# Patient Record
Sex: FEMALE | Race: WHITE | NOT HISPANIC OR LATINO | Employment: OTHER | URBAN - METROPOLITAN AREA
[De-identification: names, ages, dates, MRNs, and addresses within clinical notes are randomized per-mention and may not be internally consistent; named-entity substitution may affect disease eponyms.]

---

## 2017-11-03 ENCOUNTER — LAB (OUTPATIENT)
Dept: LAB | Facility: HOSPITAL | Age: 67
End: 2017-11-03

## 2017-11-03 ENCOUNTER — TRANSCRIBE ORDERS (OUTPATIENT)
Dept: ADMINISTRATIVE | Facility: HOSPITAL | Age: 67
End: 2017-11-03

## 2017-11-03 DIAGNOSIS — I10 ESSENTIAL HYPERTENSION, MALIGNANT: Primary | ICD-10-CM

## 2017-11-03 DIAGNOSIS — I51.9 MYXEDEMA HEART DISEASE: ICD-10-CM

## 2017-11-03 DIAGNOSIS — R79.9 ABNORMAL BLOOD CHEMISTRY: ICD-10-CM

## 2017-11-03 DIAGNOSIS — E03.9 MYXEDEMA HEART DISEASE: ICD-10-CM

## 2017-11-03 DIAGNOSIS — I10 ESSENTIAL HYPERTENSION, MALIGNANT: ICD-10-CM

## 2017-11-03 LAB
ALBUMIN SERPL-MCNC: 4.1 G/DL (ref 3.5–5.2)
ALBUMIN/GLOB SERPL: 1.1 G/DL
ALP SERPL-CCNC: 73 U/L (ref 40–129)
ALT SERPL W P-5'-P-CCNC: 18 U/L (ref 5–33)
ANION GAP SERPL CALCULATED.3IONS-SCNC: 10.2 MMOL/L
AST SERPL-CCNC: 18 U/L (ref 5–32)
BILIRUB SERPL-MCNC: 0.3 MG/DL (ref 0.2–1.2)
BUN BLD-MCNC: 19 MG/DL (ref 8–23)
BUN/CREAT SERPL: 26.4 (ref 7–25)
CALCIUM SPEC-SCNC: 9.6 MG/DL (ref 8.8–10.5)
CHLORIDE SERPL-SCNC: 103 MMOL/L (ref 98–107)
CO2 SERPL-SCNC: 27.8 MMOL/L (ref 22–29)
CREAT BLD-MCNC: 0.72 MG/DL (ref 0.57–1)
GFR SERPL CREATININE-BSD FRML MDRD: 81 ML/MIN/1.73
GLOBULIN UR ELPH-MCNC: 3.6 GM/DL
GLUCOSE BLD-MCNC: 126 MG/DL (ref 65–99)
HBA1C MFR BLD: 6.3 % (ref 4.8–5.6)
POTASSIUM BLD-SCNC: 4.6 MMOL/L (ref 3.5–5.2)
PROT SERPL-MCNC: 7.7 G/DL (ref 6–8.5)
SODIUM BLD-SCNC: 141 MMOL/L (ref 136–145)
T4 FREE SERPL-MCNC: 1.69 NG/DL (ref 0.93–1.7)
TSH SERPL DL<=0.05 MIU/L-ACNC: 0.07 MIU/ML (ref 0.27–4.2)

## 2017-11-03 PROCEDURE — 36415 COLL VENOUS BLD VENIPUNCTURE: CPT

## 2017-11-03 PROCEDURE — 84443 ASSAY THYROID STIM HORMONE: CPT

## 2017-11-03 PROCEDURE — 84439 ASSAY OF FREE THYROXINE: CPT

## 2017-11-03 PROCEDURE — 80053 COMPREHEN METABOLIC PANEL: CPT

## 2017-11-03 PROCEDURE — 83036 HEMOGLOBIN GLYCOSYLATED A1C: CPT

## 2020-03-18 ENCOUNTER — TRANSCRIBE ORDERS (OUTPATIENT)
Dept: ADMINISTRATIVE | Facility: HOSPITAL | Age: 70
End: 2020-03-18

## 2020-03-18 DIAGNOSIS — C50.912 MALIGNANT NEOPLASM OF LEFT FEMALE BREAST, UNSPECIFIED ESTROGEN RECEPTOR STATUS, UNSPECIFIED SITE OF BREAST (HCC): Primary | ICD-10-CM

## 2020-03-31 ENCOUNTER — OFFICE VISIT (OUTPATIENT)
Dept: MAMMOGRAPHY | Facility: CLINIC | Age: 70
End: 2020-03-31

## 2020-03-31 VITALS
BODY MASS INDEX: 39.65 KG/M2 | DIASTOLIC BLOOD PRESSURE: 80 MMHG | SYSTOLIC BLOOD PRESSURE: 130 MMHG | WEIGHT: 210 LBS | HEIGHT: 61 IN

## 2020-03-31 DIAGNOSIS — C50.919 MALIGNANT NEOPLASM OF FEMALE BREAST, UNSPECIFIED ESTROGEN RECEPTOR STATUS, UNSPECIFIED LATERALITY, UNSPECIFIED SITE OF BREAST (HCC): Primary | ICD-10-CM

## 2020-03-31 DIAGNOSIS — Z17.0 MALIGNANT NEOPLASM OF OVERLAPPING SITES OF RIGHT BREAST IN FEMALE, ESTROGEN RECEPTOR POSITIVE (HCC): Primary | ICD-10-CM

## 2020-03-31 DIAGNOSIS — C50.811 MALIGNANT NEOPLASM OF OVERLAPPING SITES OF RIGHT BREAST IN FEMALE, ESTROGEN RECEPTOR POSITIVE (HCC): Primary | ICD-10-CM

## 2020-03-31 PROCEDURE — 99205 OFFICE O/P NEW HI 60 MIN: CPT | Performed by: SURGERY

## 2020-03-31 RX ORDER — LOSARTAN POTASSIUM 100 MG/1
100 TABLET ORAL DAILY
COMMUNITY
Start: 2017-01-09 | End: 2020-05-08

## 2020-03-31 RX ORDER — CYCLOBENZAPRINE HCL 10 MG
10 TABLET ORAL 3 TIMES DAILY PRN
COMMUNITY
End: 2020-06-18

## 2020-03-31 RX ORDER — ZOLPIDEM TARTRATE 10 MG/1
10 TABLET ORAL
COMMUNITY
Start: 2020-01-06 | End: 2020-06-18

## 2020-03-31 RX ORDER — IRBESARTAN 150 MG/1
150 TABLET ORAL EVERY MORNING
COMMUNITY
Start: 2019-06-26

## 2020-03-31 RX ORDER — FLUTICASONE PROPIONATE 50 MCG
2 SPRAY, SUSPENSION (ML) NASAL AS NEEDED
COMMUNITY
Start: 2012-03-22

## 2020-03-31 RX ORDER — EZETIMIBE 10 MG/1
10 TABLET ORAL DAILY
COMMUNITY
Start: 2019-06-26 | End: 2022-05-23 | Stop reason: SDDI

## 2020-03-31 RX ORDER — LORATADINE 10 MG/1
1 CAPSULE, LIQUID FILLED ORAL DAILY
COMMUNITY
Start: 2020-03-16

## 2020-03-31 RX ORDER — ALBUTEROL SULFATE 90 UG/1
2 AEROSOL, METERED RESPIRATORY (INHALATION) EVERY 4 HOURS PRN
COMMUNITY
Start: 2012-03-22 | End: 2020-06-18

## 2020-03-31 NOTE — PROGRESS NOTES
Chief Complaint: Mary Jo Berg is a 70 y.o.. female here today for Breast Cancer (Right IDC)        History of Present Illness:  Patient presents with newly diagnosed breast cancer. Right IDC  She is a nice 70-year-old white female who had mammograms in June 2019.  Those mammograms described some indeterminate microcalcifications in the inferior aspect of the right breast.  A six-month follow-up was obtained and the breast tissue was described as dense.  There were 4 areas of suspicious microcalcifications scattered throughout the breast.  These were felt to be suspicious and biopsy was recommended.  The calcifications in the lower outer quadrant of the right breast have revealed grade 1 invasive ductal cancer that is ER positive at 95%, CT negative, HER-2 negative, and the Ki-67 is 5%.  She also had biopsy of some calcifications in the upper inner quadrant of the right breast at the 1 o'clock position and this has returned intermediate grade DCIS.  The other 2 areas were not biopsied.    The patient has a long history of multiple callbacks for problems in the right breast but surprisingly has not had any prior biopsies.  Her family history is significant for a paternal grandmother with breast cancer and a paternal first cousin with breast cancer both in their 70s.  Her mother had cervical cancer and father had bladder cancer.    The patient's comorbidities include hypertension and type 2 diabetes mellitus.    Review of Systems:  Review of Systems   Endocrine: Positive for hot flashes.   Skin:        The patient denies any noticeable changes to the skin of the breast.    Neurological: Positive for headaches.   Hematological: Bruises/bleeds easily.   All other systems reviewed and are negative.     I have reviewed the ROS as documented by the MA/LPN/RN Bj Stephen MD      Past Medical and Surgical History:  Breast Biopsy History:  Patient has had the following breast biopsies:3/2020  Right-malignant  Breast Cancer HIstory:  Patient does not have a past medical history of breast cancer.  Breast Operations, and year:  None  Social History     Tobacco Use   Smoking Status Never Smoker   Smokeless Tobacco Never Used     Obstetric History:  Patient is postmenopausal, entered menopause naturally at age: 54   Number of pregnancies:3  Number of live births: 3  Number of abortions or miscarriages: 0  Age of delivery of first child: 22  Patient breast fed, for the following lenth of time:8 months  Length of time taking birth control pills:1 year  Patient has never taken hormone replacement    Past Surgical History:   Procedure Laterality Date   • BLADDER REPAIR  2012   • BREAST BIOPSY Right 2020    malignant   • CARDIAC PACEMAKER PLACEMENT     • CARPAL TUNNEL RELEASE Right 2015   • CATARACT EXTRACTION Bilateral    •  SECTION      x2   • COLONOSCOPY     • ENDOMETRIAL ABLATION     • HAND SURGERY     • HERNIA REPAIR      Bilateral femoral and inguinal   • KNEE ARTHROSCOPY Bilateral    • SHOULDER ARTHROSCOPY Right 2016    rotator cuff repair, biceps tenotomy   • THYROIDECTOMY, PARTIAL     • TONSILLECTOMY AND ADENOIDECTOMY     • TOTAL KNEE ARTHROPLASTY Left    • TUBAL ABDOMINAL LIGATION         Past Medical History:   Diagnosis Date   • Breast cancer (CMS/HCC) 2020    right IDC   • CTS (carpal tunnel syndrome)    • Diabetes mellitus (CMS/HCC)     Type 2   • H/O Cellulitis of left ear    • History of asthma    • History of cataract    • History of complete heart block     paced in    • History of fall    • Hyperlipidemia    • Hypertension    • Hypothyroidism    • Osteoarthritis    • Osteopenia    • PONV (postoperative nausea and vomiting)        Prior Hospitalizations, other than for surgery or childbirth, and year:  None    Social History:  Patient is .  Patient has three daughters.    Family History:  Family History   Problem Relation Age of Onset   • Cervical cancer  Mother    • Alzheimer's disease Mother    • Arthritis Mother    • Cancer Father         Bladder   • Heart disease Father    • Hypertension Father    • Arthritis Father    • Alcohol abuse Father    • Hyperlipidemia Father    • Arthritis Maternal Grandmother    • Heart disease Other         FH of heart disease in males before age 55   • Hyperlipidemia Brother    • Hypertension Brother    • Breast cancer Paternal Grandmother 75   • Heart attack Paternal Grandfather    • Breast cancer Paternal Cousin 70       Vital Signs:  Vitals:    03/31/20 1144   BP: 130/80       Medications:    Current Outpatient Prescriptions:     Current Outpatient Medications:   •  albuterol sulfate HFA (Ventolin HFA) 108 (90 Base) MCG/ACT inhaler, , Disp: , Rfl:   •  Ascorbic Acid Buffered (BUFFERED VITAMIN C) 1000 MG capsule, , Disp: , Rfl:   •  ezetimibe (ZETIA) 10 MG tablet, TAKE 1 TABLET BY MOUTH IN THE MORNING, Disp: , Rfl:   •  fluticasone (FLONASE) 50 MCG/ACT nasal spray, , Disp: , Rfl:   •  irbesartan (AVAPRO) 150 MG tablet, TAKE 1 TABLET BY MOUTH ONCE DAILY, Disp: , Rfl:   •  Loratadine 10 MG capsule, , Disp: , Rfl:   •  losartan (COZAAR) 100 MG tablet, , Disp: , Rfl:   •  cyclobenzaprine (FLEXERIL) 10 MG tablet, Take 10 mg by mouth., Disp: , Rfl:   •  Doxylamine Succinate, Sleep, (UNISOM PO), , Disp: , Rfl:   •  fluticasone-salmeterol (ADVAIR) 100-50 MCG/DOSE DISKUS, Inhale., Disp: , Rfl:   •  metFORMIN (GLUCOPHAGE) 500 MG tablet, , Disp: , Rfl:   •  Thyroid (LEVOTHYROXINE-LIOTHYRONINE PO), , Disp: , Rfl:   •  zolpidem (AMBIEN) 10 MG tablet, Take 10 mg by mouth every night at bedtime., Disp: , Rfl:     Physical Examination:  General Appearance:   Patient is in no distress.  She is well kept and has an obese build.   Psychiatric:  Patient with appropriate mood and affect. Alert and oriented to self, time, and place.    Breast, RIGHT:  medium sized, symmetric with the contralateral side.  Breast skin is without erythema, edema, rashes.   There are no visible abnormalities upon inspection during the arm-raising maneuver or with hands on hips in the sitting position. There is no nipple retraction, discharge or nipple/areolar skin changes.There are no masses palpable in the sitting or supine positions.  She does have several small scars related to her biopsies but there is really no bruising or masses that I can feel.    Breast, LEFT:  medium sized, symmetric with the contralateral side.  Breast skin is without erythema, edema, rashes.  There are no visible abnormalities upon inspection during the arm-raising maneuver or with hands on hips in the sitting position. There is no nipple retraction, discharge or nipple/areolar skin changes.There are no masses palpable in the sitting or supine positions.    Lymphatic:  There is no axillary, cervical, infraclavicular, or supraclavicular adenopathy bilaterally.  Eyes:  Pupils are round and reactive to light.  Cardiovascular:  Heart rate and rhythm are regular.  Respiratory:  Lungs are clear bilaterally with no crackles or wheezes in any lung field.  Gastrointestinal:  Abdomen is soft, nondistended, and nontender.  There was no evidence of hepatosplenomegaly or abdominal mass.  There are  scars from previous surgeries.    Musculoskeletal:  Good strength in all 4 extremities.   There is good range of motion in both shoulders.    Skin:  No new skin lesions or rashes on the skin excluding the breast (see breast exam above).    Cancer Staging  Primary Tumor: T1a  Regional Lymph Nodes:  N0  Distant Mets: M0  Clinical Stage Group: IA    Assessment:  1. Malignant neoplasm of overlapping sites of right breast in female, estrogen receptor positive (CMS/HCC)          Plan:  The patient came by herself today because of the coronavirus restrictions.  Her  did listen and on the phone during our conversation.  The office visit lasted 1 hour with 50 minutes spent in face-to-face consultation.    We began the  conversation discussing her pathology report.  We talked about the origin of most of breast cancers from either the ducts or the lobules.  The difference between invasive and in situ disease was explained and the visual was drawn for her.  When invasion has occurred, the lymph nodes need to be evaluated.  When there is in situ disease the lymph nodes should be considered if the area of concern is widespread or it is high-grade.  We also discussed the significance of hormone receptors.  They often can give us some idea how the breast cancer will behave and potentially lead us to offer neoadjuvant chemotherapy.    Next we discussed the surgical options which include breast conserving therapy versus a mastectomy.  With breast conserving therapy we are talking about a lumpectomy with margins, lymph node evaluation, and radiation treatment.  The radiation treatment is generally given to the entire breast for 6 weeks.  The side effects consist of local skin change and potential injury to nearby structures such as the lung or the heart.  A mastectomy would involve removing the breast tissues with preservation of the pectoral muscles and evaluation of the lymph nodes if indicated.  The potential for reconstruction by either an implant or autologous tissue was discussed.  This could be performed on a delayed basis or immediately depending on a multitude of factors.  The survival rates for these 2 procedures are equivalent but there are incidences where one may be favored over the other.  There are times when a lumpectomy is not possible due to the large tumor to breast ratio or the location of the tumor.  Previous radiation to the chest wall or collagen disorders such as scleroderma would make radiation treatment and possible and therefore exclude breast conserving therapy as an option.    The patient understands that her breast cancer is multicentric.  The invasive cancer is in the lower outer quadrant and DCIS in the upper  inner quadrant.  She also has a long history of mammogram troubles and this right breast and therefore I think a mastectomy would be her best option.  She agrees with that and I have discussed the recovery and what is involved with the surgery.  She would be interested in reconstruction and we will get her to see the plastic surgeons.  We also discussed what was involved with a sentinel lymph node biopsy and the possible need for an axillary dissection if she has positive sentinel lymph nodes.  Although an MRI would not really change her decision regarding the right breast the patient would like to obtain it to get a better look at the left breast.  I will be speaking with her after the MRI returns.      CPT coding:    Next Appointment:  No follow-ups on file.            EMR Dragon/transcription disclaimer:    Much of this encounter note is an electronic transcription/translocation of spoken language to printed text.  The electronic translation of spoken language may permit erroneous, or at times, nonsensical words or phrases to be inadvertently transcribed.  Although I have reviewed the note from such areas, some may still exist.

## 2020-04-01 ENCOUNTER — TELEPHONE (OUTPATIENT)
Dept: OTHER | Facility: HOSPITAL | Age: 70
End: 2020-04-01

## 2020-04-01 NOTE — TELEPHONE ENCOUNTER
Referral received from Dr. Setphen's office. I called Ms. Berg and introduced myself and navigational services. She states the plan is to have a consult with Dr. Jasso on April 7th and a MRI on April 8th. She verbalizes her primary concern is not being able to bring her  with her the day of surgery due to the Corona virus but she understands why that is necessary. She has a good understanding of her pathology and treatment options and  is comfortable with her plan of care.     She stated she has a wonderful support system from her [facility] family and friends. Her  has also been very helpful. She has been social distancing for the past 2 weeks and only leaves the house to attend her appointments. She stated she is anxious but still feels blessed to have such good care.     We discussed integrative therapies and other [facility]. She verbalized interest in receiving a navigation folder outlining services. I verified her mailing address and will send out a navigation folder with the following information:     [facility],  [facility] Breast Cancer Support Group, Cancer and Restorative Exercise (CARE), [facility] Exercise program, Together for Breast Cancer Survival,  For Women Facing Breast Cancer, Road to Recovery, [facility], [facility], Massage Therapy, Reiki Therapy, [facility] [employer] [place], Cancer Nutrition, and Survivorship [facility].     She verbalized appreciation for navigational services and she has my contact information and will call with any questions that arise.

## 2020-04-02 ENCOUNTER — TELEPHONE (OUTPATIENT)
Dept: MAMMOGRAPHY | Facility: CLINIC | Age: 70
End: 2020-04-02

## 2020-04-02 ENCOUNTER — TELEPHONE (OUTPATIENT)
Dept: OTHER | Facility: HOSPITAL | Age: 70
End: 2020-04-02

## 2020-04-02 ENCOUNTER — HOSPITAL ENCOUNTER (OUTPATIENT)
Dept: MRI IMAGING | Facility: HOSPITAL | Age: 70
Discharge: HOME | End: 2020-04-02
Admitting: SURGERY

## 2020-04-02 VITALS
HEART RATE: 97 BPM | HEIGHT: 61 IN | WEIGHT: 210 LBS | DIASTOLIC BLOOD PRESSURE: 74 MMHG | BODY MASS INDEX: 39.65 KG/M2 | OXYGEN SATURATION: 95 % | SYSTOLIC BLOOD PRESSURE: 113 MMHG | RESPIRATION RATE: 16 BRPM

## 2020-04-02 DIAGNOSIS — C50.811 MALIGNANT NEOPLASM OF OVERLAPPING SITES OF RIGHT BREAST IN FEMALE, ESTROGEN RECEPTOR POSITIVE (HCC): ICD-10-CM

## 2020-04-02 DIAGNOSIS — Z17.0 MALIGNANT NEOPLASM OF OVERLAPPING SITES OF RIGHT BREAST IN FEMALE, ESTROGEN RECEPTOR POSITIVE (HCC): ICD-10-CM

## 2020-04-02 LAB — CREAT BLDA-MCNC: 0.9 MG/DL (ref 0.6–1.3)

## 2020-04-02 PROCEDURE — 82565 ASSAY OF CREATININE: CPT

## 2020-04-02 PROCEDURE — A9577 INJ MULTIHANCE: HCPCS | Performed by: SURGERY

## 2020-04-02 PROCEDURE — C8937 CAD BREAST MRI: HCPCS

## 2020-04-02 PROCEDURE — C8908 MRI W/O FOL W/CONT, BREAST,: HCPCS

## 2020-04-02 PROCEDURE — 0 GADOBENATE DIMEGLUMINE 529 MG/ML SOLUTION: Performed by: SURGERY

## 2020-04-02 RX ORDER — LANCETS
EACH MISCELLANEOUS
COMMUNITY
Start: 2015-09-30

## 2020-04-02 RX ADMIN — GADOBENATE DIMEGLUMINE 20 ML: 529 INJECTION, SOLUTION INTRAVENOUS at 10:13

## 2020-04-02 NOTE — NURSING NOTE
Peanut butter and crackers and ginger ale given to the patient.  She will let us know when she is ready to leave.

## 2020-04-02 NOTE — NURSING NOTE
Patient reports she is ready to leave.  Patient taken to front of entrance B where she was parked.  Patient ambulated to her car without problems.  No problems or concerns noted.

## 2020-04-02 NOTE — NURSING NOTE
Patient up and dressed, she states the nausea is gone and she is drinking Ginger ale.  She reports her sternum is sore from laying on the table.  Otherwise no C/os.

## 2020-04-02 NOTE — TELEPHONE ENCOUNTER
I told her that the MRI showed a 2 cm mass at the site of invasive cancer.  There was no residual suspicious enhancement at the site of the DCIS.  The rest of that breast looked okay and the opposite breast did not show any suspicious enhancement.  She has an appointment with plastic surgeons tomorrow and we will await that discussion.

## 2020-04-02 NOTE — TELEPHONE ENCOUNTER
Message received from Dr. Jasso to see if this patient's appointment can be moved out till after surgery. Called and spoke with patient about the reasoning for postponing med oncology consult until after surgery. She had verbal understanding and was willing to wait. Relayed message to Dr. Jasso's office to cancel the appointment.

## 2020-04-03 ENCOUNTER — LAB REQUISITION (OUTPATIENT)
Dept: LAB | Facility: HOSPITAL | Age: 70
End: 2020-04-03

## 2020-04-03 DIAGNOSIS — Z00.00 ENCOUNTER FOR GENERAL ADULT MEDICAL EXAMINATION WITHOUT ABNORMAL FINDINGS: ICD-10-CM

## 2020-04-03 LAB
LAB AP CASE REPORT: NORMAL
LAB AP DIAGNOSIS COMMENT: NORMAL
LAB AP SPECIAL STAINS: NORMAL
PATH REPORT.FINAL DX SPEC: NORMAL
PATH REPORT.GROSS SPEC: NORMAL

## 2020-04-06 ENCOUNTER — TELEPHONE (OUTPATIENT)
Dept: MAMMOGRAPHY | Facility: CLINIC | Age: 70
End: 2020-04-06

## 2020-04-06 NOTE — TELEPHONE ENCOUNTER
Patient called to let us know Dr. López will not do immediate reconstruction.    Please call her to discuss next step in plan of care/ surgery    [phone]

## 2020-04-07 ENCOUNTER — APPOINTMENT (OUTPATIENT)
Dept: LAB | Facility: HOSPITAL | Age: 70
End: 2020-04-07

## 2020-04-07 NOTE — TELEPHONE ENCOUNTER
Thank you Dr. Stephen, please let me know when you have placed her surgery orders. I will get her on the schedule.    Marilia

## 2020-04-08 ENCOUNTER — APPOINTMENT (OUTPATIENT)
Dept: MRI IMAGING | Facility: HOSPITAL | Age: 70
End: 2020-04-08

## 2020-04-10 ENCOUNTER — TELEPHONE (OUTPATIENT)
Dept: MAMMOGRAPHY | Facility: CLINIC | Age: 70
End: 2020-04-10

## 2020-04-10 DIAGNOSIS — Z17.0 MALIGNANT NEOPLASM OF OVERLAPPING SITES OF RIGHT BREAST IN FEMALE, ESTROGEN RECEPTOR POSITIVE (HCC): Primary | ICD-10-CM

## 2020-04-10 DIAGNOSIS — C50.811 MALIGNANT NEOPLASM OF OVERLAPPING SITES OF RIGHT BREAST IN FEMALE, ESTROGEN RECEPTOR POSITIVE (HCC): Primary | ICD-10-CM

## 2020-04-10 NOTE — TELEPHONE ENCOUNTER
I spoke with her on the phone today.  She is a priority C1 patient and will need to receive hormone blocking therapy with delaying her surgery until the pandemic subsides.  The patient understands this.  We have placed a referral to the medical oncologists.

## 2020-04-15 ENCOUNTER — TELEMEDICINE (OUTPATIENT)
Dept: ONCOLOGY | Facility: CLINIC | Age: 70
End: 2020-04-15

## 2020-04-15 DIAGNOSIS — Z17.0 MALIGNANT NEOPLASM OF UPPER-OUTER QUADRANT OF LEFT BREAST IN FEMALE, ESTROGEN RECEPTOR POSITIVE (HCC): Primary | ICD-10-CM

## 2020-04-15 DIAGNOSIS — C50.412 MALIGNANT NEOPLASM OF UPPER-OUTER QUADRANT OF LEFT BREAST IN FEMALE, ESTROGEN RECEPTOR POSITIVE (HCC): Primary | ICD-10-CM

## 2020-04-15 PROCEDURE — 99205 OFFICE O/P NEW HI 60 MIN: CPT | Performed by: INTERNAL MEDICINE

## 2020-04-15 RX ORDER — ANASTROZOLE 1 MG/1
1 TABLET ORAL DAILY
Qty: 30 TABLET | Refills: 6 | Status: SHIPPED | OUTPATIENT
Start: 2020-04-15 | End: 2020-05-14

## 2020-04-15 NOTE — PROGRESS NOTES
Subjective     REASON FOR CONSULTATION: Right breast cancer invasive low-grade ER positive [place] negative HER-2 negative breast cancer at 6:00 with DCIS at 1:00 post biopsy  Provide an opinion on any further workup or treatment                             REQUESTING PHYSICIAN: MD Keshia Durán MD    RECORDS OBTAINED:  Records of the patients history including those obtained from the referring provider were reviewed and summarized in detail.    HISTORY OF PRESENT ILLNESS:  The patient is a 70 y.o. year old female who is here for an opinion about the above issue.    History of Present Illness patient is a 70-year-old white female with hypercholesterolemia diabetes type 2 hypertension hypothyroidism and pacemaker placement 2015 was noted on routine mammogram in June of this last year to have some abnormalities in the right breast that warranted close follow-up.  This led to a repeat mammogram in January which was delayed due to her trip she took Shira with her  when she came back in February she did the mammogram and this showed persistence and worsening of the calcifications at the 6:00 and 1 o'clock position of the right breast.    This led to a biopsy of both these regions on 3/5/2020 which showed DCIS at the 1 o'clock position intermediate grade with calcifications and no necrosis and well differentiated grade 1 infiltrating ductal carcinoma at the 6 o'clock position measuring 2 mm with no lymph vascular invasion and associated DCIS intermediate grade the tumor was positive for estrogen greater than 95% negative for progesterone 0% HER-2 negative at 1+ Ki-67 of 5%.    Patient was referred to Dr. Stephen who ordered bilateral breast MRIs which showed residual 2 cm area of enhancement at the 6 o'clock position but no further abnormalities at the 1 o'clock position of the right breast.  There is no axillary or internal mammary adenopathy  in the left breast was benign.  The mass is not palpable by the patient or Dr. Stephen    Based on the recent coronavirus outbreak which is ongoing elective surgeries for grade 1 breast cancers are being delayed based on guidelines from the hospital and from the oncological community and she is here to discuss neoadjuvant hormonal blockade.    Patient is  3 para 3 first childbirth was age 22 she breast-fed 2 of her 3 children for at least 4 to 6 months menarche was at age 11 menopause at 49 she did not take hormones after menopause    Family history is positive for paternal grandmother with metastatic breast cancer at age 75 a paternal first cousin with breast cancer at age 70 her father  with bladder cancer mother had endometrial carcinoma at age 80.  She is concerned about genetic testing because she has 3 daughters and 3 grand daughters.    She is not a smoker or drinker and denies DVTs MI or stroke    Past Medical History:   Diagnosis Date   • Breast cancer (CMS/HCC) 2020    right IDC   • CTS (carpal tunnel syndrome)    • Diabetes mellitus (CMS/HCC)     Type 2   • H/O Cellulitis of left ear    • History of asthma    • History of cataract    • History of complete heart block     paced in 2015   • History of fall    • Hyperlipidemia    • Hypertension    • Hypothyroidism    • Osteoarthritis    • Osteopenia    • PONV (postoperative nausea and vomiting)         Past Surgical History:   Procedure Laterality Date   • BLADDER REPAIR  2012   • BREAST BIOPSY Right 2020    malignant   • CARDIAC PACEMAKER PLACEMENT     • CARPAL TUNNEL RELEASE Right 2015   • CATARACT EXTRACTION Bilateral    •  SECTION      x2   • COLONOSCOPY     • ENDOMETRIAL ABLATION     • HAND SURGERY     • HERNIA REPAIR      Bilateral femoral and inguinal   • KNEE ARTHROSCOPY Bilateral    • SHOULDER ARTHROSCOPY Right 2016    rotator cuff repair, biceps tenotomy   • THYROIDECTOMY, PARTIAL  2005   • TONSILLECTOMY AND  ADENOIDECTOMY     • TOTAL KNEE ARTHROPLASTY Left 2007   • TUBAL ABDOMINAL LIGATION          Current Outpatient Medications on File Prior to Visit   Medication Sig Dispense Refill   • albuterol sulfate HFA (Ventolin HFA) 108 (90 Base) MCG/ACT inhaler      • Ascorbic Acid Buffered (BUFFERED VITAMIN C) 1000 MG capsule      • cyclobenzaprine (FLEXERIL) 10 MG tablet Take 10 mg by mouth.     • Doxylamine Succinate, Sleep, (UNISOM PO)      • ezetimibe (ZETIA) 10 MG tablet TAKE 1 TABLET BY MOUTH IN THE MORNING     • fluticasone (FLONASE) 50 MCG/ACT nasal spray      • fluticasone-salmeterol (ADVAIR) 100-50 MCG/DOSE DISKUS Inhale.     • glucose blood test strip ACCU-CHECK SKYLA. Use to check blood sugar once daily, as directed. DX: E11.9     • irbesartan (AVAPRO) 150 MG tablet TAKE 1 TABLET BY MOUTH ONCE DAILY     • Lancets (ACCU-CHEK MULTICLIX) lancets Use to check blood sugar once daily, as directed. DX: 250.00     • Loratadine 10 MG capsule      • losartan (COZAAR) 100 MG tablet      • metFORMIN (GLUCOPHAGE) 500 MG tablet      • Thyroid (LEVOTHYROXINE-LIOTHYRONINE PO)      • zolpidem (AMBIEN) 10 MG tablet Take 10 mg by mouth every night at bedtime.       No current facility-administered medications on file prior to visit.         ALLERGIES:    Allergies   Allergen Reactions   • Indomethacin Dizziness     DIZZINESS, EAR RINGING, AND NAUSEA  DIZZINESS, EAR RINGING, AND NAUSEA     • Nutritional Supplements Swelling     [place] nuts - no other nuts that she is aware of   • Penicillins Anaphylaxis, Hives, Shortness Of Breath and Itching     Sob, hives  SOB, tachycardia  Sob, hives  SOB, tachycardia  Sob, hives     • Hydrochlorothiazide Unknown - High Severity     Caused gout symptoms.  Other reaction(s): Other (See Comments)  Caused gout symptoms.  Caused gout symptoms.     • Lisinopril Cough   • Morphine Nausea Only     N/V  N/V     • Pentazocine Nausea And Vomiting     N/V          Social History     Socioeconomic History   •  Marital status:      Spouse name: Sean   • Number of children: Not on file   • Years of education: Not on file   • Highest education level: Not on file   Occupational History     Employer: RETIRED   Tobacco Use   • Smoking status: Never Smoker   • Smokeless tobacco: Never Used   Substance and Sexual Activity   • Alcohol use: Yes     Comment: Occasionally   • Drug use: Never        Family History   Problem Relation Age of Onset   • Cervical cancer Mother    • Alzheimer's disease Mother    • Arthritis Mother    • Cancer Father         Bladder   • Heart disease Father    • Hypertension Father    • Arthritis Father    • Alcohol abuse Father    • Hyperlipidemia Father    • Arthritis Maternal Grandmother    • Heart disease Other         FH of heart disease in males before age 55   • Hyperlipidemia Brother    • Hypertension Brother    • Breast cancer Paternal Grandmother 75   • Heart attack Paternal Grandfather    • Breast cancer Paternal Cousin 70        Review of Systems   Constitutional: Positive for diaphoresis.   Respiratory: Negative for shortness of breath.    Cardiovascular: Negative for chest pain, palpitations and leg swelling.   Musculoskeletal: Positive for arthralgias, gait problem (Arthritis in the knees one is replaced) and joint swelling.   Neurological: Negative for dizziness and weakness.   All other systems reviewed and are negative.       Objective Video visit    There were no vitals filed for this visit.  No flowsheet data found.    Physical Exam physical exam not done as this was a video visit    RECENT LABS:  Hematology WBC   Date Value Ref Range Status   02/05/2019 10.83 4.5 - 11.0 10*3/uL Final     RBC   Date Value Ref Range Status   02/05/2019 4.36 4.0 - 5.2 10*6/uL Final     Hemoglobin   Date Value Ref Range Status   02/05/2019 13.7 12.0 - 16.0 g/dL Final     Hematocrit   Date Value Ref Range Status   02/05/2019 42.1 36.0 - 46.0 % Final     Platelets   Date Value Ref Range Status    02/05/2019 337 518 - 440 10*3/uL Final          Assessment/Plan   1.cT1cN0 grade 1 infiltrating ductal carcinoma right breast at 6:00 ER positive [place] negative HER-2 negative with associated intermediate grade DCIS the 1 o'clock position of the breast both lesions nonpalpable  2.  Type 2 diabetes/hypertension hypercholesterolemia/  3.  Hypothyroidism  4.  Significant degenerative arthritis on nonsteroidals  5.  Family history of breast cancer  6.  Normal bone density in 2018    Plan  1.  Oncotype DX score on her core biopsy at 6:00  2.  Arimidex 1 mg daily till surgery unless her Oncotype DX score is high in which case we will discuss other options  The side effects and toxicities of the Aromatase inhibitors was discussed with the patient including, hot flashes, mood swings and hair thinning.Significant arthralgias and worsening bone density were also discussed. Baseline bone density evaluation was ordered.  I discussed with the patient and her  on the video conference that this was not the standard of care usually but because of the pandemic and making treatment decisions for the safety of the patient and based on delaying elective surgery for certain subsets of breast cancer which she presented.    I explained the rationale of doing an Oncotype DX score and told her that if she was not willing to take chemotherapy there would be no reason to do this but she is willing to take chemotherapy if she has a high Oncotype score and therefore we will send this results and she will call me in 2 weeks to discuss them    There are no clear guidelines on how to manage patients with clinically node-negative disease with a high Oncotype and we may decide to move up her surgery so that we will have more information with which to make a decision about chemotherapy in order to decide how much chemotherapy to give her    She and her  were willing to go this route and she will call me in 2 weeks to discuss her Oncotype  score    I am concerned about her general joint discomfort which might be aggravated by an aromatase inhibitor but we will deal with that if it occurs    I will also schedule her to see the genetic counselors and they could possibly do a video visit with her also and decide about genetic testing

## 2020-04-20 ENCOUNTER — TELEPHONE (OUTPATIENT)
Dept: ONCOLOGY | Facility: HOSPITAL | Age: 70
End: 2020-04-20

## 2020-04-20 NOTE — TELEPHONE ENCOUNTER
Note from [place] noted and pt called.  Pt states her feet are swollen, red, painful with fine raised red rash.  Pt reports she did not take Arimidex today and her feet already feel some better.  Message forwarded to Dr. Jasso along with above description.  Will wait for return call or message.

## 2020-04-20 NOTE — PROGRESS NOTES
Talk to patient about rash and pain in her big toe-she has had gout twice before and it felt just like the gout so I do not think this is caused by the Arimidex    I recommended pushing fluids and taking some Aleve twice a day she is already better today since starting Aleve yesterday  I told her to call by Friday and tell us if he was better and if she is better to try the Arimidex again next Monday and see how she does    Overall I do not think this is related to the Arimidex

## 2020-04-22 ENCOUNTER — TELEPHONE (OUTPATIENT)
Dept: ONCOLOGY | Facility: HOSPITAL | Age: 70
End: 2020-04-22

## 2020-04-22 NOTE — TELEPHONE ENCOUNTER
Pt called again today about her rash and the swelling in her foot.  Dr. Jasso talked to patient 2 days ago and had decided she probably had gout.  Decided to hold her arimidex temporarily and have her call on Friday for update.    Pt calling today instead and said the swelling and foot pain is better but the rash is spreading up her leg now.  Still looks the same as it did on Monday when she apparently sent pics to Dr. Jasso.  Message sent to Dr. Haynes to see if any further changes should be made.  Will wait for response.

## 2020-04-22 NOTE — TELEPHONE ENCOUNTER
----- Message from Leanna Hemphill MA sent at 4/22/2020  3:22 PM EDT -----  Regarding: RE: Dr. Romero Jasso said to have patient take benadryl if rash is not any better have her let someone look at it  ----- Message -----  From: Dionna Guillermo RN  Sent: 4/22/2020   3:01 PM EDT  To: [facility] [place]  Subject: Dr. Romero EASLEY, please review with Dr. Jasso.      Dr. Jasso,   You spoke with the patient Monday about a rash and swelling in her foot. The gout and swelling seem to be doing better but the rash is spreading up her leg.  She said you were aware of the rash details.     She has not taken anything but she did soak it in epsom salt.  It helped the gout but not the rash.  Let me know what you recommend. Thanks.

## 2020-04-24 ENCOUNTER — TELEPHONE (OUTPATIENT)
Dept: ONCOLOGY | Facility: CLINIC | Age: 70
End: 2020-04-24

## 2020-04-24 NOTE — TELEPHONE ENCOUNTER
PT CALLING TO GIVE UPDATE ON RASH, SWELLING. PT STATES THAT RASH AND SWELLING ARE BETTER. PT HAD USED BENADRYL. DR. GARCIA BELIEVED THIS TO BE GOUT AND NOT R/T ARIMIDEX IN ANY WAY. ADVISED PT IF THIS REMAINS AN ISSUE TO SEEK THE CARE OF HER PCP OR GO TO URGENT CARE. PT V/U.

## 2020-04-30 ENCOUNTER — OFFICE VISIT (OUTPATIENT)
Dept: ONCOLOGY | Facility: CLINIC | Age: 70
End: 2020-04-30

## 2020-04-30 DIAGNOSIS — Z17.0 MALIGNANT NEOPLASM OF UPPER-OUTER QUADRANT OF LEFT BREAST IN FEMALE, ESTROGEN RECEPTOR POSITIVE (HCC): Primary | ICD-10-CM

## 2020-04-30 DIAGNOSIS — C50.412 MALIGNANT NEOPLASM OF UPPER-OUTER QUADRANT OF LEFT BREAST IN FEMALE, ESTROGEN RECEPTOR POSITIVE (HCC): Primary | ICD-10-CM

## 2020-04-30 PROCEDURE — 99443 PR PHYS/QHP TELEPHONE EVALUATION 21-30 MIN: CPT | Performed by: INTERNAL MEDICINE

## 2020-04-30 RX ORDER — PREDNISONE 10 MG/1
10 TABLET ORAL DAILY
Qty: 10 TABLET | Refills: 2 | Status: SHIPPED | OUTPATIENT
Start: 2020-04-30 | End: 2020-05-08

## 2020-04-30 NOTE — PROGRESS NOTES
Subjective     REASON FOR CONSULTATION: Right breast cancer invasive low-grade ER positive ND negative HER-2 negative breast cancer at 6:00 with DCIS at 1:00 post biopsy                             REQUESTING PHYSICIAN: MD Keshia Durán MD  History of Present Illness   Patient is a 70-year-old female with small mammographically detected right breast cancer ER positive ND negative and we opted to start Arimidex preoperatively because surgeries are being delayed for the coronavirus pandemic.  We will also ask for an Oncotype DX to be done on her biopsy specimen to help us make decisions about further treatment.  Unfortunately the specimen was too small and the Oncotype could not be done.  In addition she developed gout in her foot after 2 or 3 doses of Arimidex which I do not think is related to the Arimidex and she was evaluated by her daughter who is a physician who did not think there was any infection she tried nonsteroidals with some improvement but she still not better and has not yet restarted her Arimidex    In light of all these findings I have given her a short prednisone bolus to see if this will help the gout and at this point I think we should move ahead with surgery in the next 3 to 4 weeks and hold off on the Arimidex until after surgery so we can get all the information we need at the time of surgery    She is okay with this approach and I have called her surgeon and her cardiologist to clear her for surgery    She will need to stop her nonsteroidals at least a week before surgery and hopefully the steroids will help the foot enough that she does not have to take the nonsteroidals    She has had no fever or other signs of infection      Past Medical History:   Diagnosis Date   • Breast cancer (CMS/HCC) 03/2020    right IDC   • CTS (carpal tunnel syndrome)    • Diabetes mellitus (CMS/HCC)     Type 2   • H/O Cellulitis of left ear     • History of asthma    • History of cataract    • History of complete heart block     paced in 2015   • History of fall    • Hyperlipidemia    • Hypertension    • Hypothyroidism    • Osteoarthritis    • Osteopenia    • PONV (postoperative nausea and vomiting)         Past Surgical History:   Procedure Laterality Date   • BLADDER REPAIR  2012   • BREAST BIOPSY Right 2020    malignant   • CARDIAC PACEMAKER PLACEMENT     • CARPAL TUNNEL RELEASE Right 2015   • CATARACT EXTRACTION Bilateral    •  SECTION      x2   • COLONOSCOPY  2015   • ENDOMETRIAL ABLATION     • HAND SURGERY     • HERNIA REPAIR      Bilateral femoral and inguinal   • KNEE ARTHROSCOPY Bilateral    • SHOULDER ARTHROSCOPY Right 2016    rotator cuff repair, biceps tenotomy   • THYROIDECTOMY, PARTIAL     • TONSILLECTOMY AND ADENOIDECTOMY     • TOTAL KNEE ARTHROPLASTY Left    • TUBAL ABDOMINAL LIGATION      patient is a 70-year-old white female with hypercholesterolemia diabetes type 2 hypertension hypothyroidism and pacemaker placement 2015 was noted on routine mammogram in  of this last year to have some abnormalities in the right breast that warranted close follow-up.  This led to a repeat mammogram in January which was delayed due to her trip she took Shira with her  when she came back in February she did the mammogram and this showed persistence and worsening of the calcifications at the 6:00 and 1 o'clock position of the right breast.    This led to a biopsy of both these regions on 3/5/2020 which showed DCIS at the 1 o'clock position intermediate grade with calcifications and no necrosis and well differentiated grade 1 infiltrating ductal carcinoma at the 6 o'clock position measuring 2 mm with no lymph vascular invasion and associated DCIS intermediate grade the tumor was positive for estrogen greater than 95% negative for progesterone 0% HER-2 negative at 1+ Ki-67 of 5%.    Patient was referred to Dr. Stephen who  ordered bilateral breast MRIs which showed residual 2 cm area of enhancement at the 6 o'clock position but no further abnormalities at the 1 o'clock position of the right breast.  There is no axillary or internal mammary adenopathy in the left breast was benign.  The mass is not palpable by the patient or Dr. Stephen    Based on the recent coronavirus outbreak which is ongoing elective surgeries for grade 1 breast cancers are being delayed based on guidelines from the hospital and from the oncological community and she is here to discuss neoadjuvant hormonal blockade.    Patient is  3 para 3 first childbirth was age 22 she breast-fed 2 of her 3 children for at least 4 to 6 months menarche was at age 11 menopause at 49 she did not take hormones after menopause    Family history is positive for paternal grandmother with metastatic breast cancer at age 75 a paternal first cousin with breast cancer at age 70 her father  with bladder cancer mother had endometrial carcinoma at age 80.  She is concerned about genetic testing because she has 3 daughters and 3 grand daughters.    She is not a smoker or drinker and denies DVTs MI or stroke    Because of the delay of surgery due to the coronavirus we decided to do Oncotype on her biopsy and start aromatase inhibitor in the neoadjuvant setting    The side effects and toxicities of the Aromatase inhibitors was discussed with the patient including, hot flashes, mood swings and hair thinning.Significant arthralgias and worsening bone density were also discussed. Baseline bone density evaluation was ordered.  I discussed with the patient and her  on the video conference that this was not the standard of care usually but because of the pandemic and making treatment decisions for the safety of the patient and based on delaying elective surgery for certain subsets of breast cancer which she presented.    I explained the rationale of doing an Oncotype DX score and  told her that if she was not willing to take chemotherapy there would be no reason to do this but she is willing to take chemotherapy if she has a high Oncotype score and therefore we will send this results and she will call me in 2 weeks to discuss them    There are no clear guidelines on how to manage patients with clinically node-negative disease with a high Oncotype and we may decide to move up her surgery so that we will have more information with which to make a decision about chemotherapy in order to decide how much chemotherapy to give her    She and her  were willing to go this route and she will call me in 2 weeks to discuss her Oncotype score    I am concerned about her general joint discomfort which might be aggravated by an aromatase inhibitor but we will deal with that if it occurs    I will also schedule her to see the genetic counselors and they could possibly do a video visit with her also and decide about genetic testing        Current Outpatient Medications on File Prior to Visit   Medication Sig Dispense Refill   • albuterol sulfate HFA (Ventolin HFA) 108 (90 Base) MCG/ACT inhaler      • anastrozole (Arimidex) 1 MG tablet Take 1 tablet by mouth Daily for 30 days. 30 tablet 6   • Ascorbic Acid Buffered (BUFFERED VITAMIN C) 1000 MG capsule      • cyclobenzaprine (FLEXERIL) 10 MG tablet Take 10 mg by mouth.     • Doxylamine Succinate, Sleep, (UNISOM PO)      • ezetimibe (ZETIA) 10 MG tablet TAKE 1 TABLET BY MOUTH IN THE MORNING     • fluticasone (FLONASE) 50 MCG/ACT nasal spray      • fluticasone-salmeterol (ADVAIR) 100-50 MCG/DOSE DISKUS Inhale.     • glucose blood test strip ACCU-CHECK SKYLA. Use to check blood sugar once daily, as directed. DX: E11.9     • irbesartan (AVAPRO) 150 MG tablet TAKE 1 TABLET BY MOUTH ONCE DAILY     • Lancets (ACCU-CHEK MULTICLIX) lancets Use to check blood sugar once daily, as directed. DX: 250.00     • Loratadine 10 MG capsule      • losartan (COZAAR) 100 MG  tablet      • metFORMIN (GLUCOPHAGE) 500 MG tablet      • Thyroid (LEVOTHYROXINE-LIOTHYRONINE PO)      • zolpidem (AMBIEN) 10 MG tablet Take 10 mg by mouth every night at bedtime.       No current facility-administered medications on file prior to visit.         ALLERGIES:    Allergies   Allergen Reactions   • Indomethacin Dizziness     DIZZINESS, EAR RINGING, AND NAUSEA  DIZZINESS, EAR RINGING, AND NAUSEA     • Nutritional Supplements Swelling     [place] nuts - no other nuts that she is aware of   • Penicillins Anaphylaxis, Hives, Shortness Of Breath and Itching     Sob, hives  SOB, tachycardia  Sob, hives  SOB, tachycardia  Sob, hives     • Hydrochlorothiazide Unknown - High Severity     Caused gout symptoms.  Other reaction(s): Other (See Comments)  Caused gout symptoms.  Caused gout symptoms.     • Lisinopril Cough   • Morphine Nausea Only     N/V  N/V     • Pentazocine Nausea And Vomiting     N/V          Social History     Socioeconomic History   • Marital status:      Spouse name: Sean   • Number of children: Not on file   • Years of education: Not on file   • Highest education level: Not on file   Occupational History     Employer: RETIRED   Tobacco Use   • Smoking status: Never Smoker   • Smokeless tobacco: Never Used   Substance and Sexual Activity   • Alcohol use: Yes     Comment: Occasionally   • Drug use: Never        Family History   Problem Relation Age of Onset   • Cervical cancer Mother    • Alzheimer's disease Mother    • Arthritis Mother    • Cancer Father         Bladder   • Heart disease Father    • Hypertension Father    • Arthritis Father    • Alcohol abuse Father    • Hyperlipidemia Father    • Arthritis Maternal Grandmother    • Heart disease Other         FH of heart disease in males before age 55   • Hyperlipidemia Brother    • Hypertension Brother    • Breast cancer Paternal Grandmother 75   • Heart attack Paternal Grandfather    • Breast cancer Paternal Cousin 70        Review  of Systems   Constitutional: Positive for diaphoresis.   Respiratory: Negative for shortness of breath.    Cardiovascular: Negative for chest pain, palpitations and leg swelling.   Musculoskeletal: Positive for arthralgias, gait problem (Arthritis in the knees one is replaced) and joint swelling.   Neurological: Negative for dizziness and weakness.   All other systems reviewed and are negative.       Objective No vitals telephone visit    There were no vitals filed for this visit.  No flowsheet data found.    Physical Exam physical exam not done as this was a telephone visit    RECENT LABS:  Hematology WBC   Date Value Ref Range Status   02/05/2019 10.83 4.5 - 11.0 10*3/uL Final     RBC   Date Value Ref Range Status   02/05/2019 4.36 4.0 - 5.2 10*6/uL Final     Hemoglobin   Date Value Ref Range Status   02/05/2019 13.7 12.0 - 16.0 g/dL Final     Hematocrit   Date Value Ref Range Status   02/05/2019 42.1 36.0 - 46.0 % Final     Platelets   Date Value Ref Range Status   02/05/2019 337 140 - 440 10*3/uL Final          Assessment/Plan   1.cT1cN0 grade 1 infiltrating ductal carcinoma right breast at 6:00 ER positive WA negative HER-2 negative with associated intermediate grade DCIS the 1 o'clock position of the breast both lesions nonpalpable  2.  Type 2 diabetes/hypertension hypercholesterolemia/  3.  Hypothyroidism  4.  Significant degenerative arthritis on nonsteroidals  5.  Family history of breast cancer  6.  Normal bone density in 2018  7.  Gout right foot minimally responsive to nonsteroidals    .Plan  1.  Prednisone 40 mg taper quickly over 4 days  2.  Hold Arimidex 1 mg daily till surgery as Oncotype could not be done on her biopsy which was too small specimen-we will try femara operatively just to avoid any further issues with Arimidex  3. Discuss  Moving up surgery with dr ballesteros  4.  I have notified her cardiologist Dr. Lemons to clear her for surgery because of her pacemaker  5.  We will have to stop her  nonsteroidals of the steroids help in order to facilitate surgery  6.  Genetic testing to be done after surgery  We will see her back in 2 months and hopefully she will have had surgery and we can do the Oncotype on the pathology specimen and have all the information we need    If her foot does not improve with the prednisone she needs to come in and be evaluated    You have chosen to receive care through a telephone visit. Do you consent to use a telephone visit for your medical care today? Yes    Telephone visit greater than 20 minutes

## 2020-05-01 ENCOUNTER — PREP FOR SURGERY (OUTPATIENT)
Dept: OTHER | Facility: HOSPITAL | Age: 70
End: 2020-05-01

## 2020-05-01 DIAGNOSIS — C50.411 MALIGNANT NEOPLASM OF UPPER-OUTER QUADRANT OF RIGHT BREAST IN FEMALE, ESTROGEN RECEPTOR POSITIVE (HCC): ICD-10-CM

## 2020-05-01 DIAGNOSIS — Z17.0 MALIGNANT NEOPLASM OF UPPER-OUTER QUADRANT OF RIGHT BREAST IN FEMALE, ESTROGEN RECEPTOR POSITIVE (HCC): ICD-10-CM

## 2020-05-01 DIAGNOSIS — Z17.0 MALIGNANT NEOPLASM OF UPPER-OUTER QUADRANT OF LEFT BREAST IN FEMALE, ESTROGEN RECEPTOR POSITIVE (HCC): Primary | ICD-10-CM

## 2020-05-01 DIAGNOSIS — C50.412 MALIGNANT NEOPLASM OF UPPER-OUTER QUADRANT OF LEFT BREAST IN FEMALE, ESTROGEN RECEPTOR POSITIVE (HCC): Primary | ICD-10-CM

## 2020-05-01 RX ORDER — LIDOCAINE AND PRILOCAINE 25; 25 MG/G; MG/G
CREAM TOPICAL ONCE
Status: CANCELLED | OUTPATIENT
Start: 2020-05-11 | End: 2020-05-01

## 2020-05-01 RX ORDER — ACETAMINOPHEN 500 MG
1000 TABLET ORAL ONCE
Status: CANCELLED | OUTPATIENT
Start: 2020-05-11 | End: 2020-05-01

## 2020-05-01 RX ORDER — CLINDAMYCIN PHOSPHATE 900 MG/50ML
900 INJECTION INTRAVENOUS ONCE
Status: CANCELLED | OUTPATIENT
Start: 2020-05-11 | End: 2020-05-01

## 2020-05-01 RX ORDER — DIAZEPAM 5 MG/1
5 TABLET ORAL ONCE
Status: CANCELLED | OUTPATIENT
Start: 2020-05-11 | End: 2020-05-01

## 2020-05-05 ENCOUNTER — APPOINTMENT (OUTPATIENT)
Dept: PREADMISSION TESTING | Facility: HOSPITAL | Age: 70
End: 2020-05-05

## 2020-05-06 ENCOUNTER — OFFICE VISIT (OUTPATIENT)
Dept: MAMMOGRAPHY | Facility: CLINIC | Age: 70
End: 2020-05-06

## 2020-05-06 DIAGNOSIS — M10.072 ACUTE IDIOPATHIC GOUT INVOLVING TOE OF LEFT FOOT: Primary | ICD-10-CM

## 2020-05-06 PROCEDURE — 99213 OFFICE O/P EST LOW 20 MIN: CPT | Performed by: SURGERY

## 2020-05-06 RX ORDER — TRAMADOL HYDROCHLORIDE 50 MG/1
50 TABLET ORAL EVERY 6 HOURS PRN
Qty: 20 TABLET | Refills: 0 | Status: SHIPPED | OUTPATIENT
Start: 2020-05-06 | End: 2020-07-08 | Stop reason: SDUPTHER

## 2020-05-06 NOTE — H&P (VIEW-ONLY)
History of Present Illness:  Patient presents with newly diagnosed breast cancer. Right IDC  She is a nice 70-year-old white female who had mammograms in June 2019.  Those mammograms described some indeterminate microcalcifications in the inferior aspect of the right breast.  A six-month follow-up was obtained and the breast tissue was described as dense.  There were 4 areas of suspicious microcalcifications scattered throughout the breast.  These were felt to be suspicious and biopsy was recommended.  The calcifications in the lower outer quadrant of the right breast have revealed grade 1 invasive ductal cancer that is ER positive at 95%, SC negative, HER-2 negative, and the Ki-67 is 5%.  She also had biopsy of some calcifications in the upper inner quadrant of the right breast at the 1 o'clock position and this has returned intermediate grade DCIS.  The other 2 areas were not biopsied.     The patient is undergone an MRI which reveals a 2 cm masslike area at the site of invasive cancer.  There is no concerning enhancement at the other area that revealed DCIS.  She also has 2 other areas of suspicious microcalcifications but these areas did not enhance abnormally on her MRI.  No lymph nodes look enlarged and the other breast looks fine.    The patient has a long history of multiple callbacks for problems in the right breast but surprisingly has not had any prior biopsies.  Her family history is significant for a paternal grandmother with breast cancer and a paternal first cousin with breast cancer both in their 70s.  Her mother had cervical cancer and father had bladder cancer.     The patient's comorbidities include hypertension and type 2 diabetes mellitus.     Review of Systems:  The patient is currently suffering from gout involving the left great toe.     Past Medical and Surgical History:         Surgical History         Past Surgical History:   Procedure Laterality Date   • BLADDER REPAIR   2012   •  BREAST BIOPSY Right 2020     malignant   • CARDIAC PACEMAKER PLACEMENT       • CARPAL TUNNEL RELEASE Right 2015   • CATARACT EXTRACTION Bilateral     •  SECTION         x2   • COLONOSCOPY   2015   • ENDOMETRIAL ABLATION       • HAND SURGERY       • HERNIA REPAIR         Bilateral femoral and inguinal   • KNEE ARTHROSCOPY Bilateral     • SHOULDER ARTHROSCOPY Right 2016     rotator cuff repair, biceps tenotomy   • THYROIDECTOMY, PARTIAL      • TONSILLECTOMY AND ADENOIDECTOMY       • TOTAL KNEE ARTHROPLASTY Left    • TUBAL ABDOMINAL LIGATION                Medical History        Past Medical History:   Diagnosis Date   • Breast cancer (CMS/HCC) 2020     right IDC   • CTS (carpal tunnel syndrome)     • Diabetes mellitus (CMS/HCC)       Type 2   • H/O Cellulitis of left ear     • History of asthma     • History of cataract     • History of complete heart block       paced in 2015   • History of fall     • Hyperlipidemia     • Hypertension     • Hypothyroidism     • Osteoarthritis     • Osteopenia     • PONV (postoperative nausea and vomiting)                   Social History:  Patient is .  Patient has three daughters.          Vital Signs: Afebrile       Medications:     Current Outpatient Prescriptions:      Current Outpatient Medications:   •  albuterol sulfate HFA (Ventolin HFA) 108 (90 Base) MCG/ACT inhaler, , Disp: , Rfl:   •  Ascorbic Acid Buffered (BUFFERED VITAMIN C) 1000 MG capsule, , Disp: , Rfl:   •  ezetimibe (ZETIA) 10 MG tablet, TAKE 1 TABLET BY MOUTH IN THE MORNING, Disp: , Rfl:   •  fluticasone (FLONASE) 50 MCG/ACT nasal spray, , Disp: , Rfl:   •  irbesartan (AVAPRO) 150 MG tablet, TAKE 1 TABLET BY MOUTH ONCE DAILY, Disp: , Rfl:   •  Loratadine 10 MG capsule, , Disp: , Rfl:   •  losartan (COZAAR) 100 MG tablet, , Disp: , Rfl:   •  cyclobenzaprine (FLEXERIL) 10 MG tablet, Take 10 mg by mouth., Disp: , Rfl:   •  Doxylamine Succinate, Sleep, (UNISOM PO), , Disp: , Rfl:   •   fluticasone-salmeterol (ADVAIR) 100-50 MCG/DOSE DISKUS, Inhale., Disp: , Rfl:   •  metFORMIN (GLUCOPHAGE) 500 MG tablet, , Disp: , Rfl:   •  Thyroid (LEVOTHYROXINE-LIOTHYRONINE PO), , Disp: , Rfl:   •  zolpidem (AMBIEN) 10 MG tablet, Take 10 mg by mouth every night at bedtime., Disp: , Rfl:      Physical Examination:  General Appearance:   Patient is in no distress.  She is well kept and has an obese build.   Psychiatric:  Patient with appropriate mood and affect. Alert and oriented to self, time, and place.          Lymphatic:  There is no axillary, cervical, infraclavicular, or supraclavicular adenopathy bilaterally.  Eyes:  Pupils are round and reactive to light.  Cardiovascular:  Heart rate and rhythm are regular.  She does have a 3/6 systolic ejection murmur heard best in the right second intercostal space  Respiratory:  Lungs are clear bilaterally with no crackles or wheezes in any lung field.  Gastrointestinal:  Abdomen is soft, nondistended, and nontender.  There was no evidence of hepatosplenomegaly or abdominal mass.  There are  scars from previous surgeries.     Musculoskeletal:  The first metacarpophalangeal joint of the left foot has edema and redness and is very tender.  These changes are consistent with gout            Assessment:  1. Malignant neoplasm of overlapping sites of right breast in female, estrogen receptor positive (CMS/HCC)      Plan- the patient has multicentric breast cancer and is really not a good candidate for breast conserving surgery.  She saw the plastic surgeons but they did not feel that she was a candidate for immediate reconstruction given her other comorbidities.  We will therefore perform a total mastectomy with sentinel lymph node biopsy.  The patient is aware of the possibility of an axillary dissection should her sentinel lymph nodes be positive.  We have discussed what is involved with the recovery and all of her questions have been answered.

## 2020-05-08 ENCOUNTER — APPOINTMENT (OUTPATIENT)
Dept: PREADMISSION TESTING | Facility: HOSPITAL | Age: 70
End: 2020-05-08

## 2020-05-08 ENCOUNTER — PREP FOR SURGERY (OUTPATIENT)
Dept: OTHER | Facility: HOSPITAL | Age: 70
End: 2020-05-08

## 2020-05-08 ENCOUNTER — HOSPITAL ENCOUNTER (OUTPATIENT)
Dept: GENERAL RADIOLOGY | Facility: HOSPITAL | Age: 70
Discharge: HOME | End: 2020-05-08
Admitting: SURGERY

## 2020-05-08 VITALS
TEMPERATURE: 97.6 F | BODY MASS INDEX: 39.46 KG/M2 | HEART RATE: 90 BPM | WEIGHT: 209 LBS | DIASTOLIC BLOOD PRESSURE: 79 MMHG | SYSTOLIC BLOOD PRESSURE: 136 MMHG | HEIGHT: 61 IN | OXYGEN SATURATION: 96 % | RESPIRATION RATE: 20 BRPM

## 2020-05-08 DIAGNOSIS — C50.412 MALIGNANT NEOPLASM OF UPPER-OUTER QUADRANT OF LEFT BREAST IN FEMALE, ESTROGEN RECEPTOR POSITIVE (HCC): ICD-10-CM

## 2020-05-08 DIAGNOSIS — Z17.0 MALIGNANT NEOPLASM OF UPPER-OUTER QUADRANT OF LEFT BREAST IN FEMALE, ESTROGEN RECEPTOR POSITIVE (HCC): ICD-10-CM

## 2020-05-08 LAB
ALBUMIN SERPL-MCNC: 3.9 G/DL (ref 3.5–5.2)
ALBUMIN/GLOB SERPL: 1.2 G/DL
ALP SERPL-CCNC: 75 U/L (ref 39–117)
ALT SERPL W P-5'-P-CCNC: 16 U/L (ref 1–33)
ANION GAP SERPL CALCULATED.3IONS-SCNC: 13.5 MMOL/L (ref 5–15)
AST SERPL-CCNC: 16 U/L (ref 1–32)
BILIRUB SERPL-MCNC: 0.4 MG/DL (ref 0.2–1.2)
BUN BLD-MCNC: 27 MG/DL (ref 8–23)
BUN/CREAT SERPL: 30 (ref 7–25)
CALCIUM SPEC-SCNC: 9.6 MG/DL (ref 8.6–10.5)
CHLORIDE SERPL-SCNC: 102 MMOL/L (ref 98–107)
CO2 SERPL-SCNC: 22.5 MMOL/L (ref 22–29)
CREAT BLD-MCNC: 0.9 MG/DL (ref 0.57–1)
DEPRECATED RDW RBC AUTO: 42.2 FL (ref 37–54)
ERYTHROCYTE [DISTWIDTH] IN BLOOD BY AUTOMATED COUNT: 12.4 % (ref 12.3–15.4)
GFR SERPL CREATININE-BSD FRML MDRD: 62 ML/MIN/1.73
GLOBULIN UR ELPH-MCNC: 3.3 GM/DL
GLUCOSE BLD-MCNC: 149 MG/DL (ref 65–99)
HCT VFR BLD AUTO: 39.7 % (ref 34–46.6)
HGB BLD-MCNC: 13.2 G/DL (ref 12–15.9)
MCH RBC QN AUTO: 31.1 PG (ref 26.6–33)
MCHC RBC AUTO-ENTMCNC: 33.2 G/DL (ref 31.5–35.7)
MCV RBC AUTO: 93.6 FL (ref 79–97)
PLATELET # BLD AUTO: 323 10*3/MM3 (ref 140–450)
PMV BLD AUTO: 9.4 FL (ref 6–12)
POTASSIUM BLD-SCNC: 4.4 MMOL/L (ref 3.5–5.2)
PROT SERPL-MCNC: 7.2 G/DL (ref 6–8.5)
RBC # BLD AUTO: 4.24 10*6/MM3 (ref 3.77–5.28)
SODIUM BLD-SCNC: 138 MMOL/L (ref 136–145)
WBC NRBC COR # BLD: 8.46 10*3/MM3 (ref 3.4–10.8)

## 2020-05-08 PROCEDURE — 93010 ELECTROCARDIOGRAM REPORT: CPT | Performed by: INTERNAL MEDICINE

## 2020-05-08 PROCEDURE — 80053 COMPREHEN METABOLIC PANEL: CPT | Performed by: SURGERY

## 2020-05-08 PROCEDURE — 71046 X-RAY EXAM CHEST 2 VIEWS: CPT

## 2020-05-08 PROCEDURE — 36415 COLL VENOUS BLD VENIPUNCTURE: CPT

## 2020-05-08 PROCEDURE — 85027 COMPLETE CBC AUTOMATED: CPT | Performed by: NURSE PRACTITIONER

## 2020-05-08 PROCEDURE — 93005 ELECTROCARDIOGRAM TRACING: CPT

## 2020-05-08 PROCEDURE — U0004 COV-19 TEST NON-CDC HGH THRU: HCPCS | Performed by: NURSE PRACTITIONER

## 2020-05-08 RX ORDER — LANOLIN ALCOHOL/MO/W.PET/CERES
1000 CREAM (GRAM) TOPICAL DAILY
COMMUNITY
End: 2022-01-17

## 2020-05-08 NOTE — DISCHARGE INSTRUCTIONS
Take the following medications the morning of surgery:  FLONASE, IRBESARTAN, THYROID    ARRIVE TO MAIN SURGERY AT 10 AM ON 5/11/2020      General Instructions:  • Do not eat solid food after midnight the night before surgery.  • You may drink clear liquids day of surgery but must stop at least one hour before your hospital arrival time.  • It is beneficial for you to have a clear drink that contains carbohydrates the day of surgery.  We suggest a 12 to 20 ounce bottle of Gatorade or Powerade for non-diabetic patients or a 12 to 20 ounce bottle of G2 or Powerade Zero for diabetic patients. (Pediatric patients, are not advised to drink a 12 to 20 ounce carbohydrate drink)    Clear liquids are liquids you can see through.  Nothing red in color.     Plain water                               Sports drinks  Sodas                                   Gelatin (Jell-O)  Fruit juices without pulp such as white grape juice and apple juice  Popsicles that contain no fruit or yogurt  Tea or coffee (no cream or milk added)  Gatorade / Powerade  G2 / Powerade Zero    • Infants may have breast milk up to four hours before surgery.  • Infants drinking formula may drink formula up to six hours before surgery.   • Patients who avoid smoking, chewing tobacco and alcohol for 4 weeks prior to surgery have a reduced risk of post-operative complications.  Quit smoking as many days before surgery as you can.  • Do not smoke, use chewing tobacco or drink alcohol the day of surgery.   • If applicable bring your C-PAP/ BI-PAP machine.  • Bring any papers given to you in the doctor’s office.  • Wear clean comfortable clothes.  • Do not wear contact lenses, false eyelashes or make-up.  Bring a case for your glasses.   • Bring crutches or walker if applicable.  • Remove all piercings.  Leave jewelry and any other valuables at home.  • Hair extensions with metal clips must be removed prior to surgery.  • The Pre-Admission Testing nurse will instruct  you to bring medications if unable to obtain an accurate list in Pre-Admission Testing.          Preventing a Surgical Site Infection:  • For 2 to 3 days before surgery, avoid shaving with a razor because the razor can irritate skin and make it easier to develop an infection.    • Any areas of open skin can increase the risk of a post-operative wound infection by allowing bacteria to enter and travel throughout the body.  Notify your surgeon if you have any skin wounds / rashes even if it is not near the expected surgical site.  The area will need assessed to determine if surgery should be delayed until it is healed.  • The night prior to surgery shower using a fresh bar of anti-bacterial soap (such as Dial) and clean washcloth.  Sleep in a clean bed with clean clothing.  Do not allow pets to sleep with you.  • Shower on the morning of surgery using a fresh bar of anti-bacterial soap (such as Dial) and clean washcloth.  Dry with a clean towel and dress in clean clothing.  • Ask your surgeon if you will be receiving antibiotics prior to surgery.  • Make sure you, your family, and all healthcare providers clean their hands with soap and water or an alcohol based hand  before caring for you or your wound.    Day of surgery:  Your arrival time is approximately two hours before your scheduled surgery time.  Upon arrival, a Pre-op nurse and Anesthesiologist will review your health history, obtain vital signs, and answer questions you may have.  The only belongings needed at this time will be a list of your home medications and if applicable your C-PAP/BI-PAP machine.  If you are staying overnight your family can leave the rest of your belongings in the car and bring them to your room later.  A Pre-op nurse will start an IV and you may receive medication in preparation for surgery, including something to help you relax.  Your family will be able to see you in the Pre-op area.  Two visitors at a time will be  allowed in the Pre-op room.  While you are in surgery your family should notify the waiting room  if they leave the waiting room area and provide a contact phone number.    Please be aware that surgery does come with discomfort.  We want to make every effort to control your discomfort so please discuss any uncontrolled symptoms with your nurse.   Your doctor will most likely have prescribed pain medications.      If you are going home after surgery you will receive individualized written care instructions before being discharged.  A responsible adult must drive you to and from the hospital on the day of your surgery and stay with you for 24 hours.    If you are staying overnight following surgery, you will be transported to your hospital room following the recovery period.  [facility] has all private rooms.    If you have any questions please call Pre-Admission Testing at [phone].  Deductibles and co-payments are collected on the day of service. Please be prepared to pay the required co-pay, deductible or deposit on the day of service as defined by your plan.    Self-Quarantine Prior to Surgery    • Stay at home. Do not leave your home after you have been given the Covid test for your upcoming surgery.   • Wash your hands often with soap and water for at least 20 seconds especially after you have been in a public place, or after blowing your nose, coughing, or sneezing.  • If soap and water are not readily available, use a hand  that contains at least 60% alcohol. Cover all surfaces of your hands and rub them together until they feel dry.  • Avoid touching your eyes, nose, and mouth with unwashed hands.  • Take everyday precautions to keep space between yourself and others (stay 6 feet away, which is about two arm lengths).  Remember that some people without symptoms may be able to spread virus.  • You should stay in a specific room away from others if possible.  Stay at  least 6 feet away from others in the home if you cannot have a dedicated room to yourself. Do not have visitors.   • Wear a mask around others in your home if you are unable to stay in a separate room or 6 feet apart. If you are unable to wear a mask, have your family member wear a mask if they must be within 6 feet of you.   • Do not snuggle with your pet. While the CDC says there is no evidence that pets can spread COVID-19 or be infected from humans, it is probably best to avoid “petting, snuggling, being kissed or licked and sharing food (during self-quarantine)”, according to the CDC.   • Do not share anything - Have your own utensils, drinking glass, dishes, towels and bedding.   • Do not share a bathroom with others, if possible.   • Clean and disinfect frequently touched services daily. This includes tables, doorknobs, light switches, countertops, handles, desks, phones, keyboards, toilets, faucets, and sinks.  • Do not travel prior to surgery.    Call your surgeon immediately if you experience any of the following symptoms:  • Sore Throat      • Shortness of Breath or difficulty breathing  • Cough  • Chills  • Body soreness or muscle pain  • Headache  • Fever  • New loss of taste or smell  • Do not arrive for your surgery ill.  Your procedure will need to be rescheduled to another time.  You will need to call your physician before the day of surgery to avoid any unnecessary exposure to hospital staff as well as other patients.    CHLORHEXIDINE CLOTH INSTRUCTIONS  The morning of surgery follow these instructions using the Chlorhexidine cloths you've been given.  These steps reduce bacteria on the body.  Do not use the cloths near your eyes, ears mouth, genitalia or on open wounds.  Throw the cloths away after use but do not try to flush them down a toilet.      • Open and remove one cloth at a time from the package.    • Leave the cloth unfolded and begin the bathing.  • Massage the skin with the cloths  using gentle pressure to remove bacteria.  Do not scrub harshly.   • Follow the steps below with one 2% CHG cloth per area (6 total cloths).  • One cloth for neck, shoulders and chest.  • One cloth for both arms, hands, fingers and underarms (do underarms last).  • One cloth for the abdomen followed by groin.  • One cloth for right leg and foot including between the toes.  • One cloth for left leg and foot including between the toes.  • The last cloth is to be used for the back of the neck, back and buttocks.    Allow the CHG to air dry 3 minutes on the skin which will give it time to work and decrease the chance of irritation.  The skin may feel sticky until it is dry.  Do not rinse with water or any other liquid or you will lose the beneficial effects of the CHG.  If mild skin irritation occurs, do rinse the skin to remove the CHG.  Report this to the nurse at time of admission.  Do not apply lotions, creams, ointments, deodorants or perfumes after using the clothes. Dress in clean clothes before coming to the hospital.

## 2020-05-09 LAB
REF LAB TEST METHOD: NORMAL
SARS-COV-2 RNA RESP QL NAA+PROBE: NOT DETECTED

## 2020-05-11 ENCOUNTER — ANESTHESIA EVENT (OUTPATIENT)
Dept: PERIOP | Facility: HOSPITAL | Age: 70
End: 2020-05-11

## 2020-05-11 ENCOUNTER — HOSPITAL ENCOUNTER (OUTPATIENT)
Dept: NUCLEAR MEDICINE | Facility: HOSPITAL | Age: 70
Discharge: HOME | End: 2020-05-11

## 2020-05-11 ENCOUNTER — APPOINTMENT (OUTPATIENT)
Dept: NUCLEAR MEDICINE | Facility: HOSPITAL | Age: 70
End: 2020-05-11

## 2020-05-11 ENCOUNTER — ANESTHESIA (OUTPATIENT)
Dept: PERIOP | Facility: HOSPITAL | Age: 70
End: 2020-05-11

## 2020-05-11 ENCOUNTER — HOSPITAL ENCOUNTER (OUTPATIENT)
Facility: HOSPITAL | Age: 70
Discharge: HOME | End: 2020-05-12
Attending: SURGERY | Admitting: SURGERY

## 2020-05-11 DIAGNOSIS — C50.411 MALIGNANT NEOPLASM OF UPPER-OUTER QUADRANT OF RIGHT BREAST IN FEMALE, ESTROGEN RECEPTOR POSITIVE (HCC): ICD-10-CM

## 2020-05-11 DIAGNOSIS — Z17.0 MALIGNANT NEOPLASM OF UPPER-OUTER QUADRANT OF LEFT BREAST IN FEMALE, ESTROGEN RECEPTOR POSITIVE (HCC): ICD-10-CM

## 2020-05-11 DIAGNOSIS — C50.412 MALIGNANT NEOPLASM OF UPPER-OUTER QUADRANT OF LEFT BREAST IN FEMALE, ESTROGEN RECEPTOR POSITIVE (HCC): ICD-10-CM

## 2020-05-11 DIAGNOSIS — Z17.0 MALIGNANT NEOPLASM OF UPPER-OUTER QUADRANT OF RIGHT BREAST IN FEMALE, ESTROGEN RECEPTOR POSITIVE (HCC): ICD-10-CM

## 2020-05-11 LAB — GLUCOSE BLDC GLUCOMTR-MCNC: 97 MG/DL (ref 70–130)

## 2020-05-11 PROCEDURE — 88331 PATH CONSLTJ SURG 1 BLK 1SPC: CPT | Performed by: SURGERY

## 2020-05-11 PROCEDURE — 25010000002 DEXAMETHASONE PER 1 MG: Performed by: ANESTHESIOLOGY

## 2020-05-11 PROCEDURE — 38900 IO MAP OF SENT LYMPH NODE: CPT | Performed by: SURGERY

## 2020-05-11 PROCEDURE — 25010000002 PROPOFOL 10 MG/ML EMULSION: Performed by: NURSE ANESTHETIST, CERTIFIED REGISTERED

## 2020-05-11 PROCEDURE — 25010000002 ONDANSETRON PER 1 MG: Performed by: ANESTHESIOLOGY

## 2020-05-11 PROCEDURE — 88360 TUMOR IMMUNOHISTOCHEM/MANUAL: CPT | Performed by: SURGERY

## 2020-05-11 PROCEDURE — 82962 GLUCOSE BLOOD TEST: CPT

## 2020-05-11 PROCEDURE — 88307 TISSUE EXAM BY PATHOLOGIST: CPT | Performed by: SURGERY

## 2020-05-11 PROCEDURE — G0378 HOSPITAL OBSERVATION PER HR: HCPCS

## 2020-05-11 PROCEDURE — 25010000002 FENTANYL CITRATE (PF) 100 MCG/2ML SOLUTION: Performed by: NURSE ANESTHETIST, CERTIFIED REGISTERED

## 2020-05-11 PROCEDURE — 0 TECHNETIUM FILTERED SULFUR COLLOID: Performed by: SURGERY

## 2020-05-11 PROCEDURE — A9541 TC99M SULFUR COLLOID: HCPCS | Performed by: SURGERY

## 2020-05-11 PROCEDURE — 19303 MAST SIMPLE COMPLETE: CPT | Performed by: SURGERY

## 2020-05-11 PROCEDURE — 88341 IMHCHEM/IMCYTCHM EA ADD ANTB: CPT | Performed by: SURGERY

## 2020-05-11 PROCEDURE — 25010000002 NEOSTIGMINE PER 0.5 MG: Performed by: ANESTHESIOLOGY

## 2020-05-11 PROCEDURE — 88342 IMHCHEM/IMCYTCHM 1ST ANTB: CPT | Performed by: SURGERY

## 2020-05-11 PROCEDURE — 25010000002 HYDROMORPHONE PER 4 MG: Performed by: SURGERY

## 2020-05-11 PROCEDURE — 88377 M/PHMTRC ALYS ISHQUANT/SEMIQ: CPT

## 2020-05-11 PROCEDURE — 38525 BIOPSY/REMOVAL LYMPH NODES: CPT | Performed by: SURGERY

## 2020-05-11 PROCEDURE — 38792 RA TRACER ID OF SENTINL NODE: CPT

## 2020-05-11 PROCEDURE — 25010000002 PHENYLEPHRINE PER 1 ML: Performed by: NURSE ANESTHETIST, CERTIFIED REGISTERED

## 2020-05-11 PROCEDURE — 25010000002 MIDAZOLAM PER 1 MG: Performed by: ANESTHESIOLOGY

## 2020-05-11 RX ORDER — FENTANYL CITRATE 50 UG/ML
50 INJECTION, SOLUTION INTRAMUSCULAR; INTRAVENOUS
Status: DISCONTINUED | OUTPATIENT
Start: 2020-05-11 | End: 2020-05-11 | Stop reason: HOSPADM

## 2020-05-11 RX ORDER — ACETAMINOPHEN 325 MG/1
650 TABLET ORAL ONCE AS NEEDED
Status: DISCONTINUED | OUTPATIENT
Start: 2020-05-11 | End: 2020-05-11 | Stop reason: HOSPADM

## 2020-05-11 RX ORDER — LABETALOL HYDROCHLORIDE 5 MG/ML
5 INJECTION, SOLUTION INTRAVENOUS
Status: DISCONTINUED | OUTPATIENT
Start: 2020-05-11 | End: 2020-05-11 | Stop reason: HOSPADM

## 2020-05-11 RX ORDER — ERYTHROMYCIN 5 MG/G
OINTMENT OPHTHALMIC EVERY 12 HOURS
Status: DISCONTINUED | OUTPATIENT
Start: 2020-05-11 | End: 2020-05-12 | Stop reason: HOSPADM

## 2020-05-11 RX ORDER — MAGNESIUM HYDROXIDE 1200 MG/15ML
LIQUID ORAL AS NEEDED
Status: DISCONTINUED | OUTPATIENT
Start: 2020-05-11 | End: 2020-05-11 | Stop reason: HOSPADM

## 2020-05-11 RX ORDER — HYDROCODONE BITARTRATE AND ACETAMINOPHEN 10; 325 MG/1; MG/1
1 TABLET ORAL EVERY 4 HOURS PRN
Status: DISCONTINUED | OUTPATIENT
Start: 2020-05-11 | End: 2020-05-12 | Stop reason: HOSPADM

## 2020-05-11 RX ORDER — PROMETHAZINE HYDROCHLORIDE 25 MG/ML
12.5 INJECTION, SOLUTION INTRAMUSCULAR; INTRAVENOUS EVERY 6 HOURS PRN
Status: DISCONTINUED | OUTPATIENT
Start: 2020-05-11 | End: 2020-05-12 | Stop reason: HOSPADM

## 2020-05-11 RX ORDER — FENTANYL CITRATE 50 UG/ML
INJECTION, SOLUTION INTRAMUSCULAR; INTRAVENOUS AS NEEDED
Status: DISCONTINUED | OUTPATIENT
Start: 2020-05-11 | End: 2020-05-11 | Stop reason: SURG

## 2020-05-11 RX ORDER — PROMETHAZINE HYDROCHLORIDE 25 MG/ML
6.25 INJECTION, SOLUTION INTRAMUSCULAR; INTRAVENOUS
Status: DISCONTINUED | OUTPATIENT
Start: 2020-05-11 | End: 2020-05-11 | Stop reason: HOSPADM

## 2020-05-11 RX ORDER — SODIUM CHLORIDE 0.9 % (FLUSH) 0.9 %
3-10 SYRINGE (ML) INJECTION AS NEEDED
Status: DISCONTINUED | OUTPATIENT
Start: 2020-05-11 | End: 2020-05-11 | Stop reason: HOSPADM

## 2020-05-11 RX ORDER — CLINDAMYCIN PHOSPHATE 900 MG/50ML
900 INJECTION INTRAVENOUS ONCE
Status: COMPLETED | OUTPATIENT
Start: 2020-05-11 | End: 2020-05-11

## 2020-05-11 RX ORDER — KETOROLAC TROMETHAMINE 5 MG/ML
1 SOLUTION OPHTHALMIC 4 TIMES DAILY
Status: DISCONTINUED | OUTPATIENT
Start: 2020-05-11 | End: 2020-05-12 | Stop reason: HOSPADM

## 2020-05-11 RX ORDER — LIDOCAINE HYDROCHLORIDE 10 MG/ML
0.5 INJECTION, SOLUTION EPIDURAL; INFILTRATION; INTRACAUDAL; PERINEURAL ONCE AS NEEDED
Status: DISCONTINUED | OUTPATIENT
Start: 2020-05-11 | End: 2020-05-11 | Stop reason: HOSPADM

## 2020-05-11 RX ORDER — GLYCOPYRROLATE 0.2 MG/ML
INJECTION INTRAMUSCULAR; INTRAVENOUS AS NEEDED
Status: DISCONTINUED | OUTPATIENT
Start: 2020-05-11 | End: 2020-05-11 | Stop reason: SURG

## 2020-05-11 RX ORDER — ROCURONIUM BROMIDE 10 MG/ML
INJECTION, SOLUTION INTRAVENOUS AS NEEDED
Status: DISCONTINUED | OUTPATIENT
Start: 2020-05-11 | End: 2020-05-11 | Stop reason: SURG

## 2020-05-11 RX ORDER — HYDRALAZINE HYDROCHLORIDE 20 MG/ML
5 INJECTION INTRAMUSCULAR; INTRAVENOUS
Status: DISCONTINUED | OUTPATIENT
Start: 2020-05-11 | End: 2020-05-11 | Stop reason: HOSPADM

## 2020-05-11 RX ORDER — CYCLOBENZAPRINE HCL 10 MG
10 TABLET ORAL 3 TIMES DAILY PRN
Status: DISCONTINUED | OUTPATIENT
Start: 2020-05-11 | End: 2020-05-12 | Stop reason: HOSPADM

## 2020-05-11 RX ORDER — MIDAZOLAM HYDROCHLORIDE 1 MG/ML
2 INJECTION INTRAMUSCULAR; INTRAVENOUS
Status: DISCONTINUED | OUTPATIENT
Start: 2020-05-11 | End: 2020-05-11 | Stop reason: HOSPADM

## 2020-05-11 RX ORDER — NALOXONE HCL 0.4 MG/ML
0.1 VIAL (ML) INJECTION
Status: DISCONTINUED | OUTPATIENT
Start: 2020-05-11 | End: 2020-05-12 | Stop reason: HOSPADM

## 2020-05-11 RX ORDER — HYDROMORPHONE HYDROCHLORIDE 1 MG/ML
0.5 INJECTION, SOLUTION INTRAMUSCULAR; INTRAVENOUS; SUBCUTANEOUS
Status: DISCONTINUED | OUTPATIENT
Start: 2020-05-11 | End: 2020-05-11 | Stop reason: HOSPADM

## 2020-05-11 RX ORDER — PROMETHAZINE HYDROCHLORIDE 25 MG/1
25 SUPPOSITORY RECTAL ONCE AS NEEDED
Status: DISCONTINUED | OUTPATIENT
Start: 2020-05-11 | End: 2020-05-11 | Stop reason: HOSPADM

## 2020-05-11 RX ORDER — ONDANSETRON 2 MG/ML
4 INJECTION INTRAMUSCULAR; INTRAVENOUS ONCE AS NEEDED
Status: DISCONTINUED | OUTPATIENT
Start: 2020-05-11 | End: 2020-05-11 | Stop reason: HOSPADM

## 2020-05-11 RX ORDER — ALBUTEROL SULFATE 2.5 MG/3ML
2.5 SOLUTION RESPIRATORY (INHALATION) ONCE AS NEEDED
Status: DISCONTINUED | OUTPATIENT
Start: 2020-05-11 | End: 2020-05-11 | Stop reason: HOSPADM

## 2020-05-11 RX ORDER — NALOXONE HCL 0.4 MG/ML
0.4 VIAL (ML) INJECTION
Status: DISCONTINUED | OUTPATIENT
Start: 2020-05-11 | End: 2020-05-12 | Stop reason: HOSPADM

## 2020-05-11 RX ORDER — HYDROCODONE BITARTRATE AND ACETAMINOPHEN 7.5; 325 MG/1; MG/1
1 TABLET ORAL ONCE AS NEEDED
Status: DISCONTINUED | OUTPATIENT
Start: 2020-05-11 | End: 2020-05-11 | Stop reason: HOSPADM

## 2020-05-11 RX ORDER — HYDROCODONE BITARTRATE AND ACETAMINOPHEN 5; 325 MG/1; MG/1
1 TABLET ORAL EVERY 4 HOURS PRN
Status: DISCONTINUED | OUTPATIENT
Start: 2020-05-11 | End: 2020-05-12 | Stop reason: HOSPADM

## 2020-05-11 RX ORDER — LIDOCAINE HYDROCHLORIDE 20 MG/ML
INJECTION, SOLUTION INFILTRATION; PERINEURAL AS NEEDED
Status: DISCONTINUED | OUTPATIENT
Start: 2020-05-11 | End: 2020-05-11 | Stop reason: SURG

## 2020-05-11 RX ORDER — HYDROMORPHONE HYDROCHLORIDE 1 MG/ML
0.5 INJECTION, SOLUTION INTRAMUSCULAR; INTRAVENOUS; SUBCUTANEOUS
Status: DISCONTINUED | OUTPATIENT
Start: 2020-05-11 | End: 2020-05-12 | Stop reason: HOSPADM

## 2020-05-11 RX ORDER — ACETAMINOPHEN 650 MG/1
650 SUPPOSITORY RECTAL EVERY 4 HOURS PRN
Status: DISCONTINUED | OUTPATIENT
Start: 2020-05-11 | End: 2020-05-12 | Stop reason: HOSPADM

## 2020-05-11 RX ORDER — ACETAMINOPHEN 325 MG/1
650 TABLET ORAL EVERY 4 HOURS PRN
Status: DISCONTINUED | OUTPATIENT
Start: 2020-05-11 | End: 2020-05-12 | Stop reason: HOSPADM

## 2020-05-11 RX ORDER — PROPOFOL 10 MG/ML
VIAL (ML) INTRAVENOUS AS NEEDED
Status: DISCONTINUED | OUTPATIENT
Start: 2020-05-11 | End: 2020-05-11 | Stop reason: SURG

## 2020-05-11 RX ORDER — ONDANSETRON 2 MG/ML
INJECTION INTRAMUSCULAR; INTRAVENOUS AS NEEDED
Status: DISCONTINUED | OUTPATIENT
Start: 2020-05-11 | End: 2020-05-11 | Stop reason: SURG

## 2020-05-11 RX ORDER — FAMOTIDINE 10 MG/ML
20 INJECTION, SOLUTION INTRAVENOUS ONCE
Status: COMPLETED | OUTPATIENT
Start: 2020-05-11 | End: 2020-05-11

## 2020-05-11 RX ORDER — EPHEDRINE SULFATE 50 MG/ML
5 INJECTION, SOLUTION INTRAVENOUS ONCE AS NEEDED
Status: DISCONTINUED | OUTPATIENT
Start: 2020-05-11 | End: 2020-05-11 | Stop reason: HOSPADM

## 2020-05-11 RX ORDER — PROMETHAZINE HYDROCHLORIDE 25 MG/ML
12.5 INJECTION, SOLUTION INTRAMUSCULAR; INTRAVENOUS ONCE AS NEEDED
Status: DISCONTINUED | OUTPATIENT
Start: 2020-05-11 | End: 2020-05-11 | Stop reason: HOSPADM

## 2020-05-11 RX ORDER — ONDANSETRON 2 MG/ML
4 INJECTION INTRAMUSCULAR; INTRAVENOUS EVERY 6 HOURS PRN
Status: DISCONTINUED | OUTPATIENT
Start: 2020-05-11 | End: 2020-05-12 | Stop reason: HOSPADM

## 2020-05-11 RX ORDER — DIPHENHYDRAMINE HYDROCHLORIDE 50 MG/ML
12.5 INJECTION INTRAMUSCULAR; INTRAVENOUS
Status: DISCONTINUED | OUTPATIENT
Start: 2020-05-11 | End: 2020-05-11 | Stop reason: HOSPADM

## 2020-05-11 RX ORDER — DIPHENHYDRAMINE HCL 25 MG
25 CAPSULE ORAL
Status: DISCONTINUED | OUTPATIENT
Start: 2020-05-11 | End: 2020-05-11 | Stop reason: HOSPADM

## 2020-05-11 RX ORDER — ACETAMINOPHEN 650 MG/1
650 SUPPOSITORY RECTAL ONCE AS NEEDED
Status: DISCONTINUED | OUTPATIENT
Start: 2020-05-11 | End: 2020-05-11 | Stop reason: HOSPADM

## 2020-05-11 RX ORDER — CLINDAMYCIN PHOSPHATE 600 MG/50ML
600 INJECTION INTRAVENOUS EVERY 8 HOURS
Status: COMPLETED | OUTPATIENT
Start: 2020-05-11 | End: 2020-05-12

## 2020-05-11 RX ORDER — MIDAZOLAM HYDROCHLORIDE 1 MG/ML
1 INJECTION INTRAMUSCULAR; INTRAVENOUS
Status: DISCONTINUED | OUTPATIENT
Start: 2020-05-11 | End: 2020-05-11 | Stop reason: HOSPADM

## 2020-05-11 RX ORDER — NALOXONE HCL 0.4 MG/ML
0.2 VIAL (ML) INJECTION AS NEEDED
Status: DISCONTINUED | OUTPATIENT
Start: 2020-05-11 | End: 2020-05-11 | Stop reason: HOSPADM

## 2020-05-11 RX ORDER — OXYCODONE AND ACETAMINOPHEN 7.5; 325 MG/1; MG/1
1 TABLET ORAL ONCE AS NEEDED
Status: DISCONTINUED | OUTPATIENT
Start: 2020-05-11 | End: 2020-05-11 | Stop reason: HOSPADM

## 2020-05-11 RX ORDER — MORPHINE SULFATE 10 MG/ML
4 INJECTION INTRAMUSCULAR; INTRAVENOUS; SUBCUTANEOUS
Status: DISCONTINUED | OUTPATIENT
Start: 2020-05-11 | End: 2020-05-12 | Stop reason: HOSPADM

## 2020-05-11 RX ORDER — SCOLOPAMINE TRANSDERMAL SYSTEM 1 MG/1
1 PATCH, EXTENDED RELEASE TRANSDERMAL ONCE
Status: DISCONTINUED | OUTPATIENT
Start: 2020-05-11 | End: 2020-05-11

## 2020-05-11 RX ORDER — FLUMAZENIL 0.1 MG/ML
0.2 INJECTION INTRAVENOUS AS NEEDED
Status: DISCONTINUED | OUTPATIENT
Start: 2020-05-11 | End: 2020-05-11 | Stop reason: HOSPADM

## 2020-05-11 RX ORDER — PROMETHAZINE HYDROCHLORIDE 25 MG/1
25 TABLET ORAL ONCE AS NEEDED
Status: DISCONTINUED | OUTPATIENT
Start: 2020-05-11 | End: 2020-05-11 | Stop reason: HOSPADM

## 2020-05-11 RX ORDER — ONDANSETRON 4 MG/1
4 TABLET, FILM COATED ORAL EVERY 6 HOURS PRN
Status: DISCONTINUED | OUTPATIENT
Start: 2020-05-11 | End: 2020-05-12 | Stop reason: HOSPADM

## 2020-05-11 RX ORDER — LOSARTAN POTASSIUM 50 MG/1
50 TABLET ORAL
Status: DISCONTINUED | OUTPATIENT
Start: 2020-05-11 | End: 2020-05-12 | Stop reason: HOSPADM

## 2020-05-11 RX ORDER — SODIUM CHLORIDE 0.9 % (FLUSH) 0.9 %
3 SYRINGE (ML) INJECTION EVERY 12 HOURS SCHEDULED
Status: DISCONTINUED | OUTPATIENT
Start: 2020-05-11 | End: 2020-05-11 | Stop reason: HOSPADM

## 2020-05-11 RX ORDER — SODIUM CHLORIDE, SODIUM LACTATE, POTASSIUM CHLORIDE, CALCIUM CHLORIDE 600; 310; 30; 20 MG/100ML; MG/100ML; MG/100ML; MG/100ML
9 INJECTION, SOLUTION INTRAVENOUS CONTINUOUS
Status: DISCONTINUED | OUTPATIENT
Start: 2020-05-11 | End: 2020-05-11

## 2020-05-11 RX ORDER — DEXAMETHASONE SODIUM PHOSPHATE 10 MG/ML
INJECTION INTRAMUSCULAR; INTRAVENOUS AS NEEDED
Status: DISCONTINUED | OUTPATIENT
Start: 2020-05-11 | End: 2020-05-11 | Stop reason: SURG

## 2020-05-11 RX ORDER — DIAZEPAM 5 MG/1
5 TABLET ORAL ONCE
Status: COMPLETED | OUTPATIENT
Start: 2020-05-11 | End: 2020-05-11

## 2020-05-11 RX ORDER — SODIUM CHLORIDE, SODIUM LACTATE, POTASSIUM CHLORIDE, CALCIUM CHLORIDE 600; 310; 30; 20 MG/100ML; MG/100ML; MG/100ML; MG/100ML
100 INJECTION, SOLUTION INTRAVENOUS CONTINUOUS
Status: DISCONTINUED | OUTPATIENT
Start: 2020-05-11 | End: 2020-05-12 | Stop reason: HOSPADM

## 2020-05-11 RX ORDER — ACETAMINOPHEN 500 MG
1000 TABLET ORAL ONCE
Status: COMPLETED | OUTPATIENT
Start: 2020-05-11 | End: 2020-05-11

## 2020-05-11 RX ORDER — DEXTROSE, SODIUM CHLORIDE, AND POTASSIUM CHLORIDE 5; .45; .15 G/100ML; G/100ML; G/100ML
50 INJECTION INTRAVENOUS CONTINUOUS
Status: DISCONTINUED | OUTPATIENT
Start: 2020-05-11 | End: 2020-05-12 | Stop reason: HOSPADM

## 2020-05-11 RX ORDER — LIDOCAINE AND PRILOCAINE 25; 25 MG/G; MG/G
CREAM TOPICAL ONCE
Status: COMPLETED | OUTPATIENT
Start: 2020-05-11 | End: 2020-05-11

## 2020-05-11 RX ORDER — ISOSULFAN BLUE 50 MG/5ML
INJECTION, SOLUTION SUBCUTANEOUS AS NEEDED
Status: DISCONTINUED | OUTPATIENT
Start: 2020-05-11 | End: 2020-05-11 | Stop reason: HOSPADM

## 2020-05-11 RX ADMIN — ONDANSETRON HYDROCHLORIDE 4 MG: 2 SOLUTION INTRAMUSCULAR; INTRAVENOUS at 14:06

## 2020-05-11 RX ADMIN — DEXAMETHASONE SODIUM PHOSPHATE 6 MG: 10 INJECTION INTRAMUSCULAR; INTRAVENOUS at 14:37

## 2020-05-11 RX ADMIN — LIDOCAINE HYDROCHLORIDE 60 MG: 20 INJECTION, SOLUTION INFILTRATION; PERINEURAL at 12:25

## 2020-05-11 RX ADMIN — HYDROMORPHONE HYDROCHLORIDE 0.5 MG: 1 INJECTION, SOLUTION INTRAMUSCULAR; INTRAVENOUS; SUBCUTANEOUS at 16:33

## 2020-05-11 RX ADMIN — SCOPALAMINE 1 PATCH: 1 PATCH, EXTENDED RELEASE TRANSDERMAL at 12:06

## 2020-05-11 RX ADMIN — DIAZEPAM 5 MG: 5 TABLET ORAL at 09:59

## 2020-05-11 RX ADMIN — GLYCOPYRROLATE 0.2 MG: 0.2 INJECTION INTRAMUSCULAR; INTRAVENOUS at 14:47

## 2020-05-11 RX ADMIN — FAMOTIDINE 20 MG: 10 INJECTION, SOLUTION INTRAVENOUS at 12:06

## 2020-05-11 RX ADMIN — FENTANYL CITRATE 25 MCG: 50 INJECTION INTRAMUSCULAR; INTRAVENOUS at 15:01

## 2020-05-11 RX ADMIN — POTASSIUM CHLORIDE, DEXTROSE MONOHYDRATE AND SODIUM CHLORIDE 50 ML/HR: 150; 5; 450 INJECTION, SOLUTION INTRAVENOUS at 17:13

## 2020-05-11 RX ADMIN — Medication 2.8 MG: at 14:47

## 2020-05-11 RX ADMIN — ERYTHROMYCIN: 5 OINTMENT OPHTHALMIC at 20:28

## 2020-05-11 RX ADMIN — CLINDAMYCIN PHOSPHATE 600 MG: 600 INJECTION, SOLUTION INTRAVENOUS at 20:29

## 2020-05-11 RX ADMIN — ACETAMINOPHEN 1000 MG: 500 TABLET, FILM COATED ORAL at 09:59

## 2020-05-11 RX ADMIN — TECHNETIUM TC 99M SULFUR COLLOID 1 DOSE: KIT at 11:45

## 2020-05-11 RX ADMIN — MIDAZOLAM 2 MG: 1 INJECTION INTRAMUSCULAR; INTRAVENOUS at 12:06

## 2020-05-11 RX ADMIN — PHENYLEPHRINE HYDROCHLORIDE 100 MCG: 10 INJECTION INTRAVENOUS at 12:27

## 2020-05-11 RX ADMIN — FENTANYL CITRATE 50 MCG: 50 INJECTION INTRAMUSCULAR; INTRAVENOUS at 15:40

## 2020-05-11 RX ADMIN — FENTANYL CITRATE 50 MCG: 50 INJECTION INTRAMUSCULAR; INTRAVENOUS at 12:37

## 2020-05-11 RX ADMIN — HYDROCODONE BITARTRATE AND ACETAMINOPHEN 1 TABLET: 5; 325 TABLET ORAL at 17:13

## 2020-05-11 RX ADMIN — FENTANYL CITRATE 50 MCG: 50 INJECTION INTRAMUSCULAR; INTRAVENOUS at 12:25

## 2020-05-11 RX ADMIN — LIDOCAINE AND PRILOCAINE 1 APPLICATION: 25; 25 CREAM TOPICAL at 09:48

## 2020-05-11 RX ADMIN — FENTANYL CITRATE 50 MCG: 50 INJECTION INTRAMUSCULAR; INTRAVENOUS at 15:35

## 2020-05-11 RX ADMIN — CLINDAMYCIN PHOSPHATE 900 MG: 18 INJECTION, SOLUTION INTRAMUSCULAR; INTRAVENOUS at 12:13

## 2020-05-11 RX ADMIN — PROPOFOL 150 MG: 10 INJECTION, EMULSION INTRAVENOUS at 12:25

## 2020-05-11 RX ADMIN — PHENYLEPHRINE HYDROCHLORIDE 100 MCG: 10 INJECTION INTRAVENOUS at 13:16

## 2020-05-11 RX ADMIN — SODIUM CHLORIDE, POTASSIUM CHLORIDE, SODIUM LACTATE AND CALCIUM CHLORIDE 9 ML/HR: 600; 310; 30; 20 INJECTION, SOLUTION INTRAVENOUS at 10:35

## 2020-05-11 RX ADMIN — KETOROLAC TROMETHAMINE 1 DROP: 5 SOLUTION/ DROPS OPHTHALMIC at 20:28

## 2020-05-11 RX ADMIN — METFORMIN HYDROCHLORIDE 500 MG: 500 TABLET ORAL at 18:27

## 2020-05-11 RX ADMIN — ROCURONIUM BROMIDE 50 MG: 10 INJECTION, SOLUTION INTRAVENOUS at 12:25

## 2020-05-11 NOTE — ANESTHESIA PROCEDURE NOTES
Airway  Urgency: elective    Date/Time: 5/11/2020 12:26 PM  Airway not difficult    General Information and Staff    Patient location during procedure: OR  Anesthesiologist: Ryne Paez MD  CRNA: Steff Betancourt CRNA    Indications and Patient Condition  Indications for airway management: airway protection    Preoxygenated: yes  MILS not maintained throughout  Mask difficulty assessment: 1 - vent by mask    Final Airway Details  Final airway type: endotracheal airway      Successful airway: ETT  Cuffed: yes   Successful intubation technique: direct laryngoscopy  Facilitating devices/methods: intubating stylet  Endotracheal tube insertion site: oral  Blade: Dorothy  Blade size: 3  ETT size (mm): 7.0  Cormack-Lehane Classification: grade I - full view of glottis  Placement verified by: chest auscultation   Cuff volume (mL): 8  Measured from: lips  Number of attempts at approach: 1  Assessment: lips, teeth, and gum same as pre-op and atraumatic intubation    Additional Comments  PreO2 100% face mask, IV induction, easy mask, DVL x1, cords noted, tube through, cuff up, EBBSH, +etCO2, = chest movement, tube secured in place, atraumatic, teeth and lips intact as preop.

## 2020-05-11 NOTE — ANESTHESIA POSTPROCEDURE EVALUATION
Patient: Mary Jo Berg    Procedure Summary     Date:  05/11/20 Room / Location:   [facility]  [facility]    Anesthesia Start:  1213 Anesthesia Stop:  1527    Procedure:  RIGHT BREAST MASTECTOMY WITH SENTINEL NODE BIOPSY (Right Breast) Diagnosis:       Malignant neoplasm of upper-outer quadrant of left breast in female, estrogen receptor positive (CMS/HCC)      (Malignant neoplasm of upper-outer quadrant of left breast in female, estrogen receptor positive (CMS/HCC) [C50.412, Z17.0])    Surgeon:  Bj Stephen MD Provider:  Ryne Paez MD    Anesthesia Type:  general ASA Status:  3          Anesthesia Type: general    Vitals  Vitals Value Taken Time   /64 5/11/2020  4:00 PM   Temp 36.6 °C (97.9 °F) 5/11/2020  4:00 PM   Pulse 65 5/11/2020  4:06 PM   Resp 12 5/11/2020  4:00 PM   SpO2 100 % 5/11/2020  4:06 PM   Vitals shown include unvalidated device data.        Post Anesthesia Care and Evaluation    Patient location during evaluation: PACU  Anesthetic complications: No anesthetic complications

## 2020-05-11 NOTE — PLAN OF CARE
Problem: Patient Care Overview  Goal: Plan of Care Review  Outcome: Ongoing (interventions implemented as appropriate)  Flowsheets (Taken 5/11/2020 5536)  Progress: improving  Plan of Care Reviewed With: patient  Outcome Summary: Left chest dressing CD&I, JPs with moderate amount of bloody drainage. Voiding without difficulty. C/o moderate amount of pain, better after PO pain medication. Tolerating clear liquids. VSS on room air.

## 2020-05-11 NOTE — OP NOTE
Name: Mary Jo Berg  Age: 70 y.o.  Sex: female  :  1950  MRN: [id number]    Mastectomy with SN Procedure Note    Indications: This patient presents for surgical treatment of Breast Cancer involving the right breast.  She actually has cancer in 2 spots and there are 2 other suspicious areas.  Mastectomy is her best option and she is not a good candidate for reconstruction.    Pre-operative Diagnosis: breast cancer, right    Post-operative Diagnosis: same    Procedure:  right total mastectomy, [place] Node biopsy    Surgeon: Bj Stephen MD, FACS    Assistants: Fely Carrera certified first assistant    Anesthesia: General anesthesia      Procedure Details   The patient was seen again in the Holding Room. The risks, benefits, indications, potential complications, treatment options, and expected outcomes were discussed with the patient. The possibilities of reaction to medication, pulmonary aspiration, bleeding, recurrent infection, the need for additional procedures, failure to diagnose a condition, and creating a complication requiring transfusion or further operation were discussed with the patient. The patient and/or family concurred with the proposed plan, giving informed consent. The site of surgery was properly noted/marked.    The patient was also taken to the nuclear med department where a radioisotope injection was performed in the periareolar area of the affected breast  in the usual fashion.       The patient was taken to the Operating Room, identified as Mary Jo Berg  and the procedure verified as right total mastectomy with sentinel lymph node biopsy and possible axillary dissection.   A Time Out was held and the above information confirmed.          The patient was placed supine and general anesthetic was administered.     5 cc of blue dye were injected into the breast near the edge of the areola.  The  arm, breast, and chest were prepped and draped in standard fashion.   An  oblique elliptical incision was made encompassing the nipple of the .right breast.  . Skin flaps were created meticulously to preserve the subdermal blood supply.  Flaps were developed to the clavicle, rectus sheath, sternum, and anterior edge of the latissimus dorsi m.    [place] node evaluation was performed. 5 sentinel node(s) was/were found and removed.  The lymph node with the highest counts was 1081 and it was blue.  All of the other sentinel nodes were also blue and had appropriate counts.  2 of these lymph nodes were deep.. No other nodes were found with counts over 110 and there were no other blue nodes.  Frozen section was performed and all 5 sentinel lymph nodes were benign.     Dissection was carried down to the pectoralis fascia, which was included with the specimen, and an axillary dissection  was not performed.,       The specimen was submitted to pathology. The wound was irrigated. Two BRANDEE drains were placed.  The skin incision was closed in layers with a running 3-0 Vicryl suture for the subcutaneous layer followed by a 4-0 Vicryl subcuticular suture.. The drains were secured with 2-0 silk sutures. Steri-Strips were applied along with a dry  gauze dressing and an Ace wrap.      Instrument, sponge, and needle counts were correct at closure and at the conclusion of the case.     Findings: There were no unexpected findings    Estimated Blood Loss: less than 50 mL           Drains: 19 [place] Jose Carlos-Chavis x2      Specimens:   ID Type Source Tests Collected by Time   A : RIGHT BREAST TISSUE, FRESH FOR PERMANENT, SUTURE MARKS 12:00 Tissue Breast, Right TISSUE PATHOLOGY EXAM Bj Stephen MD 5/11/2020 1348   B : SENTINEL NODE #1, CALL EXT 4273 Tissue [place] Lymph Node TISSUE PATHOLOGY EXAM Bj Stephen MD 5/11/2020 1359   C (Not marked as sent) : SENTINEL NODE #2, CALL EXT 4273 Tissue [place] Lymph Node TISSUE PATHOLOGY EXAM Bj Stephen MD 5/11/2020 1400   D : SENTINEL NODE  #3, CALL EXT 4273 Tissue [place] Lymph Node TISSUE PATHOLOGY EXAM Bj Stephen MD 5/11/2020 1400   E (Not marked as sent) : SENTINEL NODE #4, CALL EXT 4273 Tissue [place] Lymph Node TISSUE PATHOLOGY EXAM Bj Stephen MD 5/11/2020 1402   F : RIGHT SENTINEL NODE #5, CALL EXT 4273 Tissue [place] Lymph Node TISSUE PATHOLOGY EXAM Bj Stephen MD 5/11/2020 1405       Complications: None; patient tolerated the procedure well.           Disposition: PACU - hemodynamically stable.           Condition: stable

## 2020-05-11 NOTE — ANESTHESIA PREPROCEDURE EVALUATION
Anesthesia Evaluation     Patient summary reviewed and Nursing notes reviewed   history of anesthetic complications (previous response with TD Scop will repeat): PONV  NPO Solid Status: > 6 hours             Airway   Mallampati: III  TM distance: >3 FB  Neck ROM: full  Small opening and Large neck circumference  Dental - normal exam     Pulmonary - normal exam   (-) not a smoker  Cardiovascular - normal exam    ECG reviewed    (+) pacemaker pacemaker, hypertension, hyperlipidemia,   (-) angina      Neuro/Psych  GI/Hepatic/Renal/Endo    (+)   diabetes mellitus type 2,     Musculoskeletal     (+) neck stiffness,   Abdominal    Substance History      OB/GYN          Other      history of cancer (RIGHT Breast) active                    Anesthesia Plan    ASA 3     general       Anesthetic plan, all risks, benefits, and alternatives have been provided, discussed and informed consent has been obtained with: patient.

## 2020-05-12 ENCOUNTER — READMISSION MANAGEMENT (OUTPATIENT)
Dept: CALL CENTER | Facility: HOSPITAL | Age: 70
End: 2020-05-12

## 2020-05-12 VITALS
SYSTOLIC BLOOD PRESSURE: 111 MMHG | OXYGEN SATURATION: 99 % | TEMPERATURE: 97 F | HEART RATE: 62 BPM | DIASTOLIC BLOOD PRESSURE: 64 MMHG | RESPIRATION RATE: 16 BRPM

## 2020-05-12 LAB
ANION GAP SERPL CALCULATED.3IONS-SCNC: 9.6 MMOL/L (ref 5–15)
BUN BLD-MCNC: 21 MG/DL (ref 8–23)
BUN/CREAT SERPL: 22.8 (ref 7–25)
CALCIUM SPEC-SCNC: 9.2 MG/DL (ref 8.6–10.5)
CHLORIDE SERPL-SCNC: 101 MMOL/L (ref 98–107)
CO2 SERPL-SCNC: 24.4 MMOL/L (ref 22–29)
CREAT BLD-MCNC: 0.92 MG/DL (ref 0.57–1)
GFR SERPL CREATININE-BSD FRML MDRD: 60 ML/MIN/1.73
GLUCOSE BLD-MCNC: 211 MG/DL (ref 65–99)
POTASSIUM BLD-SCNC: 5.2 MMOL/L (ref 3.5–5.2)
SODIUM BLD-SCNC: 135 MMOL/L (ref 136–145)

## 2020-05-12 PROCEDURE — G0378 HOSPITAL OBSERVATION PER HR: HCPCS

## 2020-05-12 PROCEDURE — 80048 BASIC METABOLIC PNL TOTAL CA: CPT | Performed by: SURGERY

## 2020-05-12 RX ORDER — HYDROCODONE BITARTRATE AND ACETAMINOPHEN 5; 325 MG/1; MG/1
1-2 TABLET ORAL EVERY 4 HOURS PRN
Qty: 20 TABLET | Refills: 0 | Status: SHIPPED | OUTPATIENT
Start: 2020-05-12 | End: 2020-06-18

## 2020-05-12 RX ADMIN — ERYTHROMYCIN: 5 OINTMENT OPHTHALMIC at 06:33

## 2020-05-12 RX ADMIN — CLINDAMYCIN PHOSPHATE 600 MG: 600 INJECTION, SOLUTION INTRAVENOUS at 04:27

## 2020-05-12 RX ADMIN — METFORMIN HYDROCHLORIDE 500 MG: 500 TABLET ORAL at 08:24

## 2020-05-12 RX ADMIN — KETOROLAC TROMETHAMINE 1 DROP: 5 SOLUTION/ DROPS OPHTHALMIC at 08:25

## 2020-05-12 RX ADMIN — HYDROCODONE BITARTRATE AND ACETAMINOPHEN 1 TABLET: 5; 325 TABLET ORAL at 06:41

## 2020-05-12 RX ADMIN — LOSARTAN POTASSIUM 50 MG: 50 TABLET, FILM COATED ORAL at 08:24

## 2020-05-12 NOTE — OUTREACH NOTE
Prep Survey      Responses   [facility] patient discharged from?  [place]   Is LACE score < 7 ?  Yes   Eligibility  Not Eligible   What are the reasons patient is not eligible?  Other   COVID-19 Test Status  Negative   Does the patient have one of the following disease processes/diagnoses(primary or secondary)?  General Surgery   Prep survey completed?  Yes          Manjula Reese RN

## 2020-05-12 NOTE — PLAN OF CARE
Problem: Patient Care Overview  Goal: Plan of Care Review  Outcome: Ongoing (interventions implemented as appropriate)  Flowsheets (Taken 5/12/2020 9275)  Progress: improving  Plan of Care Reviewed With: patient  Outcome Summary: Dressing CDI. ACE wrap intact. BRANDEE x2 with bloody output. No c/o pain, medication offered multiple times but refused. Up with assist to bathroom, voiding well. Walked in halls. IVFs infusing, IV abx given. Tolerating regular diet. Slept most of night. Afebrile and VSS. Will continue to monitor.

## 2020-05-12 NOTE — PROGRESS NOTES
Case Management Discharge Note      Final Note: Discharged home. Emmy Marsh, ZAHIDA              Transportation Services  Private: Car    Final Discharge Disposition Code: 01 - home or self-care

## 2020-05-12 NOTE — PLAN OF CARE
Problem: Patient Care Overview  Goal: Plan of Care Review  Outcome: Outcome(s) achieved  Flowsheets  Taken 5/12/2020 1037  Progress: improving  Outcome Summary: VSS. Inc CDI with island dsg and ace wrap in place. BRANDEE x2 with serosang output. Teaching down with pt and  at bedside. Softee, 4x4s, tape, and drain supplies given.  Taken 5/12/2020 0837  Plan of Care Reviewed With: patient  Goal: Individualization and Mutuality  Outcome: Outcome(s) achieved  Goal: Discharge Needs Assessment  Outcome: Outcome(s) achieved  Goal: Interprofessional Rounds/Family Conf  Outcome: Outcome(s) achieved     Problem: Breast Surgery/Reconstruction (Adult)  Goal: Signs and Symptoms of Listed Potential Problems Will be Absent, Minimized or Managed (Breast Surgery/Reconstruction)  Outcome: Outcome(s) achieved  Goal: Anesthesia/Sedation Recovery  Outcome: Outcome(s) achieved     Problem: Pain, Acute (Adult)  Goal: Identify Related Risk Factors and Signs and Symptoms  Outcome: Outcome(s) achieved  Goal: Acceptable Pain Control/Comfort Level  Outcome: Outcome(s) achieved

## 2020-05-12 NOTE — DISCHARGE INSTRUCTIONS
Discharge Activity    1) No driving    2) please remove the dressings in 48 hours but keep the Steri-Strips in place and let them fall off naturally.  Please empty the drains twice a day and record the output.  I would apply a 4 x 4 gauze dressing over the drain sites and change this daily.  3) May shower 48 hours from surgery after the dressings have been removed.  4) Do not lift / push / pull more then 15 lbs.  Or raise the elbow above the shoulder level

## 2020-05-12 NOTE — DISCHARGE SUMMARY
Date of admission- 5/11/2020  Date of discharge- 5/12/2020    Admission diagnosis- right breast cancer    Discharge diagnosis- same    Procedure performed- right total mastectomy with sentinel lymph node biopsy    History and physical examination-please see admitting H&P    Hospital course- the patient was admitted to the hospital following her right mastectomy.  Her postoperative course has been uneventful.  She is ambulating without difficulty and tolerating her diet.  The patient's pain level is well controlled with oral pain medication.  She has 2 drains in place and they are putting out serosanguineous fluid in appropriate amounts.  The dressing is dry and intact and I do not see any undrained fluid collections beneath the skin flaps.  Her extremities do not show any calf tenderness or edema.  The patient will be given instructions and emptying the drains and recording the outputs.  She is to call me in 1 week with drainage amounts.  She will continue all of her home medications with the addition hydrocodone 5/325 mg 1-2 p.o. every 4-6 hours as needed pain.  I will call her when the pathology report becomes available.

## 2020-05-14 ENCOUNTER — TELEPHONE (OUTPATIENT)
Dept: MAMMOGRAPHY | Facility: CLINIC | Age: 70
End: 2020-05-14

## 2020-05-14 ENCOUNTER — OFFICE VISIT (OUTPATIENT)
Dept: ONCOLOGY | Facility: CLINIC | Age: 70
End: 2020-05-14

## 2020-05-14 DIAGNOSIS — Z17.0 MALIGNANT NEOPLASM OF UPPER-OUTER QUADRANT OF LEFT BREAST IN FEMALE, ESTROGEN RECEPTOR POSITIVE (HCC): Primary | ICD-10-CM

## 2020-05-14 DIAGNOSIS — C50.412 MALIGNANT NEOPLASM OF UPPER-OUTER QUADRANT OF LEFT BREAST IN FEMALE, ESTROGEN RECEPTOR POSITIVE (HCC): Primary | ICD-10-CM

## 2020-05-14 PROCEDURE — 99442 PR PHYS/QHP TELEPHONE EVALUATION 11-20 MIN: CPT | Performed by: INTERNAL MEDICINE

## 2020-05-14 RX ORDER — LETROZOLE 2.5 MG/1
2.5 TABLET, FILM COATED ORAL DAILY
Qty: 90 TABLET | Refills: 3 | Status: SHIPPED | OUTPATIENT
Start: 2020-05-14 | End: 2020-08-12

## 2020-05-14 NOTE — PROGRESS NOTES
Subjective     REASON FOR CONSULTATION: Right breast cancer invasive low-grade ER positive GA negative HER-2 negative breast cancer at 6:00 with DCIS at 1:00 post biopsy                             REQUESTING PHYSICIAN: MD Keshia Durán MD  History of Present Illness patient is a 70-year-old female with a right breast cancer low-grade ER positive GA negative who was taken to surgery because of inability to do the Oncotype DX score on her core biopsy.    She is finally gotten rid of the gout in her foot and is doing better.    We reviewed the results of her simple mastectomy on 5/11/2020 and this shows an invasive cancer measuring 19 x 13 mmI- DCIS present measuring 15 mm margins were widely clear 5 sentinel nodes were negative for metastatic disease still grade 1   There is no lymphovascular invasion    We have sent an Oncotype DX score and the results are not back and she will call for the results in 2 weeks    She was very worried that the Arimidex because the gout although I told her I did not think this was the case I am certainly happy to switch to Femara just make sure we do not have any other problems when we resume her hormonal therapy after her Oncotype results are back.    Also discussed genetic testing because of her family history and we will schedule this    No radiation is needed because she had a mastectomy    Past Medical History:   Diagnosis Date   • Breast cancer (CMS/HCC) 03/2020    right IDC   • CTS (carpal tunnel syndrome)    • Diabetes mellitus (CMS/HCC)     Type 2   • H/O Cellulitis of left ear    • History of asthma    • History of cataract    • History of complete heart block     paced in 2015   • History of fall    • Hyperlipidemia    • Hypertension    • Hypothyroidism    • Osteoarthritis    • Osteopenia    • PONV (postoperative nausea and vomiting)         Past Surgical History:   Procedure Laterality Date   •  BLADDER REPAIR  2012   • BREAST BIOPSY Right 2020    malignant   • CARDIAC PACEMAKER PLACEMENT     • CARPAL TUNNEL RELEASE Right 2015   • CATARACT EXTRACTION Bilateral    •  SECTION      x2   • COLONOSCOPY  2015   • ENDOMETRIAL ABLATION     • HAND SURGERY     • HERNIA REPAIR      Bilateral femoral and inguinal   • KNEE ARTHROSCOPY Bilateral    • MASTECTOMY WITH SENTINEL NODE BIOPSY AND AXILLARY NODE DISSECTION Right 2020    Procedure: RIGHT BREAST MASTECTOMY WITH SENTINEL NODE BIOPSY;  Surgeon: Bj Stephen MD;  Location: [facility] [place];  Service: General;  Laterality: Right;   • SHOULDER ARTHROSCOPY Right 2016    rotator cuff repair, biceps tenotomy   • THYROIDECTOMY, PARTIAL     • TONSILLECTOMY AND ADENOIDECTOMY     • TOTAL KNEE ARTHROPLASTY Left    • TUBAL ABDOMINAL LIGATION      patient is a 70-year-old white female with hypercholesterolemia diabetes type 2 hypertension hypothyroidism and pacemaker placement 2015 was noted on routine mammogram in  of this last year to have some abnormalities in the right breast that warranted close follow-up.  This led to a repeat mammogram in January which was delayed due to her trip she took Shira with her  when she came back in February she did the mammogram and this showed persistence and worsening of the calcifications at the 6:00 and 1 o'clock position of the right breast.    This led to a biopsy of both these regions on 3/5/2020 which showed DCIS at the 1 o'clock position intermediate grade with calcifications and no necrosis and well differentiated grade 1 infiltrating ductal carcinoma at the 6 o'clock position measuring 2 mm with no lymph vascular invasion and associated DCIS intermediate grade the tumor was positive for estrogen greater than 95% negative for progesterone 0% HER-2 negative at 1+ Ki-67 of 5%.    Patient was referred to Dr. Stephen who ordered bilateral breast MRIs which showed residual 2 cm area of enhancement  at the 6 o'clock position but no further abnormalities at the 1 o'clock position of the right breast.  There is no axillary or internal mammary adenopathy in the left breast was benign.  The mass is not palpable by the patient or Dr. Stephen    Based on the recent coronavirus outbreak which is ongoing elective surgeries for grade 1 breast cancers are being delayed based on guidelines from the hospital and from the oncological community and she is here to discuss neoadjuvant hormonal blockade.    Patient is  3 para 3 first childbirth was age 22 she breast-fed 2 of her 3 children for at least 4 to 6 months menarche was at age 11 menopause at 49 she did not take hormones after menopause    Family history is positive for paternal grandmother with metastatic breast cancer at age 75 a paternal first cousin with breast cancer at age 70 her father  with bladder cancer mother had endometrial carcinoma at age 80.  She is concerned about genetic testing because she has 3 daughters and 3 grand daughters.    She is not a smoker or drinker and denies DVTs MI or stroke    Because of the delay of surgery due to the coronavirus we decided to do Oncotype on her biopsy and start aromatase inhibitor in the neoadjuvant setting    The side effects and toxicities of the Aromatase inhibitors was discussed with the patient including, hot flashes, mood swings and hair thinning.Significant arthralgias and worsening bone density were also discussed. Baseline bone density evaluation was ordered.  I discussed with the patient and her  on the video conference that this was not the standard of care usually but because of the pandemic and making treatment decisions for the safety of the patient and based on delaying elective surgery for certain subsets of breast cancer which she presented.    I explained the rationale of doing an Oncotype DX score and told her that if she was not willing to take chemotherapy there would be no  reason to do this but she is willing to take chemotherapy if she has a high Oncotype score and therefore we will send this results and she will call me in 2 weeks to discuss them    There are no clear guidelines on how to manage patients with clinically node-negative disease with a high Oncotype and we may decide to move up her surgery so that we will have more information with which to make a decision about chemotherapy in order to decide how much chemotherapy to give her    She and her  were willing to go this route and she will call me in 2 weeks to discuss her Oncotype score    I am concerned about her general joint discomfort which might be aggravated by an aromatase inhibitor but we will deal with that if it occurs    I will also schedule her to see the genetic counselors and they could possibly do a video visit with her also and decide about genetic testing    4/20  Patient is a 70-year-old female with small mammographically detected right breast cancer ER positive NM negative and we opted to start Arimidex preoperatively because surgeries are being delayed for the coronavirus pandemic.  We will also ask for an Oncotype DX to be done on her biopsy specimen to help us make decisions about further treatment.  Unfortunately the specimen was too small and the Oncotype could not be done.  In addition she developed gout in her foot after 2 or 3 doses of Arimidex which I do not think is related to the Arimidex and she was evaluated by her daughter who is a physician who did not think there was any infection she tried nonsteroidals with some improvement but she still not better and has not yet restarted her Arimidex    In light of all these findings I have given her a short prednisone bolus to see if this will help the gout and at this point I think we should move ahead with surgery in the next 3 to 4 weeks and hold off on the Arimidex until after surgery so we can get all the information we need at the time  of surgery    She is okay with this approach and I have called her surgeon and her cardiologist to clear her for surgery    She will need to stop her nonsteroidals at least a week before surgery and hopefully the steroids will help the foot enough that she does not have to take the nonsteroidals    She has had no fever or other signs of infection      Current Outpatient Medications on File Prior to Visit   Medication Sig Dispense Refill   • albuterol sulfate HFA (Ventolin HFA) 108 (90 Base) MCG/ACT inhaler Inhale 2 puffs Every 4 (Four) Hours As Needed.     • anastrozole (Arimidex) 1 MG tablet Take 1 tablet by mouth Daily for 30 days. 30 tablet 6   • cyclobenzaprine (FLEXERIL) 10 MG tablet Take 10 mg by mouth 3 (Three) Times a Day As Needed.     • Doxylamine Succinate, Sleep, (UNISOM PO) Take 1 tablet by mouth At Night As Needed.     • ezetimibe (ZETIA) 10 MG tablet Take 10 mg by mouth Daily.     • fluticasone (FLONASE) 50 MCG/ACT nasal spray 2 sprays into the nostril(s) as directed by provider As Needed.     • fluticasone-salmeterol (ADVAIR) 100-50 MCG/DOSE DISKUS Inhale 1 puff As Needed.     • glucose blood test strip ACCU-CHECK SKYLA. Use to check blood sugar once daily, as directed. DX: E11.9     • HYDROcodone-acetaminophen (NORCO) 5-325 MG per tablet Take 1-2 tablets by mouth Every 4 (Four) Hours As Needed (Pain). 20 tablet 0   • irbesartan (AVAPRO) 150 MG tablet Take 150 mg by mouth Daily.     • Lancets (ACCU-CHEK MULTICLIX) lancets Use to check blood sugar once daily, as directed. DX: 250.00     • Loratadine 10 MG capsule Take 1 tablet by mouth Daily.     • metFORMIN (GLUCOPHAGE) 500 MG tablet Take 500 mg by mouth 2 (Two) Times a Day With Meals.     • Thyroid (LEVOTHYROXINE-LIOTHYRONINE PO) Take 125 mg by mouth Daily. 1/2 ON SUNDAYS     • traMADol (Ultram) 50 MG tablet Take 1 tablet by mouth Every 6 (Six) Hours As Needed for Moderate Pain . 20 tablet 0   • vitamin B-12 (CYANOCOBALAMIN) 1000 MCG tablet Take  1,000 mcg by mouth Daily.     • zolpidem (AMBIEN) 10 MG tablet Take 10 mg by mouth every night at bedtime.       No current facility-administered medications on file prior to visit.         ALLERGIES:    Allergies   Allergen Reactions   • Indomethacin Dizziness     DIZZINESS, EAR RINGING, AND NAUSEA  DIZZINESS, EAR RINGING, AND NAUSEA     • Nutritional Supplements Swelling     [place] nuts - no other nuts that she is aware of   • Penicillins Anaphylaxis, Hives, Shortness Of Breath and Itching     Sob, hives  SOB, tachycardia  Sob, hives  SOB, tachycardia  Sob, hives     • Hydrochlorothiazide Unknown - High Severity     Caused gout symptoms.  Other reaction(s): Other (See Comments)  Caused gout symptoms.  Caused gout symptoms.     • Lisinopril Cough   • Morphine Nausea Only     N/V  N/V     • Pentazocine Nausea And Vomiting     N/V          Social History     Socioeconomic History   • Marital status:      Spouse name: Sean   • Number of children: Not on file   • Years of education: Not on file   • Highest education level: Not on file   Occupational History     Employer: RETIRED   Tobacco Use   • Smoking status: Never Smoker   • Smokeless tobacco: Never Used   Substance and Sexual Activity   • Alcohol use: Yes     Comment: Occasionally   • Drug use: Never   • Sexual activity: Defer        Family History   Problem Relation Age of Onset   • Cervical cancer Mother    • Alzheimer's disease Mother    • Arthritis Mother    • Cancer Father         Bladder   • Heart disease Father    • Hypertension Father    • Arthritis Father    • Alcohol abuse Father    • Hyperlipidemia Father    • Arthritis Maternal Grandmother    • Heart disease Other         FH of heart disease in males before age 55   • Hyperlipidemia Brother    • Hypertension Brother    • Breast cancer Paternal Grandmother 75   • Heart attack Paternal Grandfather    • Breast cancer Paternal Cousin 70   • Malig Hyperthermia Neg Hx         Review of Systems    Constitutional: Positive for diaphoresis.   Respiratory: Negative for shortness of breath.    Cardiovascular: Negative for chest pain, palpitations and leg swelling.   Musculoskeletal: Positive for arthralgias, gait problem (Arthritis in the knees one is replaced) and joint swelling.   Neurological: Negative for dizziness and weakness.   All other systems reviewed and are negative.       Objective No vitals telephone visit    There were no vitals filed for this visit.  No flowsheet data found.    Physical Exam physical exam not done as this was a telephone visit    RECENT LABS:  Hematology WBC   Date Value Ref Range Status   05/08/2020 8.46 3.40 - 10.80 10*3/mm3 Final   02/05/2019 10.83 4.5 - 11.0 10*3/uL Final     RBC   Date Value Ref Range Status   05/08/2020 4.24 3.77 - 5.28 10*6/mm3 Final   02/05/2019 4.36 4.0 - 5.2 10*6/uL Final     Hemoglobin   Date Value Ref Range Status   05/08/2020 13.2 12.0 - 15.9 g/dL Final   02/05/2019 13.7 12.0 - 16.0 g/dL Final     Hematocrit   Date Value Ref Range Status   05/08/2020 39.7 34.0 - 46.6 % Final   02/05/2019 42.1 36.0 - 46.0 % Final     Platelets   Date Value Ref Range Status   05/08/2020 323 140 - 450 10*3/mm3 Final   02/05/2019 337 140 - 440 10*3/uL Final        SPECIMEN   Procedure  Total mastectomy    Specimen Laterality  Right    TUMOR   Tumor Site  Lower inner quadrant    Clock Position of Tumor Site  6 o'clock    Histologic Type  Invasive carcinoma of no special type (invasive ductal carcinoma, not otherwise specified)    Glandular (Acinar) / Tubular Differentiation  Score 2    Nuclear Pleomorphism  Score 2    Mitotic Rate  Score 1    Overall Grade  Grade 1 (scores of 3, 4 or 5)    Tumor Size  Greatest dimension of largest invasive focus (Millimeters): 19 mm   Additional Dimension (Millimeters)  13 mm     10 mm   Tumor Focality  Single focus of invasive carcinoma    Ductal Carcinoma In Situ (DCIS)  Present      Negative for extensive intraductal component (EIC)     Size (Extent) of DCIS  Estimated size (extent) of DCIS is at least (Millimeters): 15 mm   Architectural Patterns  Cribriform      Papillary      Solid    Nuclear Grade  Grade II (intermediate)    Necrosis  Present, focal (small foci or single cell necrosis)    Lobular Carcinoma In Situ (LCIS)  No LCIS in specimen    Tumor Extent     Lymphovascular Invasion  Not identified    Dermal Lymphovascular Invasion  Not identified    Microcalcifications  Present in DCIS      Present in invasive carcinoma      Present in non-neoplastic tissue    Treatment Effect  No known presurgical therapy    MARGINS   Invasive Carcinoma Margins  Uninvolved by invasive carcinoma    Distance from Closest Margin (Millimeters)  10 mm   Closest Margin(s)  Lateral    Distance from Other Margins     Anterior Margin (Millimeters)  40 mm   Posterior Margin (Millimeters)  45 mm   Superior Margin (Millimeters)  18 mm   Inferior Margin (Millimeters)  13 mm   Medial Margin (Millimeters)  22 mm   Lateral Margin (Millimeters)  10 mm   DCIS Margins  Uninvolved by DCIS    Distance from Closest Margin (Millimeters)  14 mm   Closest Margin(s)  Superior      Inferior    Distance from Other Margins     Anterior Margin (Millimeters)  20 mm   Posterior Margin (Millimeters)  40 mm   Superior Margin (Millimeters)  14 mm   Inferior Margin (Millimeters)  14 mm   Medial Margin (Millimeters)  30 mm   Lateral Margin (Millimeters)  21 mm   LYMPH NODES   Regional Lymph Nodes  Uninvolved by tumor cells    Total Number of Lymph Nodes Examined  5    Number of [place] Nodes Examined  5    PATHOLOGIC STAGE CLASSIFICATION (pTNM, AJCC 8th Edition)   Primary Tumor (pT)  pT1c    Regional Lymph Nodes Modifier  (sn): Only sentinel node(s) evaluated.    Regional Lymph Nodes (pN)  pN0    .   Breast Biomarker Reporting Template   Breast.Bmk - 1   Protocol posted: 8/28/2019   Test(s) Performed     Estrogen Receptor (ER) Status  Positive    Percentage of Cells with Nuclear Positivity   %    Average Intensity of Staining  Strong    Test Type  Food and Drug Administration (FDA) cleared (test / vendor): [employer]    Primary Antibody  SP1    Scoring System  Ella    Proportion Score  5    Intensity Score  3    Total Ella Score  8    Test(s) Performed     Progesterone Receptor (PgR) Status  Positive    Percentage of Cells with Nuclear Positivity  5 %   Average Intensity of Staining  Moderate    Test Type  Food and Drug Administration (FDA) cleared (test / vendor): [employer]    Primary Antibody  1E2    Scoring System  Ella    Proportion Score  2    Intensity Score  2    Total Ella Score  4    Test(s) Performed     HER2 by Immunohistochemistry  Equivocal (Score 2+)    Percentage of Cells with Uniform Intense Complete Membrane Staining  0 %   Test Type  Food and Drug Administration (FDA) cleared (test / vendor): [employer]    Primary Antibody  4B5    Test(s) Performed  Ki-67    Percentage of Positive Nuclei  10 %   Primary Antibody           Assessment/Plan   1.cT1cN0 grade 1 infiltrating ductal carcinoma right breast at 6:00 ER positive MS negative HER-2 negative with associated intermediate grade DCIS the 1 o'clock position of the breast both lesions nonpalpable  · pT1cN0 grade 1 ER positive MS negative HER-2 negative post mastectomy on 5/11/2020  · Oncotype DX score pending  2.  Type 2 diabetes/hypertension hypercholesterolemia/  3.  Hypothyroidism  4.  Significant degenerative arthritis on nonsteroidals  5.  Family history of breast cancer  6.  Normal bone density in 2018  7.  Gout right foot minimally responsive to nonsteroidals    .Plan  1.  DC Arimidex Femara 2.5mg to start once Oncotype results are back  2.  Oncotype DX score  3.  Refer to genetics  4.  See me in August for follow-up    You have chosen to receive care through a telephone visit. Do you consent to use a telephone visit for your medical care today? Yes    Telephone visit 15 minutes

## 2020-05-14 NOTE — TELEPHONE ENCOUNTER
I told her the path report showed the invasive cancer measured 1.9 cm.  We had nice clear margins around everything and the sentinel nodes are all clear.

## 2020-05-18 ENCOUNTER — TELEPHONE (OUTPATIENT)
Dept: MAMMOGRAPHY | Facility: CLINIC | Age: 70
End: 2020-05-18

## 2020-05-18 NOTE — TELEPHONE ENCOUNTER
Pt called with her drain totals:  Drain #1:  05/13/2020-  71  05/14/2020-  65  05/15/2020-  65  05/16/2020-  53  05/17/2020-  55  05/18/2020-  50    Drain #2:  05/13/2020-  50  05/14/2020-  80  05/15/2020-  40  05/16/2020-  45  05/17/2020-  35  05/18/2020-  40    CMA

## 2020-05-19 ENCOUNTER — TELEPHONE (OUTPATIENT)
Dept: OTHER | Facility: HOSPITAL | Age: 70
End: 2020-05-19

## 2020-05-19 NOTE — TELEPHONE ENCOUNTER
Called Ms. Berg to see how she was doing. She stated she is doing well, but is struggling with the drains and the uncomfortable feeling they bring. She is also struggling with gout after starting her Arimidex. She will speak with Dr. Jasso's office to report these symptoms.     We also set up her genetics appointment for June 30th at 9 am. She was thankful for the call and will reach out if any questions or needs arise.

## 2020-05-22 ENCOUNTER — OFFICE VISIT (OUTPATIENT)
Dept: MAMMOGRAPHY | Facility: CLINIC | Age: 70
End: 2020-05-22

## 2020-05-22 DIAGNOSIS — C50.811 MALIGNANT NEOPLASM OF OVERLAPPING SITES OF RIGHT FEMALE BREAST, UNSPECIFIED ESTROGEN RECEPTOR STATUS (HCC): Primary | ICD-10-CM

## 2020-05-22 PROCEDURE — 99024 POSTOP FOLLOW-UP VISIT: CPT | Performed by: SURGERY

## 2020-05-22 NOTE — PROGRESS NOTES
Chief Complaint: Mary Jo Berg is a  70 y.o. female, initially referred by No ref. provider found , who is here today for a postoperative visit.    History of Present Illness:  In the interim,Mary Jo Berg has had the following procedure and resultant pathology report: She is undergone a right total mastectomy with sentinel lymph node biopsy.  All of her lymph nodes were benign.  The invasive cancer measured 1.9 cm with associated DCIS measuring 15 mm.  All the margins are clear.  She is noted some irritation around 1 of her drains.    She has noted no redness, warmth,drainage, swelling at the incision site. Denies fever or chills.      Current Outpatient Medications:   •  albuterol sulfate HFA (Ventolin HFA) 108 (90 Base) MCG/ACT inhaler, Inhale 2 puffs Every 4 (Four) Hours As Needed., Disp: , Rfl:   •  cyclobenzaprine (FLEXERIL) 10 MG tablet, Take 10 mg by mouth 3 (Three) Times a Day As Needed., Disp: , Rfl:   •  Doxylamine Succinate, Sleep, (UNISOM PO), Take 1 tablet by mouth At Night As Needed., Disp: , Rfl:   •  ezetimibe (ZETIA) 10 MG tablet, Take 10 mg by mouth Daily., Disp: , Rfl:   •  fluticasone (FLONASE) 50 MCG/ACT nasal spray, 2 sprays into the nostril(s) as directed by provider As Needed., Disp: , Rfl:   •  fluticasone-salmeterol (ADVAIR) 100-50 MCG/DOSE DISKUS, Inhale 1 puff As Needed., Disp: , Rfl:   •  glucose blood test strip, ACCU-CHECK SKYLA. Use to check blood sugar once daily, as directed. DX: E11.9, Disp: , Rfl:   •  HYDROcodone-acetaminophen (NORCO) 5-325 MG per tablet, Take 1-2 tablets by mouth Every 4 (Four) Hours As Needed (Pain)., Disp: 20 tablet, Rfl: 0  •  irbesartan (AVAPRO) 150 MG tablet, Take 150 mg by mouth Daily., Disp: , Rfl:   •  Lancets (ACCU-CHEK MULTICLIX) lancets, Use to check blood sugar once daily, as directed. DX: 250.00, Disp: , Rfl:   •  letrozole (Femara) 2.5 MG tablet, Take 1 tablet by mouth Daily for 90 days., Disp: 90 tablet, Rfl: 3  •  Loratadine 10 MG  capsule, Take 1 tablet by mouth Daily., Disp: , Rfl:   •  metFORMIN (GLUCOPHAGE) 500 MG tablet, Take 500 mg by mouth 2 (Two) Times a Day With Meals., Disp: , Rfl:   •  Thyroid (LEVOTHYROXINE-LIOTHYRONINE PO), Take 125 mg by mouth Daily. 1/2 ON SUNDAYS, Disp: , Rfl:   •  traMADol (Ultram) 50 MG tablet, Take 1 tablet by mouth Every 6 (Six) Hours As Needed for Moderate Pain ., Disp: 20 tablet, Rfl: 0  •  vitamin B-12 (CYANOCOBALAMIN) 1000 MCG tablet, Take 1,000 mcg by mouth Daily., Disp: , Rfl:   •  zolpidem (AMBIEN) 10 MG tablet, Take 10 mg by mouth every night at bedtime., Disp: , Rfl:   Physical examination  Right chest wall-status post mastectomy without reconstruction.  The incision looks pretty good and is intact.  I removed some of the Steri-Strips in the region of the axilla because there appeared to be some moisture beneath the strips.    Assessment:  Right breast cancer status post mastectomy without reconstruction.  1 of the drains was putting out below 30 cc for the last 3 days.  I removed it today without difficulty.  The other drain remains in place.  She is otherwise recovering well.    Plan:  She will call our office in about 4 days with drain amounts but she is getting close to having that one removed as well.          EMR Dragon/transcription disclaimer:    Much of this encounter note is an electronic transcription/translocation of spoken language to printed text.  The electronic translation of spoken language may permit erroneous, or at times, nonsensical words or phrases to be inadvertently transcribed.  Although I have reviewed the note from such areas, some may still exist.  Answers for HPI/ROS submitted by the patient on 5/21/2020   What is the primary reason for your visit?: Other  Please describe your symptoms.: Post -op Visit  Have you had these symptoms before?: No  How long have you been having these symptoms?: 1-2 weeks  Please list any medications you are currently taking for this  condition.: Hydrocodone with acetaminophen , Or, Tramadol  Please describe any probable cause for these symptoms. : Post - op

## 2020-05-26 ENCOUNTER — TELEPHONE (OUTPATIENT)
Dept: MAMMOGRAPHY | Facility: CLINIC | Age: 70
End: 2020-05-26

## 2020-05-26 NOTE — TELEPHONE ENCOUNTER
I am not really concerned about that will drain site.  I should be able to see her back when we pulled the drains out.

## 2020-05-26 NOTE — TELEPHONE ENCOUNTER
Calling in drain amounts:    05/24  35  05/25  27  05/26  30    I told her to call back on Thursday with the amounts. You mentioned to her over the weekend about debriding the other site again so she was wondering if she needed to come in for that.

## 2020-05-28 ENCOUNTER — TELEPHONE (OUTPATIENT)
Dept: MAMMOGRAPHY | Facility: CLINIC | Age: 70
End: 2020-05-28

## 2020-05-28 ENCOUNTER — OFFICE VISIT (OUTPATIENT)
Dept: MAMMOGRAPHY | Facility: CLINIC | Age: 70
End: 2020-05-28

## 2020-05-28 VITALS — SYSTOLIC BLOOD PRESSURE: 130 MMHG | DIASTOLIC BLOOD PRESSURE: 80 MMHG

## 2020-05-28 DIAGNOSIS — C50.811 MALIGNANT NEOPLASM OF OVERLAPPING SITES OF RIGHT BREAST IN FEMALE, ESTROGEN RECEPTOR POSITIVE (HCC): Primary | ICD-10-CM

## 2020-05-28 DIAGNOSIS — Z17.0 MALIGNANT NEOPLASM OF OVERLAPPING SITES OF RIGHT BREAST IN FEMALE, ESTROGEN RECEPTOR POSITIVE (HCC): Primary | ICD-10-CM

## 2020-05-28 PROCEDURE — 99024 POSTOP FOLLOW-UP VISIT: CPT | Performed by: SURGERY

## 2020-05-28 NOTE — PROGRESS NOTES
Chief Complaint: Mary Jo Berg is a  70 y.o. female, initially referred by No ref. provider found , who is here today for a postoperative visit.    History of Present Illness:  In the interim,Mary oJ Berg has been emptying the remaining drain and it is now below 30 cc for 24 hours for at least 4 days in a row.    She has noted no redness, warmth,drainage, swelling at the incision site. Denies fever or chills.  She has noticed some yellow material around the old drain site.    Current Outpatient Medications:   •  albuterol sulfate HFA (Ventolin HFA) 108 (90 Base) MCG/ACT inhaler, Inhale 2 puffs Every 4 (Four) Hours As Needed., Disp: , Rfl:   •  cyclobenzaprine (FLEXERIL) 10 MG tablet, Take 10 mg by mouth 3 (Three) Times a Day As Needed., Disp: , Rfl:   •  Doxylamine Succinate, Sleep, (UNISOM PO), Take 1 tablet by mouth At Night As Needed., Disp: , Rfl:   •  ezetimibe (ZETIA) 10 MG tablet, Take 10 mg by mouth Daily., Disp: , Rfl:   •  fluticasone (FLONASE) 50 MCG/ACT nasal spray, 2 sprays into the nostril(s) as directed by provider As Needed., Disp: , Rfl:   •  fluticasone-salmeterol (ADVAIR) 100-50 MCG/DOSE DISKUS, Inhale 1 puff As Needed., Disp: , Rfl:   •  glucose blood test strip, ACCU-CHECK SKYLA. Use to check blood sugar once daily, as directed. DX: E11.9, Disp: , Rfl:   •  HYDROcodone-acetaminophen (NORCO) 5-325 MG per tablet, Take 1-2 tablets by mouth Every 4 (Four) Hours As Needed (Pain)., Disp: 20 tablet, Rfl: 0  •  irbesartan (AVAPRO) 150 MG tablet, Take 150 mg by mouth Daily., Disp: , Rfl:   •  Lancets (ACCU-CHEK MULTICLIX) lancets, Use to check blood sugar once daily, as directed. DX: 250.00, Disp: , Rfl:   •  letrozole (Femara) 2.5 MG tablet, Take 1 tablet by mouth Daily for 90 days., Disp: 90 tablet, Rfl: 3  •  Loratadine 10 MG capsule, Take 1 tablet by mouth Daily., Disp: , Rfl:   •  metFORMIN (GLUCOPHAGE) 500 MG tablet, Take 500 mg by mouth 2 (Two) Times a Day With Meals., Disp: , Rfl:   •   Thyroid (LEVOTHYROXINE-LIOTHYRONINE PO), Take 125 mg by mouth Daily. 1/2 ON SUNDAYS, Disp: , Rfl:   •  traMADol (Ultram) 50 MG tablet, Take 1 tablet by mouth Every 6 (Six) Hours As Needed for Moderate Pain ., Disp: 20 tablet, Rfl: 0  •  vitamin B-12 (CYANOCOBALAMIN) 1000 MCG tablet, Take 1,000 mcg by mouth Daily., Disp: , Rfl:   •  zolpidem (AMBIEN) 10 MG tablet, Take 10 mg by mouth every night at bedtime., Disp: , Rfl:   Physical examination  Right chest wall- the incision has Steri-Strips in place which were removed.  A couple of areas of the incision have some scab on them but I see no signs of infection or obvious fluid collection.  Assessment:  Right breast cancer status post mastectomy without reconstruction.  The drain site just has some adipose tissue that has turned yellow because of exposure to air.  I do not see anything concerning there.  The remaining drain was removed today without difficulty.    Plan:  I have instructed her in how to do the wall walking exercises.  I would like to see her back in about 2 weeks.          EMR Dragon/transcription disclaimer:    Much of this encounter note is an electronic transcription/translocation of spoken language to printed text.  The electronic translation of spoken language may permit erroneous, or at times, nonsensical words or phrases to be inadvertently transcribed.  Although I have reviewed the note from such areas, some may still exist.

## 2020-06-11 ENCOUNTER — OFFICE VISIT (OUTPATIENT)
Dept: MAMMOGRAPHY | Facility: CLINIC | Age: 70
End: 2020-06-11

## 2020-06-11 DIAGNOSIS — C50.811 MALIGNANT NEOPLASM OF OVERLAPPING SITES OF RIGHT BREAST IN FEMALE, ESTROGEN RECEPTOR POSITIVE (HCC): Primary | ICD-10-CM

## 2020-06-11 DIAGNOSIS — Z17.0 MALIGNANT NEOPLASM OF OVERLAPPING SITES OF RIGHT BREAST IN FEMALE, ESTROGEN RECEPTOR POSITIVE (HCC): Primary | ICD-10-CM

## 2020-06-11 PROCEDURE — 99024 POSTOP FOLLOW-UP VISIT: CPT | Performed by: SURGERY

## 2020-06-12 ENCOUNTER — TELEPHONE (OUTPATIENT)
Dept: ONCOLOGY | Facility: HOSPITAL | Age: 70
End: 2020-06-12

## 2020-06-12 ENCOUNTER — TELEPHONE (OUTPATIENT)
Dept: ONCOLOGY | Facility: CLINIC | Age: 70
End: 2020-06-12

## 2020-06-12 NOTE — TELEPHONE ENCOUNTER
Patient request that a nurse give her call today regarding her test results.  Please call [phone]

## 2020-06-12 NOTE — PROGRESS NOTES
Chief Complaint: Mary Jo Berg is a  70 y.o. female, initially referred by No ref. provider found , who is here today for a postoperative visit.    History of Present Illness:  In the interim,Mary Jo Berg has been doing the wall walking exercises and making good progress.  Her final drain was removed at her last visit and she has not really noted any puffiness to the chest wall.    She has noted no redness, warmth,drainage, swelling at the incision site. Denies fever or chills.      Current Outpatient Medications:   •  albuterol sulfate HFA (Ventolin HFA) 108 (90 Base) MCG/ACT inhaler, Inhale 2 puffs Every 4 (Four) Hours As Needed., Disp: , Rfl:   •  cyclobenzaprine (FLEXERIL) 10 MG tablet, Take 10 mg by mouth 3 (Three) Times a Day As Needed., Disp: , Rfl:   •  Doxylamine Succinate, Sleep, (UNISOM PO), Take 1 tablet by mouth At Night As Needed., Disp: , Rfl:   •  ezetimibe (ZETIA) 10 MG tablet, Take 10 mg by mouth Daily., Disp: , Rfl:   •  fluticasone (FLONASE) 50 MCG/ACT nasal spray, 2 sprays into the nostril(s) as directed by provider As Needed., Disp: , Rfl:   •  fluticasone-salmeterol (ADVAIR) 100-50 MCG/DOSE DISKUS, Inhale 1 puff As Needed., Disp: , Rfl:   •  glucose blood test strip, ACCU-CHECK SKYLA. Use to check blood sugar once daily, as directed. DX: E11.9, Disp: , Rfl:   •  HYDROcodone-acetaminophen (NORCO) 5-325 MG per tablet, Take 1-2 tablets by mouth Every 4 (Four) Hours As Needed (Pain)., Disp: 20 tablet, Rfl: 0  •  irbesartan (AVAPRO) 150 MG tablet, Take 150 mg by mouth Daily., Disp: , Rfl:   •  Lancets (ACCU-CHEK MULTICLIX) lancets, Use to check blood sugar once daily, as directed. DX: 250.00, Disp: , Rfl:   •  letrozole (Femara) 2.5 MG tablet, Take 1 tablet by mouth Daily for 90 days., Disp: 90 tablet, Rfl: 3  •  Loratadine 10 MG capsule, Take 1 tablet by mouth Daily., Disp: , Rfl:   •  metFORMIN (GLUCOPHAGE) 500 MG tablet, Take 500 mg by mouth 2 (Two) Times a Day With Meals., Disp: ,  Rfl:   •  Thyroid (LEVOTHYROXINE-LIOTHYRONINE PO), Take 125 mg by mouth Daily. 1/2 ON SUNDAYS, Disp: , Rfl:   •  traMADol (Ultram) 50 MG tablet, Take 1 tablet by mouth Every 6 (Six) Hours As Needed for Moderate Pain ., Disp: 20 tablet, Rfl: 0  •  vitamin B-12 (CYANOCOBALAMIN) 1000 MCG tablet, Take 1,000 mcg by mouth Daily., Disp: , Rfl:   •  zolpidem (AMBIEN) 10 MG tablet, Take 10 mg by mouth every night at bedtime., Disp: , Rfl:   Physical examination  Right chest wall- the incision has 2 areas with scab remaining.  I do not see any fluid beneath the skin flaps and there is no evidence of infection.  The drain sites are doing well.  One has totally scabbed over and the other looks pretty clean.  Extremities-the patient has good range of motion of that right upper extremity.  Assessment:  Right breast cancer status post mastectomy without reconstruction.  She is making good progress with her exercising and the wounds are doing well.    Plan:  The patient will continue to follow with the medical oncologist.  I would like to see her back in about 3 weeks just to be certain the wound has healed well.  At that point we will give her a prescription for a prosthetic breast.          EMR Dragon/transcription disclaimer:    Much of this encounter note is an electronic transcription/translocation of spoken language to printed text.  The electronic translation of spoken language may permit erroneous, or at times, nonsensical words or phrases to be inadvertently transcribed.  Although I have reviewed the note from such areas, some may still exist.  Answers for HPI/ROS submitted by the patient on 6/9/2020   What is the primary reason for your visit?: Other  Please describe your symptoms.: Post operation check  Have you had these symptoms before?: Yes  How long have you been having these symptoms?: Greater than 2 weeks  Please list any medications you are currently taking for this condition.: Same as on file  Please describe any  probable cause for these symptoms. : Mastectomy

## 2020-06-12 NOTE — TELEPHONE ENCOUNTER
Called pt and informed her of this. She v/u.     ----- Message from Karyn Jasso MD sent at 6/12/2020  1:42 PM EDT -----  Not she had back we had to wait 15 days after her discharge from the hospital because of Medicare rules Macie will call her on Monday  ----- Message -----  From: Verna Chase RN  Sent: 6/12/2020  11:02 AM EDT  To: MD Yuri Alas,   Pt is calling for the results of her Onocotype test. Can you please review and let me know what I can tell pt? It looks like your last note mentions her starting Femara? Please advise. Thank you!

## 2020-06-15 LAB
CYTO UR: NORMAL
LAB AP CASE REPORT: NORMAL
LAB AP DIAGNOSIS COMMENT: NORMAL
LAB AP SPECIAL STAINS: NORMAL
LAB AP SYNOPTIC CHECKLIST: NORMAL
Lab: NORMAL
PATH REPORT.ADDENDUM SPEC: NORMAL
PATH REPORT.FINAL DX SPEC: NORMAL
PATH REPORT.GROSS SPEC: NORMAL

## 2020-06-18 ENCOUNTER — OFFICE VISIT (OUTPATIENT)
Dept: FAMILY MEDICINE CLINIC | Facility: CLINIC | Age: 70
End: 2020-06-18

## 2020-06-18 VITALS
HEIGHT: 61 IN | HEART RATE: 86 BPM | DIASTOLIC BLOOD PRESSURE: 74 MMHG | WEIGHT: 209 LBS | OXYGEN SATURATION: 98 % | TEMPERATURE: 98.3 F | SYSTOLIC BLOOD PRESSURE: 126 MMHG | BODY MASS INDEX: 39.46 KG/M2

## 2020-06-18 DIAGNOSIS — I10 ESSENTIAL HYPERTENSION: ICD-10-CM

## 2020-06-18 DIAGNOSIS — R73.03 PREDIABETES: ICD-10-CM

## 2020-06-18 DIAGNOSIS — I44.2 COMPLETE HEART BLOCK (HCC): ICD-10-CM

## 2020-06-18 DIAGNOSIS — Z13.0 SCREENING FOR DEFICIENCY ANEMIA: ICD-10-CM

## 2020-06-18 DIAGNOSIS — E89.0 POSTOPERATIVE HYPOTHYROIDISM: ICD-10-CM

## 2020-06-18 DIAGNOSIS — C50.919 MALIGNANT NEOPLASM OF FEMALE BREAST, UNSPECIFIED ESTROGEN RECEPTOR STATUS, UNSPECIFIED LATERALITY, UNSPECIFIED SITE OF BREAST (HCC): ICD-10-CM

## 2020-06-18 DIAGNOSIS — G47.00 INSOMNIA, UNSPECIFIED TYPE: ICD-10-CM

## 2020-06-18 DIAGNOSIS — M10.9 GOUT OF LEFT FOOT, UNSPECIFIED CAUSE, UNSPECIFIED CHRONICITY: Primary | ICD-10-CM

## 2020-06-18 DIAGNOSIS — E78.00 HIGH CHOLESTEROL: ICD-10-CM

## 2020-06-18 DIAGNOSIS — E53.8 VITAMIN B12 DEFICIENCY: ICD-10-CM

## 2020-06-18 PROBLEM — E13.9 DIABETES 1.5, MANAGED AS TYPE 2: Status: ACTIVE | Noted: 2020-06-18

## 2020-06-18 PROBLEM — M48.061 LUMBAR STENOSIS WITHOUT NEUROGENIC CLAUDICATION: Status: ACTIVE | Noted: 2019-01-16

## 2020-06-18 PROBLEM — M43.17 SPONDYLOLISTHESIS AT L5-S1 LEVEL: Status: ACTIVE | Noted: 2019-01-16

## 2020-06-18 PROBLEM — E78.5 HYPERLIPIDEMIA: Status: ACTIVE | Noted: 2020-06-18

## 2020-06-18 PROBLEM — M54.16 LUMBAR RADICULOPATHY: Status: ACTIVE | Noted: 2019-01-16

## 2020-06-18 PROBLEM — E13.9 DIABETES 1.5, MANAGED AS TYPE 2 (HCC): Status: ACTIVE | Noted: 2020-06-18

## 2020-06-18 PROCEDURE — 99204 OFFICE O/P NEW MOD 45 MIN: CPT | Performed by: FAMILY MEDICINE

## 2020-06-18 RX ORDER — ALLOPURINOL 100 MG/1
100 TABLET ORAL DAILY
COMMUNITY
Start: 2020-06-15 | End: 2020-07-02 | Stop reason: SDUPTHER

## 2020-06-18 NOTE — PROGRESS NOTES
Mary Jo Berg is a 70 y.o. female.     Chief Complaint   Patient presents with   • Establish Care     new pt establishing today with dr cole    • Gout     follow up pt taking allopiurinol        HPI     Pt is a pleasant 70 y.o. YO female here for gout. She is a new patient to me and to [facility] [place], she is being seen by [facility] for breast cancer.     Gout - chronic, has been very rare in occurrence but has had two episodes over the last couple of months since starting on letrozole for breast cancer. Most recent episodes have involved the left foot, she was prescribed allopurinol by her orthopedist for the latest episode and is taking it daily, her orthopedist recommended she have uric acid levels checked when she saw her PCP to determine if it needs to be continued.    Breast Cancer - multicentric, right sided - Diagnosed through imaging in early March 2020 - she had mastectomy in May and is now on oral therapy with letrozole. Her surgeon is Dr. Stephen and her Oncologist is Dr. Jasso     Heart Block s/p pacemaker: Follows with cardiology in [facility] where she used to live ([facility]), recently noted to have a worsening systolic murmur which I also found on my exam, had a echocardiogram done a couple weeks ago but is still pending results.    HTN: Chronic, well-controlled on irbesartan daily.    HLD: Chronic, per patient well-controlled on acetamide daily, patient has been trialed on statin medications in the past but is intolerant.    Prediabetes: Chronic, unclear if patient's A1c was never high enough to be categorized as diabetes, follows with endocrinology-Dr. Wynn- takes metformin daily.    Hypothyroidism: Chronic, secondary to thyroidectomy for a very large goiter, follows with Dr. Wynn, endocrinology, for management.    Insomnia - chronic, well controlled on doxylamine for the most part, still has occasional nights that she is unable to sleep , insomnia has been  worse since starting the Femara    The following portions of the patient's history were reviewed by me and updated as appropriate: allergies, current medications, past family history, past medical history, past social history, past surgical history, and problem list.     Review of Systems   Constitutional: Negative.  Negative for activity change, appetite change and chills.   HENT: Negative.    Eyes: Negative.  Negative for pain, discharge and itching.   Respiratory: Negative.  Negative for apnea, choking and chest tightness.    Cardiovascular: Negative for chest pain, palpitations and leg swelling.   Gastrointestinal: Negative.  Negative for abdominal distention, abdominal pain and anal bleeding.   Endocrine: Negative.  Negative for cold intolerance, heat intolerance and polydipsia.   Genitourinary: Negative.  Negative for difficulty urinating, dyspareunia and dysuria.   Musculoskeletal: Positive for myalgias.        Toe pain    Allergic/Immunologic: Negative.  Negative for environmental allergies, food allergies and immunocompromised state.   Neurological: Negative.  Negative for dizziness and facial asymmetry.   Hematological: Negative.  Negative for adenopathy. Does not bruise/bleed easily.   Psychiatric/Behavioral: Negative.    I have reviewed and confirmed the accuracy of the ROS as documented by the MA/LPN/RN Kathrin Mitchell MD    Objective  Vitals:    06/18/20 1307   BP: 126/74   Pulse: 86   Temp: 98.3 °F (36.8 °C)   SpO2: 98%     Body mass index is 39.49 kg/m².       Physical Exam   Constitutional: She is oriented to person, place, and time. She appears well-developed and well-nourished. No distress.   HENT:   Head: Normocephalic and atraumatic.   Right Ear: External ear normal.   Left Ear: External ear normal.   Nose: Nose normal.   Eyes: Conjunctivae are normal. Right eye exhibits no discharge. Left eye exhibits no discharge. No scleral icterus.   Neck: Neck supple.   Cardiovascular: Normal rate and  regular rhythm. Exam reveals no gallop and no friction rub.   Murmur heard.  holosytolic murmur   Pulmonary/Chest: Effort normal and breath sounds normal. No respiratory distress. She has no wheezes. She has no rales.   Musculoskeletal:   Left foot with no significant tenderness to palpation, mild swelling, no erythema.    Lymphadenopathy:     She has no cervical adenopathy.   Neurological: She is alert and oriented to person, place, and time.   Skin: Skin is warm and dry.   Psychiatric: She has a normal mood and affect. Her behavior is normal. Judgment and thought content normal.   Vitals reviewed.        Current Outpatient Medications:   •  allopurinol (ZYLOPRIM) 100 MG tablet, Take 100 mg by mouth Daily., Disp: , Rfl:   •  Doxylamine Succinate, Sleep, (UNISOM PO), Take 1 tablet by mouth At Night As Needed., Disp: , Rfl:   •  ezetimibe (ZETIA) 10 MG tablet, Take 10 mg by mouth Daily., Disp: , Rfl:   •  fluticasone (FLONASE) 50 MCG/ACT nasal spray, 2 sprays into the nostril(s) as directed by provider As Needed., Disp: , Rfl:   •  glucose blood test strip, ACCU-CHECK SKYLA. Use to check blood sugar once daily, as directed. DX: E11.9, Disp: , Rfl:   •  irbesartan (AVAPRO) 150 MG tablet, Take 150 mg by mouth Daily., Disp: , Rfl:   •  Lancets (ACCU-CHEK MULTICLIX) lancets, Use to check blood sugar once daily, as directed. DX: 250.00, Disp: , Rfl:   •  letrozole (Femara) 2.5 MG tablet, Take 1 tablet by mouth Daily for 90 days., Disp: 90 tablet, Rfl: 3  •  Loratadine 10 MG capsule, Take 1 tablet by mouth Daily., Disp: , Rfl:   •  metFORMIN (GLUCOPHAGE) 500 MG tablet, Take 500 mg by mouth 2 (Two) Times a Day With Meals., Disp: , Rfl:   •  Thyroid (LEVOTHYROXINE-LIOTHYRONINE PO), Take 125 mg by mouth Daily. 1/2 ON SUNDAYS, Disp: , Rfl:   •  traMADol (Ultram) 50 MG tablet, Take 1 tablet by mouth Every 6 (Six) Hours As Needed for Moderate Pain ., Disp: 20 tablet, Rfl: 0  •  vitamin B-12 (CYANOCOBALAMIN) 1000 MCG tablet, Take  1,000 mcg by mouth Daily., Disp: , Rfl:     Procedures    Lab Results (most recent)     None              Mary Jo was seen today for establish care and gout.    45 minutes was spent of the 50 minute visit in direct face to face counseling and coordination of care regarding:   Encounter Diagnoses   Name Primary?   • Gout of left foot, unspecified cause, unspecified chronicity Yes   • Malignant neoplasm of female breast, unspecified estrogen receptor status, unspecified laterality, unspecified site of breast (CMS/HCC)    • Complete heart block (CMS/HCC)    • Essential hypertension    • High cholesterol    • Prediabetes    • Postoperative hypothyroidism    • Insomnia, unspecified type    • Vitamin B12 deficiency    • Screening for deficiency anemia        Diagnoses and all orders for this visit:    Gout of left foot, unspecified cause, unspecified chronicity  Continue allopurinol 100 mg for the time being, will check uric acid levels today.  She has had 2 episodes since being started on the letrozole and so she probably will need to be on prophylactic therapy with allopurinol for the time being.  Will call once we get results back and adjust dose if needed.  -     Uric acid    Malignant neoplasm of female breast, unspecified estrogen receptor status, unspecified laterality, unspecified site of breast (CMS/HCC)  Followed by Dr. Stephen and Dr. Jasso, will continue to keep up-to-date with treatments.  Currently having side effects on the letrozole including mood lability, hot flashes, and weight gain however medication is medically necessary.    Complete heart block (CMS/HCC)  Chronic, follows with cardiology in [facility], patient requested records be sent to us, we will follow-up on echo report for heart murmur.    Essential hypertension  Chronic, well-controlled on irbesartan, continue the same.  Will obtain a CMP today to evaluate for renal function.  Also to evaluate for sodium levels as she was mildly  hyponatremic after surgery.  -     Comprehensive Metabolic Panel    High cholesterol  Chronic, patient unable to tolerate statin medication, continue acetamide for the time being, will obtain lipid panel today (patient not fasting)  -     Lipid Panel    Prediabetes  Chronic, managed by endocrinology, will obtain A1c today to evaluate control.  Patient has had a recent flareup of gout that did require steroid treatment and may lead to elevated sugar levels.  -     Hemoglobin A1c    Postoperative hypothyroidism  Chronic, managed by endocrinology, will obtain TSH and T4 levels today as patient is past due for them.    -     TSH  -     T4, Free    Insomnia, unspecified type  Chronic, well controlled on doxylamine nightly, continue the same.    Vitamin B12 deficiency  Continue B12 supplement for the time being, will check B12 levels today and evaluate if dosing needs adjusted.  -     Vitamin B12    Screening for deficiency anemia  -     CBC & Differential        Return in about 6 months (around 12/18/2020) for Medicare Wellness.      Kathrin Mitchell MD

## 2020-06-19 LAB
ALBUMIN SERPL-MCNC: 4.3 G/DL (ref 3.5–5.2)
ALBUMIN/GLOB SERPL: 1.5 G/DL
ALP SERPL-CCNC: 75 U/L (ref 39–117)
ALT SERPL-CCNC: 13 U/L (ref 1–33)
AST SERPL-CCNC: 13 U/L (ref 1–32)
BASOPHILS # BLD AUTO: 0.04 10*3/MM3 (ref 0–0.2)
BASOPHILS NFR BLD AUTO: 0.4 % (ref 0–1.5)
BILIRUB SERPL-MCNC: 0.2 MG/DL (ref 0.2–1.2)
BUN SERPL-MCNC: 27 MG/DL (ref 8–23)
BUN/CREAT SERPL: 23.5 (ref 7–25)
CALCIUM SERPL-MCNC: 10.2 MG/DL (ref 8.6–10.5)
CHLORIDE SERPL-SCNC: 106 MMOL/L (ref 98–107)
CHOLEST SERPL-MCNC: 210 MG/DL (ref 0–200)
CO2 SERPL-SCNC: 26.3 MMOL/L (ref 22–29)
CREAT SERPL-MCNC: 1.15 MG/DL (ref 0.57–1)
EOSINOPHIL # BLD AUTO: 0.22 10*3/MM3 (ref 0–0.4)
EOSINOPHIL NFR BLD AUTO: 2 % (ref 0.3–6.2)
ERYTHROCYTE [DISTWIDTH] IN BLOOD BY AUTOMATED COUNT: 12.7 % (ref 12.3–15.4)
GLOBULIN SER CALC-MCNC: 2.9 GM/DL
GLUCOSE SERPL-MCNC: 105 MG/DL (ref 65–99)
HBA1C MFR BLD: 6.8 % (ref 4.8–5.6)
HCT VFR BLD AUTO: 40.4 % (ref 34–46.6)
HDLC SERPL-MCNC: 69 MG/DL (ref 40–60)
HGB BLD-MCNC: 13.3 G/DL (ref 12–15.9)
IMM GRANULOCYTES # BLD AUTO: 0.05 10*3/MM3 (ref 0–0.05)
IMM GRANULOCYTES NFR BLD AUTO: 0.5 % (ref 0–0.5)
LDLC SERPL CALC-MCNC: 92 MG/DL (ref 0–100)
LYMPHOCYTES # BLD AUTO: 2.97 10*3/MM3 (ref 0.7–3.1)
LYMPHOCYTES NFR BLD AUTO: 27.7 % (ref 19.6–45.3)
MCH RBC QN AUTO: 31.4 PG (ref 26.6–33)
MCHC RBC AUTO-ENTMCNC: 32.9 G/DL (ref 31.5–35.7)
MCV RBC AUTO: 95.3 FL (ref 79–97)
MONOCYTES # BLD AUTO: 0.88 10*3/MM3 (ref 0.1–0.9)
MONOCYTES NFR BLD AUTO: 8.2 % (ref 5–12)
NEUTROPHILS # BLD AUTO: 6.58 10*3/MM3 (ref 1.7–7)
NEUTROPHILS NFR BLD AUTO: 61.2 % (ref 42.7–76)
NRBC BLD AUTO-RTO: 0 /100 WBC (ref 0–0.2)
PLATELET # BLD AUTO: 293 10*3/MM3 (ref 140–450)
POTASSIUM SERPL-SCNC: 5.1 MMOL/L (ref 3.5–5.2)
PROT SERPL-MCNC: 7.2 G/DL (ref 6–8.5)
RBC # BLD AUTO: 4.24 10*6/MM3 (ref 3.77–5.28)
SODIUM SERPL-SCNC: 142 MMOL/L (ref 136–145)
T4 FREE SERPL-MCNC: 1.63 NG/DL (ref 0.93–1.7)
TRIGL SERPL-MCNC: 247 MG/DL (ref 0–150)
TSH SERPL DL<=0.005 MIU/L-ACNC: 1.03 UIU/ML (ref 0.27–4.2)
URATE SERPL-MCNC: 5.9 MG/DL (ref 2.4–5.7)
VIT B12 SERPL-MCNC: >2000 PG/ML (ref 211–946)
VLDLC SERPL CALC-MCNC: 49.4 MG/DL
WBC # BLD AUTO: 10.74 10*3/MM3 (ref 3.4–10.8)

## 2020-06-23 ENCOUNTER — CLINICAL SUPPORT (OUTPATIENT)
Dept: OTHER | Facility: HOSPITAL | Age: 70
End: 2020-06-23

## 2020-06-23 DIAGNOSIS — C50.911 MALIGNANT NEOPLASM OF RIGHT BREAST IN FEMALE, ESTROGEN RECEPTOR POSITIVE, UNSPECIFIED SITE OF BREAST (HCC): Primary | ICD-10-CM

## 2020-06-23 DIAGNOSIS — Z80.3 FAMILY HISTORY OF BREAST CANCER: ICD-10-CM

## 2020-06-23 DIAGNOSIS — Z80.8 FAMILY HISTORY OF BRAIN CANCER: ICD-10-CM

## 2020-06-23 DIAGNOSIS — Z17.0 MALIGNANT NEOPLASM OF RIGHT BREAST IN FEMALE, ESTROGEN RECEPTOR POSITIVE, UNSPECIFIED SITE OF BREAST (HCC): Primary | ICD-10-CM

## 2020-06-23 PROCEDURE — G0463 HOSPITAL OUTPT CLINIC VISIT: HCPCS

## 2020-06-23 PROCEDURE — 99203 OFFICE O/P NEW LOW 30 MIN: CPT | Performed by: MEDICAL GENETICS

## 2020-06-26 NOTE — PROGRESS NOTES
NAME:Mary Jo Berg            YOB: 1950    MRN:[id number]    DATE SEEN:06/23/2020    COUNSELOR:    : Romero Mckoy M.D.      Mary Jo Berg was seen for genetic counseling at the [facility].  She referral was initiated by Karyn Jasso MD because of a recent diagnosisiof breast cancer and family history of breast cancer, raising the possibility of a hereditary basis for the malignancy.    Review of the patient's health history indicated that Ms. Berg has type 2 diabetes. She had surgery for a goiter and a uterine ablation. She has had symptoms related to gout. She underwent a simple mastectomy on May 11, 2020 due to an invasive low grade  breast cancer of the right breast that was estrogen receptor positive and progesterone receptor and HER-2 negative. She is currently taking letrazole.     Review of the family history and pedigree revealed that Ms. Berg is 70 years of age. She has three daughters 48, 44, and 42. Her mother had cervical cancer and father bladder cancer. Her paternal grandmother had breast cancer at age 75 and paternal first cousin breast cancer in her 70s and this woman's grandson had leukemia in his 20s. Another paternal first cousin had brain cancer and his grandson had brain cancer age age 6 years.  The rest of the family history was non-contributory and there was no [employer]azi [employer] descent.    The rest of the genetic counseling session focused on the possibility of a hereditary basis for her breast cancer diagnosis.  She was told that approximately 70% of breast cancer is sporadic in nature, usually occurring later in life and without a significant family history of breast cancer.  In about 20% to 25% of cases, familial clustering occurs suggesting an increased genetic predisposition, although a specific abnormality within a gene is not detected.  In about 5% to 10% of cases, a hereditary basis  represents the cause for the cancer and a single gene is found to have a pathogenic variant that significantly increases the risk for breast cancer, may increase the risk for a second breast cancer and other malignancies.  In addition, the pathogenic variant places first degree relatives at 50% risk for inheriting the hereditary condition and, if not inherited, the risk for cancer would be the same as the risk for that cancer in the general population.    The most common cause of hereditary breast cancer is hereditary breast and ovarian cancer syndrome.  About two thirds of hereditary breast cancer either results from mutation in the BRCA1 gene (a tumor suppressor gene on chromosome 17) or the BRCA2 gene(a tumor suppressor gene on chromosome 13).  The presence of a mutation either of these genes increases the lifetime risk for breast cancer from 12% to 13% in the general population potentially to greater than 80%.  It also increases the risk for a second primary breast cancer to greater than 50%  There is also an increased risk for ovarian cancer. In the case of BRCA1, the ovarian cancer risk can be as high as 44% and in the case of BRCA2 as high as 27%, compared to the general population risk of 1.3% to 1.6%.  An increased risk for prostate cancer, male breast cancer, melanoma and pancreatic cancer also exists,either with a BRCA1 or  BRCA2 pathogenic variant.  Characteristics of hereditary breast and ovarian cancer syndrome frequently include but are not limited to an early age of diagnosis of breast cancer, multiple breast cancer primaries, the presence of both breast and ovarian cancer in the same woman or ovarian cancer alone, male breast cancer, family history of breast and ovarian cancer, ovarian cancer or multiple cases of breast cancer,triple negative breast cancer and Ashkenazi [employer] descent.    Since the original discovery of the BRCA1 and BRCA2 genes in the mid 1990s several moderate risk genes have been  identified that may increase the risk fro breast cancer in the range of 20% to as high as 60%.  These moderate breast risk genes in general are not associated with an increased risk for ovarian cancer but may be associated with an increased risk for other malignancies.    Based on Ms. Berg's diagnosis and family history, genetic testing will be performed.  A blood sample will be sent today to [facility] to perform there 84 gene multi cancer panel.  In addition, additional genes not on the multi cancer panel that are on the breast and gynecologic, urinary tract and kidney and nervous system and brain cancer panels, and the myelodysplastic syndrome and brain cancer panel will be pursued.  Should the out-of-pocket cost to her not exceed $100 [facility] will proceed with genetic testing and results will be communicated to her in 2 to 3 weeks.  Should insurance coverage be denied the maximum out-of-pocket cost to proceed with genetic testing would be $250.  Results are reported either as negative in which no pathogenic variant has been found, positive him which a pathogenic variant has been detected, and/or identification of a variant of uncertain significance, in which a change in the gene has been identified but currently data is insufficient to classified either is benign or pathogenic.  However in most instances eventually when a variant of uncertain significance is reclassified it is considered to be benign although this is not always the case and pathogenicity cannot be ruled out at this time.  In the interim period of time if Mary Jo Berg has any additional questions I can be reached either at [phone] or [phone].      Total time of the encounter was 30 minutes and 30 minutes was spent counseling.      Romero Mckoy MD  06/23/2020  Clinical

## 2020-06-30 NOTE — PATIENT INSTRUCTIONS
Pt seen by Dr. Mckoy for genetic counseling and testing. Lab drawn with 21g butterfly needle in Left AC x 1 attempt. Sent to [employer]. Pt informed she would receive a phone call when test results.

## 2020-07-02 DIAGNOSIS — M10.9 GOUT OF LEFT FOOT, UNSPECIFIED CAUSE, UNSPECIFIED CHRONICITY: ICD-10-CM

## 2020-07-02 DIAGNOSIS — M54.16 LUMBAR RADICULOPATHY: Primary | ICD-10-CM

## 2020-07-02 RX ORDER — ALLOPURINOL 100 MG/1
100 TABLET ORAL DAILY
Qty: 30 TABLET | Refills: 5 | Status: SHIPPED | OUTPATIENT
Start: 2020-07-02 | End: 2020-08-25 | Stop reason: SDUPTHER

## 2020-07-08 DIAGNOSIS — M10.072 ACUTE IDIOPATHIC GOUT INVOLVING TOE OF LEFT FOOT: ICD-10-CM

## 2020-07-08 RX ORDER — TRAMADOL HYDROCHLORIDE 50 MG/1
50 TABLET ORAL EVERY 6 HOURS PRN
Qty: 30 TABLET | Refills: 0 | Status: SHIPPED | OUTPATIENT
Start: 2020-07-08 | End: 2021-04-26

## 2020-07-08 RX ORDER — TRAMADOL HYDROCHLORIDE 50 MG/1
50 TABLET ORAL EVERY 6 HOURS PRN
Qty: 20 TABLET | Refills: 0 | Status: CANCELLED | OUTPATIENT
Start: 2020-07-08

## 2020-07-08 NOTE — TELEPHONE ENCOUNTER
Patient is having joint pain and had some tramadol left over from taking before and that is helping her sleep. Was wanting to know if tramadol can be called into pharmacy      [facility] pharmacy  [phone]    Patient phone # [phone]

## 2020-07-11 ENCOUNTER — DOCUMENTATION (OUTPATIENT)
Dept: OTHER | Facility: CLINIC | Age: 70
End: 2020-07-11

## 2020-07-11 NOTE — PROGRESS NOTES
Genetic testing was performed on Mary Jo Berg at the request of Dr. Karyn Jasso.  Ms. Berg recently was diagnosed with breast cancer and she had a paternal family history of breast cancer raising the possibility of a hereditary basis for her diagnosis.  [facility] performed their 84 gene multi cancer panel.  Additional genes not on the panel on the breast and gynecologic cancers panel, urinary tract/kidney cancer panel, nervous system/brain cancer panel myelodysplastic syndrome and leukemia panel were also performed.  A total of 118 genes were analyzed that are listed in the results were reviewed.  No pathogenic sequence variant or deletion/duplication was detected.  However for variants of uncertain significance were found and the following genes: A single APC gene (c.764A>G); a single FANCD2 gene(c.1250T>C); a single MSH6 gene(c.2780T>C); and a single RECQL4 gene(c.608G>A).  A pathogenic variant in 1 APC gene is associated with familial adenomatous polyposis, attenuated familial adenomatous polyposis and gastric adenocarcinoma and proximal polyposis of the stomach. The FANCD2 gene is associated with Fanconi anemia in both the maternally derived and paternally  Genes have a pathogenic variant.  MSH6 results and Scott syndrome, formerly known as hereditary non-polyposis colorectal cancer when there is a pathogenic variant in either the maternally or Scott MSH6 gene.  Finally RECQL4 gene results in Rothmund-Morley syndrome,RAPADALINO syndrome and Baller-Jayy syndrome in the presence of a pathogenic variant in both the maternally and paternally derived genes.  Although variants of uncertain significance cannot be classified at this time in most instances when reclassification is possible, the variants of uncertain significance are considered to be benign although this is not always the case.  However the greatest likelihood is that all 4 variants are of no clinical significance although pathogenicity cannot  be ruled out at this time.  This information was explained to Ms. Berg.  When the additional feedback on the 4 variants of uncertain significance becomes available she and Dr. Jasso will be notified.  I am available in the interim period of time if there are any questions and I can be reached at [phone] or [phone].

## 2020-07-23 ENCOUNTER — OFFICE VISIT (OUTPATIENT)
Dept: MAMMOGRAPHY | Facility: CLINIC | Age: 70
End: 2020-07-23

## 2020-07-23 DIAGNOSIS — C50.811 MALIGNANT NEOPLASM OF OVERLAPPING SITES OF RIGHT BREAST IN FEMALE, ESTROGEN RECEPTOR POSITIVE (HCC): Primary | ICD-10-CM

## 2020-07-23 DIAGNOSIS — Z17.0 MALIGNANT NEOPLASM OF OVERLAPPING SITES OF RIGHT BREAST IN FEMALE, ESTROGEN RECEPTOR POSITIVE (HCC): Primary | ICD-10-CM

## 2020-07-23 PROCEDURE — 99024 POSTOP FOLLOW-UP VISIT: CPT | Performed by: SURGERY

## 2020-07-23 NOTE — PROGRESS NOTES
Chief Complaint: Mary Jo Berg is a  70 y.o. female, initially referred by No ref. provider found , who is here today for a postoperative visit.    History of Present Illness:  In the interim,Mary Jo Berg states that the scab fell off and things have healed pretty well.  She is not having any problems raising her arms.    She has noted no redness, warmth,drainage, swelling at the incision site. Denies fever or chills.      Current Outpatient Medications:   •  allopurinol (ZYLOPRIM) 100 MG tablet, Take 1 tablet by mouth Daily., Disp: 30 tablet, Rfl: 5  •  Doxylamine Succinate, Sleep, (UNISOM PO), Take 1 tablet by mouth At Night As Needed., Disp: , Rfl:   •  ezetimibe (ZETIA) 10 MG tablet, Take 10 mg by mouth Daily., Disp: , Rfl:   •  fluticasone (FLONASE) 50 MCG/ACT nasal spray, 2 sprays into the nostril(s) as directed by provider As Needed., Disp: , Rfl:   •  glucose blood test strip, ACCU-CHECK SKYLA. Use to check blood sugar once daily, as directed. DX: E11.9, Disp: , Rfl:   •  irbesartan (AVAPRO) 150 MG tablet, Take 150 mg by mouth Daily., Disp: , Rfl:   •  Lancets (ACCU-CHEK MULTICLIX) lancets, Use to check blood sugar once daily, as directed. DX: 250.00, Disp: , Rfl:   •  letrozole (Femara) 2.5 MG tablet, Take 1 tablet by mouth Daily for 90 days., Disp: 90 tablet, Rfl: 3  •  Loratadine 10 MG capsule, Take 1 tablet by mouth Daily., Disp: , Rfl:   •  metFORMIN (GLUCOPHAGE) 500 MG tablet, Take 500 mg by mouth 2 (Two) Times a Day With Meals., Disp: , Rfl:   •  Thyroid (LEVOTHYROXINE-LIOTHYRONINE PO), Take 125 mg by mouth Daily. 1/2 ON SUNDAYS, Disp: , Rfl:   •  traMADol (Ultram) 50 MG tablet, Take 1 tablet by mouth Every 6 (Six) Hours As Needed for Moderate Pain ., Disp: 30 tablet, Rfl: 0  •  vitamin B-12 (CYANOCOBALAMIN) 1000 MCG tablet, Take 1,000 mcg by mouth Daily., Disp: , Rfl:   Physical examination  Right chest wall- the incision is healed nicely and I do not see any open areas or signs of  fluid.  Extremities-the patient has full range of motion of both upper extremities  Assessment:  Right breast cancer status post mastectomy without reconstruction.  She appears to be healing well and I gave her a prescription for breast prosthesis today.  She continues to follow with the medical oncologist.    Plan:  I will see her back in about 3 months.          EMR Dragon/transcription disclaimer:    Much of this encounter note is an electronic transcription/translocation of spoken language to printed text.  The electronic translation of spoken language may permit erroneous, or at times, nonsensical words or phrases to be inadvertently transcribed.  Although I have reviewed the note from such areas, some may still exist.  Answers for HPI/ROS submitted by the patient on 7/22/2020   What is the primary reason for your visit?: Other  Please describe your symptoms.: Post Surgery follow up  Have you had these symptoms before?: Yes  How long have you been having these symptoms?: Greater than 2 weeks  Please list any medications you are currently taking for this condition.: In my record  Please describe any probable cause for these symptoms. : Surgery

## 2020-08-10 ENCOUNTER — OFFICE VISIT (OUTPATIENT)
Dept: ONCOLOGY | Facility: CLINIC | Age: 70
End: 2020-08-10

## 2020-08-10 ENCOUNTER — APPOINTMENT (OUTPATIENT)
Dept: OTHER | Facility: HOSPITAL | Age: 70
End: 2020-08-10

## 2020-08-10 ENCOUNTER — LAB (OUTPATIENT)
Dept: OTHER | Facility: HOSPITAL | Age: 70
End: 2020-08-10

## 2020-08-10 VITALS
BODY MASS INDEX: 39.78 KG/M2 | WEIGHT: 210.7 LBS | TEMPERATURE: 97.3 F | HEIGHT: 61 IN | RESPIRATION RATE: 12 BRPM | SYSTOLIC BLOOD PRESSURE: 133 MMHG | HEART RATE: 93 BPM | DIASTOLIC BLOOD PRESSURE: 73 MMHG | OXYGEN SATURATION: 99 %

## 2020-08-10 DIAGNOSIS — C50.412 MALIGNANT NEOPLASM OF UPPER-OUTER QUADRANT OF LEFT BREAST IN FEMALE, ESTROGEN RECEPTOR POSITIVE (HCC): ICD-10-CM

## 2020-08-10 DIAGNOSIS — C50.811 MALIGNANT NEOPLASM OF OVERLAPPING SITES OF RIGHT BREAST IN FEMALE, ESTROGEN RECEPTOR POSITIVE (HCC): ICD-10-CM

## 2020-08-10 DIAGNOSIS — C50.412 MALIGNANT NEOPLASM OF UPPER-OUTER QUADRANT OF LEFT BREAST IN FEMALE, ESTROGEN RECEPTOR POSITIVE (HCC): Primary | ICD-10-CM

## 2020-08-10 DIAGNOSIS — N17.9 AKI (ACUTE KIDNEY INJURY) (HCC): ICD-10-CM

## 2020-08-10 DIAGNOSIS — Z17.0 MALIGNANT NEOPLASM OF OVERLAPPING SITES OF RIGHT BREAST IN FEMALE, ESTROGEN RECEPTOR POSITIVE (HCC): ICD-10-CM

## 2020-08-10 DIAGNOSIS — Z79.811 AROMATASE INHIBITOR USE: ICD-10-CM

## 2020-08-10 DIAGNOSIS — Z17.0 MALIGNANT NEOPLASM OF UPPER-OUTER QUADRANT OF LEFT BREAST IN FEMALE, ESTROGEN RECEPTOR POSITIVE (HCC): ICD-10-CM

## 2020-08-10 DIAGNOSIS — Z17.0 MALIGNANT NEOPLASM OF UPPER-OUTER QUADRANT OF LEFT BREAST IN FEMALE, ESTROGEN RECEPTOR POSITIVE (HCC): Primary | ICD-10-CM

## 2020-08-10 LAB
ALBUMIN SERPL-MCNC: 4.1 G/DL (ref 3.5–5.2)
ALBUMIN/GLOB SERPL: 1.4 G/DL
ALP SERPL-CCNC: 77 U/L (ref 39–117)
ALT SERPL W P-5'-P-CCNC: 14 U/L (ref 1–33)
ANION GAP SERPL CALCULATED.3IONS-SCNC: 10.8 MMOL/L (ref 5–15)
AST SERPL-CCNC: 15 U/L (ref 1–32)
BASOPHILS # BLD AUTO: 0.04 10*3/MM3 (ref 0–0.2)
BASOPHILS NFR BLD AUTO: 0.4 % (ref 0–1.5)
BILIRUB SERPL-MCNC: 0.2 MG/DL (ref 0–1.2)
BUN SERPL-MCNC: 34 MG/DL (ref 8–23)
BUN/CREAT SERPL: 31.2 (ref 7–25)
CALCIUM SPEC-SCNC: 9.6 MG/DL (ref 8.6–10.5)
CHLORIDE SERPL-SCNC: 107 MMOL/L (ref 98–107)
CO2 SERPL-SCNC: 25.2 MMOL/L (ref 22–29)
CREAT SERPL-MCNC: 1.09 MG/DL (ref 0.57–1)
DEPRECATED RDW RBC AUTO: 48.2 FL (ref 37–54)
EOSINOPHIL # BLD AUTO: 0.15 10*3/MM3 (ref 0–0.4)
EOSINOPHIL NFR BLD AUTO: 1.5 % (ref 0.3–6.2)
ERYTHROCYTE [DISTWIDTH] IN BLOOD BY AUTOMATED COUNT: 13.5 % (ref 12.3–15.4)
GFR SERPL CREATININE-BSD FRML MDRD: 49 ML/MIN/1.73
GLOBULIN UR ELPH-MCNC: 2.9 GM/DL
GLUCOSE SERPL-MCNC: 164 MG/DL (ref 65–99)
HCT VFR BLD AUTO: 38.5 % (ref 34–46.6)
HGB BLD-MCNC: 12.2 G/DL (ref 12–15.9)
IMM GRANULOCYTES # BLD AUTO: 0.03 10*3/MM3 (ref 0–0.05)
IMM GRANULOCYTES NFR BLD AUTO: 0.3 % (ref 0–0.5)
LYMPHOCYTES # BLD AUTO: 3.45 10*3/MM3 (ref 0.7–3.1)
LYMPHOCYTES NFR BLD AUTO: 35.2 % (ref 19.6–45.3)
MCH RBC QN AUTO: 30.3 PG (ref 26.6–33)
MCHC RBC AUTO-ENTMCNC: 31.7 G/DL (ref 31.5–35.7)
MCV RBC AUTO: 95.8 FL (ref 79–97)
MONOCYTES # BLD AUTO: 0.68 10*3/MM3 (ref 0.1–0.9)
MONOCYTES NFR BLD AUTO: 6.9 % (ref 5–12)
NEUTROPHILS NFR BLD AUTO: 5.46 10*3/MM3 (ref 1.7–7)
NEUTROPHILS NFR BLD AUTO: 55.7 % (ref 42.7–76)
NRBC BLD AUTO-RTO: 0 /100 WBC (ref 0–0.2)
PLATELET # BLD AUTO: 300 10*3/MM3 (ref 140–450)
PMV BLD AUTO: 9.8 FL (ref 6–12)
POTASSIUM SERPL-SCNC: 4.4 MMOL/L (ref 3.5–5.2)
PROT SERPL-MCNC: 7 G/DL (ref 6–8.5)
RBC # BLD AUTO: 4.02 10*6/MM3 (ref 3.77–5.28)
SODIUM SERPL-SCNC: 143 MMOL/L (ref 136–145)
WBC # BLD AUTO: 9.81 10*3/MM3 (ref 3.4–10.8)

## 2020-08-10 PROCEDURE — 36415 COLL VENOUS BLD VENIPUNCTURE: CPT

## 2020-08-10 PROCEDURE — 80053 COMPREHEN METABOLIC PANEL: CPT | Performed by: INTERNAL MEDICINE

## 2020-08-10 PROCEDURE — 85025 COMPLETE CBC W/AUTO DIFF WBC: CPT | Performed by: INTERNAL MEDICINE

## 2020-08-10 PROCEDURE — 99215 OFFICE O/P EST HI 40 MIN: CPT | Performed by: NURSE PRACTITIONER

## 2020-08-10 NOTE — PROGRESS NOTES
Subjective     REASON FOR CONSULTATION: Right breast cancer invasive low-grade ER positive MD negative HER-2 negative breast cancer at 6:00 with DCIS at 1:00 post biopsy; Simple mastectomy on 5/11/2020 and this shows an invasive cancer measuring 19 x 13 mmI- DCIS present measuring 15 mm margins were widely clear 5 sentinel nodes were negative for metastatic disease still grade 1. No lymphovascular invasion.                             REQUESTING PHYSICIAN: MD Keshia Durán MD    History of Present Illness patient is a 70 y.o. female with a right breast cancer low-grade ER positive MD negative who was taken to surgery because of inability to do the Oncotype DX score on her core biopsy.  When she was seen back in May by Dr. Jasso the patient was having continued issues with gout which she attributed to Arimidex.  We had recommended discontinuing Arimidex and awaiting Oncotype DX scoring before proceeding with further AI therapy, specifically Femara.    As she is now reviewed back 3 months later it is realized that the patient soon after being seen began Femara 2.5 mg daily though we had not review the Oncotype DX score with her.  That being said her Oncotype is 22.    She is reporting today repeated issues with gout in her right foot developing.  She saw her primary care physician, Dr. Mitchell, in June.  Uric acid level at that time was slightly elevated at 5.9.  Renal function was also notably worse with BUN of 27, creatinine of 1.1, estimated GFR 47.  The patient was started on allopurinol 100 mg daily.  Now in just the last week she believes she is having yet another flare in her right foot and began taking part of a leftover Medrol Dosepak though she has been inconsistent in taking these pills over the last few days.  Her right foot is slightly swollen on the top though not hot.  She does have bilateral bunions on her greater toes that are  slightly red but this appears to be from rubbing along her shoes.  She does note in general her feet being a little more swollen and difficult to get into her normal shoes.  Notably her BUN is worse today at 34, creatinine 1.09.  When asked about hydration she feels that she drinks water throughout the day though when further questioned she admits that she probably could stand to drink more.  At this point remains unclear if the Femara is in any way contributing to her worsening renal function.    She otherwise denies further concerns today.     Past Medical History:   Diagnosis Date   • Breast cancer (CMS/HCC) 2020    right IDC   • CTS (carpal tunnel syndrome)    • Diabetes mellitus (CMS/HCC)     Type 2   • H/O Cellulitis of left ear    • History of asthma    • History of cataract    • History of complete heart block     paced in    • History of fall    • Hyperlipidemia    • Hypertension    • Hypothyroidism    • Osteoarthritis    • Osteopenia    • PONV (postoperative nausea and vomiting)         Past Surgical History:   Procedure Laterality Date   • BLADDER REPAIR  2012   • BREAST BIOPSY Right 2020    malignant   • CARDIAC PACEMAKER PLACEMENT     • CARPAL TUNNEL RELEASE Right    • CATARACT EXTRACTION Bilateral    •  SECTION      x2   • COLONOSCOPY     • ENDOMETRIAL ABLATION     • HAND SURGERY     • HERNIA REPAIR      Bilateral femoral and inguinal   • KNEE ARTHROSCOPY Bilateral    • MASTECTOMY WITH SENTINEL NODE BIOPSY AND AXILLARY NODE DISSECTION Right 2020    Procedure: RIGHT BREAST MASTECTOMY WITH SENTINEL NODE BIOPSY;  Surgeon: Bj Stephen MD;  Location: [facility] OR [facility] [place];  Service: General;  Laterality: Right;   • SHOULDER ARTHROSCOPY Right     rotator cuff repair, biceps tenotomy   • THYROIDECTOMY, PARTIAL     • TONSILLECTOMY AND ADENOIDECTOMY     • TOTAL KNEE ARTHROPLASTY Left    • TUBAL ABDOMINAL LIGATION      patient is a 70-year-old white female with  hypercholesterolemia diabetes type 2 hypertension hypothyroidism and pacemaker placement 2015 was noted on routine mammogram in  of this last year to have some abnormalities in the right breast that warranted close follow-up.  This led to a repeat mammogram in January which was delayed due to her trip she took Shira with her  when she came back in February she did the mammogram and this showed persistence and worsening of the calcifications at the 6:00 and 1 o'clock position of the right breast.    This led to a biopsy of both these regions on 3/5/2020 which showed DCIS at the 1 o'clock position intermediate grade with calcifications and no necrosis and well differentiated grade 1 infiltrating ductal carcinoma at the 6 o'clock position measuring 2 mm with no lymph vascular invasion and associated DCIS intermediate grade the tumor was positive for estrogen greater than 95% negative for progesterone 0% HER-2 negative at 1+ Ki-67 of 5%.    Patient was referred to Dr. Stephen who ordered bilateral breast MRIs which showed residual 2 cm area of enhancement at the 6 o'clock position but no further abnormalities at the 1 o'clock position of the right breast.  There is no axillary or internal mammary adenopathy in the left breast was benign.  The mass is not palpable by the patient or Dr. Stephen    Based on the recent coronavirus outbreak which is ongoing elective surgeries for grade 1 breast cancers are being delayed based on guidelines from the hospital and from the oncological community and she is here to discuss neoadjuvant hormonal blockade.    Patient is  3 para 3 first childbirth was age 22 she breast-fed 2 of her 3 children for at least 4 to 6 months menarche was at age 11 menopause at 49 she did not take hormones after menopause    Family history is positive for paternal grandmother with metastatic breast cancer at age 75 a paternal first cousin with breast cancer at age 70 her father   with bladder cancer mother had endometrial carcinoma at age 80.  She is concerned about genetic testing because she has 3 daughters and 3 grand daughters.    She is not a smoker or drinker and denies DVTs MI or stroke    Because of the delay of surgery due to the coronavirus we decided to do Oncotype on her biopsy and start aromatase inhibitor in the neoadjuvant setting    The side effects and toxicities of the Aromatase inhibitors was discussed with the patient including, hot flashes, mood swings and hair thinning.Significant arthralgias and worsening bone density were also discussed. Baseline bone density evaluation was ordered.  I discussed with the patient and her  on the video conference that this was not the standard of care usually but because of the pandemic and making treatment decisions for the safety of the patient and based on delaying elective surgery for certain subsets of breast cancer which she presented.    I explained the rationale of doing an Oncotype DX score and told her that if she was not willing to take chemotherapy there would be no reason to do this but she is willing to take chemotherapy if she has a high Oncotype score and therefore we will send this results and she will call me in 2 weeks to discuss them    There are no clear guidelines on how to manage patients with clinically node-negative disease with a high Oncotype and we may decide to move up her surgery so that we will have more information with which to make a decision about chemotherapy in order to decide how much chemotherapy to give her    She and her  were willing to go this route and she will call me in 2 weeks to discuss her Oncotype score    I am concerned about her general joint discomfort which might be aggravated by an aromatase inhibitor but we will deal with that if it occurs    I will also schedule her to see the genetic counselors and they could possibly do a video visit with her also and decide about  genetic testing    4/20  Patient is a 70-year-old female with small mammographically detected right breast cancer ER positive AL negative and we opted to start Arimidex preoperatively because surgeries are being delayed for the coronavirus pandemic.  We will also ask for an Oncotype DX to be done on her biopsy specimen to help us make decisions about further treatment.  Unfortunately the specimen was too small and the Oncotype could not be done.  In addition she developed gout in her foot after 2 or 3 doses of Arimidex which I do not think is related to the Arimidex and she was evaluated by her daughter who is a physician who did not think there was any infection she tried nonsteroidals with some improvement but she still not better and has not yet restarted her Arimidex    In light of all these findings I have given her a short prednisone bolus to see if this will help the gout and at this point I think we should move ahead with surgery in the next 3 to 4 weeks and hold off on the Arimidex until after surgery so we can get all the information we need at the time of surgery    She is okay with this approach and I have called her surgeon and her cardiologist to clear her for surgery    She will need to stop her nonsteroidals at least a week before surgery and hopefully the steroids will help the foot enough that she does not have to take the nonsteroidals    She has had no fever or other signs of infection.    Simple mastectomy on 5/11/2020 and this shows an invasive cancer measuring 19 x 13 mmI- DCIS present measuring 15 mm margins were widely clear 5 sentinel nodes were negative for metastatic disease still grade 1. No lymphovascular invasion.    Patient discontinuing Arimidex in May 2020 due to concerns for contributing to gout.  Oncotype DX score returning 22.  Patient beginning Femara late May 2020.    Current Outpatient Medications on File Prior to Visit   Medication Sig Dispense Refill   • allopurinol  (ZYLOPRIM) 100 MG tablet Take 1 tablet by mouth Daily. 30 tablet 5   • Doxylamine Succinate, Sleep, (UNISOM PO) Take 1 tablet by mouth At Night As Needed.     • ezetimibe (ZETIA) 10 MG tablet Take 10 mg by mouth Daily.     • fluticasone (FLONASE) 50 MCG/ACT nasal spray 2 sprays into the nostril(s) as directed by provider As Needed.     • glucose blood test strip ACCU-CHECK SKYLA. Use to check blood sugar once daily, as directed. DX: E11.9     • irbesartan (AVAPRO) 150 MG tablet Take 150 mg by mouth As Needed.     • Lancets (ACCU-CHEK MULTICLIX) lancets Use to check blood sugar once daily, as directed. DX: 250.00     • letrozole (Femara) 2.5 MG tablet Take 1 tablet by mouth Daily for 90 days. 90 tablet 3   • Loratadine 10 MG capsule Take 1 tablet by mouth Daily.     • metFORMIN (GLUCOPHAGE) 500 MG tablet Take 500 mg by mouth 2 (Two) Times a Day With Meals.     • Thyroid (LEVOTHYROXINE-LIOTHYRONINE PO) Take 125 mg by mouth Daily. 1/2 ON SUNDAYS     • traMADol (Ultram) 50 MG tablet Take 1 tablet by mouth Every 6 (Six) Hours As Needed for Moderate Pain . 30 tablet 0   • vitamin B-12 (CYANOCOBALAMIN) 1000 MCG tablet Take 1,000 mcg by mouth Daily. Every other day       No current facility-administered medications on file prior to visit.         ALLERGIES:    Allergies   Allergen Reactions   • Indomethacin Dizziness     DIZZINESS, EAR RINGING, AND NAUSEA  DIZZINESS, EAR RINGING, AND NAUSEA     • Nutritional Supplements Swelling     [place] nuts - no other nuts that she is aware of   • Penicillins Anaphylaxis, Hives, Shortness Of Breath and Itching     Sob, hives  SOB, tachycardia  Sob, hives  SOB, tachycardia  Sob, hives     • Hydrochlorothiazide Unknown - High Severity     Caused gout symptoms.  Other reaction(s): Other (See Comments)  Caused gout symptoms.  Caused gout symptoms.     • Lisinopril Cough   • Morphine Nausea Only     N/V  N/V     • Pentazocine Nausea And Vomiting     N/V          Social History  "    Socioeconomic History   • Marital status:      Spouse name: Sean   • Number of children: Not on file   • Years of education: Not on file   • Highest education level: Not on file   Occupational History     Employer: RETIRED   Tobacco Use   • Smoking status: Never Smoker   • Smokeless tobacco: Never Used   Substance and Sexual Activity   • Alcohol use: Yes     Comment: Occasionally   • Drug use: Never   • Sexual activity: Defer        Family History   Problem Relation Age of Onset   • Cervical cancer Mother    • Alzheimer's disease Mother    • Arthritis Mother    • Cancer Father         Bladder   • Heart disease Father    • Hypertension Father    • Arthritis Father    • Alcohol abuse Father    • Hyperlipidemia Father    • Arthritis Maternal Grandmother    • Heart disease Other         FH of heart disease in males before age 55   • Hyperlipidemia Brother    • Hypertension Brother    • Breast cancer Paternal Grandmother 75   • Heart attack Paternal Grandfather    • Breast cancer Paternal Cousin 70   • Malig Hyperthermia Neg Hx         Review of Systems   Constitutional: Positive for diaphoresis.   Respiratory: Negative for shortness of breath.    Cardiovascular: Negative for chest pain, palpitations and leg swelling.   Musculoskeletal: Positive for arthralgias (right foot, 8/10/2020), gait problem (Arthritis in the knees one is replaced) and joint swelling.   Neurological: Negative for dizziness and weakness.   All other systems reviewed and are negative.       Objective   Vitals:    08/10/20 1315   BP: 133/73   Pulse: 93   Resp: 12   Temp: 97.3 °F (36.3 °C)   TempSrc: Tympanic   SpO2: 99%   Weight: 95.6 kg (210 lb 11.2 oz)   Height: 154.9 cm (60.98\")   PainSc:   6   PainLoc: Foot  Comment: rt foot     Current Status 8/10/2020   ECOG score 0       Physical Exam   Constitutional: She appears well-developed and well-nourished. No distress.   HENT:   Head: Normocephalic and atraumatic.   Mouth/Throat: No " oropharyngeal exudate.   Eyes: Pupils are equal, round, and reactive to light. Conjunctivae and EOM are normal.   Neck: Normal range of motion. Neck supple. No thyromegaly present.   Cardiovascular: Normal rate and regular rhythm.   Murmur heard.  Pulmonary/Chest: Effort normal and breath sounds normal. No respiratory distress.   Abdominal: Soft. Bowel sounds are normal. She exhibits no distension.   Musculoskeletal: Normal range of motion. She exhibits edema (mild top of right foot). She exhibits no tenderness.   Skin: Skin is warm. There is erythema.   Mild redness to lateral portion of both great toes   Psychiatric: She has a normal mood and affect. Her behavior is normal.          RECENT LABS:  Results from last 7 days   Lab Units 08/10/20  1251   WBC 10*3/mm3 9.81   NEUTROS ABS 10*3/mm3 5.46   HEMOGLOBIN g/dL 12.2   HEMATOCRIT % 38.5   PLATELETS 10*3/mm3 300     Results from last 7 days   Lab Units 08/10/20  1251   SODIUM mmol/L 143   POTASSIUM mmol/L 4.4   CHLORIDE mmol/L 107   CO2 mmol/L 25.2   BUN mg/dL 34*   CREATININE mg/dL 1.09*   CALCIUM mg/dL 9.6   ALBUMIN g/dL 4.10   BILIRUBIN mg/dL 0.2   ALK PHOS U/L 77   ALT (SGPT) U/L 14   AST (SGOT) U/L 15   GLUCOSE mg/dL 164*             SPECIMEN   Procedure  Total mastectomy    Specimen Laterality  Right    TUMOR   Tumor Site  Lower inner quadrant    Clock Position of Tumor Site  6 o'clock    Histologic Type  Invasive carcinoma of no special type (invasive ductal carcinoma, not otherwise specified)    Glandular (Acinar) / Tubular Differentiation  Score 2    Nuclear Pleomorphism  Score 2    Mitotic Rate  Score 1    Overall Grade  Grade 1 (scores of 3, 4 or 5)    Tumor Size  Greatest dimension of largest invasive focus (Millimeters): 19 mm   Additional Dimension (Millimeters)  13 mm     10 mm   Tumor Focality  Single focus of invasive carcinoma    Ductal Carcinoma In Situ (DCIS)  Present      Negative for extensive intraductal component (EIC)    Size (Extent) of  DCIS  Estimated size (extent) of DCIS is at least (Millimeters): 15 mm   Architectural Patterns  Cribriform      Papillary      Solid    Nuclear Grade  Grade II (intermediate)    Necrosis  Present, focal (small foci or single cell necrosis)    Lobular Carcinoma In Situ (LCIS)  No LCIS in specimen    Tumor Extent     Lymphovascular Invasion  Not identified    Dermal Lymphovascular Invasion  Not identified    Microcalcifications  Present in DCIS      Present in invasive carcinoma      Present in non-neoplastic tissue    Treatment Effect  No known presurgical therapy    MARGINS   Invasive Carcinoma Margins  Uninvolved by invasive carcinoma    Distance from Closest Margin (Millimeters)  10 mm   Closest Margin(s)  Lateral    Distance from Other Margins     Anterior Margin (Millimeters)  40 mm   Posterior Margin (Millimeters)  45 mm   Superior Margin (Millimeters)  18 mm   Inferior Margin (Millimeters)  13 mm   Medial Margin (Millimeters)  22 mm   Lateral Margin (Millimeters)  10 mm   DCIS Margins  Uninvolved by DCIS    Distance from Closest Margin (Millimeters)  14 mm   Closest Margin(s)  Superior      Inferior    Distance from Other Margins     Anterior Margin (Millimeters)  20 mm   Posterior Margin (Millimeters)  40 mm   Superior Margin (Millimeters)  14 mm   Inferior Margin (Millimeters)  14 mm   Medial Margin (Millimeters)  30 mm   Lateral Margin (Millimeters)  21 mm   LYMPH NODES   Regional Lymph Nodes  Uninvolved by tumor cells    Total Number of Lymph Nodes Examined  5    Number of [place]ter Nodes Examined  5    PATHOLOGIC STAGE CLASSIFICATION (pTNM, AJCC 8th Edition)   Primary Tumor (pT)  pT1c    Regional Lymph Nodes Modifier  (sn): Only sentinel node(s) evaluated.    Regional Lymph Nodes (pN)  pN0    .   Breast Biomarker Reporting Template   Breast.Bmk - 1   Protocol posted: 8/28/2019   Test(s) Performed     Estrogen Receptor (ER) Status  Positive    Percentage of Cells with Nuclear Positivity  %    Average  Intensity of Staining  Strong    Test Type  Food and Drug Administration (FDA) cleared (test / vendor): [place]    Primary Antibody  SP1    Scoring System  Ella    Proportion Score  5    Intensity Score  3    Total Ella Score  8    Test(s) Performed     Progesterone Receptor (PgR) Status  Positive    Percentage of Cells with Nuclear Positivity  5 %   Average Intensity of Staining  Moderate    Test Type  Food and Drug Administration (FDA) cleared (test / vendor): [place]    Primary Antibody  1E2    Scoring System  Ella    Proportion Score  2    Intensity Score  2    Total Ella Score  4    Test(s) Performed     HER2 by Immunohistochemistry  Equivocal (Score 2+)    Percentage of Cells with Uniform Intense Complete Membrane Staining  0 %   Test Type  Food and Drug Administration (FDA) cleared (test / vendor): [place]    Primary Antibody  4B5    Test(s) Performed  Ki-67    Percentage of Positive Nuclei  10 %   Primary Antibody           Assessment/Plan   1.cT1cN0 grade 1 infiltrating ductal carcinoma right breast at 6:00 ER positive TX negative HER-2 negative with associated intermediate grade DCIS the 1 o'clock position of the breast both lesions nonpalpable  · pT1cN0 grade 1 ER positive TX negative HER-2 negative post mastectomy on 5/11/2020  · Oncotype DX score 22  · Patient discontinued Arimidex in early May 2020 and soon thereafter began Femara 2.5 mg daily.  · Patient reviewed today, 8/10/2020, having recurrent issues with gout and renal dysfunction, ? r/t Femara.  I reviewed her case with Dr. Shah in Dr. Jasso's absence.  We will asked the patient to hold Femara for 10 days.  I will plan to have a tele-visit follow-up with her to assess how she is doing in terms of gout and hydration status before considering restarting.    2.  Type 2 diabetes/hypertension hypercholesterolemia/  3.  Hypothyroidism  4.  Significant degenerative arthritis on nonsteroidals  5.  Family history of breast cancer.  Genetics  testing negative.  6.  Normal bone density in 2018    7.  Gout right foot, recurrent.  Patient initiated on allopurinol 100 mg daily per Dr. Mitchell in June 2020.    · As patient is reviewed today, 8/10/2020, she is reporting another flare in her right foot.  She does have minimal swelling in the right foot though no significant warmth or redness.  She began part of a leftover Medrol pack a few days ago though she has been taking this inconsistently.  Furthermore her kidney function is noted to be worse with BUN up to 34, creatinine relatively stable at 1.09.  Upon questioning patient could be hydrating better than she is though I am not sure that that is the whole story.  Again have asked her to hold Femara and follow-up with Dr. Mitchell.  I will message Dr. Mitchell regarding labs today and patient's ongoing issues with questionable gout.      PLAN:  1.  Patient to hold Femara for the next 10 days.  2.  I will have my note and today's labs faxed to Dr. Mitchell and I will also message her regarding concern for worsening kidney function and recurrent questionable gouty arthritis.  I would recommend she have repeat chemistries done in a week and have recommended the patient significantly increase her water intake in the meantime.  3.  Plan tele-visit follow-up with the patient in roughly 10 days to assess her hopeful improvement of renal function and gouty flare and consider at that time restarting Femara.  Based on that discussion we will decide on follow-up with Dr. Jasso.    This patient was new to me today and extensive review of the EMR performed for clarity and to create plan of care.  The above assessment and plan was discussed with Dr. Shah in Dr. Jasso's absence. I did spend 40 minutes with the patient today, 40 minutes in face-to-face consultation and coordination of care reviewing issues outlined in the assessment and plan above.

## 2020-08-11 ENCOUNTER — TELEPHONE (OUTPATIENT)
Dept: FAMILY MEDICINE CLINIC | Facility: CLINIC | Age: 70
End: 2020-08-11

## 2020-08-11 NOTE — TELEPHONE ENCOUNTER
She can continue it, I'd like to check levels while she's on it to confirm whether or not her cancer medicine was the problem.

## 2020-08-11 NOTE — TELEPHONE ENCOUNTER
Pt is currently scheduled for next Thursday, pt is curious if she should go ahead a fill her allopurinol prescription or should she discontinue until evaluation?

## 2020-08-11 NOTE — TELEPHONE ENCOUNTER
Please have patient make an appointment to see me, sometime next week would be fine but can do sooner if she wants, please schedule for a 30 min block, thank you!

## 2020-08-11 NOTE — TELEPHONE ENCOUNTER
Patient was had an evaluation with Tesha WORTHINGTON (oncology) yesterday. Patient renal function is worse, increased from 27 to 34. Oncology is concerned about patient's gouty flare ups and arthritis. Labs are in patient's chart to review, along with notes and advisements from oncology physician. Please advise.

## 2020-08-12 NOTE — TELEPHONE ENCOUNTER
Informed patient. She wanted me to let you know they did take her off the cancer mediation for the time being.

## 2020-08-13 ENCOUNTER — TELEPHONE (OUTPATIENT)
Dept: ONCOLOGY | Facility: CLINIC | Age: 70
End: 2020-08-13

## 2020-08-13 ENCOUNTER — TELEPHONE (OUTPATIENT)
Dept: FAMILY MEDICINE CLINIC | Facility: CLINIC | Age: 70
End: 2020-08-13

## 2020-08-13 DIAGNOSIS — M10.9 GOUT OF LEFT FOOT, UNSPECIFIED CAUSE, UNSPECIFIED CHRONICITY: Primary | ICD-10-CM

## 2020-08-13 RX ORDER — PREDNISONE 20 MG/1
20 TABLET ORAL 2 TIMES DAILY
Qty: 10 TABLET | Refills: 0 | Status: SHIPPED | OUTPATIENT
Start: 2020-08-13 | End: 2020-08-20

## 2020-08-13 NOTE — TELEPHONE ENCOUNTER
Pt calling for refill on prednisone for her gouty arthritis. Per Mirtha VELASQUEZ, she has been in contact w/ pt's PCP who will be managing this issue. Told pt to contact her PCP, pt v/u.

## 2020-08-13 NOTE — TELEPHONE ENCOUNTER
Could you call patient and see if there is a certain amount of prednisone that has worked well for her in the past? I typically write for prednisone 40 mg daily x 5 days for gout flairs

## 2020-08-20 ENCOUNTER — OFFICE VISIT (OUTPATIENT)
Dept: FAMILY MEDICINE CLINIC | Facility: CLINIC | Age: 70
End: 2020-08-20

## 2020-08-20 VITALS
DIASTOLIC BLOOD PRESSURE: 78 MMHG | WEIGHT: 210 LBS | HEART RATE: 89 BPM | BODY MASS INDEX: 39.65 KG/M2 | TEMPERATURE: 97.1 F | OXYGEN SATURATION: 98 % | SYSTOLIC BLOOD PRESSURE: 128 MMHG | HEIGHT: 61 IN

## 2020-08-20 DIAGNOSIS — R00.2 PALPITATIONS: ICD-10-CM

## 2020-08-20 DIAGNOSIS — Z79.899 HIGH RISK MEDICATION USE: ICD-10-CM

## 2020-08-20 DIAGNOSIS — R79.89 ELEVATED SERUM CREATININE: ICD-10-CM

## 2020-08-20 DIAGNOSIS — M10.9 ACUTE GOUT INVOLVING TOE OF RIGHT FOOT, UNSPECIFIED CAUSE: Primary | ICD-10-CM

## 2020-08-20 LAB
ALBUMIN SERPL-MCNC: 4.5 G/DL (ref 3.5–5.2)
ALBUMIN/GLOB SERPL: 2 G/DL
ALP SERPL-CCNC: 82 U/L (ref 39–117)
ALT SERPL-CCNC: 21 U/L (ref 1–33)
AST SERPL-CCNC: 14 U/L (ref 1–32)
BASOPHILS # BLD AUTO: 0.04 10*3/MM3 (ref 0–0.2)
BASOPHILS NFR BLD AUTO: 0.3 % (ref 0–1.5)
BILIRUB SERPL-MCNC: 0.4 MG/DL (ref 0–1.2)
BUN SERPL-MCNC: 23 MG/DL (ref 8–23)
BUN/CREAT SERPL: 24.5 (ref 7–25)
CALCIUM SERPL-MCNC: 10.3 MG/DL (ref 8.6–10.5)
CHLORIDE SERPL-SCNC: 100 MMOL/L (ref 98–107)
CO2 SERPL-SCNC: 27.8 MMOL/L (ref 22–29)
CREAT SERPL-MCNC: 0.94 MG/DL (ref 0.57–1)
EOSINOPHIL # BLD AUTO: 0.22 10*3/MM3 (ref 0–0.4)
EOSINOPHIL NFR BLD AUTO: 1.8 % (ref 0.3–6.2)
ERYTHROCYTE [DISTWIDTH] IN BLOOD BY AUTOMATED COUNT: 13.3 % (ref 12.3–15.4)
GLOBULIN SER CALC-MCNC: 2.3 GM/DL
GLUCOSE SERPL-MCNC: 132 MG/DL (ref 65–99)
HCT VFR BLD AUTO: 43.9 % (ref 34–46.6)
HGB BLD-MCNC: 14.1 G/DL (ref 12–15.9)
IMM GRANULOCYTES # BLD AUTO: 0.08 10*3/MM3 (ref 0–0.05)
IMM GRANULOCYTES NFR BLD AUTO: 0.6 % (ref 0–0.5)
LYMPHOCYTES # BLD AUTO: 3.02 10*3/MM3 (ref 0.7–3.1)
LYMPHOCYTES NFR BLD AUTO: 24.2 % (ref 19.6–45.3)
MCH RBC QN AUTO: 30.2 PG (ref 26.6–33)
MCHC RBC AUTO-ENTMCNC: 32.1 G/DL (ref 31.5–35.7)
MCV RBC AUTO: 94 FL (ref 79–97)
MONOCYTES # BLD AUTO: 1.03 10*3/MM3 (ref 0.1–0.9)
MONOCYTES NFR BLD AUTO: 8.3 % (ref 5–12)
NEUTROPHILS # BLD AUTO: 8.08 10*3/MM3 (ref 1.7–7)
NEUTROPHILS NFR BLD AUTO: 64.8 % (ref 42.7–76)
NRBC BLD AUTO-RTO: 0 /100 WBC (ref 0–0.2)
PLATELET # BLD AUTO: 348 10*3/MM3 (ref 140–450)
POTASSIUM SERPL-SCNC: 5.5 MMOL/L (ref 3.5–5.2)
PROT SERPL-MCNC: 6.8 G/DL (ref 6–8.5)
RBC # BLD AUTO: 4.67 10*6/MM3 (ref 3.77–5.28)
SODIUM SERPL-SCNC: 141 MMOL/L (ref 136–145)
URATE SERPL-MCNC: 6.5 MG/DL (ref 2.4–5.7)
WBC # BLD AUTO: 12.47 10*3/MM3 (ref 3.4–10.8)

## 2020-08-20 PROCEDURE — 93000 ELECTROCARDIOGRAM COMPLETE: CPT | Performed by: FAMILY MEDICINE

## 2020-08-20 PROCEDURE — 99214 OFFICE O/P EST MOD 30 MIN: CPT | Performed by: FAMILY MEDICINE

## 2020-08-20 RX ORDER — METHYLPREDNISOLONE SODIUM SUCCINATE 125 MG/2ML
125 INJECTION, POWDER, LYOPHILIZED, FOR SOLUTION INTRAMUSCULAR; INTRAVENOUS ONCE
Status: COMPLETED | OUTPATIENT
Start: 2020-08-20 | End: 2020-08-20

## 2020-08-20 RX ADMIN — METHYLPREDNISOLONE SODIUM SUCCINATE 125 MG: 125 INJECTION, POWDER, LYOPHILIZED, FOR SOLUTION INTRAMUSCULAR; INTRAVENOUS at 11:22

## 2020-08-20 NOTE — PROGRESS NOTES
Mary Jo Berg is a 70 y.o. female.     Chief Complaint   Patient presents with   • Gout     pt is here for a gout flareup in R foot, rx from last week was helpful    • Abnormal Lab     pt is here to discuss kidney function. increased fatigue        HPI     Pt is a pleasant 70 y.o. YO female here for assessment of gout as well as kidney function and symptoms of fatigue.         Review of Systems   Constitutional: Positive for fatigue. Negative for fever and unexpected weight change.   Musculoskeletal:        Right ankle pain         Objective  Vitals:    08/20/20 0953   BP: 128/78   Pulse: 89   Temp: 97.1 °F (36.2 °C)   SpO2: 98%     Body mass index is 39.68 kg/m².       Physical Exam      Current Outpatient Medications:   •  allopurinol (ZYLOPRIM) 100 MG tablet, Take 1 tablet by mouth Daily., Disp: 30 tablet, Rfl: 5  •  Doxylamine Succinate, Sleep, (UNISOM PO), Take 1 tablet by mouth At Night As Needed., Disp: , Rfl:   •  ezetimibe (ZETIA) 10 MG tablet, Take 10 mg by mouth Daily., Disp: , Rfl:   •  fluticasone (FLONASE) 50 MCG/ACT nasal spray, 2 sprays into the nostril(s) as directed by provider As Needed., Disp: , Rfl:   •  glucose blood test strip, ACCU-CHECK SKYLA. Use to check blood sugar once daily, as directed. DX: E11.9, Disp: , Rfl:   •  irbesartan (AVAPRO) 150 MG tablet, Take 150 mg by mouth As Needed., Disp: , Rfl:   •  Lancets (ACCU-CHEK MULTICLIX) lancets, Use to check blood sugar once daily, as directed. DX: 250.00, Disp: , Rfl:   •  Loratadine 10 MG capsule, Take 1 tablet by mouth Daily., Disp: , Rfl:   •  metFORMIN (GLUCOPHAGE) 500 MG tablet, Take 500 mg by mouth 2 (Two) Times a Day With Meals., Disp: , Rfl:   •  Thyroid (LEVOTHYROXINE-LIOTHYRONINE PO), Take 125 mg by mouth Daily. 1/2 ON SUNDAYS, Disp: , Rfl:   •  traMADol (Ultram) 50 MG tablet, Take 1 tablet by mouth Every 6 (Six) Hours As Needed for Moderate Pain ., Disp: 30 tablet, Rfl: 0  •  vitamin B-12 (CYANOCOBALAMIN) 1000 MCG tablet,  Take 1,000 mcg by mouth Daily. Every other day, Disp: , Rfl:     Procedures    Lab Results (most recent)     None              There are no diagnoses linked to this encounter.      No follow-ups on file.      Kathrin Mitchell MD

## 2020-08-20 NOTE — PROGRESS NOTES
Mary Jo Berg is a 70 y.o. female.     Chief Complaint   Patient presents with   • Gout     pt is here for a gout flareup in R foot, rx from last week was helpful    • Abnormal Lab     pt is here to discuss kidney function. increased fatigue        HPI     Pt is a pleasant 70 y.o. YO female here for right foot pain as well as to follow-up mildly elevated kidney function values.  Patient also describes new symptoms of palpitations patient's medical problems include gout, hypothyroidism, insulin resistance, and she is currently undergoing treatment for breast cancer.      Right foot pain -patient started having symptoms of pain in her right foot, specifically the right great toe about 2 weeks ago.  At that time she was seen by a nurse practitioner in the heme-onc office who is concerned that gout flareup could have been secondary to dehydration.  Around this time she had also been found to have mildly elevated serum creatinine to 1.15.  She was encouraged to start drinking lots of fluids and her Femara was held    Since that time patient has had treatment with a 5-day course of prednisone with some resolution in symptoms but after completing the prednisone symptoms quickly came back in her foot.  She is having significant pain in the right great toe with associated redness and swelling.  She has been diagnosed with gout and has responded to treatment for gout in the past.  Her diagnosis has not been confirmed with crystal analysis.  She is currently on daily allopurinol as a preventative agent.  She had a uric acid level taken about 2 months ago which was 5.9.    Kidney Function -creatinine over the last 2 months elevated from her baseline of 0.9-1.15.  Due to this she was instructed to increase water intake and her Femara was held x10 days.Trying to drink about 60 oz of water in addition to water she drinks with her medication.  She describes some fatigue over the last week or so, attributes it to  "increasing her water intake and now waking up multiple times in the night to urinate.    Palpitations -new, started in the last couple of weeks.  Patient has had intermittent symptoms when laying and down in bed of feeling like her heart is \"fluttering\".  It has been at least a week since the last episode.  She had one episode that was longer in time, she describes trying to do deep breathing and calm herself down which led to resolution.  Other episodes have been very brief.     The following portions of the patient's history were reviewed by me and updated as appropriate: allergies, current medications, past family history, past medical history, past social history, past surgical history, and problem list.     Review of Systems   Constitutional: Positive for fatigue.   HENT: Negative.    Eyes: Negative.    Respiratory: Negative.    Cardiovascular: Negative.  Negative for chest pain, palpitations and leg swelling.   Gastrointestinal: Negative.    Endocrine: Negative.    Genitourinary: Negative.    Musculoskeletal: Positive for arthralgias and gait problem.   Skin: Positive for color change.   Allergic/Immunologic: Negative.    Hematological: Negative.    Psychiatric/Behavioral: Negative.    I have reviewed and confirmed the accuracy of the ROS as documented by the MA/LPN/RN Kathrin Mitchell MD    Objective  Vitals:    08/20/20 0953   BP: 128/78   Pulse: 89   Temp: 97.1 °F (36.2 °C)   SpO2: 98%     Body mass index is 39.68 kg/m².       Physical Exam   Constitutional: She is oriented to person, place, and time. She appears well-developed and well-nourished. No distress.   HENT:   Head: Normocephalic and atraumatic.   Nose: Nose normal.   Eyes: Conjunctivae are normal. Right eye exhibits no discharge. Left eye exhibits no discharge. No scleral icterus.   Cardiovascular: Normal rate.   Murmur heard.  Holosystolic murmur   Pulmonary/Chest: Effort normal. No respiratory distress.   Musculoskeletal:   Redness, swelling, " and significant tenderness of the right great toe.   Neurological: She is alert and oriented to person, place, and time.   Skin: No erythema. No pallor.   Psychiatric: She has a normal mood and affect. Her behavior is normal. Judgment and thought content normal.   Vitals reviewed.        Current Outpatient Medications:   •  allopurinol (ZYLOPRIM) 100 MG tablet, Take 1 tablet by mouth Daily., Disp: 30 tablet, Rfl: 5  •  Doxylamine Succinate, Sleep, (UNISOM PO), Take 1 tablet by mouth At Night As Needed., Disp: , Rfl:   •  ezetimibe (ZETIA) 10 MG tablet, Take 10 mg by mouth Daily., Disp: , Rfl:   •  fluticasone (FLONASE) 50 MCG/ACT nasal spray, 2 sprays into the nostril(s) as directed by provider As Needed., Disp: , Rfl:   •  glucose blood test strip, ACCU-CHECK SKYLA. Use to check blood sugar once daily, as directed. DX: E11.9, Disp: , Rfl:   •  irbesartan (AVAPRO) 150 MG tablet, Take 150 mg by mouth As Needed., Disp: , Rfl:   •  Lancets (ACCU-CHEK MULTICLIX) lancets, Use to check blood sugar once daily, as directed. DX: 250.00, Disp: , Rfl:   •  Loratadine 10 MG capsule, Take 1 tablet by mouth Daily., Disp: , Rfl:   •  metFORMIN (GLUCOPHAGE) 500 MG tablet, Take 500 mg by mouth 2 (Two) Times a Day With Meals., Disp: , Rfl:   •  Thyroid (LEVOTHYROXINE-LIOTHYRONINE PO), Take 125 mg by mouth Daily. 1/2 ON SUNDAYS, Disp: , Rfl:   •  traMADol (Ultram) 50 MG tablet, Take 1 tablet by mouth Every 6 (Six) Hours As Needed for Moderate Pain ., Disp: 30 tablet, Rfl: 0  •  vitamin B-12 (CYANOCOBALAMIN) 1000 MCG tablet, Take 1,000 mcg by mouth Daily. Every other day, Disp: , Rfl:   No current facility-administered medications for this visit.       ECG 12 Lead  Date/Time: 8/20/2020 12:34 PM  Performed by: Kathrin Rawls MD  Authorized by: Kathrin Rawls MD   Comparison: compared with previous ECG from 5/8/2020  Similar to previous ECG  Rhythm: sinus rhythm  Rate: normal  BPM: 83  QRS axis: left    Clinical impression:  non-specific ECG  Comments: Atrial ventricular dual paced rhythm            Lab Results (most recent)     None              Mary Jo was seen today for gout and abnormal lab.    Diagnoses and all orders for this visit:    Acute gout involving toe of right foot, unspecified cause  Patient with evidence of acute gouty flare in the right toe, has not had complete resolution with 5-day course of oral steroid.  Patient is not able to take NSAIDs secondary to renal function.  Will administer IM injection of Solu-Medrol in office today, if no significant improvement on this may do a prolonged oral steroid course.  If able to catch episode early in its course may consider colchicine in the future.  Will check uric acid level today.  Continue allopurinol 100 mg daily.  -     Uric acid  -     methylPREDNISolone sodium succinate (SOLU-Medrol) injection 125 mg    Elevated serum creatinine  Patient with elevated serum creatinine over the last 2 months, may be secondary to dehydration versus side effect of medication such as Femara.  Her Femara has been held over the last 10 days.  Will recheck today.  Patient has been drinking lots of water.  -     Comprehensive Metabolic Panel    Palpitations  Patient has had new onset symptoms of palpitations when lying in bed over the last couple of weeks, may be related to stress or anxiety but patient does have significant cardiac history for aortic stenosis as well as complete heart block.  EKG that was done in the office is not significantly changed from one done earlier this year in May.  The pacer and the EKG machine that we had in the office were having some technical issues.  There is nothing on EKG that is concerning to me for an acute change or emergency.  I did recommend that patient follow-up with her cardiologist - Dr.P Lemons, she is going to talk with his office and try to get an appointment.  I let her know if she had any difficulty to let me know we could try to reach out.  -      ECG 12 Lead    High risk medication use  CMP to evaluate renal function, also CBC per heme-onc with patient on Femara therapy.  -     Comprehensive Metabolic Panel  -     CBC & Differential          Return in about 3 months (around 11/20/2020) for Recheck.      Kathrin Mitchell MD

## 2020-08-21 ENCOUNTER — TELEPHONE (OUTPATIENT)
Dept: FAMILY MEDICINE CLINIC | Facility: CLINIC | Age: 70
End: 2020-08-21

## 2020-08-21 DIAGNOSIS — M10.9 ACUTE GOUT INVOLVING TOE OF RIGHT FOOT, UNSPECIFIED CAUSE: Primary | ICD-10-CM

## 2020-08-21 DIAGNOSIS — D72.829 LEUKOCYTOSIS, UNSPECIFIED TYPE: ICD-10-CM

## 2020-08-21 DIAGNOSIS — R79.89 ELEVATED SERUM CREATININE: ICD-10-CM

## 2020-08-21 NOTE — TELEPHONE ENCOUNTER
PATIENT WAS CALLING BACK AS INSTRUCTED BY DR HOWARD TO ALERT HER OF HOW HER FOOT IS DOING AFTER THE CORTISONE SHOT.     PATIENT STATED THE REDNESS HAS IMPROVED IT IS MORE OF A PINK NOW AND ONLY ONE TINY AREA IS A DEEPER SHADE OF PINK THAT THE REST OF THE FOOT.     PATIENT STATED SHE CAN PLACE HER FOOT ON THE FLOOR WITHOUT PAIN AND THE FOOT IS NOT AS TENDER.     PATIENT IS ALSO CURIOUS ABOUT WHETHER SHE IS TO GO BACK ON THE FARMA OR NOT PLEASE ADVISE     PATIENT STATED IT HAS TO DO WITH WHATEVER HE LAB LEVELS SHOW AND WHAT DR HOWARD WANTS HER TO DO PLEASE ADVISE     PATIENT CALL BACK NUMBER [phone]

## 2020-08-21 NOTE — TELEPHONE ENCOUNTER
Called patient back to discuss results.  Kidney function back to baseline, uric acid mildly elevated.  Instructed patient to increase allopurinol to twice daily.  We will have her go ahead and restart Femara and repeat creatinine in 2 weeks.

## 2020-08-25 DIAGNOSIS — M10.9 GOUT OF LEFT FOOT, UNSPECIFIED CAUSE, UNSPECIFIED CHRONICITY: ICD-10-CM

## 2020-08-25 RX ORDER — ALLOPURINOL 100 MG/1
100 TABLET ORAL 2 TIMES DAILY
Qty: 60 TABLET | Refills: 3 | Status: SHIPPED | OUTPATIENT
Start: 2020-08-25 | End: 2021-04-07

## 2020-08-25 RX ORDER — FLUCONAZOLE 150 MG/1
150 TABLET ORAL ONCE
Qty: 1 TABLET | Refills: 0 | Status: SHIPPED | OUTPATIENT
Start: 2020-08-25 | End: 2020-08-25

## 2020-08-25 RX ORDER — COLCHICINE 0.6 MG/1
TABLET ORAL
Qty: 3 TABLET | Refills: 0 | Status: SHIPPED | OUTPATIENT
Start: 2020-08-25 | End: 2020-09-11 | Stop reason: SDUPTHER

## 2020-08-25 RX ORDER — PREDNISONE 20 MG/1
TABLET ORAL
Qty: 13 TABLET | Refills: 0 | Status: SHIPPED | OUTPATIENT
Start: 2020-08-25 | End: 2020-09-02

## 2020-09-04 ENCOUNTER — TELEPHONE (OUTPATIENT)
Dept: FAMILY MEDICINE CLINIC | Facility: CLINIC | Age: 70
End: 2020-09-04

## 2020-09-04 ENCOUNTER — RESULTS ENCOUNTER (OUTPATIENT)
Dept: FAMILY MEDICINE CLINIC | Facility: CLINIC | Age: 70
End: 2020-09-04

## 2020-09-04 DIAGNOSIS — D72.829 LEUKOCYTOSIS, UNSPECIFIED TYPE: ICD-10-CM

## 2020-09-04 DIAGNOSIS — M10.9 ACUTE GOUT INVOLVING TOE OF RIGHT FOOT, UNSPECIFIED CAUSE: ICD-10-CM

## 2020-09-04 DIAGNOSIS — R79.89 ELEVATED SERUM CREATININE: ICD-10-CM

## 2020-09-04 LAB
BASOPHILS # BLD AUTO: 0.02 10*3/MM3 (ref 0–0.2)
BASOPHILS NFR BLD AUTO: 0.3 % (ref 0–1.5)
BUN SERPL-MCNC: 35 MG/DL (ref 8–23)
BUN/CREAT SERPL: 35 (ref 7–25)
CALCIUM SERPL-MCNC: 9.3 MG/DL (ref 8.6–10.5)
CHLORIDE SERPL-SCNC: 102 MMOL/L (ref 98–107)
CO2 SERPL-SCNC: 21.8 MMOL/L (ref 22–29)
CREAT SERPL-MCNC: 1 MG/DL (ref 0.57–1)
EOSINOPHIL # BLD AUTO: 0.25 10*3/MM3 (ref 0–0.4)
EOSINOPHIL NFR BLD AUTO: 3.7 % (ref 0.3–6.2)
ERYTHROCYTE [DISTWIDTH] IN BLOOD BY AUTOMATED COUNT: 13.7 % (ref 12.3–15.4)
GLUCOSE SERPL-MCNC: 238 MG/DL (ref 65–99)
HCT VFR BLD AUTO: 40.9 % (ref 34–46.6)
HGB BLD-MCNC: 13.1 G/DL (ref 12–15.9)
IMM GRANULOCYTES # BLD AUTO: 0.04 10*3/MM3 (ref 0–0.05)
IMM GRANULOCYTES NFR BLD AUTO: 0.6 % (ref 0–0.5)
LYMPHOCYTES # BLD AUTO: 2.23 10*3/MM3 (ref 0.7–3.1)
LYMPHOCYTES NFR BLD AUTO: 33.2 % (ref 19.6–45.3)
MCH RBC QN AUTO: 30.5 PG (ref 26.6–33)
MCHC RBC AUTO-ENTMCNC: 32 G/DL (ref 31.5–35.7)
MCV RBC AUTO: 95.3 FL (ref 79–97)
MONOCYTES # BLD AUTO: 0.67 10*3/MM3 (ref 0.1–0.9)
MONOCYTES NFR BLD AUTO: 10 % (ref 5–12)
NEUTROPHILS # BLD AUTO: 3.51 10*3/MM3 (ref 1.7–7)
NEUTROPHILS NFR BLD AUTO: 52.2 % (ref 42.7–76)
NRBC BLD AUTO-RTO: 0 /100 WBC (ref 0–0.2)
PLATELET # BLD AUTO: 249 10*3/MM3 (ref 140–450)
POTASSIUM SERPL-SCNC: 4.7 MMOL/L (ref 3.5–5.2)
RBC # BLD AUTO: 4.29 10*6/MM3 (ref 3.77–5.28)
SODIUM SERPL-SCNC: 134 MMOL/L (ref 136–145)
URATE SERPL-MCNC: 5.5 MG/DL (ref 2.4–5.7)
WBC # BLD AUTO: 6.72 10*3/MM3 (ref 3.4–10.8)

## 2020-09-04 RX ORDER — METOPROLOL SUCCINATE 25 MG/1
25 TABLET, EXTENDED RELEASE ORAL 2 TIMES DAILY
COMMUNITY

## 2020-09-04 NOTE — TELEPHONE ENCOUNTER
Patient would like to add a new medication that her cardiologist Cristo Menjivar MD just recently prescribed to her current medication list. Metoprolol ER 25 mg Take one tablet daily.

## 2020-09-11 DIAGNOSIS — M10.9 GOUT OF LEFT FOOT, UNSPECIFIED CAUSE, UNSPECIFIED CHRONICITY: ICD-10-CM

## 2020-09-11 RX ORDER — PREDNISONE 20 MG/1
TABLET ORAL
Qty: 13 TABLET | Refills: 0 | OUTPATIENT
Start: 2020-09-11 | End: 2020-09-19

## 2020-09-11 RX ORDER — COLCHICINE 0.6 MG/1
TABLET ORAL
Qty: 9 TABLET | Refills: 0 | Status: SHIPPED | OUTPATIENT
Start: 2020-09-11 | End: 2020-12-09

## 2020-09-16 ENCOUNTER — TELEPHONE (OUTPATIENT)
Dept: FAMILY MEDICINE CLINIC | Facility: CLINIC | Age: 70
End: 2020-09-16

## 2020-09-16 NOTE — TELEPHONE ENCOUNTER
PATIENT REQUESTS WE FAX LAST 3 BLOOD DRAWS/LABS TO [facility]      FAX: [phone]    PATIENT: [phone]

## 2020-10-20 ENCOUNTER — TELEPHONE (OUTPATIENT)
Dept: FAMILY MEDICINE CLINIC | Facility: CLINIC | Age: 70
End: 2020-10-20

## 2020-10-20 ENCOUNTER — OFFICE VISIT (OUTPATIENT)
Dept: FAMILY MEDICINE CLINIC | Facility: CLINIC | Age: 70
End: 2020-10-20

## 2020-10-20 VITALS
BODY MASS INDEX: 39.69 KG/M2 | OXYGEN SATURATION: 99 % | DIASTOLIC BLOOD PRESSURE: 74 MMHG | WEIGHT: 210.2 LBS | HEIGHT: 61 IN | HEART RATE: 78 BPM | TEMPERATURE: 97.6 F | SYSTOLIC BLOOD PRESSURE: 126 MMHG

## 2020-10-20 DIAGNOSIS — E13.9 DIABETES 1.5, MANAGED AS TYPE 2 (HCC): ICD-10-CM

## 2020-10-20 DIAGNOSIS — Z23 NEED FOR PNEUMOCOCCAL VACCINATION: ICD-10-CM

## 2020-10-20 DIAGNOSIS — M25.561 CHRONIC PAIN OF RIGHT KNEE: ICD-10-CM

## 2020-10-20 DIAGNOSIS — E66.01 MORBIDLY OBESE (HCC): ICD-10-CM

## 2020-10-20 DIAGNOSIS — G89.29 CHRONIC PAIN OF RIGHT KNEE: ICD-10-CM

## 2020-10-20 DIAGNOSIS — Z12.11 SCREEN FOR COLON CANCER: ICD-10-CM

## 2020-10-20 DIAGNOSIS — Z00.00 MEDICARE ANNUAL WELLNESS VISIT, SUBSEQUENT: Primary | ICD-10-CM

## 2020-10-20 PROBLEM — R73.03 PREDIABETES: Status: RESOLVED | Noted: 2020-06-18 | Resolved: 2020-10-20

## 2020-10-20 LAB — HBA1C MFR BLD: 7.4 %

## 2020-10-20 PROCEDURE — G0009 ADMIN PNEUMOCOCCAL VACCINE: HCPCS | Performed by: FAMILY MEDICINE

## 2020-10-20 PROCEDURE — G0439 PPPS, SUBSEQ VISIT: HCPCS | Performed by: FAMILY MEDICINE

## 2020-10-20 PROCEDURE — 90732 PPSV23 VACC 2 YRS+ SUBQ/IM: CPT | Performed by: FAMILY MEDICINE

## 2020-10-20 PROCEDURE — 83036 HEMOGLOBIN GLYCOSYLATED A1C: CPT | Performed by: FAMILY MEDICINE

## 2020-10-20 RX ORDER — LETROZOLE 2.5 MG/1
2.5 TABLET, FILM COATED ORAL DAILY
COMMUNITY
Start: 2020-09-23 | End: 2020-12-09

## 2020-10-20 RX ORDER — CLOTRIMAZOLE AND BETAMETHASONE DIPROPIONATE 10; .64 MG/G; MG/G
CREAM TOPICAL AS NEEDED
COMMUNITY
Start: 2020-08-24

## 2020-10-20 RX ORDER — NAPROXEN 500 MG/1
500 TABLET ORAL 2 TIMES DAILY WITH MEALS
Status: CANCELLED | OUTPATIENT
Start: 2020-10-20

## 2020-10-20 RX ORDER — NAPROXEN 500 MG/1
500 TABLET ORAL 2 TIMES DAILY PRN
Qty: 60 TABLET | Refills: 2 | Status: SHIPPED | OUTPATIENT
Start: 2020-10-20 | End: 2021-01-19

## 2020-10-20 NOTE — PROGRESS NOTES
The ABCs of the Annual Wellness Visit  Subsequent Medicare Wellness Visit    Chief Complaint   Patient presents with   • Medicare Wellness-subsequent     Patient is here for subsequent AWV ( wore mask and goggles )        Subjective   History of Present Illness:  Mary Jo Berg is a 70 y.o. female who presents for a Subsequent Medicare Wellness Visit.  Her chronic medical problems include prediabetes, hypertension, hyperlipidemia (intolerant to statins), hypothyroidism, osteopenia, gout, and right-sided breast cancer status postmastectomy.    HEALTH RISK ASSESSMENT    Recent Hospitalizations:  Recently treated at the following:  [facility] May 2020 for elective mastectomy for breast cancer.    Current Medical Providers:  Patient Care Team:  Kathrin Rawls MD as PCP - General (Family Medicine)  Syed Jasso MD as Consulting Physician (Hematology and Oncology)  Bj Stephen MD as Referring Physician (Breast Surgery)    Smoking Status:  Social History     Tobacco Use   Smoking Status Never Smoker   Smokeless Tobacco Never Used       Alcohol Consumption:  Social History     Substance and Sexual Activity   Alcohol Use Yes    Comment: Occasionally       Depression Screen:   PHQ-2/PHQ-9 Depression Screening 10/20/2020   Little interest or pleasure in doing things 1   Feeling down, depressed, or hopeless 0   Trouble falling or staying asleep, or sleeping too much -   Feeling tired or having little energy -   Poor appetite or overeating -   Feeling bad about yourself - or that you are a failure or have let yourself or your family down -   Trouble concentrating on things, such as reading the newspaper or watching television -   Moving or speaking so slowly that other people could have noticed. Or the opposite - being so fidgety or restless that you have been moving around a lot more than usual -   Thoughts that you would be better off dead, or of hurting yourself in some way -    Total Score 1   If you checked off any problems, how difficult have these problems made it for you to do your work, take care of things at home, or get along with other people? -       Fall Risk Screen:  CASSIA Fall Risk Assessment has not been completed.    Health Habits and Functional and Cognitive Screening:  Functional & Cognitive Status 10/20/2020   Do you have difficulty preparing food and eating? No   Do you have difficulty bathing yourself, getting dressed or grooming yourself? No   Do you have difficulty using the toilet? No   Do you have difficulty moving around from place to place? No   Do you have trouble with steps or getting out of a bed or a chair? No   Current Diet Limited Junk Food   Dental Exam Up to date   Eye Exam Up to date   Exercise (times per week) 7 times per week   Current Exercise Activities Include Walking   Do you need help using the phone?  No   Are you deaf or do you have serious difficulty hearing?  No   Do you need help with transportation? No   Do you need help shopping? No   Do you need help preparing meals?  No   Do you need help with housework?  No   Do you need help with laundry? No   Do you need help taking your medications? No   Do you need help managing money? No   Do you ever drive or ride in a car without wearing a seat belt? No         Does the patient have evidence of cognitive impairment? No    Asprin use counseling:Does not need ASA (and currently is not on it)    Age-appropriate Screening Schedule:  Refer to the list below for future screening recommendations based on patient's age, sex and/or medical conditions. Orders for these recommended tests are listed in the plan section. The patient has been provided with a written plan.    Health Maintenance   Topic Date Due   • URINE MICROALBUMIN  1950   • COLONOSCOPY  1950   • TDAP/TD VACCINES (1 - Tdap) 01/27/1968   • ZOSTER VACCINE (2 of 3) 08/22/2013   • DIABETIC FOOT EXAM  03/30/2020   • DXA SCAN   03/31/2020   • DIABETIC EYE EXAM  01/01/2021   • HEMOGLOBIN A1C  04/20/2021   • LIPID PANEL  06/18/2021   • INFLUENZA VACCINE  Completed          The following portions of the patient's history were reviewed and updated as appropriate: allergies, current medications, past family history, past medical history, past social history, past surgical history and problem list.    Outpatient Medications Prior to Visit   Medication Sig Dispense Refill   • allopurinol (ZYLOPRIM) 100 MG tablet Take 1 tablet by mouth 2 (Two) Times a Day. 60 tablet 3   • clotrimazole-betamethasone (LOTRISONE) 1-0.05 % cream APPLY CREAM TO AFFECTED AREA IN GROIN TWICE DAILY     • colchicine 0.6 MG tablet Take two tablets PO X 1, then take 1 tablet PO 1 hour later. Use as needed for gout flair up. 9 tablet 0   • Doxylamine Succinate, Sleep, (UNISOM PO) Take 1 tablet by mouth At Night As Needed.     • ezetimibe (ZETIA) 10 MG tablet Take 10 mg by mouth Daily.     • fluticasone (FLONASE) 50 MCG/ACT nasal spray 2 sprays into the nostril(s) as directed by provider As Needed.     • glucose blood test strip ACCU-CHECK SKYLA. Use to check blood sugar once daily, as directed. DX: E11.9     • irbesartan (AVAPRO) 150 MG tablet Take 150 mg by mouth As Needed.     • Lancets (ACCU-CHEK MULTICLIX) lancets Use to check blood sugar once daily, as directed. DX: 250.00     • letrozole (FEMARA) 2.5 MG tablet Take 2.5 mg by mouth Daily.     • Loratadine 10 MG capsule Take 1 tablet by mouth Daily.     • metFORMIN (GLUCOPHAGE) 500 MG tablet Take 500 mg by mouth 2 (Two) Times a Day With Meals.     • metoprolol succinate XL (TOPROL-XL) 25 MG 24 hr tablet Take 25 mg by mouth Daily.     • Thyroid (LEVOTHYROXINE-LIOTHYRONINE PO) Take 125 mg by mouth Daily. 1/2 ON SUNDAYS     • traMADol (Ultram) 50 MG tablet Take 1 tablet by mouth Every 6 (Six) Hours As Needed for Moderate Pain . 30 tablet 0   • vitamin B-12 (CYANOCOBALAMIN) 1000 MCG tablet Take 1,000 mcg by mouth Daily. Every  "other day       No facility-administered medications prior to visit.        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 Patient has an advance directive (not in EMR), copy requested.    Review of Systems   Constitutional: Negative.    HENT: Negative.    Eyes: Negative.    Respiratory: Negative.    Cardiovascular: Negative for chest pain, palpitations and leg swelling.   Gastrointestinal: Negative.    Endocrine: Negative.    Genitourinary: Negative.    Musculoskeletal: Negative.    Skin: Negative.    Allergic/Immunologic: Negative.    Neurological: Negative.    Hematological: Negative.    Psychiatric/Behavioral: Negative.        Compared to one year ago, the patient feels her physical health is worse. Mood is lower due to changes in lifestyle/social life with the pandemic.   Compared to one year ago, the patient feels her mental health is worse. Patient has poor body image and also feels \"off balance\" since her mastectomy.     Reviewed chart for potential of high risk medication in the elderly: yes  Reviewed chart for potential of harmful drug interactions in the elderly:yes    Objective         Vitals:    10/20/20 0903   BP: 126/74   Pulse: 78   Temp: 97.6 °F (36.4 °C)   SpO2: 99%   Weight: 95.3 kg (210 lb 3.2 oz)   Height: 154.9 cm (60.98\")   PainSc: 0-No pain       Body mass index " is 39.74 kg/m².  Discussed the patient's BMI with her. The BMI is above average; BMI management plan is completed. Discussed improving diet including limiting sweets.     Physical Exam  Vitals signs reviewed.   Constitutional:       General: She is not in acute distress.     Appearance: She is well-developed.   HENT:      Head: Normocephalic and atraumatic.      Nose: Nose normal.   Eyes:      General: No scleral icterus.        Right eye: No discharge.         Left eye: No discharge.      Conjunctiva/sclera: Conjunctivae normal.   Pulmonary:      Effort: Pulmonary effort is normal. No respiratory distress.   Skin:     Coloration: Skin is not pale.      Findings: No erythema.   Neurological:      Mental Status: She is alert and oriented to person, place, and time.   Psychiatric:         Behavior: Behavior normal.         Thought Content: Thought content normal.         Judgment: Judgment normal.         Lab Results   Component Value Date     (H) 09/04/2020    HGBA1C 7.4 10/20/2020        Assessment/Plan   Medicare Risks and Personalized Health Plan  CMS Preventative Services Quick Reference  Advance Directive Discussion -patient has advanced directive, requested copy.  Breast Cancer/Mammogram Screening -patient undergoing  left-sided mammogram February 2021.  Chronic Pain patient with chronic pain related to different joints, particularly has pain in her right knee.  Has been taking flurbiprofen as Tylenol does not seem to significantly relieve symptoms.  Also has tramadol to take on a as needed basis but uses very rarely.  Recommended patient switch to naproxen which demonstrates better cardiovascular outcomes among NSAIDs.  Still encouraged her to use it sparingly as possible.  Colon Cancer Screening -patient due for routine colon cancer screening.  Placed order to get colonoscopy scheduled for her.  Depression/Dysphoria -patient with some low mood symptoms, attributes these to the pandemic and change in  lifestyle.  Immunizations Discussed/Encouraged (specific immunizations; Pneumococcal 23 and Shingrix ) pneumococcal administered today, patient interested in how much Shingrix would cost prior to having it done, will inquire to billing.  Inactivity/Sedentary -patient with low activity related to chronic pain.  Obesity/Overweight -patient with need for weight loss, we discussed dietary changes that she could make such as limiting sweets/treat foods to a couple times a week instead of on a daily basis.  Weight control is going to be especially important to her as her hemoglobin A1c has worsened.    The above risks/problems have been discussed with the patient.  Pertinent information has been shared with the patient in the After Visit Summary.  Follow up plans and orders are seen below in the Assessment/Plan Section.    Diagnoses and all orders for this visit:    1. Medicare annual wellness visit, subsequent (Primary)    2. 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  Encouraged dietary changes including limiting sweets.  Will forward the results to her endocrinologist Dr. Molina  -     POC Glycosylated Hemoglobin (Hb A1C)    3. Morbidly obese (CMS/[facility])    4. Chronic pain of right knee  -     naproxen (Naprosyn) 500 MG tablet; Take 1 tablet by mouth 2 (Two) Times a Day As Needed (pain).  Dispense: 60 tablet; Refill: 2    5. Need for pneumococcal vaccination  -     Pneumococcal Polysaccharide Vaccine 23-Valent Greater Than or Equal To 1yo Subcutaneous / IM    6. Screen for colon cancer  -     Amb referral for Screening Colonoscopy    Other orders  -     Cancel: naproxen (Naprosyn) 500 MG tablet; Take 1 tablet by mouth 2 (Two) Times a Day With Meals.  Dispense:        Follow Up:  Return in about 4 months (around 2/20/2021) for Recheck.     An After Visit Summary and PPPS were given to the patient.

## 2020-10-20 NOTE — TELEPHONE ENCOUNTER
PATIENT IS NEEDING THE PRESCRIPTION STRENGTH NAPROXEN CALLED IN TO HER PHARMACY.   [id number] - [place], [place] - [address] [phone]  - [phone] FX

## 2020-10-21 ENCOUNTER — TELEPHONE (OUTPATIENT)
Dept: FAMILY MEDICINE CLINIC | Facility: CLINIC | Age: 70
End: 2020-10-21

## 2020-10-21 NOTE — TELEPHONE ENCOUNTER
Patient requested her A1C be faxed/sent to her endocrinologist - Dr. Damari Wynn.   Fax sent 10/21/20

## 2020-10-21 NOTE — TELEPHONE ENCOUNTER
Spoke with patient she has never had an abnormal pap and she is ok with not getting it at this time.

## 2020-10-21 NOTE — TELEPHONE ENCOUNTER
Called patient to inform her that her insurance is showing they will cover the shingrix vaccine but it would be best to call her plan to make sure. She has been added to the wait list at this time. Patient also was wondering about the pap you all talked about she states her mother had cervical cancer and that is why her previous doctor recommended she always get a pap. Patient is wondering if you are in agreement with this and if so we can get this scheduled.

## 2020-11-02 ENCOUNTER — OFFICE VISIT (OUTPATIENT)
Dept: ONCOLOGY | Facility: CLINIC | Age: 70
End: 2020-11-02

## 2020-11-02 ENCOUNTER — LAB (OUTPATIENT)
Dept: OTHER | Facility: HOSPITAL | Age: 70
End: 2020-11-02

## 2020-11-02 VITALS
SYSTOLIC BLOOD PRESSURE: 115 MMHG | HEIGHT: 61 IN | DIASTOLIC BLOOD PRESSURE: 71 MMHG | BODY MASS INDEX: 39.36 KG/M2 | WEIGHT: 208.5 LBS | RESPIRATION RATE: 16 BRPM | TEMPERATURE: 97.1 F | OXYGEN SATURATION: 96 % | HEART RATE: 86 BPM

## 2020-11-02 DIAGNOSIS — Z79.811 AROMATASE INHIBITOR USE: ICD-10-CM

## 2020-11-02 DIAGNOSIS — Z17.0 MALIGNANT NEOPLASM OF UPPER-OUTER QUADRANT OF LEFT BREAST IN FEMALE, ESTROGEN RECEPTOR POSITIVE (HCC): ICD-10-CM

## 2020-11-02 DIAGNOSIS — C50.412 MALIGNANT NEOPLASM OF UPPER-OUTER QUADRANT OF LEFT BREAST IN FEMALE, ESTROGEN RECEPTOR POSITIVE (HCC): ICD-10-CM

## 2020-11-02 DIAGNOSIS — Z17.0 MALIGNANT NEOPLASM OF UPPER-OUTER QUADRANT OF LEFT BREAST IN FEMALE, ESTROGEN RECEPTOR POSITIVE (HCC): Primary | ICD-10-CM

## 2020-11-02 DIAGNOSIS — C50.412 MALIGNANT NEOPLASM OF UPPER-OUTER QUADRANT OF LEFT BREAST IN FEMALE, ESTROGEN RECEPTOR POSITIVE (HCC): Primary | ICD-10-CM

## 2020-11-02 LAB
ALBUMIN SERPL-MCNC: 4 G/DL (ref 3.5–5.2)
ALBUMIN/GLOB SERPL: 1.3 G/DL
ALP SERPL-CCNC: 82 U/L (ref 39–117)
ALT SERPL W P-5'-P-CCNC: 29 U/L (ref 1–33)
ANION GAP SERPL CALCULATED.3IONS-SCNC: 11 MMOL/L (ref 5–15)
AST SERPL-CCNC: 24 U/L (ref 1–32)
BASOPHILS # BLD AUTO: 0.03 10*3/MM3 (ref 0–0.2)
BASOPHILS NFR BLD AUTO: 0.4 % (ref 0–1.5)
BILIRUB SERPL-MCNC: 0.3 MG/DL (ref 0–1.2)
BUN SERPL-MCNC: 32 MG/DL (ref 8–23)
BUN/CREAT SERPL: 30.2 (ref 7–25)
CALCIUM SPEC-SCNC: 10 MG/DL (ref 8.6–10.5)
CHLORIDE SERPL-SCNC: 103 MMOL/L (ref 98–107)
CO2 SERPL-SCNC: 27 MMOL/L (ref 22–29)
CREAT SERPL-MCNC: 1.06 MG/DL (ref 0.57–1)
DEPRECATED RDW RBC AUTO: 50.7 FL (ref 37–54)
EOSINOPHIL # BLD AUTO: 0.21 10*3/MM3 (ref 0–0.4)
EOSINOPHIL NFR BLD AUTO: 3 % (ref 0.3–6.2)
ERYTHROCYTE [DISTWIDTH] IN BLOOD BY AUTOMATED COUNT: 14.7 % (ref 12.3–15.4)
GFR SERPL CREATININE-BSD FRML MDRD: 51 ML/MIN/1.73
GLOBULIN UR ELPH-MCNC: 3 GM/DL
GLUCOSE SERPL-MCNC: 180 MG/DL (ref 65–99)
HCT VFR BLD AUTO: 40.8 % (ref 34–46.6)
HGB BLD-MCNC: 13.4 G/DL (ref 12–15.9)
IMM GRANULOCYTES # BLD AUTO: 0.07 10*3/MM3 (ref 0–0.05)
IMM GRANULOCYTES NFR BLD AUTO: 1 % (ref 0–0.5)
LYMPHOCYTES # BLD AUTO: 2.32 10*3/MM3 (ref 0.7–3.1)
LYMPHOCYTES NFR BLD AUTO: 33.2 % (ref 19.6–45.3)
MCH RBC QN AUTO: 30.9 PG (ref 26.6–33)
MCHC RBC AUTO-ENTMCNC: 32.8 G/DL (ref 31.5–35.7)
MCV RBC AUTO: 94.2 FL (ref 79–97)
MONOCYTES # BLD AUTO: 0.57 10*3/MM3 (ref 0.1–0.9)
MONOCYTES NFR BLD AUTO: 8.2 % (ref 5–12)
NEUTROPHILS NFR BLD AUTO: 3.78 10*3/MM3 (ref 1.7–7)
NEUTROPHILS NFR BLD AUTO: 54.2 % (ref 42.7–76)
NRBC BLD AUTO-RTO: 0 /100 WBC (ref 0–0.2)
PLATELET # BLD AUTO: 275 10*3/MM3 (ref 140–450)
PMV BLD AUTO: 9.9 FL (ref 6–12)
POTASSIUM SERPL-SCNC: 5 MMOL/L (ref 3.5–5.2)
PROT SERPL-MCNC: 7 G/DL (ref 6–8.5)
RBC # BLD AUTO: 4.33 10*6/MM3 (ref 3.77–5.28)
SODIUM SERPL-SCNC: 141 MMOL/L (ref 136–145)
WBC # BLD AUTO: 6.98 10*3/MM3 (ref 3.4–10.8)

## 2020-11-02 PROCEDURE — 36415 COLL VENOUS BLD VENIPUNCTURE: CPT

## 2020-11-02 PROCEDURE — 99214 OFFICE O/P EST MOD 30 MIN: CPT | Performed by: INTERNAL MEDICINE

## 2020-11-02 PROCEDURE — 80053 COMPREHEN METABOLIC PANEL: CPT | Performed by: NURSE PRACTITIONER

## 2020-11-02 PROCEDURE — 85025 COMPLETE CBC W/AUTO DIFF WBC: CPT | Performed by: NURSE PRACTITIONER

## 2020-11-23 ENCOUNTER — OFFICE VISIT (OUTPATIENT)
Dept: MAMMOGRAPHY | Facility: CLINIC | Age: 70
End: 2020-11-23

## 2020-11-23 DIAGNOSIS — C50.811 MALIGNANT NEOPLASM OF OVERLAPPING SITES OF RIGHT BREAST IN FEMALE, ESTROGEN RECEPTOR POSITIVE (HCC): Primary | ICD-10-CM

## 2020-11-23 DIAGNOSIS — Z17.0 MALIGNANT NEOPLASM OF OVERLAPPING SITES OF RIGHT BREAST IN FEMALE, ESTROGEN RECEPTOR POSITIVE (HCC): Primary | ICD-10-CM

## 2020-11-23 PROCEDURE — 99213 OFFICE O/P EST LOW 20 MIN: CPT | Performed by: SURGERY

## 2020-11-23 NOTE — PROGRESS NOTES
Subjective   Mary Jo Berg is a 70 y.o. female     History of Present Illness she is a nice 70-year-old white female well-known to me.  She is 6 months out from her right mastectomy and doing pretty well.  She has a breast prosthesis but does not like it because it so heavy.  The patient has not palpated anything that concerns her on physical examination.  She is also following closely with Dr. Jasso.  Apparently her left mammogram is due in February.        Review of Systems musculoskeletal-the patient has been arthritic problems involving the right knee and may be looking at surgery in the near future.  Past Medical History:   Diagnosis Date   • Breast cancer (CMS/HCC) 2020    right IDC   • CTS (carpal tunnel syndrome)    • Diabetes mellitus (CMS/HCC)     Type 2   • H/O Cellulitis of left ear    • History of asthma    • History of cataract    • History of complete heart block     paced in    • History of fall    • Hyperlipidemia    • Hypertension    • Hypothyroidism    • Osteoarthritis    • Osteopenia    • PONV (postoperative nausea and vomiting)      Past Surgical History:   Procedure Laterality Date   • BLADDER REPAIR     • BREAST BIOPSY Right 2020    malignant   • CARDIAC PACEMAKER PLACEMENT     • CARPAL TUNNEL RELEASE Right    • CATARACT EXTRACTION Bilateral    •  SECTION      x2   • COLONOSCOPY     • ENDOMETRIAL ABLATION     • HAND SURGERY     • HERNIA REPAIR      Bilateral femoral and inguinal   • KNEE ARTHROSCOPY Bilateral    • MASTECTOMY WITH SENTINEL NODE BIOPSY AND AXILLARY NODE DISSECTION Right 2020    Procedure: RIGHT BREAST MASTECTOMY WITH SENTINEL NODE BIOPSY;  Surgeon: Bj Stephen MD;  [place] [facility] [place] [facility] [place]  Service: General;  Laterality: Right;   • SHOULDER ARTHROSCOPY Right 2016    rotator cuff repair, biceps tenotomy   • THYROIDECTOMY, PARTIAL     • TONSILLECTOMY AND ADENOIDECTOMY     • TOTAL KNEE ARTHROPLASTY Left    • TUBAL  ABDOMINAL LIGATION       Family History   Problem Relation Age of Onset   • Cervical cancer Mother    • Alzheimer's disease Mother    • Arthritis Mother    • Cancer Father         Bladder   • Heart disease Father    • Hypertension Father    • Arthritis Father    • Alcohol abuse Father    • Hyperlipidemia Father    • Arthritis Maternal Grandmother    • Heart disease Other         FH of heart disease in males before age 55   • Hyperlipidemia Brother    • Hypertension Brother    • Breast cancer Paternal Grandmother 75   • Heart attack Paternal Grandfather    • Breast cancer Paternal Cousin 70   • Malig Hyperthermia Neg Hx      Social History     Socioeconomic History   • Marital status:      Spouse name: Sean   • Number of children: Not on file   • Years of education: Not on file   • Highest education level: Not on file   Occupational History     Employer: RETIRED   Tobacco Use   • Smoking status: Never Smoker   • Smokeless tobacco: Never Used   Substance and Sexual Activity   • Alcohol use: Yes     Comment: Occasionally   • Drug use: Never   • Sexual activity: Defer       Objective   Physical Exam  Constitutional-morbidly obese white female in no acute distress  Lymphatics-no cervical or axillary adenopathy  Right chest wall-status post mastectomy without reconstruction.  The incision itself looks fine and I do not palpate any masses beneath the skin flaps.  She does have some redundant skin laterally.  Left breast-there is no skin dimpling or nipple retraction.  I do not palpate any masses in the breast of concern and there is no nipple discharge.  Abdomen-rounded but soft and nontender without obvious masses or hepatosplenomegaly.  Assessment/Plan   Personal history of right breast cancer status post mastectomy without reconstruction.  She appears to be healing well and has no signs of recurrence at this point in time.  The patient will continue to follow with medical oncologist and I will see her back in  the office in 6 months.    The encounter diagnosis was Malignant neoplasm of overlapping sites of right breast in female, estrogen receptor positive (CMS/HCC).

## 2020-11-30 ENCOUNTER — TELEPHONE (OUTPATIENT)
Dept: GASTROENTEROLOGY | Facility: CLINIC | Age: 70
End: 2020-11-30

## 2020-12-09 DIAGNOSIS — M10.9 GOUT OF LEFT FOOT, UNSPECIFIED CAUSE, UNSPECIFIED CHRONICITY: ICD-10-CM

## 2020-12-14 ENCOUNTER — TELEPHONE (OUTPATIENT)
Dept: GASTROENTEROLOGY | Facility: CLINIC | Age: 70
End: 2020-12-14

## 2020-12-14 NOTE — TELEPHONE ENCOUNTER
Patient states last scope was in 2012, called and spoke with facility in [facility] they only hold records for 8 years and have no record of scope.    Personal hx of polyps-- no family hx of polyps or colon ca-- no ASA or blood thinners-- medications:      allopurinol (ZYLOPRIM) 100 MG tablet     clotrimazole-betamethasone (LOTRISONE) 1-0.05 % cream     Doxylamine Succinate, Sleep, (UNISOM PO)     ezetimibe (ZETIA) 10 MG tablet     fluticasone (FLONASE) 50 MCG/ACT nasal spray     glucose blood test strip     irbesartan (AVAPRO) 150 MG tablet     Lancets (ACCU-CHEK MULTICLIX) lancets     Loratadine 10 MG capsule     metFORMIN (GLUCOPHAGE) 500 MG tablet     metoprolol succinate XL (TOPROL-XL) 25 MG 24 hr tablet     naproxen (Naprosyn) 500 MG tablet     Thyroid (LEVOTHYROXINE-LIOTHYRONINE PO)     traMADol (Ultram) 50 MG tablet     vitamin B-12 (CYANOCOBALAMIN) 1000 MCG tablet          OA form scanned into media    Thank you

## 2020-12-21 DIAGNOSIS — Z86.010 PERSONAL HISTORY OF COLONIC POLYPS: Primary | ICD-10-CM

## 2020-12-21 RX ORDER — SODIUM CHLORIDE, SODIUM LACTATE, POTASSIUM CHLORIDE, CALCIUM CHLORIDE 600; 310; 30; 20 MG/100ML; MG/100ML; MG/100ML; MG/100ML
30 INJECTION, SOLUTION INTRAVENOUS CONTINUOUS
Status: CANCELLED | OUTPATIENT
Start: 2021-02-09

## 2021-01-08 ENCOUNTER — TELEPHONE (OUTPATIENT)
Dept: GASTROENTEROLOGY | Facility: CLINIC | Age: 71
End: 2021-01-08

## 2021-01-08 PROBLEM — Z86.0100 PERSONAL HISTORY OF COLONIC POLYPS: Status: ACTIVE | Noted: 2021-01-08

## 2021-01-08 PROBLEM — Z86.010 PERSONAL HISTORY OF COLONIC POLYPS: Status: ACTIVE | Noted: 2021-01-08

## 2021-01-08 NOTE — TELEPHONE ENCOUNTER
SPOKE WITH PT SCHEDULED AT [facility] ON [address]  ARR[address]  AM LENORA ELVIS

## 2021-01-13 ENCOUNTER — TELEPHONE (OUTPATIENT)
Dept: ONCOLOGY | Facility: CLINIC | Age: 71
End: 2021-01-13

## 2021-01-13 NOTE — TELEPHONE ENCOUNTER
PT: SELF    PT CALLING TO R/S HER 2/22/21 AS SHE IS GOING TO BE OUT OF TOWN. PT IS LEAVING ON THE 13th OF FEB SHE IS ASKING IF SHE CAN GET BEFORE SHE LEAVES DUE MEDICATION BEING STOPPED (LETZROLE). SHE SAID THAT SHE COULD GO TO THE [facility] OFFICE IF AFTER 10AM  OR IF YOU CAN GET HER IN ON FEB 1ST AT [facility] SHE CAN GO THAT DAY OR [place] THE 25 IF THERE IS AN OPENING?  PT CAN NOT DO FEB 8-9-10   PLEASE ADVISE    PT # [phone]

## 2021-01-15 DIAGNOSIS — M25.561 CHRONIC PAIN OF RIGHT KNEE: ICD-10-CM

## 2021-01-15 DIAGNOSIS — G89.29 CHRONIC PAIN OF RIGHT KNEE: ICD-10-CM

## 2021-01-19 RX ORDER — NAPROXEN 500 MG/1
TABLET ORAL
Qty: 60 TABLET | Refills: 1 | Status: SHIPPED | OUTPATIENT
Start: 2021-01-19 | End: 2021-04-01

## 2021-01-29 ENCOUNTER — TRANSCRIBE ORDERS (OUTPATIENT)
Dept: LAB | Facility: HOSPITAL | Age: 71
End: 2021-01-29

## 2021-01-29 ENCOUNTER — TRANSCRIBE ORDERS (OUTPATIENT)
Dept: OBSTETRICS AND GYNECOLOGY | Facility: CLINIC | Age: 71
End: 2021-01-29

## 2021-01-29 DIAGNOSIS — Z01.818 OTHER SPECIFIED PRE-OPERATIVE EXAMINATION: Primary | ICD-10-CM

## 2021-02-01 ENCOUNTER — LAB (OUTPATIENT)
Dept: OTHER | Facility: HOSPITAL | Age: 71
End: 2021-02-01

## 2021-02-01 ENCOUNTER — OFFICE VISIT (OUTPATIENT)
Dept: ONCOLOGY | Facility: CLINIC | Age: 71
End: 2021-02-01

## 2021-02-01 VITALS
DIASTOLIC BLOOD PRESSURE: 75 MMHG | SYSTOLIC BLOOD PRESSURE: 120 MMHG | HEIGHT: 61 IN | HEART RATE: 86 BPM | BODY MASS INDEX: 39.57 KG/M2 | OXYGEN SATURATION: 96 % | RESPIRATION RATE: 18 BRPM | TEMPERATURE: 97.6 F | WEIGHT: 209.6 LBS

## 2021-02-01 DIAGNOSIS — Z17.0 MALIGNANT NEOPLASM OF UPPER-OUTER QUADRANT OF LEFT BREAST IN FEMALE, ESTROGEN RECEPTOR POSITIVE (HCC): ICD-10-CM

## 2021-02-01 DIAGNOSIS — Z17.0 MALIGNANT NEOPLASM OF OVERLAPPING SITES OF RIGHT BREAST IN FEMALE, ESTROGEN RECEPTOR POSITIVE (HCC): ICD-10-CM

## 2021-02-01 DIAGNOSIS — Z17.0 MALIGNANT NEOPLASM OF UPPER-OUTER QUADRANT OF LEFT BREAST IN FEMALE, ESTROGEN RECEPTOR POSITIVE (HCC): Primary | ICD-10-CM

## 2021-02-01 DIAGNOSIS — Z12.31 ENCOUNTER FOR SCREENING MAMMOGRAM FOR MALIGNANT NEOPLASM OF BREAST: Primary | ICD-10-CM

## 2021-02-01 DIAGNOSIS — Z79.811 AROMATASE INHIBITOR USE: ICD-10-CM

## 2021-02-01 DIAGNOSIS — C50.412 MALIGNANT NEOPLASM OF UPPER-OUTER QUADRANT OF LEFT BREAST IN FEMALE, ESTROGEN RECEPTOR POSITIVE (HCC): Primary | ICD-10-CM

## 2021-02-01 DIAGNOSIS — M10.9 GOUT OF LEFT FOOT, UNSPECIFIED CAUSE, UNSPECIFIED CHRONICITY: ICD-10-CM

## 2021-02-01 DIAGNOSIS — C50.412 MALIGNANT NEOPLASM OF UPPER-OUTER QUADRANT OF LEFT BREAST IN FEMALE, ESTROGEN RECEPTOR POSITIVE (HCC): ICD-10-CM

## 2021-02-01 DIAGNOSIS — C50.811 MALIGNANT NEOPLASM OF OVERLAPPING SITES OF RIGHT BREAST IN FEMALE, ESTROGEN RECEPTOR POSITIVE (HCC): ICD-10-CM

## 2021-02-01 LAB
ALBUMIN SERPL-MCNC: 4.2 G/DL (ref 3.5–5.2)
ALBUMIN/GLOB SERPL: 1.4 G/DL
ALP SERPL-CCNC: 93 U/L (ref 39–117)
ALT SERPL W P-5'-P-CCNC: 12 U/L (ref 1–33)
ANION GAP SERPL CALCULATED.3IONS-SCNC: 6.8 MMOL/L (ref 5–15)
AST SERPL-CCNC: 17 U/L (ref 1–32)
BASOPHILS # BLD AUTO: 0.04 10*3/MM3 (ref 0–0.2)
BASOPHILS NFR BLD AUTO: 0.5 % (ref 0–1.5)
BILIRUB SERPL-MCNC: 0.3 MG/DL (ref 0–1.2)
BUN SERPL-MCNC: 22 MG/DL (ref 8–23)
BUN/CREAT SERPL: 20.6 (ref 7–25)
CALCIUM SPEC-SCNC: 10 MG/DL (ref 8.6–10.5)
CHLORIDE SERPL-SCNC: 105 MMOL/L (ref 98–107)
CO2 SERPL-SCNC: 28.2 MMOL/L (ref 22–29)
CREAT SERPL-MCNC: 1.07 MG/DL (ref 0.57–1)
DEPRECATED RDW RBC AUTO: 48 FL (ref 37–54)
EOSINOPHIL # BLD AUTO: 0.22 10*3/MM3 (ref 0–0.4)
EOSINOPHIL NFR BLD AUTO: 2.6 % (ref 0.3–6.2)
ERYTHROCYTE [DISTWIDTH] IN BLOOD BY AUTOMATED COUNT: 13.2 % (ref 12.3–15.4)
GFR SERPL CREATININE-BSD FRML MDRD: 50 ML/MIN/1.73
GLOBULIN UR ELPH-MCNC: 3.1 GM/DL
GLUCOSE SERPL-MCNC: 91 MG/DL (ref 65–99)
HCT VFR BLD AUTO: 42.5 % (ref 34–46.6)
HGB BLD-MCNC: 13.5 G/DL (ref 12–15.9)
IMM GRANULOCYTES # BLD AUTO: 0.03 10*3/MM3 (ref 0–0.05)
IMM GRANULOCYTES NFR BLD AUTO: 0.4 % (ref 0–0.5)
LYMPHOCYTES # BLD AUTO: 2.42 10*3/MM3 (ref 0.7–3.1)
LYMPHOCYTES NFR BLD AUTO: 29.1 % (ref 19.6–45.3)
MCH RBC QN AUTO: 30.7 PG (ref 26.6–33)
MCHC RBC AUTO-ENTMCNC: 31.8 G/DL (ref 31.5–35.7)
MCV RBC AUTO: 96.6 FL (ref 79–97)
MONOCYTES # BLD AUTO: 0.87 10*3/MM3 (ref 0.1–0.9)
MONOCYTES NFR BLD AUTO: 10.5 % (ref 5–12)
NEUTROPHILS NFR BLD AUTO: 4.73 10*3/MM3 (ref 1.7–7)
NEUTROPHILS NFR BLD AUTO: 56.9 % (ref 42.7–76)
NRBC BLD AUTO-RTO: 0 /100 WBC (ref 0–0.2)
PLATELET # BLD AUTO: 274 10*3/MM3 (ref 140–450)
PMV BLD AUTO: 10 FL (ref 6–12)
POTASSIUM SERPL-SCNC: 5 MMOL/L (ref 3.5–5.2)
PROT SERPL-MCNC: 7.3 G/DL (ref 6–8.5)
RBC # BLD AUTO: 4.4 10*6/MM3 (ref 3.77–5.28)
SODIUM SERPL-SCNC: 140 MMOL/L (ref 136–145)
WBC # BLD AUTO: 8.31 10*3/MM3 (ref 3.4–10.8)

## 2021-02-01 PROCEDURE — 85025 COMPLETE CBC W/AUTO DIFF WBC: CPT | Performed by: INTERNAL MEDICINE

## 2021-02-01 PROCEDURE — 99214 OFFICE O/P EST MOD 30 MIN: CPT | Performed by: INTERNAL MEDICINE

## 2021-02-01 PROCEDURE — 36415 COLL VENOUS BLD VENIPUNCTURE: CPT

## 2021-02-01 PROCEDURE — 80053 COMPREHEN METABOLIC PANEL: CPT | Performed by: INTERNAL MEDICINE

## 2021-02-01 RX ORDER — LETROZOLE 2.5 MG/1
2.5 TABLET, FILM COATED ORAL DAILY
Qty: 30 TABLET | Refills: 6 | Status: SHIPPED | OUTPATIENT
Start: 2021-02-01 | End: 2021-05-02

## 2021-02-01 RX ORDER — GABAPENTIN 100 MG/1
100 CAPSULE ORAL 3 TIMES DAILY
Qty: 90 CAPSULE | Refills: 3 | Status: SHIPPED | OUTPATIENT
Start: 2021-02-01 | End: 2021-12-27 | Stop reason: ALTCHOICE

## 2021-02-06 ENCOUNTER — LAB (OUTPATIENT)
Dept: LAB | Facility: HOSPITAL | Age: 71
End: 2021-02-06

## 2021-02-06 DIAGNOSIS — Z01.818 OTHER SPECIFIED PRE-OPERATIVE EXAMINATION: ICD-10-CM

## 2021-02-06 LAB — SARS-COV-2 RNA PNL SPEC NAA+PROBE: NOT DETECTED

## 2021-02-06 PROCEDURE — 87635 SARS-COV-2 COVID-19 AMP PRB: CPT | Performed by: OBSTETRICS & GYNECOLOGY

## 2021-02-06 PROCEDURE — C9803 HOPD COVID-19 SPEC COLLECT: HCPCS

## 2021-02-09 ENCOUNTER — HOSPITAL ENCOUNTER (OUTPATIENT)
Facility: HOSPITAL | Age: 71
Setting detail: HOSPITAL OUTPATIENT SURGERY
Discharge: HOME | End: 2021-02-09
Attending: INTERNAL MEDICINE | Admitting: INTERNAL MEDICINE

## 2021-02-09 ENCOUNTER — ANESTHESIA EVENT (OUTPATIENT)
Dept: GASTROENTEROLOGY | Facility: HOSPITAL | Age: 71
End: 2021-02-09

## 2021-02-09 ENCOUNTER — ANESTHESIA (OUTPATIENT)
Dept: GASTROENTEROLOGY | Facility: HOSPITAL | Age: 71
End: 2021-02-09

## 2021-02-09 VITALS
OXYGEN SATURATION: 97 % | HEART RATE: 61 BPM | RESPIRATION RATE: 16 BRPM | DIASTOLIC BLOOD PRESSURE: 69 MMHG | WEIGHT: 208 LBS | SYSTOLIC BLOOD PRESSURE: 137 MMHG | BODY MASS INDEX: 39.27 KG/M2 | HEIGHT: 61 IN

## 2021-02-09 DIAGNOSIS — Z86.010 PERSONAL HISTORY OF COLONIC POLYPS: ICD-10-CM

## 2021-02-09 LAB — GLUCOSE BLDC GLUCOMTR-MCNC: 124 MG/DL (ref 70–130)

## 2021-02-09 PROCEDURE — 82962 GLUCOSE BLOOD TEST: CPT

## 2021-02-09 PROCEDURE — S0260 H&P FOR SURGERY: HCPCS | Performed by: INTERNAL MEDICINE

## 2021-02-09 PROCEDURE — G0105 COLORECTAL SCRN; HI RISK IND: HCPCS | Performed by: INTERNAL MEDICINE

## 2021-02-09 PROCEDURE — 25010000002 PROPOFOL 10 MG/ML EMULSION: Performed by: ANESTHESIOLOGY

## 2021-02-09 RX ORDER — PROMETHAZINE HYDROCHLORIDE 25 MG/1
25 SUPPOSITORY RECTAL ONCE AS NEEDED
Status: DISCONTINUED | OUTPATIENT
Start: 2021-02-09 | End: 2021-02-09 | Stop reason: HOSPADM

## 2021-02-09 RX ORDER — PROPOFOL 10 MG/ML
VIAL (ML) INTRAVENOUS AS NEEDED
Status: DISCONTINUED | OUTPATIENT
Start: 2021-02-09 | End: 2021-02-09 | Stop reason: SURG

## 2021-02-09 RX ORDER — SODIUM CHLORIDE 0.9 % (FLUSH) 0.9 %
3 SYRINGE (ML) INJECTION EVERY 12 HOURS SCHEDULED
Status: DISCONTINUED | OUTPATIENT
Start: 2021-02-09 | End: 2021-02-09 | Stop reason: HOSPADM

## 2021-02-09 RX ORDER — PROMETHAZINE HYDROCHLORIDE 25 MG/1
25 TABLET ORAL ONCE AS NEEDED
Status: DISCONTINUED | OUTPATIENT
Start: 2021-02-09 | End: 2021-02-09 | Stop reason: HOSPADM

## 2021-02-09 RX ORDER — SODIUM CHLORIDE 0.9 % (FLUSH) 0.9 %
10 SYRINGE (ML) INJECTION AS NEEDED
Status: DISCONTINUED | OUTPATIENT
Start: 2021-02-09 | End: 2021-02-09 | Stop reason: HOSPADM

## 2021-02-09 RX ORDER — PROPOFOL 10 MG/ML
VIAL (ML) INTRAVENOUS CONTINUOUS PRN
Status: DISCONTINUED | OUTPATIENT
Start: 2021-02-09 | End: 2021-02-09 | Stop reason: SURG

## 2021-02-09 RX ORDER — LIDOCAINE HYDROCHLORIDE 20 MG/ML
INJECTION, SOLUTION INFILTRATION; PERINEURAL AS NEEDED
Status: DISCONTINUED | OUTPATIENT
Start: 2021-02-09 | End: 2021-02-09 | Stop reason: SURG

## 2021-02-09 RX ORDER — SODIUM CHLORIDE, SODIUM LACTATE, POTASSIUM CHLORIDE, CALCIUM CHLORIDE 600; 310; 30; 20 MG/100ML; MG/100ML; MG/100ML; MG/100ML
30 INJECTION, SOLUTION INTRAVENOUS CONTINUOUS
Status: DISCONTINUED | OUTPATIENT
Start: 2021-02-09 | End: 2021-02-09 | Stop reason: HOSPADM

## 2021-02-09 RX ADMIN — PROPOFOL 80 MG: 10 INJECTION, EMULSION INTRAVENOUS at 09:57

## 2021-02-09 RX ADMIN — LIDOCAINE HYDROCHLORIDE 50 MG: 20 INJECTION, SOLUTION INFILTRATION; PERINEURAL at 09:57

## 2021-02-09 RX ADMIN — PROPOFOL 160 MCG/KG/MIN: 10 INJECTION, EMULSION INTRAVENOUS at 09:57

## 2021-02-09 NOTE — DISCHARGE INSTRUCTIONS
For the next 24 hours patient needs to be with a responsible adult.    For 24 hours DO NOT drive, operate machinery, appliances, drink alcohol, make important decisions or sign legal documents.    Start with a light or bland diet and advance to regular diet as tolerated.    Follow recommendations on procedure report provided by your doctor.    Call Dr Stewart for problems [phone]    Problems may include but not limited to: large amounts of bleeding, trouble breathing, repeated vomiting, severe unrelieved pain, fever or chills.

## 2021-02-09 NOTE — ANESTHESIA PREPROCEDURE EVALUATION
Anesthesia Evaluation     Patient summary reviewed and Nursing notes reviewed   history of anesthetic complications: PONV  NPO Solid Status: > 8 hours  NPO Liquid Status: > 2 hours           Airway   Mallampati: II  Dental - normal exam     Pulmonary    (+) asthma,  Cardiovascular     ECG reviewed    (+) pacemaker pacemaker, hypertension, valvular problems/murmurs murmur and AS, murmur, hyperlipidemia,     ROS comment: Moderate aortic stenosis    Neuro/Psych  GI/Hepatic/Renal/Endo    (+) morbid obesity,  diabetes mellitus type 2,     Musculoskeletal     Abdominal   (+) obese,    Substance History - negative use     OB/GYN          Other   arthritis,    history of cancer                  Anesthesia Plan    ASA 4     MAC

## 2021-02-09 NOTE — H&P
[facility]  Pre Procedure History & Physical    Chief Complaint: Personal history of colon polyps      HPI: 71-year-old woman with past medical history as below here for colonoscopy.  She has a personal history of colon polyps with last colonoscopy greater than 5 years ago.  She otherwise feels well.    Past Medical History:   Past Medical History:   Diagnosis Date   • Breast cancer (CMS/HCC) 03/2020    right IDC   • Diabetes mellitus (CMS/[facility])     Type 2   • History of asthma    • History of colon polyps    • History of complete heart block     paced in 2015   • Hyperlipidemia    • Hypertension    • Hypothyroidism    • Osteoarthritis    • Osteopenia    • Pacemaker    • PONV (postoperative nausea and vomiting)        Family History:  Family History   Problem Relation Age of Onset   • Cervical cancer Mother    • Alzheimer's disease Mother    • Arthritis Mother    • Cancer Father         Bladder   • Heart disease Father    • Hypertension Father    • Arthritis Father    • Alcohol abuse Father    • Hyperlipidemia Father    • Arthritis Maternal Grandmother    • Heart disease Other         FH of heart disease in males before age 55   • Hyperlipidemia Brother    • Hypertension Brother    • Breast cancer Paternal Grandmother 75   • Heart attack Paternal Grandfather    • Breast cancer Paternal Cousin 70   • Malig Hyperthermia Neg Hx        Social History:   reports that she has never smoked. She has never used smokeless tobacco. She reports current alcohol use. She reports that she does not use drugs.    Medications:   No medications prior to admission.       Allergies:  Indomethacin, Nutritional supplements, Penicillins, Statins, Hydrochlorothiazide, Lisinopril, Morphine, and Pentazocine    ROS:    Pertinent items are noted in HPI     Objective     There were no vitals taken for this visit.    Physical Exam   Constitutional: Pt is oriented to person, place, and time and well-developed,  well-nourished, and in no distress.   HENT:   Mouth/Throat: Oropharynx is clear and moist.   Neck: Normal range of motion. Neck supple.   Cardiovascular: Normal rate, regular rhythm and normal heart sounds.    Pulmonary/Chest: Effort normal and breath sounds normal. No respiratory distress. No  wheezes.   Abdominal: Soft. Bowel sounds are normal.   Skin: Skin is warm and dry.   Psychiatric: Mood, memory, affect and judgment normal.     Assessment/Plan     Diagnosis: Personal history of colon polyps      Anticipated Surgical Procedure:    Colonoscopy    The risks, benefits, and alternatives of this procedure have been discussed with the patient or the responsible party- the patient understands and agrees to proceed.

## 2021-02-09 NOTE — ANESTHESIA POSTPROCEDURE EVALUATION
"Patient: Mary Jo Berg    Procedure Summary     Date: 02/09/21 Room / Location: [facility] ENDOSCOPY 8 /  [facility]    Anesthesia Start: 0952 Anesthesia Stop: 1029    Procedure: COLONOSCOPY (N/A ) Diagnosis:       Personal history of colonic polyps      (Personal history of colonic polyps [Z86.010])    Surgeon: Yamilet Stewart MD Provider: Ana Castelan MD    Anesthesia Type: MAC ASA Status: 4          Anesthesia Type: MAC    Vitals  Vitals Value Taken Time   /69 02/09/21 1049   Temp     Pulse 61 02/09/21 1049   Resp 16 02/09/21 1049   SpO2 97 % 02/09/21 1049           Post Anesthesia Care and Evaluation    Patient location during evaluation: PHASE II  Patient participation: complete - patient participated  Level of consciousness: awake  Pain management: adequate  Airway patency: patent  Anesthetic complications: No anesthetic complications    Cardiovascular status: acceptable  Respiratory status: acceptable  Hydration status: acceptable    Comments: /69 (BP Location: Left arm, Patient Position: Lying)   Pulse 61   Resp 16   Ht 154.9 cm (61\")   Wt 94.3 kg (208 lb)   SpO2 97%   BMI 39.30 kg/m²       "

## 2021-02-16 NOTE — TELEPHONE ENCOUNTER
Pt calling for results of her Oncotype test. Advised her that I would send a message to Dr. Jasso for review and would get back with her on any new orders. She v/u.       CHIEF COMPLAINT:   POD #1 s/p Left Atrial Appendage Closure Device Insertion - Watchman.       HISTORY OF PRESENT ILLNESS:    Cristel Mckeon is a 76 year old female presenting for Watchman Lab work and pre procedure visit. Pt had hematuria w/ xaralto and eliquis in the past. Had urologic workup in spring of 2020 which was okay. +H/O kidney stones. No pain associated w/ hematuria. Pt has been more tired and SOB x 1 week. Feels palpitations. No CP, leg edema. No blood on toilet paper, only in toilet. No dizziness. HTN, HLD, thyroid CAD, DAVID no cpap.       Objective     I/O's    Intake/Output Summary (Last 24 hours) at 2/16/2021 1033  Last data filed at 2/16/2021 1000  Gross per 24 hour   Intake 374.92 ml   Output 575 ml   Net -200.08 ml       Last Recorded Vitals  Blood pressure 94/47, pulse 61, temperature 98.4 °F (36.9 °C), temperature source Oral, resp. rate (!) 23, height 5' 4\" (1.626 m), weight 67.7 kg (149 lb 4 oz), SpO2 96 %.  Body mass index is 25.62 kg/m².    Physical Exam    General:  Alert, cooperative, conversive. No acute distress.  Skin:  Removed right groin sutures. No bleeding, no oozing, no swelling,  no hematoma noted. Pt is ok to shower.   right wrist with dressing post galo removal.  may remove dressing tomorrow am.   Head:  Normocephalic-atraumatic.   Cardiovascular:  No edema.  SR  Respiratory:   Normal respiratory effort.  Clear to auscultation.  No wheezes, rales or rhonchi.  Gastrointestinal:  Soft and nontender.  Normal bowel sounds.    Gu: Voiding, no issues  Musculoskeletal:  No deformity or edema.  No tenderness to palpation.    Neurologic:   Oriented times 4.   Psychiatric:   Cooperative.  Appropriate mood and affect.  Normal judgment.  Results for orders placed or performed during the hospital encounter of 02/13/21   Light Blue Top   Result Value    Extra Tube Hold for Add Ons   Light Green Top   Result Value    Extra Tube Hold for Add Ons   Lavender Top   Result Value    Extra  Tube Hold for Add Ons   Gold Top   Result Value    Extra Tube Hold for Add Ons   Pink Top Tube   Result Value    Extra Tube Hold for Add Ons   Grey Top Tube   Result Value    Extra Tube Hold for Add Ons   Comprehensive Metabolic Panel   Result Value    Fasting Status     Sodium 143    Potassium 4.2    Chloride 111 (H)    Carbon Dioxide 27    Anion Gap 9 (L)    Glucose 127 (H)    BUN 20    Creatinine 0.81    Glomerular Filtration Rate 70 (L)     Comment: eGFR 60 - 89 mL/min/1.73m2 = Mild decrease in kidney function.    BUN/ Creatinine Ratio 25    Calcium 9.1    Bilirubin, Total 0.6    GOT/AST 44 (H)    GPT/ALT 69 (H)    Alkaline Phosphatase 109    Albumin 3.6    Protein, Total 7.3    Globulin 3.7    A/G Ratio 1.0   Troponin I Ultra Sensitive   Result Value    Troponin I, Ultra Sensitive <0.02   Prothrombin Time   Result Value    Prothrombin Time 29.3 (H)    INR 2.8     Comment: INR Therapeutic Range: 2.0 to 3.0 (2.5 to 3.5 recommended for recurrent thrombotic episodes and mechanical prosthetic heart valves.)   Partial Thromboplastin Time   Result Value    PTT 40 (H)   NT proBNP   Result Value    NT-proBNP 872 (H)   CBC with Automated Differential (performable only)   Result Value    WBC 8.9    RBC 4.38    HGB 13.3    HCT 42.2    MCV 96.3    MCH 30.4    MCHC 31.5 (L)    RDW-CV 12.8    RDW-SD 45.4        NRBC 0    Neutrophil, Percent 53    Lymphocytes, Percent 33    Mono, Percent 12    Eosinophils, Percent 1    Basophils, Percent 1    Immature Granulocytes 0    Absolute Neutrophils 4.8    Absolute Lymphocytes 2.9    Absolute Monocytes 1.0 (H)    Absolute Eosinophils  0.1    Absolute Basophils 0.1    Absolute Immmature Granulocytes 0.0   Procalcitonin   Result Value    Procalcitonin <0.05     Comment:   Bacterial Sepsis:  <0.5 ng/mL:    Sepsis not likely. Localized bacterial infection possible.  0.5 - 2 ng/mL: Sepsis or other conditions possible  >2.0 ng/mL:    High risk of sepsis/septic shock    NOTE: If  bacterial sepsis is of high clinical suspicion but PCT<2 ng/mL,  consider recheck PCT 6-24 hours after initial test.     Lower Respiratory Tract Infection (LRTI):  <0.1 ng/mL:       Bacterial infection highly unlikely  0.1 - 0.25 ng/mL: Bacterial infection unlikely  0.26 - 0.5 ng/mL: Bacterial infection likely  > 0.5 ng/mL:      Bacterial infection highly likely    NOTE: Patients with advanced CKD or medical/surgical trauma may have  elevated baseline PCT (>0.5 ng/mL) in the absence of bacterial infection. If  bacterial infection is suspected, consider repeat PCT in 2 to 4 days to guide de-escalation or discontinuation of antibiotic therapy.  Higher reference range cutoffs may be appropriate for patients <3 days old.     Prothrombin Time   Result Value    Prothrombin Time 39.5 (H)    INR 3.8     Comment: INR Therapeutic Range: 2.0 to 3.0 (2.5 to 3.5 recommended for recurrent thrombotic episodes and mechanical prosthetic heart valves.)   Comprehensive Metabolic Panel   Result Value    Fasting Status     Sodium 141    Potassium 4.3    Chloride 105    Carbon Dioxide 33 (H)    Anion Gap 7 (L)    Glucose 94    BUN 21 (H)    Creatinine 0.91    Glomerular Filtration Rate 61 (L)     Comment: eGFR 60 - 89 mL/min/1.73m2 = Mild decrease in kidney function.    BUN/ Creatinine Ratio 23    Calcium 9.6    Bilirubin, Total 1.1 (H)    GOT/AST 32    GPT/ALT 52    Alkaline Phosphatase 108    Albumin 3.4 (L)    Protein, Total 7.1    Globulin 3.7    A/G Ratio 0.9 (L)   Magnesium   Result Value    Magnesium 2.3   Phosphorus   Result Value    Phosphorus 3.6   Thyroid Stimulating Hormone   Result Value    TSH 3.570   CBC with Automated Differential (performable only)   Result Value    WBC 7.1    RBC 4.56    HGB 14.0    HCT 42.3    MCV 92.8    MCH 30.7    MCHC 33.1    RDW-CV 12.6    RDW-SD 42.5        NRBC 0    Neutrophil, Percent 64    Lymphocytes, Percent 22    Mono, Percent 12    Eosinophils, Percent 1    Basophils, Percent 1     Immature Granulocytes 0    Absolute Neutrophils 4.6    Absolute Lymphocytes 1.6    Absolute Monocytes 0.9    Absolute Eosinophils  0.1    Absolute Basophils 0.0    Absolute Immmature Granulocytes 0.0   Prothrombin Time   Result Value    Prothrombin Time 17.2 (H)    INR 1.6     Comment: INR Therapeutic Range: 2.0 to 3.0 (2.5 to 3.5 recommended for recurrent thrombotic episodes and mechanical prosthetic heart valves.)   Basic Metabolic Panel   Result Value    Fasting Status     Sodium 141    Potassium 3.8    Chloride 105    Carbon Dioxide 33 (H)    Anion Gap 7 (L)    Glucose 99    BUN 26 (H)    Creatinine 1.00 (H)    Glomerular Filtration Rate 54 (L)     Comment: eGFR 30-59 mL/min/1.73m2 = Moderate decrease in kidney function. Stage 3 CKD (chronic kidney disease) or moderate kidney disease.    BUN/ Creatinine Ratio 26 (H)    Calcium 9.7   CBC No Differential   Result Value    WBC 5.7    RBC 4.74    HGB 14.2    HCT 43.9    MCV 92.6    MCH 30.0    MCHC 32.3        RDW-CV 12.5    RDW-SD 42.5    NRBC 0   Gold Top   Result Value    Extra Tube Hold for Add Ons   Prothrombin Time   Result Value    Prothrombin Time 12.8 (H)    INR 1.2     Comment: INR Therapeutic Range: 2.0 to 3.0 (2.5 to 3.5 recommended for recurrent thrombotic episodes and mechanical prosthetic heart valves.)   Comprehensive Metabolic Panel   Result Value    Fasting Status     Sodium 141    Potassium 4.0    Chloride 108 (H)    Carbon Dioxide 30    Anion Gap 7 (L)    Glucose 95    BUN 24 (H)    Creatinine 0.96 (H)    Glomerular Filtration Rate 57 (L)     Comment: eGFR 30-59 mL/min/1.73m2 = Moderate decrease in kidney function. Stage 3 CKD (chronic kidney disease) or moderate kidney disease.    BUN/ Creatinine Ratio 25    Calcium 8.7    Bilirubin, Total 0.7    GOT/AST 19    GPT/ALT 31    Alkaline Phosphatase 87    Albumin 3.0 (L)    Protein, Total 6.3 (L)    Globulin 3.3    A/G Ratio 0.9 (L)   CBC with Automated Differential (performable only)   Result  Value    WBC 11.6 (H)    RBC 4.05    HGB 12.3    HCT 38.4    MCV 94.8    MCH 30.4    MCHC 32.0    RDW-CV 12.7    RDW-SD 44.3        NRBC 0    Neutrophil, Percent 65    Lymphocytes, Percent 19    Mono, Percent 16    Eosinophils, Percent 0    Basophils, Percent 0    Immature Granulocytes 0    Absolute Neutrophils 7.5    Absolute Lymphocytes 2.2    Absolute Monocytes 1.8 (H)    Absolute Eosinophils  0.0    Absolute Basophils 0.0    Absolute Immmature Granulocytes 0.0   Electrocardiogram 12-Lead   Result Value    Ventricular Rate EKG/Min (BPM) 67    Atrial Rate (BPM) 67    CT-Interval (MSEC) 172    QRS-Interval (MSEC) 68    QT-Interval (MSEC) 430    QTc 454    P Axis (Degrees) 9    R Axis (Degrees) -39    T Axis (Degrees) 20    REPORT TEXT      Normal sinus rhythm  Left axis deviation  Anteroseptal infarct  , age undetermined  Abnormal ECG  When compared with ECG of  11-FEB-2021 13:24,  RSR' pattern in V1  is no longer  present  Anteroseptal infarct  is now  present  Confirmed by OSMANY RIVERA DO ([id number]) on 2/13/2021 6:16:28 AM     Activated Clotting Time - POC   Result Value    Activated Clotting Time 352   Prepare Red Blood Cells: 1 Units   Result Value    UNIT BLOOD TYPE A Pos    ISBT BLOOD TYPE 6200    BLOOD EXPIRATION DATE 20210302235900    UNIT NUMBER [id number]    DISPENSE STATUS Returned from Issue    PRODUCT ID Red Blood Cells    PRODUCT CODE [id number]    PRODUCT DESCRIPTION RBC AS-1 LR    CROSSMATCH RESULT Compatible    ISSUE DATE/TIME 78672219410843   Prepare Red Blood Cells: 1 Units   Result Value    UNIT BLOOD TYPE A Pos    ISBT BLOOD TYPE 6200    BLOOD EXPIRATION DATE 79303882699598    UNIT NUMBER [id number]    DISPENSE STATUS Returned from Issue    PRODUCT ID Red Blood Cells    PRODUCT CODE [id number]    PRODUCT DESCRIPTION RBC AS-1 LR    CROSSMATCH RESULT Compatible    ISSUE DATE/TIME [id number]   Rapid SARS-CoV-2 by PCR    Specimen: Nasopharyngeal; Swab   Result Value    Rapid SARS-COV-2  by PCR Not Detected    Isolation Guidelines      Comment: Do not use this test result as the sole decision-maker for discontinuation of isolation.   Clinical evaluation should be considered for other respiratory illness requiring transmission-based isolation.    •       No fever (<99.0 F/37.2 C) for at least 24 hours without the use of fever-reducing medications    AND  •       Respiratory symptoms have improved or resolved (e.g. cough, shortness of breath)     AND  •       COVID-19 negative test    See COVID-19 Deisolation Resource Guide    Procedural Comment      Comment: SARS-CoV-2 nucleic acid has not been detected indicating the absence of COVID-19.       Testing was performed using the [employer] Xpert Xpress SARS-CoV-2 RT-PCR assay that has been given Emergency Use Authorization (EUA) by the [place] Food and Drug Administration (FDA).  These results are considered definitive and do not need to be confirmed by another method.   ACTIVATED CLOTTING TIME PLUS   Result Value    ACTIVATED CLOTTING TIME PLUS - POINT OF CARE 352     Comment: Baseline Activated Clotting Times vary by individual. Clinicians establish baselines for patients and established theraputic target ranges based on the procedure being performed.          Imaging    02/13/21   TRANSTHORACIC ECHO (TTE) LIMITED W/ W/O IMAGING AGENT  Impression  *[facility]**[facility]*[address] [place], [place] [phone] Transthoracic Echocardiogram (TTE)Patient: Cristel Mckeon    Study Date/Time:   Feb 15 2021 12:59PMMRN:     [id number]                FIN#:              [id number]:     1944             Ht/Wt:             162.6cm 68kgAge:     76                     BSA/BMI:           1.77m^2 25.7kg/m^2Gender:  F                      Baseline BP:       116 / 56 Referring Physician:  Jeremy Rivera Syed M Attending Physician:  Jakob Lopez MPerforming Physician: Jeremy Suarez  MDDiagnostic Physician: Jeremy Suarez MDSonographer:          Maryellen Chang --------------------------------------------------------------------------INDICATIONS:   Pericardial effusion.--------------------------------------------------------------------------STUDY CONCLUSIONSSUMMARY:1. Left ventricle: The cavity size is normal. Wall thickness is moderately   increased. Hypertrophy is noted. The ejection fraction is 60%.2. Pericardium, extracardiac: A small pericardial effusion is identified   posterior to the heart. Features are not consistent with tamponade   physiology.--------------------------------------------------------------------------STUDY DATA:   Procedure:  Transthoracic echocardiography was performed.Image quality was good.  Study status:  Routine.  Study completion:  Therewere no complications.FINDINGSLEFT VENTRICLE:  The cavity size is normal. Wall thickness is moderatelyincreased. Hypertrophy is noted.    The ejection fraction is 60%.PERICARDIUM:  A small pericardial effusion is identified posterior to theheart. Trivial posterior pericardial effusion. No hemodynamicsignificance.  Doppler:  Features are not consistent with tamponadephysiology.--------------------------------------------------------------------------Measurements Left ventricle  Value   Ref EF              60    % 54 - 74Legend:(L)  and  (H)  colby values outside specified reference range.Prepared and electronically signed byJeremy Suarez MD02/16/2021 10:22     TRANSESOPHAGEAL ECHO (GUSTAVO) W/ W/O IMAGING AGENT  Impression  *[facility]**[facility]*[address] [place], [place] [phone] Echocardiogram (GUSTAVO)Patient: Cristel Mckeon    Study Date/Time:    Feb 15 2021 8:03AMMRN:     [id number]                FIN#:               [id number]:     1944             Ht/Wt:              162cm 67.7kgAge:     76                     BSA/BMI:            1.76m^2  25.8kg/m^2Gender:  F                      Baseline BP:        140 / 70 Referring Physician:  Jeremy Rivera Attending Physician:  Jakob Lopez MPerforming Physician: RAAD Salomoniagnostic Physician: SHERON Alvaradoonographer:          Greer Desouza RCSNurse:                Sirena Amaro --------------------------------------------------------------------------INDICATIONS:   Watchman.--------------------------------------------------------------------------STUDY CONCLUSIONSSUMMARY:1. Left ventricle: The cavity size is normal. Wall thickness is mildly   increased.2. Left atrium: The atrium is moderately dilated.3. Pericardium, extracardiac: A small pericardial effusion is identified   posterior to the heart.--------------------------------------------------------------------------STUDY DATA:   Procedure:  Initial setup. Surface ECG leads, blood pressuremeasurements, and pulse oximetric signals were monitored.  Time outperformed. Sedation. was administered by anesthesiology, Transesophagealechocardiography. Image quality was good. The transesophageal probe wasremoved.  2D and Doppler.  Study status:  Routine.  Consent:  The risks,benefits, and alternatives to the procedure were explained.  Studycompletion:  The patient tolerated the procedure well.FINDINGSLEFT VENTRICLE:  The cavity size is normal. Wall thickness is mildlyincreased. Systolic function is mildly reduced.AORTIC VALVE:   The valve is trileaflet.MITRAL VALVE:   Structurally normal valve.   Leaflet separation is normal. There is no evidence of a vegetation.  Doppler:   No regurgitation.LEFT ATRIUM:  The atrium is moderately dilated. The appendage ismorphologically a left appendage. Chicken wing morphology. Widest [id number].Compression 10-15%.RIGHT VENTRICLE:  The cavity size is normal.VENTRICULAR SEPTUM:   Thickness is normal.TRICUSPID VALVE:  Leaflet separation is normal.RIGHT ATRIUM:  The atrium is normal in size.PERICARDIUM:  A small  pericardial effusion is identified posterior to theheart.--------------------------------------------------------------------------Measurements Left ventricle  Value EFLegend:(L)  and  (H)  colby values outside specified reference range.Prepared and electronically signed byJeremy Suarez MD02/15/2021 09:48    12/09/20   TRANSTHORACIC ECHO(TTE) COMPLETE W/ W/O IMAGING AGENT  Impression  *[facility]**[facility]*[address] [place], [place] [phone] Echocardiogram (TTE)Patient: Cristel Mckeon    Study Date/Time:   Dec 10 2020 11:15AMMRN:     [id number]                FIN#:              [id number]:     1944             Ht/Wt:             163cm 72kgAge:     76                     BSA/BMI:           1.82m^2 27.1kg/m^2Gender:  F                      Baseline BP:       118 / 80 Referring Physician:  Osorio Reid, Franklin CHAVES, Osorio Norman Attending Physician:  Lorena Vallesforming Physician: ASHER Ortiziagnostic Physician: SHERON Coombsonographer:          Maryellen Chang --------------------------------------------------------------------------INDICATIONS:   Cerebrovascular accident.--------------------------------------------------------------------------STUDY CONCLUSIONSSUMMARY:1. Left ventricle: The cavity size is normal. Wall thickness is normal.   Doppler parameters are consistent with abnormal left ventricular   relaxation (grade 1 diastolic dysfunction).2. Aortic valve: Mild regurgitation.--------------------------------------------------------------------------STUDY DATA:   Procedure:  Transthoracic echocardiography was performed.Image quality was good. Intravenous contrast (agitated saline) wasadministered to identify shunting.  M-mode, complete 2D, complete spectralDoppler, and color Doppler.  Study status:  Routine.  Study completion:There were no  complications.FINDINGSLEFT VENTRICLE:  The cavity size is normal. Wall thickness is normal.Systolic function is normal. Wall motion is normal; there are no regionalwall motion abnormalities. Doppler parameters are consistent with abnormalleft ventricular relaxation (grade 1 diastolic dysfunction).AORTIC VALVE:   Structurally normal valve. The valve is trileaflet.   Cuspseparation is normal.  Doppler:  Transvalvular velocity is within thenormal range. There is no stenosis.  Mild regurgitation.AORTA:  Aortic root: The aortic root is normal in size.MITRAL VALVE:   Structurally normal valve.   Leaflet separation is normal. Doppler:  Transvalvular velocity is within the normal range. There is noevidence for stenosis.  Mild regurgitation.LEFT ATRIUM:  The atrium is normal in size.RIGHT VENTRICLE:  The cavity size is normal. Wall thickness is normal.Systolic function is normal. The estimated peak pressure is 33mm Hg.PULMONIC VALVE:    Structurally normal valve.   Cusp separation is normal. Doppler:  Transvalvular velocity is within the normal range.  Noregurgitation.TRICUSPID VALVE:   Structurally normal valve.   Leaflet separation isnormal.  Doppler:  Transvalvular velocity is within the normal range.Trivial regurgitation.PULMONARY ARTERY:   The main pulmonary artery is normal-sized. Systolicpressure is within the normal range.RIGHT ATRIUM:  The atrium is normal in size.PERICARDIUM:  There is no pericardial effusion.SYSTEMIC VEINS:Inferior vena cava: The vessel is normal in size.--------------------------------------------------------------------------Measurements Left ventricle  Value  Ref  Left atrium    Value        Ref EF                          Vol, S     (H) 56    ml     22 - 52                             Vol/bsa, S     31    ml/m^2 16 - 34Legend:(L)  and  (H)  colby values outside specified reference range.Prepared and electronically signed byJohn Mariano MD12/11/2020 13:58    Assessment & Plan   No  problem-specific Assessment & Plan notes found for this encounter.           Summary of your Discharge Medications      You have not been prescribed any medications.          Diagnosis/Assessment and Plan    S/P Left Atrial Appendage Closure Device Insertion - Watchman.    Tolerated procedure well.   VSS.   Up in chair  Doing well, right groin is benign and with dressing.  Keep dry and intact. Monitor for bleeding, oozing, swelling, and hematoma.   Right wrist with dressing post galo removal.  May remove dressing tomorrow am.   Coumadin 4mg daily until INR check. NO aspirin for 6 weeks then GUSTAVO and hopefully stop A/C   Echo- No pericardial effusion without hemodynamic compromise. No other complications seen.  Appointment scheduled with Dr Suarez in the [place] office on 3/11 12n  Patient instructed to have INR checked Thursday with Dr Yancey office. Needs to call to schedule an appointment  Patient need to schedule GUSTAVO.  Please call Dr Suarez office to arrange GUSTAVO.   OK to shower.   No powders, lotions or creams to right groin  Discussed post cardiac procedure discharge instructions with patient and will be given upon discharge by nurse.      AFib: on rate control strategy, AC as above    HTN- Patient has been having low Bps post op.  Started on Dopamine by Dr Ferrera yesterday.  Weaned off for 30 mins this morning, continued with low Bps and dopa resumed.  Hold losartan and metoprolol today.     HLD- on statin     GIB- Denies recent blood transfusioin     Thyroid- on levothroxine      Dr Ferrera (Card)  Dr Yancey (PCP)

## 2021-03-25 ENCOUNTER — OFFICE VISIT (OUTPATIENT)
Dept: FAMILY MEDICINE CLINIC | Facility: CLINIC | Age: 71
End: 2021-03-25

## 2021-03-25 VITALS
TEMPERATURE: 98.1 F | SYSTOLIC BLOOD PRESSURE: 122 MMHG | HEART RATE: 73 BPM | WEIGHT: 203 LBS | HEIGHT: 61 IN | OXYGEN SATURATION: 99 % | DIASTOLIC BLOOD PRESSURE: 74 MMHG | BODY MASS INDEX: 38.33 KG/M2

## 2021-03-25 DIAGNOSIS — Z23 NEED FOR VACCINATION: ICD-10-CM

## 2021-03-25 DIAGNOSIS — E13.9 DIABETES 1.5, MANAGED AS TYPE 2 (HCC): Primary | ICD-10-CM

## 2021-03-25 PROCEDURE — 99213 OFFICE O/P EST LOW 20 MIN: CPT | Performed by: FAMILY MEDICINE

## 2021-03-25 PROCEDURE — 90750 HZV VACC RECOMBINANT IM: CPT | Performed by: FAMILY MEDICINE

## 2021-03-25 PROCEDURE — 90471 IMMUNIZATION ADMIN: CPT | Performed by: FAMILY MEDICINE

## 2021-03-25 NOTE — PROGRESS NOTES
Mary Jo Berg is a 71 y.o. female.     Chief Complaint   Patient presents with   • Diabetes     pt says that glucose has been higher than ever - fasting     Masks/face shield/appropriate PPE were worn for the entirety of the visit by the patient, MA, and provider.     HPI     Pt is a pleasant 71 y.o. YO female here for follow-up of her type 2 diabetes.  Patient does also follow with [facility]in[facility].  She was last seen there a little over a month ago, 2/12/2021, her A1c at that time was 6.9.  She is currently on treatment with Metformin 500 mg twice daily.  Patient is very concerned about her hemoglobin A1c being higher.  She used to be stable around 5.8.  With this higher A1c she is concerned for complications such as diabetic retinopathy.  She wonders what she should do to help bring it down.  She is already working very hard to be diligent in watching her carb and sugar intake.  She has lost about 5 pounds over the last month.  She would like to know what she can do in addition to these changes.      The following portions of the patient's history were reviewed by me and updated as appropriate: allergies, current medications, past family history, past medical history, past social history, past surgical history, and problem list.     Review of Systems   Constitutional: Negative.    HENT: Negative.    Eyes: Negative.    Respiratory: Negative.    Cardiovascular: Negative.    Gastrointestinal: Negative.    Endocrine: Negative.    Genitourinary: Negative.    Musculoskeletal: Negative.    Skin: Negative.    Allergic/Immunologic: Negative.    Neurological: Negative.    Hematological: Negative.    Psychiatric/Behavioral: Negative.    I have reviewed and confirmed the accuracy of the ROS as documented by the MA/ADRIANAN/RN Kathrin Mitchell MD    Objective  Vitals:    03/25/21 0834   BP: 122/74   Pulse: 73   Temp: 98.1 °F (36.7 °C)   SpO2: 99%     Body mass index is 38.36 kg/m².       Physical  Exam  Vitals reviewed.   Constitutional:       General: She is not in acute distress.     Appearance: She is well-developed.   HENT:      Head: Normocephalic and atraumatic.   Eyes:      General: No scleral icterus.        Right eye: No discharge.         Left eye: No discharge.      Conjunctiva/sclera: Conjunctivae normal.   Pulmonary:      Effort: Pulmonary effort is normal. No respiratory distress.   Skin:     Coloration: Skin is not pale.      Findings: No erythema.   Neurological:      Mental Status: She is alert and oriented to person, place, and time.   Psychiatric:         Behavior: Behavior normal.         Thought Content: Thought content normal.         Judgment: Judgment normal.           Current Outpatient Medications:   •  allopurinol (ZYLOPRIM) 100 MG tablet, Take 1 tablet by mouth 2 (Two) Times a Day. (Patient taking differently: Take 100 mg by mouth Daily.), Disp: 60 tablet, Rfl: 3  •  clotrimazole-betamethasone (LOTRISONE) 1-0.05 % cream, APPLY CREAM TO AFFECTED AREA IN GROIN TWICE DAILY, Disp: , Rfl:   •  ezetimibe (ZETIA) 10 MG tablet, Take 10 mg by mouth Daily., Disp: , Rfl:   •  fluticasone (FLONASE) 50 MCG/ACT nasal spray, 2 sprays into the nostril(s) as directed by provider As Needed., Disp: , Rfl:   •  gabapentin (NEURONTIN) 100 MG capsule, Take 1 capsule by mouth 3 (Three) Times a Day. (Patient taking differently: Take 100 mg by mouth Daily.), Disp: 90 capsule, Rfl: 3  •  glucose blood test strip, ACCU-CHECK SKYLA. Use to check blood sugar once daily, as directed. DX: E11.9, Disp: , Rfl:   •  irbesartan (AVAPRO) 150 MG tablet, Take 150 mg by mouth Every Morning., Disp: , Rfl:   •  Lancets (ACCU-CHEK MULTICLIX) lancets, Use to check blood sugar once daily, as directed. DX: 250.00, Disp: , Rfl:   •  letrozole (Femara) 2.5 MG tablet, Take 1 tablet by mouth Daily for 90 days., Disp: 30 tablet, Rfl: 6  •  Loratadine 10 MG capsule, Take 1 tablet by mouth Daily., Disp: , Rfl:   •  metFORMIN  (GLUCOPHAGE) 500 MG tablet, Take 2 tablets p.o. every morning and 1 tablet p.o. every afternoon., Disp: 270 tablet, Rfl: 1  •  metoprolol succinate XL (TOPROL-XL) 25 MG 24 hr tablet, Take 25 mg by mouth Every Night., Disp: , Rfl:   •  naproxen (NAPROSYN) 500 MG tablet, Take 1 tablet by mouth twice daily as needed for pain, Disp: 60 tablet, Rfl: 1  •  Thyroid (LEVOTHYROXINE-LIOTHYRONINE PO), Take 125 mg by mouth Daily. 1/2 ON SUNDAYS, Disp: , Rfl:   •  vitamin B-12 (CYANOCOBALAMIN) 1000 MCG tablet, Take 1,000 mcg by mouth Daily. Every other day, Disp: , Rfl:   •  Doxylamine Succinate, Sleep, (UNISOM PO), Take 1 tablet by mouth At Night As Needed., Disp: , Rfl:   •  traMADol (Ultram) 50 MG tablet, Take 1 tablet by mouth Every 6 (Six) Hours As Needed for Moderate Pain ., Disp: 30 tablet, Rfl: 0    Procedures    Lab Results (most recent)     None              Diagnoses and all orders for this visit:    Diabetes 1.5, managed as type 2 (CMS/[facility])  -     metFORMIN (GLUCOPHAGE) 500 MG tablet; Take 2 tablets p.o. every morning and 1 tablet p.o. every afternoon.    Need for vaccination  -     Shingrix Vaccine    Patient is a pleasant 71-year-old female here today for type 2 diabetes.  Hemoglobin A1c has crept up over the last year, may in part be secondary to stress as well as progressive insulin resistance.  Discussed with her to increase her Metformin to 2 tablets in the morning (1000 mg) and 500 at night.  We will recheck an A1c when she is due in about 2 months.  In the meantime she is also to continue with monitoring her diet carefully.      Return in about 7 weeks (around 5/13/2021) for a1c recheck.      Kathrin Mitchell MD

## 2021-04-01 DIAGNOSIS — G89.29 CHRONIC PAIN OF RIGHT KNEE: ICD-10-CM

## 2021-04-01 DIAGNOSIS — M25.561 CHRONIC PAIN OF RIGHT KNEE: ICD-10-CM

## 2021-04-01 RX ORDER — NAPROXEN 500 MG/1
TABLET ORAL
Qty: 60 TABLET | Refills: 1 | Status: SHIPPED | OUTPATIENT
Start: 2021-04-01 | End: 2021-06-01

## 2021-04-07 DIAGNOSIS — M10.9 GOUT OF LEFT FOOT, UNSPECIFIED CAUSE, UNSPECIFIED CHRONICITY: ICD-10-CM

## 2021-04-07 RX ORDER — ALLOPURINOL 100 MG/1
TABLET ORAL
Qty: 180 TABLET | Refills: 1 | Status: SHIPPED | OUTPATIENT
Start: 2021-04-07 | End: 2021-10-13 | Stop reason: DRUGHIGH

## 2021-04-26 ENCOUNTER — OFFICE VISIT (OUTPATIENT)
Dept: ONCOLOGY | Facility: CLINIC | Age: 71
End: 2021-04-26

## 2021-04-26 ENCOUNTER — LAB (OUTPATIENT)
Dept: OTHER | Facility: HOSPITAL | Age: 71
End: 2021-04-26

## 2021-04-26 VITALS
BODY MASS INDEX: 38.72 KG/M2 | RESPIRATION RATE: 18 BRPM | HEIGHT: 61 IN | DIASTOLIC BLOOD PRESSURE: 72 MMHG | HEART RATE: 76 BPM | TEMPERATURE: 97.1 F | OXYGEN SATURATION: 96 % | WEIGHT: 205.1 LBS | SYSTOLIC BLOOD PRESSURE: 145 MMHG

## 2021-04-26 DIAGNOSIS — C50.412 MALIGNANT NEOPLASM OF UPPER-OUTER QUADRANT OF LEFT BREAST IN FEMALE, ESTROGEN RECEPTOR POSITIVE (HCC): ICD-10-CM

## 2021-04-26 DIAGNOSIS — C50.412 MALIGNANT NEOPLASM OF UPPER-OUTER QUADRANT OF LEFT BREAST IN FEMALE, ESTROGEN RECEPTOR POSITIVE (HCC): Primary | ICD-10-CM

## 2021-04-26 DIAGNOSIS — Z17.0 MALIGNANT NEOPLASM OF UPPER-OUTER QUADRANT OF LEFT BREAST IN FEMALE, ESTROGEN RECEPTOR POSITIVE (HCC): ICD-10-CM

## 2021-04-26 DIAGNOSIS — Z79.811 AROMATASE INHIBITOR USE: ICD-10-CM

## 2021-04-26 DIAGNOSIS — Z12.31 ENCOUNTER FOR SCREENING MAMMOGRAM FOR MALIGNANT NEOPLASM OF BREAST: ICD-10-CM

## 2021-04-26 DIAGNOSIS — Z17.0 MALIGNANT NEOPLASM OF UPPER-OUTER QUADRANT OF LEFT BREAST IN FEMALE, ESTROGEN RECEPTOR POSITIVE (HCC): Primary | ICD-10-CM

## 2021-04-26 LAB
ALBUMIN SERPL-MCNC: 4 G/DL (ref 3.5–5.2)
ALBUMIN/GLOB SERPL: 1.3 G/DL
ALP SERPL-CCNC: 83 U/L (ref 39–117)
ALT SERPL W P-5'-P-CCNC: 12 U/L (ref 1–33)
ANION GAP SERPL CALCULATED.3IONS-SCNC: 14.8 MMOL/L (ref 5–15)
AST SERPL-CCNC: 22 U/L (ref 1–32)
BASOPHILS # BLD AUTO: 0.03 10*3/MM3 (ref 0–0.2)
BASOPHILS NFR BLD AUTO: 0.4 % (ref 0–1.5)
BILIRUB SERPL-MCNC: 0.2 MG/DL (ref 0–1.2)
BUN SERPL-MCNC: 20 MG/DL (ref 8–23)
BUN/CREAT SERPL: 23.5 (ref 7–25)
CALCIUM SPEC-SCNC: 9.8 MG/DL (ref 8.6–10.5)
CHLORIDE SERPL-SCNC: 103 MMOL/L (ref 98–107)
CO2 SERPL-SCNC: 23.2 MMOL/L (ref 22–29)
CREAT SERPL-MCNC: 0.85 MG/DL (ref 0.57–1)
DEPRECATED RDW RBC AUTO: 48.5 FL (ref 37–54)
EOSINOPHIL # BLD AUTO: 0.23 10*3/MM3 (ref 0–0.4)
EOSINOPHIL NFR BLD AUTO: 3 % (ref 0.3–6.2)
ERYTHROCYTE [DISTWIDTH] IN BLOOD BY AUTOMATED COUNT: 13.5 % (ref 12.3–15.4)
GFR SERPL CREATININE-BSD FRML MDRD: 66 ML/MIN/1.73
GLOBULIN UR ELPH-MCNC: 3 GM/DL
GLUCOSE SERPL-MCNC: 171 MG/DL (ref 65–99)
HCT VFR BLD AUTO: 40.1 % (ref 34–46.6)
HGB BLD-MCNC: 12.6 G/DL (ref 12–15.9)
IMM GRANULOCYTES # BLD AUTO: 0.02 10*3/MM3 (ref 0–0.05)
IMM GRANULOCYTES NFR BLD AUTO: 0.3 % (ref 0–0.5)
LYMPHOCYTES # BLD AUTO: 2.02 10*3/MM3 (ref 0.7–3.1)
LYMPHOCYTES NFR BLD AUTO: 26 % (ref 19.6–45.3)
MCH RBC QN AUTO: 30.7 PG (ref 26.6–33)
MCHC RBC AUTO-ENTMCNC: 31.4 G/DL (ref 31.5–35.7)
MCV RBC AUTO: 97.6 FL (ref 79–97)
MONOCYTES # BLD AUTO: 0.58 10*3/MM3 (ref 0.1–0.9)
MONOCYTES NFR BLD AUTO: 7.5 % (ref 5–12)
NEUTROPHILS NFR BLD AUTO: 4.88 10*3/MM3 (ref 1.7–7)
NEUTROPHILS NFR BLD AUTO: 62.8 % (ref 42.7–76)
NRBC BLD AUTO-RTO: 0 /100 WBC (ref 0–0.2)
PLATELET # BLD AUTO: 271 10*3/MM3 (ref 140–450)
PMV BLD AUTO: 9.9 FL (ref 6–12)
POTASSIUM SERPL-SCNC: 4.5 MMOL/L (ref 3.5–5.2)
PROT SERPL-MCNC: 7 G/DL (ref 6–8.5)
RBC # BLD AUTO: 4.11 10*6/MM3 (ref 3.77–5.28)
SODIUM SERPL-SCNC: 141 MMOL/L (ref 136–145)
WBC # BLD AUTO: 7.76 10*3/MM3 (ref 3.4–10.8)

## 2021-04-26 PROCEDURE — 80053 COMPREHEN METABOLIC PANEL: CPT | Performed by: INTERNAL MEDICINE

## 2021-04-26 PROCEDURE — 36415 COLL VENOUS BLD VENIPUNCTURE: CPT

## 2021-04-26 PROCEDURE — 99213 OFFICE O/P EST LOW 20 MIN: CPT | Performed by: NURSE PRACTITIONER

## 2021-04-26 PROCEDURE — 85025 COMPLETE CBC W/AUTO DIFF WBC: CPT | Performed by: INTERNAL MEDICINE

## 2021-04-26 RX ORDER — LEVOTHYROXINE SODIUM 0.12 MG/1
TABLET ORAL
COMMUNITY
Start: 2021-04-21 | End: 2021-08-03

## 2021-04-26 RX ORDER — ASPIRIN 81 MG/1
81 TABLET ORAL DAILY
COMMUNITY
End: 2021-07-19

## 2021-04-26 RX ORDER — ALUMINUM HYDROXIDE, MAGNESIUM HYDROXIDE, DIMETHICONE 400; 400; 40 MG/5ML; MG/5ML; MG/5ML
1 LIQUID ORAL DAILY
COMMUNITY
End: 2021-08-03

## 2021-04-26 NOTE — PROGRESS NOTES
Subjective      REASON FOR FOLLOW-UP:   1.Right breast cancer invasive low-grade ER positive WI negative HER-2 negative breast cancer at 6:00 with DCIS at 1:00 post biopsy; Simple mastectomy on 5/11/2020 and this shows an invasive cancer measuring 19 x 13 mmI- DCIS present measuring 15 mm margins were widely clear 5 sentinel nodes were negative for metastatic disease still grade 1. No lymphovascular invasion.                             REQUESTING PHYSICIAN: MD Keshia Durán MD    History of Present Illness patient is a 71 y.o. female with a history of small node negative hormone positive right breast cancer.  When seen in February the patient had been holding Femara from most 2 months because of worsening hot flashes making it difficult to sleep.  We opted to restart Femara but add gabapentin to help with the hot flashes.    She is now reviewed back today and is happy to report that the gabapentin has made a big difference in her hot flashes and therefore she is sleeping better.  She was able to remain at just gabapentin 100 mg nightly without any need to increase the dose.    She is slightly overdue for left mammogram but is scheduled in 3 weeks, delayed because of Covid backup.  She denies any changes to the left breast or right chest wall of concern.    She denies other concerns this time.    Past Medical History:   Diagnosis Date   • Asthma    • Breast cancer (CMS/HCC) 03/2020    right IDC   • Diabetes mellitus (CMS/HCC)     Type 2   • History of asthma    • History of colon polyps    • History of complete heart block     paced in 2015   • Hyperlipidemia    • Hypertension    • Hypothyroidism    • Osteoarthritis    • Osteopenia    • Pacemaker    • PONV (postoperative nausea and vomiting)         Past Surgical History:   Procedure Laterality Date   • BLADDER REPAIR  2012   • BREAST BIOPSY Right 03/2020    malignant   • CARDIAC PACEMAKER  PLACEMENT     • CARPAL TUNNEL RELEASE Right 2015   • CATARACT EXTRACTION Bilateral    •  SECTION      x2   • COLONOSCOPY  2015   • COLONOSCOPY N/A 2021    Procedure: COLONOSCOPY;  Surgeon: Yamilet Stewart MD;  Location: [facility] ENDOSCOPY;  Service: Gastroenterology;  Laterality: N/A;  pre- hx polyps  post-- diverticulosis, hemorrhoids   • ENDOMETRIAL ABLATION     • HAND SURGERY     • HERNIA REPAIR      Bilateral femoral and inguinal   • KNEE ARTHROSCOPY Bilateral    • MASTECTOMY WITH SENTINEL NODE BIOPSY AND AXILLARY NODE DISSECTION Right 2020    Procedure: RIGHT BREAST MASTECTOMY WITH SENTINEL NODE BIOPSY;  Surgeon: Bj Stephen MD;  Location: [facility] OR [facility];  Service: General;  Laterality: Right;   • SHOULDER ARTHROSCOPY Right 2016    rotator cuff repair, biceps tenotomy   • THYROIDECTOMY, PARTIAL     • TONSILLECTOMY AND ADENOIDECTOMY     • TOTAL KNEE ARTHROPLASTY Left    • TUBAL ABDOMINAL LIGATION      patient is a 70-year-old white female with hypercholesterolemia diabetes type 2 hypertension hypothyroidism and pacemaker placement 2015 was noted on routine mammogram in  of this last year to have some abnormalities in the right breast that warranted close follow-up.  This led to a repeat mammogram in January which was delayed due to her trip she took Shira with her  when she came back in February she did the mammogram and this showed persistence and worsening of the calcifications at the 6:00 and 1 o'clock position of the right breast.    This led to a biopsy of both these regions on 3/5/2020 which showed DCIS at the 1 o'clock position intermediate grade with calcifications and no necrosis and well differentiated grade 1 infiltrating ductal carcinoma at the 6 o'clock position measuring 2 mm with no lymph vascular invasion and associated DCIS intermediate grade the tumor was positive for estrogen greater than 95% negative for progesterone 0% HER-2 negative at 1+ Ki-67  of 5%.    Patient was referred to Dr. Stephen who ordered bilateral breast MRIs which showed residual 2 cm area of enhancement at the 6 o'clock position but no further abnormalities at the 1 o'clock position of the right breast.  There is no axillary or internal mammary adenopathy in the left breast was benign.  The mass is not palpable by the patient or Dr. Stephen    Based on the recent coronavirus outbreak which is ongoing elective surgeries for grade 1 breast cancers are being delayed based on guidelines from the hospital and from the oncological community and she is here to discuss neoadjuvant hormonal blockade.    Patient is  3 para 3 first childbirth was age 22 she breast-fed 2 of her 3 children for at least 4 to 6 months menarche was at age 11 menopause at 49 she did not take hormones after menopause    Family history is positive for paternal grandmother with metastatic breast cancer at age 75 a paternal first cousin with breast cancer at age 70 her father  with bladder cancer mother had endometrial carcinoma at age 80.  She is concerned about genetic testing because she has 3 daughters and 3 grand daughters.    She is not a smoker or drinker and denies DVTs MI or stroke    Because of the delay of surgery due to the coronavirus we decided to do Oncotype on her biopsy and start aromatase inhibitor in the neoadjuvant setting    The side effects and toxicities of the Aromatase inhibitors was discussed with the patient including, hot flashes, mood swings and hair thinning.Significant arthralgias and worsening bone density were also discussed. Baseline bone density evaluation was ordered.  I discussed with the patient and her  on the video conference that this was not the standard of care usually but because of the pandemic and making treatment decisions for the safety of the patient and based on delaying elective surgery for certain subsets of breast cancer which she presented.    I explained  the rationale of doing an Oncotype DX score and told her that if she was not willing to take chemotherapy there would be no reason to do this but she is willing to take chemotherapy if she has a high Oncotype score and therefore we will send this results and she will call me in 2 weeks to discuss them    There are no clear guidelines on how to manage patients with clinically node-negative disease with a high Oncotype and we may decide to move up her surgery so that we will have more information with which to make a decision about chemotherapy in order to decide how much chemotherapy to give her    She and her  were willing to go this route and she will call me in 2 weeks to discuss her Oncotype score    I am concerned about her general joint discomfort which might be aggravated by an aromatase inhibitor but we will deal with that if it occurs    I will also schedule her to see the genetic counselors and they could possibly do a video visit with her also and decide about genetic testing    4/20  Patient is a 70-year-old female with small mammographically detected right breast cancer ER positive AR negative and we opted to start Arimidex preoperatively because surgeries are being delayed for the coronavirus pandemic.  We will also ask for an Oncotype DX to be done on her biopsy specimen to help us make decisions about further treatment.  Unfortunately the specimen was too small and the Oncotype could not be done.  In addition she developed gout in her foot after 2 or 3 doses of Arimidex which I do not think is related to the Arimidex and she was evaluated by her daughter who is a physician who did not think there was any infection she tried nonsteroidals with some improvement but she still not better and has not yet restarted her Arimidex    In light of all these findings I have given her a short prednisone bolus to see if this will help the gout and at this point I think we should move ahead with surgery in  the next 3 to 4 weeks and hold off on the Arimidex until after surgery so we can get all the information we need at the time of surgery    She is okay with this approach and I have called her surgeon and her cardiologist to clear her for surgery    She will need to stop her nonsteroidals at least a week before surgery and hopefully the steroids will help the foot enough that she does not have to take the nonsteroidals    She has had no fever or other signs of infection.    Simple mastectomy on 5/11/2020 and this shows an invasive cancer measuring 19 x 13 mmI- DCIS present measuring 15 mm margins were widely clear 5 sentinel nodes were negative for metastatic disease still grade 1. No lymphovascular invasion.    Patient discontinuing Arimidex in May 2020 due to concerns for contributing to gout.  Oncotype DX score returning 22.  Patient beginning Femara late May 2020.    Current Outpatient Medications on File Prior to Visit   Medication Sig Dispense Refill   • allopurinol (ZYLOPRIM) 100 MG tablet Take 1 tablet by mouth twice daily 180 tablet 1   • aspirin (aspirin) 81 MG EC tablet Take 81 mg by mouth Daily.     • clotrimazole-betamethasone (LOTRISONE) 1-0.05 % cream APPLY CREAM TO AFFECTED AREA IN GROIN TWICE DAILY     • Doxylamine Succinate, Sleep, (UNISOM PO) Take 1 tablet by mouth At Night As Needed.     • ezetimibe (ZETIA) 10 MG tablet Take 10 mg by mouth Daily.     • fluticasone (FLONASE) 50 MCG/ACT nasal spray 2 sprays into the nostril(s) as directed by provider As Needed.     • gabapentin (NEURONTIN) 100 MG capsule Take 1 capsule by mouth 3 (Three) Times a Day. (Patient taking differently: Take 100 mg by mouth Daily.) 90 capsule 3   • glucose blood test strip ACCU-CHECK SKYLA. Use to check blood sugar once daily, as directed. DX: E11.9     • irbesartan (AVAPRO) 150 MG tablet Take 150 mg by mouth Every Morning.     • Lactobacillus Rhamnosus, GG, (RA Probiotic Digestive Care) capsule Take 1 capsule by mouth  Daily.     • Lancets (ACCU-CHEK MULTICLIX) lancets Use to check blood sugar once daily, as directed. DX: 250.00     • letrozole (Femara) 2.5 MG tablet Take 1 tablet by mouth Daily for 90 days. 30 tablet 6   • levothyroxine (SYNTHROID, LEVOTHROID) 125 MCG tablet Take 1 tablet by mouth once daily     • Loratadine 10 MG capsule Take 1 tablet by mouth Daily.     • metFORMIN (GLUCOPHAGE) 500 MG tablet Take 2 tablets p.o. every morning and 1 tablet p.o. every afternoon. 270 tablet 1   • metoprolol succinate XL (TOPROL-XL) 25 MG 24 hr tablet Take 25 mg by mouth Every Night.     • naproxen (NAPROSYN) 500 MG tablet Take 1 tablet by mouth twice daily as needed for pain 60 tablet 1   • Thyroid (LEVOTHYROXINE-LIOTHYRONINE PO) Take 125 mg by mouth Daily. 1/2 ON SUNDAYS     • vitamin B-12 (CYANOCOBALAMIN) 1000 MCG tablet Take 1,000 mcg by mouth Daily. Every other day     • traMADol (Ultram) 50 MG tablet Take 1 tablet by mouth Every 6 (Six) Hours As Needed for Moderate Pain . 30 tablet 0     No current facility-administered medications on file prior to visit.        ALLERGIES:    Allergies   Allergen Reactions   • Indomethacin Nausea Only and Dizziness     EAR RINGING     • Nutritional Supplements Swelling     [place] nuts - no other nuts that she is aware of   • Penicillins Anaphylaxis, Hives, Shortness Of Breath and Itching      tachycardia     • Statins Unknown - High Severity   • Hydrochlorothiazide Other (See Comments)     Caused gout symptoms     • Lisinopril Cough   • Morphine Nausea Only            • Pentazocine Nausea And Vomiting               Social History     Socioeconomic History   • Marital status:      Spouse name: Sean   • Number of children: Not on file   • Years of education: Not on file   • Highest education level: Not on file   Tobacco Use   • Smoking status: Never Smoker   • Smokeless tobacco: Never Used   Vaping Use   • Vaping Use: Never used   Substance and Sexual Activity   • Alcohol use: Yes      "Comment: RARE   • Drug use: Never   • Sexual activity: Defer        Family History   Problem Relation Age of Onset   • Cervical cancer Mother    • Alzheimer's disease Mother    • Arthritis Mother    • Cancer Father         Bladder   • Heart disease Father    • Hypertension Father    • Arthritis Father    • Alcohol abuse Father    • Hyperlipidemia Father    • Arthritis Maternal Grandmother    • Heart disease Other         FH of heart disease in males before age 55   • Hyperlipidemia Brother    • Hypertension Brother    • Breast cancer Paternal Grandmother 75   • Heart attack Paternal Grandfather    • Breast cancer Paternal Cousin 70   • Malig Hyperthermia Neg Hx         Review of Systems   Constitutional: Positive for diaphoresis.   Respiratory: Negative for shortness of breath.    Cardiovascular: Negative for chest pain, palpitations and leg swelling.   Musculoskeletal: Positive for gait problem (Arthritis in the knees one is replaced  in evening).   Neurological: Negative for dizziness and weakness.   All other systems reviewed and are negative.     ROS reviewed and updated 04/26/21      Objective   Vitals:    04/26/21 1002   BP: 145/72   Pulse: 76   Resp: 18   Temp: 97.1 °F (36.2 °C)   TempSrc: Temporal   SpO2: 96%   Weight: 93 kg (205 lb 1.6 oz)   Height: 154.9 cm (60.98\")   PainSc: 0-No pain     Current Status 2/1/2021   ECOG score 0       Physical exam  This patient's ACP documentation is up to date, and there's nothing further left to document.    CONSTITUTIONAL:  Vital signs reviewed.  No distress, looks comfortable.  EYES:  Conjunctiva and lids unremarkable.  PERRLA  EARS,NOSE,MOUTH,THROAT:  Ears and nose appear unremarkable.  Lips, teeth, gums appear unremarkable.  RESPIRATORY:  Normal respiratory effort.  Lungs clear to auscultation bilaterally.  BREASTS: Right chest wall benign; left breast shows no palpable masses.   CARDIOVASCULAR:  Normal S1, S2.  Grade II/VI murmur.  No significant lower extremity " edema.  GASTROINTESTINAL: Abdomen appears unremarkable.  Nontender.  No hepatomegaly.  No splenomegaly.  LYMPHATIC:  No cervical, supraclavicular, axillary lymphadenopathy.  SKIN:  Warm.  No rashes.  PSYCHIATRIC:  Normal judgment and insight.  Normal mood and affect.    I have reexamined the patient and the results are consistent with the previously documented exam. ELIN Edwards         RECENT LABS:  Results from last 7 days   Lab Units 04/26/21  0955   WBC 10*3/mm3 7.76   NEUTROS ABS 10*3/mm3 4.88   HEMOGLOBIN g/dL 12.6   HEMATOCRIT % 40.1   PLATELETS 10*3/mm3 271     Results from last 7 days   Lab Units 04/26/21  0955   SODIUM mmol/L 141   POTASSIUM mmol/L 4.5   CHLORIDE mmol/L 103   CO2 mmol/L 23.2   BUN mg/dL 20   CREATININE mg/dL 0.85   CALCIUM mg/dL 9.8   ALBUMIN g/dL 4.00   BILIRUBIN mg/dL 0.2   ALK PHOS U/L 83   ALT (SGPT) U/L 12   AST (SGOT) U/L 22   GLUCOSE mg/dL 171*             SPECIMEN   Procedure  Total mastectomy    Specimen Laterality  Right    TUMOR   Tumor Site  Lower inner quadrant    Clock Position of Tumor Site  6 o'clock    Histologic Type  Invasive carcinoma of no special type (invasive ductal carcinoma, not otherwise specified)    Glandular (Acinar) / Tubular Differentiation  Score 2    Nuclear Pleomorphism  Score 2    Mitotic Rate  Score 1    Overall Grade  Grade 1 (scores of 3, 4 or 5)    Tumor Size  Greatest dimension of largest invasive focus (Millimeters): 19 mm   Additional Dimension (Millimeters)  13 mm     10 mm   Tumor Focality  Single focus of invasive carcinoma    Ductal Carcinoma In Situ (DCIS)  Present      Negative for extensive intraductal component (EIC)    Size (Extent) of DCIS  Estimated size (extent) of DCIS is at least (Millimeters): 15 mm   Architectural Patterns  Cribriform      Papillary      Solid    Nuclear Grade  Grade II (intermediate)    Necrosis  Present, focal (small foci or single cell necrosis)    Lobular Carcinoma In Situ (LCIS)  No LCIS in  specimen    Tumor Extent     Lymphovascular Invasion  Not identified    Dermal Lymphovascular Invasion  Not identified    Microcalcifications  Present in DCIS      Present in invasive carcinoma      Present in non-neoplastic tissue    Treatment Effect  No known presurgical therapy    MARGINS   Invasive Carcinoma Margins  Uninvolved by invasive carcinoma    Distance from Closest Margin (Millimeters)  10 mm   Closest Margin(s)  Lateral    Distance from Other Margins     Anterior Margin (Millimeters)  40 mm   Posterior Margin (Millimeters)  45 mm   Superior Margin (Millimeters)  18 mm   Inferior Margin (Millimeters)  13 mm   Medial Margin (Millimeters)  22 mm   Lateral Margin (Millimeters)  10 mm   DCIS Margins  Uninvolved by DCIS    Distance from Closest Margin (Millimeters)  14 mm   Closest Margin(s)  Superior      Inferior    Distance from Other Margins     Anterior Margin (Millimeters)  20 mm   Posterior Margin (Millimeters)  40 mm   Superior Margin (Millimeters)  14 mm   Inferior Margin (Millimeters)  14 mm   Medial Margin (Millimeters)  30 mm   Lateral Margin (Millimeters)  21 mm   LYMPH NODES   Regional Lymph Nodes  Uninvolved by tumor cells    Total Number of Lymph Nodes Examined  5    Number of [place] Nodes Examined  5    PATHOLOGIC STAGE CLASSIFICATION (pTNM, AJCC 8th Edition)   Primary Tumor (pT)  pT1c    Regional Lymph Nodes Modifier  (sn): Only sentinel node(s) evaluated.    Regional Lymph Nodes (pN)  pN0    .   Breast Biomarker Reporting Template   Breast.Bmk - 1   Protocol posted: 8/28/2019   Test(s) Performed     Estrogen Receptor (ER) Status  Positive    Percentage of Cells with Nuclear Positivity  %    Average Intensity of Staining  Strong    Test Type  Food and Drug Administration (FDA) cleared (test / vendor): [employer]    Primary Antibody  SP1    Scoring System  Ella    Proportion Score  5    Intensity Score  3    Total Ella Score  8    Test(s) Performed     Progesterone Receptor (PgR)  Status  Positive    Percentage of Cells with Nuclear Positivity  5 %   Average Intensity of Staining  Moderate    Test Type  Food and Drug Administration (FDA) cleared (test / vendor): [place]    Primary Antibody  1E2    Scoring System  Ella    Proportion Score  2    Intensity Score  2    Total Ella Score  4    Test(s) Performed     HER2 by Immunohistochemistry  Equivocal (Score 2+)    Percentage of Cells with Uniform Intense Complete Membrane Staining  0 %   Test Type  Food and Drug Administration (FDA) cleared (test / vendor): [place]    Primary Antibody  4B5    Test(s) Performed  Ki-67    Percentage of Positive Nuclei  10 %   Primary Antibody           Assessment/Plan   1.cT1cN0 grade 1 infiltrating ductal carcinoma right breast at 6:00 ER positive TN negative HER-2 negative with associated intermediate grade DCIS the 1 o'clock position of the breast both lesions nonpalpable  · pT1cN0 grade 1 ER positive TN negative HER-2 negative post mastectomy on 5/11/2020  · Oncotype DX score 22  · Patient discontinued Arimidex in early May 2020 and soon thereafter began Femara 2.5 mg daily.  · Discontinued Femara in December 2020 due to worsening hot flashes and difficulty sleeping  · Gabapentin added in 2/21 with Femara resumed  · Patient tolerating combination of Femara plus gabapentin well with improvement in hot flashes.  Continue.  Due for mammogram on the left, scheduled to take place 5/19/2021.    2.  Type 2 diabetes/hypertension hypercholesterolemia/  3.  Hypothyroidism  4.  Significant degenerative arthritis on nonsteroidals  5.  Family history of breast cancer.  Genetics testing negative.  6.  Normal bone density in 2018  7.  Gout right foot, recurrent.  Patient initiated on allopurinol 100 mg daily per Dr. Mitchell in June 2020.      PLAN:  1.  Continue Femara 2.5 daily  2.  Left mammogram scheduled in 3 weeks.  3.  Continue Gabapentin 100 mg at bedtime.  4.  Follow-up with Dr. Jasso in 3 months with CBC, CMP  and mammogram for review.

## 2021-05-19 ENCOUNTER — HOSPITAL ENCOUNTER (OUTPATIENT)
Dept: MAMMOGRAPHY | Facility: HOSPITAL | Age: 71
Discharge: HOME | End: 2021-05-19
Admitting: INTERNAL MEDICINE

## 2021-05-19 DIAGNOSIS — C50.412 MALIGNANT NEOPLASM OF UPPER-OUTER QUADRANT OF LEFT BREAST IN FEMALE, ESTROGEN RECEPTOR POSITIVE (HCC): ICD-10-CM

## 2021-05-19 DIAGNOSIS — Z79.811 AROMATASE INHIBITOR USE: ICD-10-CM

## 2021-05-19 DIAGNOSIS — Z17.0 MALIGNANT NEOPLASM OF UPPER-OUTER QUADRANT OF LEFT BREAST IN FEMALE, ESTROGEN RECEPTOR POSITIVE (HCC): ICD-10-CM

## 2021-05-19 DIAGNOSIS — Z12.31 ENCOUNTER FOR SCREENING MAMMOGRAM FOR MALIGNANT NEOPLASM OF BREAST: ICD-10-CM

## 2021-05-19 PROCEDURE — 77063 BREAST TOMOSYNTHESIS BI: CPT

## 2021-05-19 PROCEDURE — 77067 SCR MAMMO BI INCL CAD: CPT

## 2021-05-24 ENCOUNTER — OFFICE VISIT (OUTPATIENT)
Dept: MAMMOGRAPHY | Facility: CLINIC | Age: 71
End: 2021-05-24

## 2021-05-24 DIAGNOSIS — Z17.0 MALIGNANT NEOPLASM OF OVERLAPPING SITES OF RIGHT BREAST IN FEMALE, ESTROGEN RECEPTOR POSITIVE (HCC): Primary | ICD-10-CM

## 2021-05-24 DIAGNOSIS — C50.811 MALIGNANT NEOPLASM OF OVERLAPPING SITES OF RIGHT BREAST IN FEMALE, ESTROGEN RECEPTOR POSITIVE (HCC): Primary | ICD-10-CM

## 2021-05-24 PROCEDURE — 99213 OFFICE O/P EST LOW 20 MIN: CPT | Performed by: SURGERY

## 2021-05-24 NOTE — PROGRESS NOTES
Subjective   Mary Jo Berg is a 71 y.o. female     History of Present Illness she is a nice 71-year-old white female who underwent a right total mastectomy on 2020.  She continues to follow with the medical oncologist and is on Femara.  She does state that over the last 2 weeks she has had a lot of scattered joint discomfort.  She did undergo a left mammogram recently which just showed some scattered fibrocystic change but no worrisome calcifications, areas of architectural distortion, or masses.  I have personally reviewed those imaging studies and agree with the findings.        Review of Systems constitutional-stable weight and good appetite.  Musculoskeletal-she has scattered pains in her hands and knees and left shoulder.  Past Medical History:   Diagnosis Date   • Asthma    • Breast cancer (CMS/HCC) 2020    right IDC   • Diabetes mellitus (CMS/HCC)     Type 2   • History of asthma    • History of colon polyps    • History of complete heart block     paced in 2015   • Hyperlipidemia    • Hypertension    • Hypothyroidism    • Osteoarthritis    • Osteopenia    • Pacemaker    • PONV (postoperative nausea and vomiting)      Past Surgical History:   Procedure Laterality Date   • BLADDER REPAIR  2012   • BREAST BIOPSY Right 2020    malignant   • CARDIAC PACEMAKER PLACEMENT     • CARPAL TUNNEL RELEASE Right 2015   • CATARACT EXTRACTION Bilateral    •  SECTION      x2   • COLONOSCOPY  2015   • COLONOSCOPY N/A 2021    Procedure: COLONOSCOPY;  Surgeon: Yamilet Stewart MD;  Location: [facility] ENDOSCOPY;  Service: Gastroenterology;  Laterality: N/A;  pre- hx polyps  post-- diverticulosis, hemorrhoids   • ENDOMETRIAL ABLATION     • HAND SURGERY     • HERNIA REPAIR      Bilateral femoral and inguinal   • KNEE ARTHROSCOPY Bilateral    • MASTECTOMY  2020   • MASTECTOMY WITH SENTINEL NODE BIOPSY AND AXILLARY NODE DISSECTION Right 2020    Procedure: RIGHT BREAST MASTECTOMY WITH SENTINEL  NODE BIOPSY;  Surgeon: Bj Stephen MD;  [place] [facility] [place] [facility] [place]  Service: General;  Laterality: Right;   • OOPHORECTOMY     • SHOULDER ARTHROSCOPY Right 2016    rotator cuff repair, biceps tenotomy   • THYROIDECTOMY, PARTIAL  2005   • TONSILLECTOMY AND ADENOIDECTOMY     • TOTAL KNEE ARTHROPLASTY Left 2007   • TUBAL ABDOMINAL LIGATION       Family History   Problem Relation Age of Onset   • Cervical cancer Mother    • Alzheimer's disease Mother    • Arthritis Mother    • Cancer Father         Bladder   • Heart disease Father    • Hypertension Father    • Arthritis Father    • Alcohol abuse Father    • Hyperlipidemia Father    • Arthritis Maternal Grandmother    • Heart disease Other         FH of heart disease in males before age 55   • Hyperlipidemia Brother    • Hypertension Brother    • Breast cancer Paternal Grandmother 75   • Heart attack Paternal Grandfather    • Breast cancer Paternal Cousin 70   • Malig Hyperthermia Neg Hx      Social History     Socioeconomic History   • Marital status:      Spouse name: Sean   • Number of children: Not on file   • Years of education: Not on file   • Highest education level: Not on file   Tobacco Use   • Smoking status: Never Smoker   • Smokeless tobacco: Never Used   Vaping Use   • Vaping Use: Never used   Substance and Sexual Activity   • Alcohol use: Yes     Comment: RARE   • Drug use: Never   • Sexual activity: Defer       Objective   Physical Exam  Constitutional-obese white female in no acute distress  Psychiatric-alert and oriented to person place and time.  Right chest wall-status post mastectomy without reconstruction.  I do not palpate any masses beneath the skin flaps or along the incision.  There is some redundant tissue in the axilla.  Left breast-there is no skin dimpling or nipple retraction.  I do not palpate any masses in the breast that are concerning and there is no evidence of nipple discharge.  Lymphatics-no cervical or axillary  adenopathy  Abdomen-rounded but soft and nontender without masses or hepatosplenomegaly.  Extremities-good strength in all 4 extremities.  She does have good range of motion of both upper extremities and no signs of lymphedema.    Assessment/Plan   Right breast cancer status post mastectomy without reconstruction.  She continues to follow with the medical oncologist and is on hormone blocking therapy.  I do not find anything concerning on physical examination today and her imaging is up-to-date.  I will see her back in 1 year.    The encounter diagnosis was Malignant neoplasm of overlapping sites of right breast in female, estrogen receptor positive (CMS/HCC).

## 2021-05-31 DIAGNOSIS — M25.561 CHRONIC PAIN OF RIGHT KNEE: ICD-10-CM

## 2021-05-31 DIAGNOSIS — G89.29 CHRONIC PAIN OF RIGHT KNEE: ICD-10-CM

## 2021-06-01 ENCOUNTER — HOSPITAL ENCOUNTER (EMERGENCY)
Facility: HOSPITAL | Age: 71
Discharge: HOME | End: 2021-06-01
Attending: EMERGENCY MEDICINE | Admitting: EMERGENCY MEDICINE

## 2021-06-01 ENCOUNTER — APPOINTMENT (OUTPATIENT)
Dept: CT IMAGING | Facility: HOSPITAL | Age: 71
End: 2021-06-01

## 2021-06-01 ENCOUNTER — TELEPHONE (OUTPATIENT)
Dept: ONCOLOGY | Facility: CLINIC | Age: 71
End: 2021-06-01

## 2021-06-01 ENCOUNTER — APPOINTMENT (OUTPATIENT)
Dept: GENERAL RADIOLOGY | Facility: HOSPITAL | Age: 71
End: 2021-06-01

## 2021-06-01 VITALS
HEART RATE: 85 BPM | OXYGEN SATURATION: 97 % | SYSTOLIC BLOOD PRESSURE: 134 MMHG | DIASTOLIC BLOOD PRESSURE: 67 MMHG | RESPIRATION RATE: 16 BRPM | TEMPERATURE: 97.4 F

## 2021-06-01 DIAGNOSIS — M79.89 BILATERAL HAND SWELLING: ICD-10-CM

## 2021-06-01 DIAGNOSIS — M25.512 ACUTE PAIN OF BOTH SHOULDERS: Primary | ICD-10-CM

## 2021-06-01 DIAGNOSIS — T50.905A ADVERSE EFFECT OF DRUG, INITIAL ENCOUNTER: ICD-10-CM

## 2021-06-01 DIAGNOSIS — M25.511 ACUTE PAIN OF BOTH SHOULDERS: Primary | ICD-10-CM

## 2021-06-01 LAB
ALBUMIN SERPL-MCNC: 4.1 G/DL (ref 3.5–5.2)
ALBUMIN/GLOB SERPL: 1.4 G/DL
ALP SERPL-CCNC: 92 U/L (ref 39–117)
ALT SERPL W P-5'-P-CCNC: 14 U/L (ref 1–33)
ANION GAP SERPL CALCULATED.3IONS-SCNC: 12.1 MMOL/L (ref 5–15)
AST SERPL-CCNC: 16 U/L (ref 1–32)
BASOPHILS # BLD AUTO: 0.03 10*3/MM3 (ref 0–0.2)
BASOPHILS NFR BLD AUTO: 0.3 % (ref 0–1.5)
BILIRUB SERPL-MCNC: 0.2 MG/DL (ref 0–1.2)
BUN SERPL-MCNC: 29 MG/DL (ref 8–23)
BUN/CREAT SERPL: 31.2 (ref 7–25)
CALCIUM SPEC-SCNC: 9.7 MG/DL (ref 8.6–10.5)
CHLORIDE SERPL-SCNC: 103 MMOL/L (ref 98–107)
CO2 SERPL-SCNC: 19.9 MMOL/L (ref 22–29)
CREAT SERPL-MCNC: 0.93 MG/DL (ref 0.57–1)
DEPRECATED RDW RBC AUTO: 46.7 FL (ref 37–54)
EOSINOPHIL # BLD AUTO: 0.09 10*3/MM3 (ref 0–0.4)
EOSINOPHIL NFR BLD AUTO: 0.8 % (ref 0.3–6.2)
ERYTHROCYTE [DISTWIDTH] IN BLOOD BY AUTOMATED COUNT: 12.9 % (ref 12.3–15.4)
ERYTHROCYTE [SEDIMENTATION RATE] IN BLOOD: 65 MM/HR (ref 0–30)
GFR SERPL CREATININE-BSD FRML MDRD: 59 ML/MIN/1.73
GLOBULIN UR ELPH-MCNC: 3 GM/DL
GLUCOSE SERPL-MCNC: 135 MG/DL (ref 65–99)
HCT VFR BLD AUTO: 41 % (ref 34–46.6)
HGB BLD-MCNC: 13.3 G/DL (ref 12–15.9)
IMM GRANULOCYTES # BLD AUTO: 0.05 10*3/MM3 (ref 0–0.05)
IMM GRANULOCYTES NFR BLD AUTO: 0.4 % (ref 0–0.5)
LYMPHOCYTES # BLD AUTO: 1.08 10*3/MM3 (ref 0.7–3.1)
LYMPHOCYTES NFR BLD AUTO: 9.3 % (ref 19.6–45.3)
MCH RBC QN AUTO: 31.7 PG (ref 26.6–33)
MCHC RBC AUTO-ENTMCNC: 32.4 G/DL (ref 31.5–35.7)
MCV RBC AUTO: 97.6 FL (ref 79–97)
MONOCYTES # BLD AUTO: 0.4 10*3/MM3 (ref 0.1–0.9)
MONOCYTES NFR BLD AUTO: 3.5 % (ref 5–12)
NEUTROPHILS NFR BLD AUTO: 85.7 % (ref 42.7–76)
NEUTROPHILS NFR BLD AUTO: 9.93 10*3/MM3 (ref 1.7–7)
NRBC BLD AUTO-RTO: 0 /100 WBC (ref 0–0.2)
PLATELET # BLD AUTO: 318 10*3/MM3 (ref 140–450)
PMV BLD AUTO: 9.6 FL (ref 6–12)
POTASSIUM SERPL-SCNC: 5 MMOL/L (ref 3.5–5.2)
PROT SERPL-MCNC: 7.1 G/DL (ref 6–8.5)
QT INTERVAL: 435 MS
RBC # BLD AUTO: 4.2 10*6/MM3 (ref 3.77–5.28)
SODIUM SERPL-SCNC: 135 MMOL/L (ref 136–145)
TROPONIN T SERPL-MCNC: <0.01 NG/ML (ref 0–0.03)
WBC # BLD AUTO: 11.58 10*3/MM3 (ref 3.4–10.8)

## 2021-06-01 PROCEDURE — 93005 ELECTROCARDIOGRAM TRACING: CPT | Performed by: EMERGENCY MEDICINE

## 2021-06-01 PROCEDURE — 96374 THER/PROPH/DIAG INJ IV PUSH: CPT

## 2021-06-01 PROCEDURE — 99283 EMERGENCY DEPT VISIT LOW MDM: CPT

## 2021-06-01 PROCEDURE — 96375 TX/PRO/DX INJ NEW DRUG ADDON: CPT

## 2021-06-01 PROCEDURE — 85652 RBC SED RATE AUTOMATED: CPT | Performed by: EMERGENCY MEDICINE

## 2021-06-01 PROCEDURE — 85025 COMPLETE CBC W/AUTO DIFF WBC: CPT | Performed by: EMERGENCY MEDICINE

## 2021-06-01 PROCEDURE — 25010000002 HYDROMORPHONE PER 4 MG: Performed by: EMERGENCY MEDICINE

## 2021-06-01 PROCEDURE — 93010 ELECTROCARDIOGRAM REPORT: CPT | Performed by: INTERNAL MEDICINE

## 2021-06-01 PROCEDURE — 25010000002 ONDANSETRON PER 1 MG: Performed by: EMERGENCY MEDICINE

## 2021-06-01 PROCEDURE — 80053 COMPREHEN METABOLIC PANEL: CPT | Performed by: EMERGENCY MEDICINE

## 2021-06-01 PROCEDURE — 71045 X-RAY EXAM CHEST 1 VIEW: CPT

## 2021-06-01 PROCEDURE — 70450 CT HEAD/BRAIN W/O DYE: CPT

## 2021-06-01 PROCEDURE — 84484 ASSAY OF TROPONIN QUANT: CPT | Performed by: EMERGENCY MEDICINE

## 2021-06-01 RX ORDER — HYDROMORPHONE HYDROCHLORIDE 1 MG/ML
0.5 INJECTION, SOLUTION INTRAMUSCULAR; INTRAVENOUS; SUBCUTANEOUS ONCE
Status: COMPLETED | OUTPATIENT
Start: 2021-06-01 | End: 2021-06-01

## 2021-06-01 RX ORDER — NAPROXEN 500 MG/1
TABLET ORAL
Qty: 90 TABLET | Refills: 3 | Status: SHIPPED | OUTPATIENT
Start: 2021-06-01 | End: 2021-08-03

## 2021-06-01 RX ORDER — ONDANSETRON 2 MG/ML
4 INJECTION INTRAMUSCULAR; INTRAVENOUS ONCE
Status: COMPLETED | OUTPATIENT
Start: 2021-06-01 | End: 2021-06-01

## 2021-06-01 RX ORDER — PREDNISONE 10 MG/1
10 TABLET ORAL DAILY
Qty: 7 TABLET | Refills: 0 | Status: SHIPPED | OUTPATIENT
Start: 2021-06-01 | End: 2021-06-21 | Stop reason: DRUGHIGH

## 2021-06-01 RX ORDER — HYDROCODONE BITARTRATE AND ACETAMINOPHEN 5; 325 MG/1; MG/1
1 TABLET ORAL EVERY 6 HOURS PRN
Qty: 10 TABLET | Refills: 0 | Status: SHIPPED | OUTPATIENT
Start: 2021-06-01 | End: 2022-01-17

## 2021-06-01 RX ADMIN — HYDROMORPHONE HYDROCHLORIDE 0.5 MG: 1 INJECTION, SOLUTION INTRAMUSCULAR; INTRAVENOUS; SUBCUTANEOUS at 18:40

## 2021-06-01 RX ADMIN — ONDANSETRON 4 MG: 2 INJECTION INTRAMUSCULAR; INTRAVENOUS at 18:37

## 2021-06-01 NOTE — TELEPHONE ENCOUNTER
Caller:  PARTH     Relationship: SELF      Best call back number: [phone]    PATIENT IS STILL EXPERIENCING A LOT OF JOINT PAIN FROM THE 5-26 TELEPHONE ENCOUNTER.  SHE CAN BARELY LIFT HER LEFT ARM. HER HANDS LOOK LIKE SHE HAS ARTHRITIS. VEINS LOOK THICKER. IS NEEDING HELP WITH EVERYTHING SUCH AS SHOWERING.   SHE DOESN'T EVEN FEEL LIKE SHE CAN DRIVE.   PLEASE CALL AND ADVISE   THANK YOU .

## 2021-06-01 NOTE — ED TRIAGE NOTES
Pt dizzy, weak, facial tingling (left jaw), left arm pain.  All symptoms started this afternoon.      Patient was placed in face mask during first look triage.  Patient was wearing a face mask throughout encounter.  I wore personal protective equipment throughout the encounter.  Hand hygiene was performed before and after patient encounter.

## 2021-06-01 NOTE — TELEPHONE ENCOUNTER
Reviewed pts recent messages with Dr. Jasso. Will escribe prednisone 10mg daily x7 days. Called pt to discuss but got her voicemail; left her a message with instructions and my direct number to return my call with questions or further concerns.

## 2021-06-01 NOTE — ED PROVIDER NOTES
EMERGENCY DEPARTMENT ENCOUNTER    Room Number:  21/21  PCP: Kathrni Rawls MD  Historian: Patient  History Limited By: Nothing      HPI  Chief Complaint: Arm pain  Context: Mary Jo Berg is a 71 y.o. female who presents to the ED c/o pain.  Patient has history of breast cancer.  Patient states she has had increasing pain and swelling in both arms that started 2 to 3 weeks ago.  States right arm feels better however she is having increasing pain in the left arm.  The left arm feels heavy.  Patient has some aching in the left upper arm.  She does have some aching in her jaw too.  Denies chest pain.  No shortness of breath.  She had called [facility] and that put her on steroids.  She took 1 dose prior to arrival.  Has had some swelling to both hands.      Location: Left arm pain  Radiation: None  Character: Aching  Duration: 2 to 3 weeks worse over the last 2 days  Severity: Moderate  Progression: Worsening  Aggravating Factors: Nothing  Alleviating Factors: Nothing        MEDICAL RECORD REVIEW    Patient has history of breast cancer status postmastectomy          PAST MEDICAL HISTORY  Active Ambulatory Problems     Diagnosis Date Noted   • Malignant neoplasm of overlapping sites of right female breast (CMS/[facility]) 05/28/2020   • Allergic rhinitis 03/21/2012   • Complete heart block (CMS/[facility]) 01/27/2015   • De Quervain's tenosynovitis, left 10/26/2015   • CTS (carpal tunnel syndrome) 01/02/2015   • Degenerative arthritis of thumb 06/23/2015   • Failed total knee, 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[facility]) 10/01/2013   • Gout 10/11/2013   • Hyperlipidemia 06/18/2020   • Postoperative hypothyroidism 02/14/2012   • Lumbar radiculopathy 01/16/2019   • Lumbar stenosis without neurogenic claudication 01/16/2019   • Spondylolisthesis at L5-S1 level 01/16/2019   • Diabetes 1.5, 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   • Aromatase inhibitor use 08/10/2020   • Morbidly obese (CMS/HCC) 10/20/2020   • Personal history of colonic polyps 01/08/2021      Resolved Ambulatory Problems     Diagnosis Date Noted   • Prediabetes 2020     Past Medical History:   Diagnosis Date   • Asthma    • Breast cancer (CMS/[facility]) 2020   • Diabetes mellitus (CMS/[facility])    • History of asthma    • History of colon polyps    • History of complete heart block    • Hypertension    • Hypothyroidism    • Osteoarthritis    • Osteopenia    • Pacemaker    • PONV (postoperative nausea and vomiting)          PAST SURGICAL HISTORY  Past Surgical History:   Procedure Laterality Date   • BLADDER REPAIR  2012   • BREAST BIOPSY Right 2020    malignant   • CARDIAC PACEMAKER PLACEMENT     • CARPAL TUNNEL RELEASE Right 2015   • CATARACT EXTRACTION Bilateral    •  SECTION      x2   • COLONOSCOPY  2015   • COLONOSCOPY N/A 2021    Procedure: COLONOSCOPY;  Surgeon: Yamliet Stewart MD;  Location: [facility] ENDOSCOPY;  Service: Gastroenterology;  Laterality: N/A;  pre- hx polyps  post-- diverticulosis, hemorrhoids   • ENDOMETRIAL ABLATION     • HAND SURGERY     • HERNIA REPAIR      Bilateral femoral and inguinal   • KNEE ARTHROSCOPY Bilateral    • MASTECTOMY  2020   • MASTECTOMY WITH SENTINEL NODE BIOPSY AND AXILLARY NODE DISSECTION Right 2020    Procedure: RIGHT BREAST MASTECTOMY WITH SENTINEL NODE BIOPSY;  Surgeon: Bj Stephen MD;  Location: [facility] [place] [facility] [place];  Service: General;  Laterality: Right;   • OOPHORECTOMY     • SHOULDER ARTHROSCOPY Right     rotator cuff repair, biceps tenotomy   • THYROIDECTOMY, PARTIAL     • TONSILLECTOMY AND ADENOIDECTOMY     • TOTAL KNEE ARTHROPLASTY Left    • TUBAL ABDOMINAL LIGATION           FAMILY HISTORY  Family History   Problem Relation Age of Onset   • Cervical cancer Mother    • Alzheimer's disease Mother    • Arthritis Mother    • Cancer Father         Bladder   • Heart disease Father    • Hypertension Father    • Arthritis Father    • Alcohol abuse Father    • Hyperlipidemia Father    • Arthritis Maternal Grandmother     • Heart disease Other         FH of heart disease in males before age 55   • Hyperlipidemia Brother    • Hypertension Brother    • Breast cancer Paternal Grandmother 75   • Heart attack Paternal Grandfather    • Breast cancer Paternal Cousin 70   • Malig Hyperthermia Neg Hx          SOCIAL HISTORY  Social History     Socioeconomic History   • Marital status:      Spouse name: Sean   • Number of children: Not on file   • Years of education: Not on file   • Highest education level: Not on file   Tobacco Use   • Smoking status: Never Smoker   • Smokeless tobacco: Never Used   Vaping Use   • Vaping Use: Never used   Substance and Sexual Activity   • Alcohol use: Yes     Comment: RARE   • Drug use: Never   • Sexual activity: Defer         ALLERGIES  Indomethacin, Nutritional supplements, Penicillins, Statins, Hydrochlorothiazide, Lisinopril, Morphine, and Pentazocine        REVIEW OF SYSTEMS  Review of Systems   Constitutional: Negative for fever.   HENT: Negative for sore throat.    Eyes: Negative.    Respiratory: Negative for cough and shortness of breath.    Cardiovascular: Negative for chest pain.   Gastrointestinal: Negative for abdominal pain, diarrhea and vomiting.   Genitourinary: Negative for dysuria.   Musculoskeletal: Positive for arthralgias and joint swelling. Negative for neck pain.   Skin: Negative for rash.   Allergic/Immunologic: Negative.    Neurological: Negative for weakness, numbness and headaches.   Hematological: Negative.    Psychiatric/Behavioral: Negative.    All other systems reviewed and are negative.           PHYSICAL EXAM  ED Triage Vitals [06/01/21 1625]   Temp Heart Rate Resp BP SpO2   97.4 °F (36.3 °C) 88 16 -- 97 %      Temp src Heart Rate Source Patient Position BP Location FiO2 (%)   Tympanic Monitor -- -- --       Physical Exam  Vitals and nursing note reviewed.   Constitutional:       General: She is not in acute distress.  HENT:      Head: Normocephalic and atraumatic.    Eyes:      Pupils: Pupils are equal, round, and reactive to light.   Cardiovascular:      Rate and Rhythm: Normal rate and regular rhythm.      Heart sounds: Normal heart sounds.   Pulmonary:      Effort: Pulmonary effort is normal. No respiratory distress.      Breath sounds: Normal breath sounds.   Abdominal:      Palpations: Abdomen is soft.      Tenderness: There is no abdominal tenderness. There is no guarding or rebound.   Musculoskeletal:         General: Tenderness present. Normal range of motion.      Cervical back: Normal range of motion and neck supple.      Comments: Patient has tenderness to left arm   Skin:     General: Skin is warm and dry.      Findings: No rash.   Neurological:      Mental Status: She is alert and oriented to person, place, and time.      Sensory: Sensation is intact.   Psychiatric:         Mood and Affect: Mood and affect normal.       Patient was wearing a face mask when I entered the room and they continued to wear a mask throughout their stay in the ED.  I wore PPE, including  gloves, face mask with shield or face mask with goggles whenever I was in the room with patient.       LAB RESULTS  Recent Results (from the past 24 hour(s))   Comprehensive Metabolic Panel    Collection Time: 06/01/21  4:51 PM    Specimen: Blood   Result Value Ref Range    Glucose 135 (H) 65 - 99 mg/dL    BUN 29 (H) 8 - 23 mg/dL    Creatinine 0.93 0.57 - 1.00 mg/dL    Sodium 135 (L) 136 - 145 mmol/L    Potassium 5.0 3.5 - 5.2 mmol/L    Chloride 103 98 - 107 mmol/L    CO2 19.9 (L) 22.0 - 29.0 mmol/L    Calcium 9.7 8.6 - 10.5 mg/dL    Total Protein 7.1 6.0 - 8.5 g/dL    Albumin 4.10 3.50 - 5.20 g/dL    ALT (SGPT) 14 1 - 33 U/L    AST (SGOT) 16 1 - 32 U/L    Alkaline Phosphatase 92 39 - 117 U/L    Total Bilirubin 0.2 0.0 - 1.2 mg/dL    eGFR Non African Amer 59 (L) >60 mL/min/1.73    Globulin 3.0 gm/dL    A/G Ratio 1.4 g/dL    BUN/Creatinine Ratio 31.2 (H) 7.0 - 25.0    Anion Gap 12.1 5.0 - 15.0 mmol/L    Troponin    Collection Time: 06/01/21  4:51 PM    Specimen: Blood   Result Value Ref Range    Troponin T <0.010 0.000 - 0.030 ng/mL   Sedimentation Rate    Collection Time: 06/01/21  4:51 PM    Specimen: Blood   Result Value Ref Range    Sed Rate 65 (H) 0 - 30 mm/hr   CBC Auto Differential    Collection Time: 06/01/21  4:51 PM    Specimen: Blood   Result Value Ref Range    WBC 11.58 (H) 3.40 - 10.80 10*3/mm3    RBC 4.20 3.77 - 5.28 10*6/mm3    Hemoglobin 13.3 12.0 - 15.9 g/dL    Hematocrit 41.0 34.0 - 46.6 %    MCV 97.6 (H) 79.0 - 97.0 fL    MCH 31.7 26.6 - 33.0 pg    MCHC 32.4 31.5 - 35.7 g/dL    RDW 12.9 12.3 - 15.4 %    RDW-SD 46.7 37.0 - 54.0 fl    MPV 9.6 6.0 - 12.0 fL    Platelets 318 140 - 450 10*3/mm3    Neutrophil % 85.7 (H) 42.7 - 76.0 %    Lymphocyte % 9.3 (L) 19.6 - 45.3 %    Monocyte % 3.5 (L) 5.0 - 12.0 %    Eosinophil % 0.8 0.3 - 6.2 %    Basophil % 0.3 0.0 - 1.5 %    Immature Grans % 0.4 0.0 - 0.5 %    Neutrophils, Absolute 9.93 (H) 1.70 - 7.00 10*3/mm3    Lymphocytes, Absolute 1.08 0.70 - 3.10 10*3/mm3    Monocytes, Absolute 0.40 0.10 - 0.90 10*3/mm3    Eosinophils, Absolute 0.09 0.00 - 0.40 10*3/mm3    Basophils, Absolute 0.03 0.00 - 0.20 10*3/mm3    Immature Grans, Absolute 0.05 0.00 - 0.05 10*3/mm3    nRBC 0.0 0.0 - 0.2 /100 WBC   ECG 12 Lead    Collection Time: 06/01/21  5:04 PM   Result Value Ref Range    QT Interval 435 ms       Ordered the above labs and reviewed the results.        RADIOLOGY  XR Chest 1 View   Final Result   No acute finding       This report was finalized on 6/1/2021 6:12 PM by Dr. Charles Ohara M.D.          CT Head Without Contrast   Final Result   No acute intracranial abnormality.       Radiation dose reduction techniques were utilized, including automated   exposure control and exposure modulation based on body size.       This report was finalized on 6/1/2021 5:59 PM by Dr. Charles Ohara M.D.               Ordered the above noted radiological studies.  Reviewed by me in PACS.        PROCEDURES  Procedures      EKG:          EKG time: 1704  Rhythm/Rate: Paced rhythm 78    Interpreted Contemporaneously by me, independently viewed  Unchanged compared to prior 5/8/2020        MEDICATIONS GIVEN IN ER  Medications   HYDROmorphone (DILAUDID) injection 0.5 mg (0.5 mg Intravenous Given 6/1/21 1840)   ondansetron (ZOFRAN) injection 4 mg (4 mg Intravenous Given 6/1/21 1837)             PROGRESS AND CONSULTS  ED Course as of Jun 01 1910   Tue Jun 01, 2021   1828 18:28 EDT  Patient presents for left arm pain and joint swelling.  Patient has no evidence of cardiac etiology.  No evidence of stroke as head CT negative and it is more pain than weakness.  Patient discussed with Dr. Davalos who agrees with Dr. Jasso that it is probably the meds.  Will take 10 days to wash out.  Will give small amount of pain meds for home and follow up with PMD.    [SL]      ED Course User Index  [SL] Donte Campbell MD           MEDICAL DECISION MAKING      MDM  Number of Diagnoses or Management Options     Amount and/or Complexity of Data Reviewed  Clinical lab tests: reviewed and ordered (Sed rate 65)  Tests in the radiology section of CPT®: reviewed and ordered (Head CT negative)  Tests in the medicine section of CPT®: reviewed  Decide to obtain previous medical records or to obtain history from someone other than the patient: yes  Discuss the patient with other providers: yes (Discussed with Dr. Davalos. )               DIAGNOSIS  Final diagnoses:   Acute pain of both shoulders   Bilateral hand swelling   Adverse effect of drug, initial encounter           DISPOSITION  DISCHARGE    Patient discharged in stable condition.    Reviewed implications of results, diagnosis, meds, responsibility to follow up, warning signs and symptoms of possible worsening, potential complications and reasons to return to ER, including worsening pain.    Patient/Family voiced understanding of above  instructions.    Discussed plan for discharge, as there is no emergent indication for admission. Patient referred to primary care provider for BP management due to today's BP. Pt/family is agreeable and understands need for follow up and repeat testing.  Pt is aware that discharge does not mean that nothing is wrong but it indicates no emergency is present that requires admission and they must continue care with follow-up as given below or physician of their choice.     FOLLOW-UP  Syed Jasso MD  [address] [id number] [phone]    Call in 1 day           Medication List      New Prescriptions    HYDROcodone-acetaminophen 5-325 MG per tablet  Commonly known as: NORCO  Take 1 tablet by mouth Every 6 (Six) Hours As Needed for Moderate Pain .        Changed    gabapentin 100 MG capsule  Commonly known as: NEURONTIN  Take 1 capsule by mouth 3 (Three) Times a Day.  What changed: when to take this           Where to Get Your Medications      These medications were sent to [facility] - [place] [address]  [phone]  - [phone]   [address] [place] [address]    Phone: [phone]   · HYDROcodone-acetaminophen 5-325 MG per tablet             Latest Documented Vital Signs:  As of 19:10 EDT  BP- 134/67 HR- 85 Temp- 97.4 °F (36.3 °C) (Tympanic) O2 sat- 97%                           Donte Campbell MD  06/01/21 2159

## 2021-06-01 NOTE — TELEPHONE ENCOUNTER
PATIENT CALLED BACK AT 11:05 STATING THAT SHE HAS NOT YET REC'D A CALL AND HAS NO MISSED CALLS ON HER PHONE.    WENT OVER MESSAGE INFO BUT SHE WOULD STILL LIKE TO RECEIVE A CALL FROM THE NURSE.  PLEASE CONTACT HER AS SOON AS POSSIBLE AT THE NUMBER LISTED.

## 2021-06-02 RX ORDER — PREDNISONE 10 MG/1
20 TABLET ORAL DAILY
Qty: 7 TABLET | Refills: 0 | Status: SHIPPED | OUTPATIENT
Start: 2021-06-02 | End: 2021-06-21 | Stop reason: DRUGHIGH

## 2021-06-02 NOTE — TELEPHONE ENCOUNTER
Caller: Mary Jo Berg    Relationship: Self    Best call back number: [phone]    PATIENT IS CURRENTLY ON 10-MG OF PREDNISONE AND IS REQUESTING THIS TO BE INCREASED TO 20-MG IF POSSIBLE.   ER PHYSICIAN AND DR. CASTREJON BOTH THOUGHT THAT THIS WOULD BE GOOD IDEA BUT PATIENT ISN'T SURE DUE HER HAVING DIABETES.      SHE WOULD LIKE FOR DR. GARCIA TO KNOW THAT SHE HAS BEEN TAKING 500-MG OF METFORMIN 3 X A DAY FOR THE PAST 2 MONTHS AND HER A1-C HAD DECREASED TO 6.3    IN ADDITION, SHE STATES THAT SHE DOES NOT WANT TO CONTINUE TAKING THE HORMONE SUPPLEMENT MEDICATION THAT SHE CURRENTLY HAS PRESCRIBED BUT WOULD TAKE SOMETHING DIFFERENT. WOULD LIKE TO DISCUSS THIS FIRST.     PLEASE CONTACT HER AT NUMBER LISTED ABOVE.  SHE IS AVAILABLE ANYTIME TODAY.

## 2021-06-02 NOTE — TELEPHONE ENCOUNTER
Spoke with pt re: her visit to the ER last night. States the pain she called in tor report yesterday had begun moving from her upper arms up to her jaw so she was very concerned she was having a cardiac event. I reviewed the ER note and discussed with Dr. Jasso pt's request to increase prednisone to 20mg daily. Have routed new prescription to Dr. Jasso. Pt states she never wants to take letrozole again. Will follow up with pt over the next couple of weeks via the phone to assess resolution of symptoms. Of course pt will call if she develops any further issues.

## 2021-06-03 ENCOUNTER — TELEPHONE (OUTPATIENT)
Dept: FAMILY MEDICINE CLINIC | Facility: CLINIC | Age: 71
End: 2021-06-03

## 2021-06-03 NOTE — TELEPHONE ENCOUNTER
Patient is wondering if you need to see her again before you leave. She cant remember if you all needed to discuss anything. She states she already got her A1C done that you wanted her to get. Please advise.

## 2021-06-03 NOTE — TELEPHONE ENCOUNTER
Please let her know I do not think she needs to see me unless she has any new concerns that she wants to discuss.  Her A1c was excellent and does not need repeated until this fall.  If she is in need of any medication refills please have her let me know and I would be happy to make sure that she has a full years worth of medications that she needs while she is in transition to find a new provider.

## 2021-06-16 ENCOUNTER — TELEPHONE (OUTPATIENT)
Dept: ONCOLOGY | Facility: CLINIC | Age: 71
End: 2021-06-16

## 2021-06-16 DIAGNOSIS — M25.541 JOINT PAIN IN BOTH HANDS: ICD-10-CM

## 2021-06-16 DIAGNOSIS — M25.542 JOINT PAIN IN BOTH HANDS: ICD-10-CM

## 2021-06-16 DIAGNOSIS — Z79.811 AROMATASE INHIBITOR USE: ICD-10-CM

## 2021-06-16 DIAGNOSIS — Z17.0 MALIGNANT NEOPLASM OF OVERLAPPING SITES OF RIGHT BREAST IN FEMALE, ESTROGEN RECEPTOR POSITIVE (HCC): Primary | ICD-10-CM

## 2021-06-16 DIAGNOSIS — C50.811 MALIGNANT NEOPLASM OF OVERLAPPING SITES OF RIGHT BREAST IN FEMALE, ESTROGEN RECEPTOR POSITIVE (HCC): Primary | ICD-10-CM

## 2021-06-16 DIAGNOSIS — M18.10 OSTEOARTHRITIS OF THUMB, UNSPECIFIED LATERALITY: ICD-10-CM

## 2021-06-16 NOTE — TELEPHONE ENCOUNTER
Pt called re: break from femara. States the severe joint pain she was experiencing at her ER visit resolved when she was on the prednisone.  But she finished her last dose 2 days ago and the joint pain has returned and even states she has some hip pain now. Dr Jasso called pt to discuss with her and per Dr. Jasso, I have placed a rheumatology referral.

## 2021-06-21 DIAGNOSIS — M18.10 OSTEOARTHRITIS OF THUMB, UNSPECIFIED LATERALITY: Primary | ICD-10-CM

## 2021-06-21 DIAGNOSIS — Z17.0 MALIGNANT NEOPLASM OF OVERLAPPING SITES OF RIGHT BREAST IN FEMALE, ESTROGEN RECEPTOR POSITIVE (HCC): ICD-10-CM

## 2021-06-21 DIAGNOSIS — Z79.811 AROMATASE INHIBITOR USE: ICD-10-CM

## 2021-06-21 DIAGNOSIS — C50.811 MALIGNANT NEOPLASM OF OVERLAPPING SITES OF RIGHT BREAST IN FEMALE, ESTROGEN RECEPTOR POSITIVE (HCC): ICD-10-CM

## 2021-06-21 RX ORDER — PREDNISONE 10 MG/1
10 TABLET ORAL DAILY
Qty: 30 TABLET | Refills: 0 | Status: SHIPPED | OUTPATIENT
Start: 2021-06-21 | End: 2021-07-19 | Stop reason: SDUPTHER

## 2021-06-22 ENCOUNTER — TELEPHONE (OUTPATIENT)
Dept: ONCOLOGY | Facility: CLINIC | Age: 71
End: 2021-06-22

## 2021-06-22 NOTE — TELEPHONE ENCOUNTER
----- Message from Sharron Bullard RN sent at 6/21/2021  1:45 PM EDT -----  Regarding: rheumatology referral  Please arrange for scheduling with Slava Cm, Dr. You or partner or NP/PA in that office. Currently scheduled with Radha Edmonds, but wait is too long. Need to have her seen sooner than September

## 2021-06-22 NOTE — TELEPHONE ENCOUNTER
Called pt and advised that kimberlyn is booked to march of 2022, she will call [facility] to see if they can move up or make sooner with other provider, df

## 2021-07-19 ENCOUNTER — OFFICE VISIT (OUTPATIENT)
Dept: ONCOLOGY | Facility: CLINIC | Age: 71
End: 2021-07-19

## 2021-07-19 ENCOUNTER — LAB (OUTPATIENT)
Dept: OTHER | Facility: HOSPITAL | Age: 71
End: 2021-07-19

## 2021-07-19 VITALS
WEIGHT: 204.9 LBS | HEIGHT: 61 IN | SYSTOLIC BLOOD PRESSURE: 113 MMHG | HEART RATE: 75 BPM | OXYGEN SATURATION: 97 % | TEMPERATURE: 96.9 F | DIASTOLIC BLOOD PRESSURE: 69 MMHG | BODY MASS INDEX: 38.68 KG/M2 | RESPIRATION RATE: 16 BRPM

## 2021-07-19 DIAGNOSIS — Z79.811 AROMATASE INHIBITOR USE: ICD-10-CM

## 2021-07-19 DIAGNOSIS — Z17.0 MALIGNANT NEOPLASM OF UPPER-OUTER QUADRANT OF LEFT BREAST IN FEMALE, ESTROGEN RECEPTOR POSITIVE (HCC): ICD-10-CM

## 2021-07-19 DIAGNOSIS — C50.412 MALIGNANT NEOPLASM OF UPPER-OUTER QUADRANT OF LEFT BREAST IN FEMALE, ESTROGEN RECEPTOR POSITIVE (HCC): ICD-10-CM

## 2021-07-19 DIAGNOSIS — Z79.811 AROMATASE INHIBITOR USE: Primary | ICD-10-CM

## 2021-07-19 LAB
ALBUMIN SERPL-MCNC: 4.3 G/DL (ref 3.5–5.2)
ALBUMIN/GLOB SERPL: 1.3 G/DL
ALP SERPL-CCNC: 85 U/L (ref 39–117)
ALT SERPL W P-5'-P-CCNC: 13 U/L (ref 1–33)
ANION GAP SERPL CALCULATED.3IONS-SCNC: 9.8 MMOL/L (ref 5–15)
AST SERPL-CCNC: 15 U/L (ref 1–32)
BASOPHILS # BLD AUTO: 0.03 10*3/MM3 (ref 0–0.2)
BASOPHILS NFR BLD AUTO: 0.3 % (ref 0–1.5)
BILIRUB SERPL-MCNC: 0.3 MG/DL (ref 0–1.2)
BUN SERPL-MCNC: 36 MG/DL (ref 8–23)
BUN/CREAT SERPL: 35.3 (ref 7–25)
CALCIUM SPEC-SCNC: 10.2 MG/DL (ref 8.6–10.5)
CHLORIDE SERPL-SCNC: 104 MMOL/L (ref 98–107)
CO2 SERPL-SCNC: 24.2 MMOL/L (ref 22–29)
CREAT SERPL-MCNC: 1.02 MG/DL (ref 0.57–1)
DEPRECATED RDW RBC AUTO: 50.6 FL (ref 37–54)
EOSINOPHIL # BLD AUTO: 0.07 10*3/MM3 (ref 0–0.4)
EOSINOPHIL NFR BLD AUTO: 0.7 % (ref 0.3–6.2)
ERYTHROCYTE [DISTWIDTH] IN BLOOD BY AUTOMATED COUNT: 14.3 % (ref 12.3–15.4)
GFR SERPL CREATININE-BSD FRML MDRD: 53 ML/MIN/1.73
GLOBULIN UR ELPH-MCNC: 3.3 GM/DL
GLUCOSE SERPL-MCNC: 116 MG/DL (ref 65–99)
HCT VFR BLD AUTO: 41 % (ref 34–46.6)
HGB BLD-MCNC: 13.4 G/DL (ref 12–15.9)
IMM GRANULOCYTES # BLD AUTO: 0.07 10*3/MM3 (ref 0–0.05)
IMM GRANULOCYTES NFR BLD AUTO: 0.7 % (ref 0–0.5)
LYMPHOCYTES # BLD AUTO: 1.64 10*3/MM3 (ref 0.7–3.1)
LYMPHOCYTES NFR BLD AUTO: 16.5 % (ref 19.6–45.3)
MCH RBC QN AUTO: 31.7 PG (ref 26.6–33)
MCHC RBC AUTO-ENTMCNC: 32.7 G/DL (ref 31.5–35.7)
MCV RBC AUTO: 96.9 FL (ref 79–97)
MONOCYTES # BLD AUTO: 0.88 10*3/MM3 (ref 0.1–0.9)
MONOCYTES NFR BLD AUTO: 8.9 % (ref 5–12)
NEUTROPHILS NFR BLD AUTO: 7.23 10*3/MM3 (ref 1.7–7)
NEUTROPHILS NFR BLD AUTO: 72.9 % (ref 42.7–76)
NRBC BLD AUTO-RTO: 0 /100 WBC (ref 0–0.2)
PLATELET # BLD AUTO: 278 10*3/MM3 (ref 140–450)
PMV BLD AUTO: 10 FL (ref 6–12)
POTASSIUM SERPL-SCNC: 5 MMOL/L (ref 3.5–5.2)
PROT SERPL-MCNC: 7.6 G/DL (ref 6–8.5)
RBC # BLD AUTO: 4.23 10*6/MM3 (ref 3.77–5.28)
SODIUM SERPL-SCNC: 138 MMOL/L (ref 136–145)
WBC # BLD AUTO: 9.92 10*3/MM3 (ref 3.4–10.8)

## 2021-07-19 PROCEDURE — 85025 COMPLETE CBC W/AUTO DIFF WBC: CPT | Performed by: NURSE PRACTITIONER

## 2021-07-19 PROCEDURE — 80053 COMPREHEN METABOLIC PANEL: CPT | Performed by: NURSE PRACTITIONER

## 2021-07-19 PROCEDURE — 36415 COLL VENOUS BLD VENIPUNCTURE: CPT

## 2021-07-19 PROCEDURE — 99214 OFFICE O/P EST MOD 30 MIN: CPT | Performed by: INTERNAL MEDICINE

## 2021-07-19 RX ORDER — PREDNISONE 10 MG/1
10 TABLET ORAL DAILY
Qty: 30 TABLET | Refills: 0 | Status: SHIPPED | OUTPATIENT
Start: 2021-07-19 | End: 2021-08-03

## 2021-07-19 RX ORDER — TAMOXIFEN CITRATE 20 MG/1
20 TABLET ORAL DAILY
Qty: 30 TABLET | Refills: 6 | Status: SHIPPED | OUTPATIENT
Start: 2021-07-19 | End: 2021-10-17

## 2021-07-19 NOTE — PROGRESS NOTES
Subjective      REASON FOR CONSULTATION:   1.Right breast cancer invasive low-grade ER positive UT negative HER-2 negative breast cancer at 6:00 with DCIS at 1:00 post biopsy; Simple mastectomy on 5/11/2020 and this shows an invasive cancer measuring 19 x 13 mmI- DCIS present measuring 15 mm margins were widely clear 5 sentinel nodes were negative for metastatic disease still grade 1. No lymphovascular invasion.                             REQUESTING PHYSICIAN: MD Keshia Durán MD    History of Present Illness patient is a 70-year-old lady with a small node negative hormone positive right breast cancer currently on Femara 6 months and off now for almost 4 weeks because of worsening crippling arthritis for which we had to start her on prednisone.  Within a week of her symptoms are dramatically improved and she is just finishing a month of 10 mg of prednisone I have asked her to cut back to 5 mg every other day for a week and then discontinue it    I recommended rheumatological evaluation but they cannot see her till September.  In the meantime I have recommended she start back on tamoxifen 20 mg daily which should not have the same joint effects as Femara and Arimidex did hopefully    She does not want to take Arimidex which she thinks precipitated her gout.  Before switching to Aromasin I think it is worth a trial of gabapentin for the hot flashes and I prescribed 100 mg to start at bedtime increased to 200 and even 300 at bedtime and resume her Femara  She is weaned down to 100 mg of allopurinol and is having no problems with gout     she is due for breast imaging again in February and I have scheduled this    She otherwise denies further concerns today.     Past Medical History:   Diagnosis Date   • Asthma    • Breast cancer (CMS/HCC) 03/2020    right IDC   • Diabetes mellitus (CMS/HCC)     Type 2   • History of asthma    • History of colon  polyps    • History of complete heart block     paced in 2015   • Hyperlipidemia    • Hypertension    • Hypothyroidism    • Osteoarthritis    • Osteopenia    • Pacemaker    • PONV (postoperative nausea and vomiting)         Past Surgical History:   Procedure Laterality Date   • BLADDER REPAIR  2012   • BREAST BIOPSY Right 2020    malignant   • CARDIAC PACEMAKER PLACEMENT     • CARPAL TUNNEL RELEASE Right 2015   • CATARACT EXTRACTION Bilateral    •  SECTION      x2   • COLONOSCOPY  2015   • COLONOSCOPY N/A 2021    Procedure: COLONOSCOPY;  Surgeon: Yamilet Stewart MD;  Location: [facility] ENDOSCOPY;  Service: Gastroenterology;  Laterality: N/A;  pre- hx polyps  post-- diverticulosis, hemorrhoids   • ENDOMETRIAL ABLATION     • HAND SURGERY     • HERNIA REPAIR      Bilateral femoral and inguinal   • KNEE ARTHROSCOPY Bilateral    • MASTECTOMY  2020   • MASTECTOMY WITH SENTINEL NODE BIOPSY AND AXILLARY NODE DISSECTION Right 2020    Procedure: RIGHT BREAST MASTECTOMY WITH SENTINEL NODE BIOPSY;  Surgeon: Bj Stephen MD;  [place] [facility] [place] [facility] [place]  Service: General;  Laterality: Right;   • OOPHORECTOMY     • SHOULDER ARTHROSCOPY Right     rotator cuff repair, biceps tenotomy   • THYROIDECTOMY, PARTIAL     • TONSILLECTOMY AND ADENOIDECTOMY     • TOTAL KNEE ARTHROPLASTY Left    • TUBAL ABDOMINAL LIGATION      patient is a 70-year-old white female with hypercholesterolemia diabetes type 2 hypertension hypothyroidism and pacemaker placement 2015 was noted on routine mammogram in  of this last year to have some abnormalities in the right breast that warranted close follow-up.  This led to a repeat mammogram in January which was delayed due to her trip she took Sh[place] with her  when she came back in February she did the mammogram and this showed persistence and worsening of the calcifications at the 6:00 and 1 o'clock position of the right breast.    This led to a biopsy of  both these regions on 3/5/2020 which showed DCIS at the 1 o'clock position intermediate grade with calcifications and no necrosis and well differentiated grade 1 infiltrating ductal carcinoma at the 6 o'clock position measuring 2 mm with no lymph vascular invasion and associated DCIS intermediate grade the tumor was positive for estrogen greater than 95% negative for progesterone 0% HER-2 negative at 1+ Ki-67 of 5%.    Patient was referred to Dr. Stephen who ordered bilateral breast MRIs which showed residual 2 cm area of enhancement at the 6 o'clock position but no further abnormalities at the 1 o'clock position of the right breast.  There is no axillary or internal mammary adenopathy in the left breast was benign.  The mass is not palpable by the patient or Dr. Stephen    Based on the recent coronavirus outbreak which is ongoing elective surgeries for grade 1 breast cancers are being delayed based on guidelines from the hospital and from the oncological community and she is here to discuss neoadjuvant hormonal blockade.    Patient is  3 para 3 first childbirth was age 22 she breast-fed 2 of her 3 children for at least 4 to 6 months menarche was at age 11 menopause at 49 she did not take hormones after menopause    Family history is positive for paternal grandmother with metastatic breast cancer at age 75 a paternal first cousin with breast cancer at age 70 her father  with bladder cancer mother had endometrial carcinoma at age 80.  She is concerned about genetic testing because she has 3 daughters and 3 grand daughters.    She is not a smoker or drinker and denies DVTs MI or stroke    Because of the delay of surgery due to the coronavirus we decided to do Oncotype on her biopsy and start aromatase inhibitor in the neoadjuvant setting    The side effects and toxicities of the Aromatase inhibitors was discussed with the patient including, hot flashes, mood swings and hair thinning.Significant  arthralgias and worsening bone density were also discussed. Baseline bone density evaluation was ordered.  I discussed with the patient and her  on the video conference that this was not the standard of care usually but because of the pandemic and making treatment decisions for the safety of the patient and based on delaying elective surgery for certain subsets of breast cancer which she presented.    I explained the rationale of doing an Oncotype DX score and told her that if she was not willing to take chemotherapy there would be no reason to do this but she is willing to take chemotherapy if she has a high Oncotype score and therefore we will send this results and she will call me in 2 weeks to discuss them    There are no clear guidelines on how to manage patients with clinically node-negative disease with a high Oncotype and we may decide to move up her surgery so that we will have more information with which to make a decision about chemotherapy in order to decide how much chemotherapy to give her    She and her  were willing to go this route and she will call me in 2 weeks to discuss her Oncotype score    I am concerned about her general joint discomfort which might be aggravated by an aromatase inhibitor but we will deal with that if it occurs    I will also schedule her to see the genetic counselors and they could possibly do a video visit with her also and decide about genetic testing    4/20  Patient is a 70-year-old female with small mammographically detected right breast cancer ER positive MN negative and we opted to start Arimidex preoperatively because surgeries are being delayed for the coronavirus pandemic.  We will also ask for an Oncotype DX to be done on her biopsy specimen to help us make decisions about further treatment.  Unfortunately the specimen was too small and the Oncotype could not be done.  In addition she developed gout in her foot after 2 or 3 doses of Arimidex which I  do not think is related to the Arimidex and she was evaluated by her daughter who is a physician who did not think there was any infection she tried nonsteroidals with some improvement but she still not better and has not yet restarted her Arimidex    In light of all these findings I have given her a short prednisone bolus to see if this will help the gout and at this point I think we should move ahead with surgery in the next 3 to 4 weeks and hold off on the Arimidex until after surgery so we can get all the information we need at the time of surgery    She is okay with this approach and I have called her surgeon and her cardiologist to clear her for surgery    She will need to stop her nonsteroidals at least a week before surgery and hopefully the steroids will help the foot enough that she does not have to take the nonsteroidals    She has had no fever or other signs of infection.    Simple mastectomy on 5/11/2020 and this shows an invasive cancer measuring 19 x 13 mmI- DCIS present measuring 15 mm margins were widely clear 5 sentinel nodes were negative for metastatic disease still grade 1. No lymphovascular invasion.    Patient discontinuing Arimidex in May 2020 due to concerns for contributing to gout.  Oncotype DX score returning 22.  Patient beginning Femara late May 2020.    Current Outpatient Medications on File Prior to Visit   Medication Sig Dispense Refill   • allopurinol (ZYLOPRIM) 100 MG tablet Take 1 tablet by mouth twice daily 180 tablet 1   • aspirin (aspirin) 81 MG EC tablet Take 81 mg by mouth Daily.     • clotrimazole-betamethasone (LOTRISONE) 1-0.05 % cream APPLY CREAM TO AFFECTED AREA IN GROIN TWICE DAILY     • Doxylamine Succinate, Sleep, (UNISOM PO) Take 1 tablet by mouth At Night As Needed.     • ezetimibe (ZETIA) 10 MG tablet Take 10 mg by mouth Daily.     • fluticasone (FLONASE) 50 MCG/ACT nasal spray 2 sprays into the nostril(s) as directed by provider As Needed.     • gabapentin  (NEURONTIN) 100 MG capsule Take 1 capsule by mouth 3 (Three) Times a Day. (Patient taking differently: Take 100 mg by mouth Daily.) 90 capsule 3   • glucose blood test strip ACCU-CHECK SKYLA. Use to check blood sugar once daily, as directed. DX: E11.9     • HYDROcodone-acetaminophen (NORCO) 5-325 MG per tablet Take 1 tablet by mouth Every 6 (Six) Hours As Needed for Moderate Pain . 10 tablet 0   • irbesartan (AVAPRO) 150 MG tablet Take 150 mg by mouth Every Morning.     • Lactobacillus Rhamnosus, GG, (RA Probiotic Digestive Care) capsule Take 1 capsule by mouth Daily.     • Lancets (ACCU-CHEK MULTICLIX) lancets Use to check blood sugar once daily, as directed. DX: 250.00     • levothyroxine (SYNTHROID, LEVOTHROID) 125 MCG tablet Take 1 tablet by mouth once daily     • Loratadine 10 MG capsule Take 1 tablet by mouth Daily.     • metFORMIN (GLUCOPHAGE) 500 MG tablet Take 2 tablets p.o. every morning and 1 tablet p.o. every afternoon. 270 tablet 1   • metoprolol succinate XL (TOPROL-XL) 25 MG 24 hr tablet Take 25 mg by mouth Every Night.     • naproxen (NAPROSYN) 500 MG tablet Take 1 tablet by mouth twice daily as needed for pain 90 tablet 3   • predniSONE (DELTASONE) 10 MG tablet Take 1 tablet by mouth Daily. 30 tablet 0   • Thyroid (LEVOTHYROXINE-LIOTHYRONINE PO) Take 125 mg by mouth Daily. 1/2 ON SUNDAYS     • vitamin B-12 (CYANOCOBALAMIN) 1000 MCG tablet Take 1,000 mcg by mouth Daily. Every other day       No current facility-administered medications on file prior to visit.        ALLERGIES:    Allergies   Allergen Reactions   • Indomethacin Nausea Only and Dizziness     EAR RINGING     • Nutritional Supplements Swelling     [place] nuts - no other nuts that she is aware of   • Penicillins Anaphylaxis, Hives, Shortness Of Breath and Itching      tachycardia     • Statins Unknown - High Severity   • Hydrochlorothiazide Other (See Comments)     Caused gout symptoms     • Lisinopril Cough   • Morphine Nausea Only      "       • Pentazocine Nausea And Vomiting               Social History     Socioeconomic History   • Marital status:      Spouse name: Sean   • Number of children: Not on file   • Years of education: Not on file   • Highest education level: Not on file   Tobacco Use   • Smoking status: Never Smoker   • Smokeless tobacco: Never Used   Vaping Use   • Vaping Use: Never used   Substance and Sexual Activity   • Alcohol use: Yes     Comment: RARE   • Drug use: Never   • Sexual activity: Defer        Family History   Problem Relation Age of Onset   • Cervical cancer Mother    • Alzheimer's disease Mother    • Arthritis Mother    • Cancer Father         Bladder   • Heart disease Father    • Hypertension Father    • Arthritis Father    • Alcohol abuse Father    • Hyperlipidemia Father    • Arthritis Maternal Grandmother    • Heart disease Other         FH of heart disease in males before age 55   • Hyperlipidemia Brother    • Hypertension Brother    • Breast cancer Paternal Grandmother 75   • Heart attack Paternal Grandfather    • Breast cancer Paternal Cousin 70   • Malig Hyperthermia Neg Hx         Review of Systems   Constitutional: Positive for diaphoresis.   Respiratory: Negative for shortness of breath.    Cardiovascular: Negative for chest pain, palpitations and leg swelling.   Musculoskeletal: Positive for gait problem (Arthritis in the knees one is replaced  in evening).   Neurological: Positive for headaches. Negative for dizziness and weakness.   All other systems reviewed and are negative.       Objective   Vitals:    07/19/21 1347   BP: 113/69   Pulse: 75   Resp: 16   Temp: 96.9 °F (36.1 °C)   TempSrc: Skin   SpO2: 97%   Weight: 92.9 kg (204 lb 14.4 oz)   Height: 154.9 cm (60.98\")   PainSc: 0-No pain     Current Status 2/1/2021   ECOG score 0       Physical exam  This patient's ACP documentation is up to date, and there's nothing further left to document.    CONSTITUTIONAL:  Vital signs reviewed.  No " distress, looks comfortable.  EYES:  Conjunctiva and lids unremarkable.  PERRLA  EARS,NOSE,MOUTH,THROAT:  Ears and nose appear unremarkable.  Lips, teeth, gums appear unremarkable.  RESPIRATORY:  Normal respiratory effort.  Lungs clear to auscultation bilaterally.  BREASTS: Right chest wall benign left breast shows no palpable masses  CARDIOVASCULAR:  Normal S1, S2.  No murmurs rubs or gallops.  No significant lower extremity edema.  GASTROINTESTINAL: Abdomen appears unremarkable.  Nontender.  No hepatomegaly.  No splenomegaly.  LYMPHATIC:  No cervical, supraclavicular, axillary lymphadenopathy.  SKIN:  Warm.  No rashes.  PSYCHIATRIC:  Normal judgment and insight.  Normal mood and affect.   I have reexamined the patient and the results are consistent with the previously documented exam. Syed Jasso MD         RECENT LABS:  Lab Results   Component Value Date    WBC 9.92 07/19/2021    HGB 13.4 07/19/2021    HCT 41.0 07/19/2021    MCV 96.9 07/19/2021     07/19/2021     Lab Results   Component Value Date    GLUCOSE 116 (H) 07/19/2021    BUN 36 (H) 07/19/2021    CREATININE 1.02 (H) 07/19/2021    EGFRIFNONA 53 (L) 07/19/2021    EGFRIFAFRI 66 09/04/2020    BCR 35.3 (H) 07/19/2021    K 5.0 07/19/2021    CO2 24.2 07/19/2021    CALCIUM 10.2 07/19/2021    PROTENTOTREF 6.8 08/20/2020    ALBUMIN 4.30 07/19/2021    LABIL2 2.0 08/20/2020    AST 15 07/19/2021    ALT 13 07/19/2021             Results from last 7 days   Lab Units 07/19/21  1328   WBC 10*3/mm3 9.92   NEUTROS ABS 10*3/mm3 7.23*   HEMOGLOBIN g/dL 13.4   HEMATOCRIT % 41.0   PLATELETS 10*3/mm3 278     Results from last 7 days   Lab Units 07/19/21  1328   SODIUM mmol/L 138   POTASSIUM mmol/L 5.0   CHLORIDE mmol/L 104   CO2 mmol/L 24.2   BUN mg/dL 36*   CREATININE mg/dL 1.02*   CALCIUM mg/dL 10.2   ALBUMIN g/dL 4.30   BILIRUBIN mg/dL 0.3   ALK PHOS U/L 85   ALT (SGPT) U/L 13   AST (SGOT) U/L 15   GLUCOSE mg/dL 116*             SPECIMEN   Procedure  Total  mastectomy    Specimen Laterality  Right    TUMOR   Tumor Site  Lower inner quadrant    Clock Position of Tumor Site  6 o'clock    Histologic Type  Invasive carcinoma of no special type (invasive ductal carcinoma, not otherwise specified)    Glandular (Acinar) / Tubular Differentiation  Score 2    Nuclear Pleomorphism  Score 2    Mitotic Rate  Score 1    Overall Grade  Grade 1 (scores of 3, 4 or 5)    Tumor Size  Greatest dimension of largest invasive focus (Millimeters): 19 mm   Additional Dimension (Millimeters)  13 mm     10 mm   Tumor Focality  Single focus of invasive carcinoma    Ductal Carcinoma In Situ (DCIS)  Present      Negative for extensive intraductal component (EIC)    Size (Extent) of DCIS  Estimated size (extent) of DCIS is at least (Millimeters): 15 mm   Architectural Patterns  Cribriform      Papillary      Solid    Nuclear Grade  Grade II (intermediate)    Necrosis  Present, focal (small foci or single cell necrosis)    Lobular Carcinoma In Situ (LCIS)  No LCIS in specimen    Tumor Extent     Lymphovascular Invasion  Not identified    Dermal Lymphovascular Invasion  Not identified    Microcalcifications  Present in DCIS      Present in invasive carcinoma      Present in non-neoplastic tissue    Treatment Effect  No known presurgical therapy    MARGINS   Invasive Carcinoma Margins  Uninvolved by invasive carcinoma    Distance from Closest Margin (Millimeters)  10 mm   Closest Margin(s)  Lateral    Distance from Other Margins     Anterior Margin (Millimeters)  40 mm   Posterior Margin (Millimeters)  45 mm   Superior Margin (Millimeters)  18 mm   Inferior Margin (Millimeters)  13 mm   Medial Margin (Millimeters)  22 mm   Lateral Margin (Millimeters)  10 mm   DCIS Margins  Uninvolved by DCIS    Distance from Closest Margin (Millimeters)  14 mm   Closest Margin(s)  Superior      Inferior    Distance from Other Margins     Anterior Margin (Millimeters)  20 mm   Posterior Margin (Millimeters)  40 mm    Superior Margin (Millimeters)  14 mm   Inferior Margin (Millimeters)  14 mm   Medial Margin (Millimeters)  30 mm   Lateral Margin (Millimeters)  21 mm   LYMPH NODES   Regional Lymph Nodes  Uninvolved by tumor cells    Total Number of Lymph Nodes Examined  5    Number of [place] Nodes Examined  5    PATHOLOGIC STAGE CLASSIFICATION (pTNM, AJCC 8th Edition)   Primary Tumor (pT)  pT1c    Regional Lymph Nodes Modifier  (sn): Only sentinel node(s) evaluated.    Regional Lymph Nodes (pN)  pN0    .   Breast Biomarker Reporting Template   Breast.Bmk - 1   Protocol posted: 8/28/2019   Test(s) Performed     Estrogen Receptor (ER) Status  Positive    Percentage of Cells with Nuclear Positivity  %    Average Intensity of Staining  Strong    Test Type  Food and Drug Administration (FDA) cleared (test / vendor): [facility]    Primary Antibody  SP1    Scoring System  Ella    Proportion Score  5    Intensity Score  3    Total Ella Score  8    Test(s) Performed     Progesterone Receptor (PgR) Status  Positive    Percentage of Cells with Nuclear Positivity  5 %   Average Intensity of Staining  Moderate    Test Type  Food and Drug Administration (FDA) cleared (test / vendor): [facility]    Primary Antibody  1E2    Scoring System  Ella    Proportion Score  2    Intensity Score  2    Total Ella Score  4    Test(s) Performed     HER2 by Immunohistochemistry  Equivocal (Score 2+)    Percentage of Cells with Uniform Intense Complete Membrane Staining  0 %   Test Type  Food and Drug Administration (FDA) cleared (test / vendor): [facility]    Primary Antibody  4B5    Test(s) Performed  Ki-67    Percentage of Positive Nuclei  10 %   Primary Antibody           Assessment/Plan   1.cT1cN0 grade 1 infiltrating ductal carcinoma right breast at 6:00 ER positive DC negative HER-2 negative with associated intermediate grade DCIS the 1 o'clock position of the breast both lesions nonpalpable  · pT1cN0 grade 1 ER positive DC negative HER-2  negative post mastectomy on 5/11/2020  · Oncotype DX score 22  · Patient discontinued Arimidex in early May 2020 and soon thereafter began Femara 2.5 mg daily.  · Discontinued Femara in December due to worsening hot flashes and difficulty sleeping  · Gabapentin added in 2/21 with Femara resumed    2.  Type 2 diabetes/hypertension hypercholesterolemia/  3.  Hypothyroidism  4.  Significant degenerative arthritis on nonsteroidals  5.  Family history of breast cancer.  Genetics testing negative.  6.  Normal bone density in 2018  7.  Gout right foot, recurrent.  Patient initiated on allopurinol 100 mg daily per Dr. Mitchell in June 2020.      PLAN:  1.  Tamoxifen 20 mg daily to start in 2 to 3 weeks  2.  Continue  Gabapentin 100 mg at bedtime    3.  Continue baby aspirin   4.  Taper off prednisone over the next week and follow-up with rheumatology   5. see me in 4 months for follow-up

## 2021-07-20 ENCOUNTER — TELEPHONE (OUTPATIENT)
Dept: ONCOLOGY | Facility: CLINIC | Age: 71
End: 2021-07-20

## 2021-07-20 NOTE — TELEPHONE ENCOUNTER
PT WAS SEEN BY DR. GARCIA YESTERDAY.  SHE WAS LOOKING AT HER LABS ON MY CHART AND SHE NOTICED HER CREAT. WAS ELEVATED AT 1.02.  VOICES CONCERN OVER THIS NUMBER AND WANTED TO KNOW IF DR. GARCIA SAW THAT IT WAS ELEVATED.  PT. VOICES SHE DRINKS PLENTY OF WATER.  INFORMED PT. IT WAS .93 ON 6/1/21, BUT IT HAS BEEN AS HIGH AS 1.07 WHICH WAS ABOUT 5 MONTHS AGO.  WILL SEND  DR. GARCIA'S CLINICAL NURSE A MESSAGE.  INFORMED PT. WE WILL ONLY GET BACK WITH HER IF DR. GARCIA HAS ANYTHING TO ADD.  UNDERSTANDING NOTED.

## 2021-07-21 ENCOUNTER — TELEPHONE (OUTPATIENT)
Dept: ONCOLOGY | Facility: CLINIC | Age: 71
End: 2021-07-21

## 2021-07-21 NOTE — TELEPHONE ENCOUNTER
INFORMED PT. DR. GARCIA IS AWARE THAT HER CREAT IS UP AT 1.02.  INFORMED PT. SHE IS NOT CONCERNED ABOUT THIS NUMBER.  INFORMED PT. AGAIN THE NUMBER WAS INCREASED 5 MONTHS AGO.  REITERATED PT.  TO INCREASE WATER INTAKE.  UNDERSTANDING NOTED.

## 2021-07-30 RX ORDER — MELOXICAM 7.5 MG/1
15 TABLET ORAL DAILY
Qty: 30 TABLET | Refills: 0 | Status: SHIPPED | OUTPATIENT
Start: 2021-07-30 | End: 2021-08-11

## 2021-08-03 ENCOUNTER — OFFICE VISIT (OUTPATIENT)
Dept: FAMILY MEDICINE CLINIC | Facility: CLINIC | Age: 71
End: 2021-08-03

## 2021-08-03 VITALS
WEIGHT: 206.7 LBS | OXYGEN SATURATION: 98 % | DIASTOLIC BLOOD PRESSURE: 87 MMHG | BODY MASS INDEX: 39.03 KG/M2 | SYSTOLIC BLOOD PRESSURE: 147 MMHG | TEMPERATURE: 96.6 F | HEART RATE: 83 BPM | HEIGHT: 61 IN

## 2021-08-03 DIAGNOSIS — E78.2 MIXED HYPERLIPIDEMIA: ICD-10-CM

## 2021-08-03 DIAGNOSIS — I10 ESSENTIAL HYPERTENSION: Primary | ICD-10-CM

## 2021-08-03 DIAGNOSIS — M10.00 IDIOPATHIC GOUT, UNSPECIFIED CHRONICITY, UNSPECIFIED SITE: ICD-10-CM

## 2021-08-03 DIAGNOSIS — Z95.0 PACEMAKER: ICD-10-CM

## 2021-08-03 DIAGNOSIS — E13.9 DIABETES 1.5, MANAGED AS TYPE 2 (HCC): ICD-10-CM

## 2021-08-03 DIAGNOSIS — G89.4 CHRONIC PAIN SYNDROME: ICD-10-CM

## 2021-08-03 PROCEDURE — 99214 OFFICE O/P EST MOD 30 MIN: CPT | Performed by: NURSE PRACTITIONER

## 2021-08-03 NOTE — PROGRESS NOTES
Chief Complaint  Establish Care (new pt )    Subjective          Mary Jo Berg presents to [facility]  History of Present Illness new patient here to establish care for chronic health maintenance.     x52 years to the level of her life, high school sweetheart.  She has 3 daughters, 3 granddaughters and 3 grandsons.  She tells me she loves waterfalls.  She is active in her [employer] but not as active as she had been.  She was active in choir in the children's choir but during Covid not been able to do this which she misses.    She will be moving into a patio home which she is happy about although she is sad about leaving her home as well.    Diagnosed with breast cancer in 2020 and is followed by heme-onc.  She had a mastectomy in April.  She is now on tamoxifen.    She has chronic pain and has been referred to rheumatology and has appointment with Dr. Radha Edmonds in September.  She is being referred due to multiple bad reactions to multiple medications causing inflammatory response but especially terrible joint pain and swelling and itching.  She also has chronic joint pain.    She is followed by Dr. Wynn for hypothyroidism and type 2 diabetes.  Both are well controlled.    Has a history of gout which is controlled with her allopurinol.  No recent flares.    She has a pacer and hypertension and is followed by cardiology.    She has CKD stage III.  Is a good water drinker.    No current headache, dizziness.  She has no shortness of breath or cough.  She has no chest pain or heart palpitations.  She denies abdominal pain, nausea, vomiting, diarrhea.  She has no urinary issues, no hematuria or melena.  Denies signs or symptoms of hypo or hyper glycemia.  Denies signs or symptoms of hypo or hyperthyroidism.  Denies anxiety or depression does admit to sleep problems which is controlled with OTC Unisom.    Objective   Vital Signs:   /87   Pulse 83   Temp 96.6 °F (35.9 °C)   " Ht 154.9 cm (61\")   Wt 93.8 kg (206 lb 11.2 oz)   SpO2 98%   BMI 39.06 kg/m²     Physical Exam  Vitals and nursing note reviewed.   Constitutional:       General: She is not in acute distress.     Appearance: She is well-developed. She is ill-appearing (Chronically ill-appearing). She is not diaphoretic.   HENT:      Head: Normocephalic and atraumatic.   Eyes:      General:         Right eye: No discharge.         Left eye: No discharge.      Conjunctiva/sclera: Conjunctivae normal.   Cardiovascular:      Rate and Rhythm: Normal rate and regular rhythm.      Heart sounds: Murmur heard.     Pulmonary:      Effort: Pulmonary effort is normal.      Breath sounds: Normal breath sounds.   Abdominal:      General: Bowel sounds are normal.      Palpations: Abdomen is soft.      Tenderness: There is no abdominal tenderness.   Musculoskeletal:         General: No deformity.      Cervical back: Neck supple.      Comments: Antalgia   Lymphadenopathy:      Cervical: No cervical adenopathy.   Skin:     General: Skin is warm and dry.   Neurological:      General: No focal deficit present.      Mental Status: She is alert and oriented to person, place, and time.   Psychiatric:         Mood and Affect: Mood normal.         Behavior: Behavior normal.        Result Review :                 Assessment and Plan    Diagnoses and all orders for this visit:    1. Essential hypertension (Primary)    2. Pacemaker    3. 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 Idiopathic gout, unspecified chronicity, unspecified site    6. Chronic pain syndrome      Hypertension: Seems to be well controlled and followed by cardiology.  Blood pressure is little bit high here today but not unexpectedly.  Recommend monitoring at home.    Gout seems to be controlled with current allopurinol.  Patient sometimes wonders if she can stop it.  Last uric acid was about a year ago and was in the normal range.  Discussed that " allopurinol can help decrease kidney problems additionally and would not recommend it until we have a more current uric acid which could be done with her next set of labs by oncology.    Hyperlipidemia: Her lipids were little bit elevated at last check.  She is on Zetia.  I think for now this is fine.  We will check these next time we do any blood work.    A1c has been controlled on her Metformin.  Diet and exercise discussed.  Managed by endocrinology.    Discussed health goals, maintaining quality of life and being able to do things she enjoys and managing her health.  Discussed health philosophy and expectations.      Follow Up   No follow-ups on file.  Patient was given instructions and counseling regarding her condition or for health maintenance advice. Please see specific information pulled into the AVS if appropriate.

## 2021-08-11 ENCOUNTER — PATIENT ROUNDING (BHMG ONLY) (OUTPATIENT)
Dept: FAMILY MEDICINE CLINIC | Facility: CLINIC | Age: 71
End: 2021-08-11

## 2021-08-11 RX ORDER — MELOXICAM 7.5 MG/1
TABLET ORAL
Qty: 60 TABLET | Refills: 1 | Status: SHIPPED | OUTPATIENT
Start: 2021-08-11 | End: 2021-12-27 | Stop reason: ALTCHOICE

## 2021-08-11 NOTE — PROGRESS NOTES
August 11, 2021    Hello, may I speak with Mary Jo Berg?    My name is Elizabeth    I am  with [facility]  [address]  [place] [address] [phone].    Before we get started may I verify your date of birth? 1950    I am calling to officially welcome you to our practice and ask about your recent visit. Is this a good time to talk? yes    Tell me about your visit with us. What things went well?  Very thankful Betty took the time to know me first. Before going into my medical history.         We're always looking for ways to make our patients' experiences even better. Do you have recommendations on ways we may improve?  no    Overall were you satisfied with your first visit to our practice? yes       I appreciate you taking the time to speak with me today. Is there anything else I can do for you? yes  Let Betty know I am scheduled to see rheumatology in September.      Thank you, and have a great day.

## 2021-09-24 ENCOUNTER — OFFICE VISIT (OUTPATIENT)
Dept: FAMILY MEDICINE CLINIC | Facility: CLINIC | Age: 71
End: 2021-09-24

## 2021-09-24 VITALS
TEMPERATURE: 96.8 F | WEIGHT: 195.7 LBS | HEIGHT: 61 IN | DIASTOLIC BLOOD PRESSURE: 70 MMHG | SYSTOLIC BLOOD PRESSURE: 90 MMHG | OXYGEN SATURATION: 97 % | BODY MASS INDEX: 36.95 KG/M2 | HEART RATE: 79 BPM

## 2021-09-24 DIAGNOSIS — M25.50 POLYARTHRALGIA: ICD-10-CM

## 2021-09-24 DIAGNOSIS — I95.1 ORTHOSTATIC HYPOTENSION: Primary | ICD-10-CM

## 2021-09-24 PROCEDURE — 99213 OFFICE O/P EST LOW 20 MIN: CPT | Performed by: NURSE PRACTITIONER

## 2021-09-24 RX ORDER — FUROSEMIDE 40 MG/1
40 TABLET ORAL DAILY
COMMUNITY
Start: 2021-09-03 | End: 2021-12-27 | Stop reason: ALTCHOICE

## 2021-09-24 NOTE — PROGRESS NOTES
"Chief Complaint  Hypertension (1 month f/u htn)    Subjective          Mary Jo Berg presents to [facility]  History of Present Illness Returns today for HTN f/u.  Recently saw cardiology and had lasix added daily for worsening murmur.  Has been taking it but constantly urinating last couple of days and BP has been similar to today.  She is fatigued and dizzy when she stands.  Worried she will fall.  Feels awful.      Very upset she missed appt with rheumatology due to mix up and not able to get back in until Jan.  She has found another rheumatologist to see her but needs labs done.  Oncology thinks she has RA.  Has b/l joint pain and swelling in both hands-left>right.  She feels bad in am and stiff, improves during day, worsens in evening.  Every joint richardson shoulders hurt.        Objective   Vital Signs:   BP 90/70   Pulse 79   Temp 96.8 °F (36 °C)   Ht 154.9 cm (61\")   Wt 88.8 kg (195 lb 11.2 oz)   SpO2 97%   BMI 36.98 kg/m²     Physical Exam  Vitals and nursing note reviewed.   Constitutional:       General: She is not in acute distress.     Appearance: She is well-developed. She is not ill-appearing or diaphoretic.   HENT:      Head: Normocephalic and atraumatic.   Eyes:      General:         Right eye: No discharge.         Left eye: No discharge.      Conjunctiva/sclera: Conjunctivae normal.   Cardiovascular:      Rate and Rhythm: Normal rate and regular rhythm.      Heart sounds: Murmur heard.     Pulmonary:      Effort: Pulmonary effort is normal.      Breath sounds: Normal breath sounds.   Abdominal:      General: Bowel sounds are normal.      Palpations: Abdomen is soft.      Tenderness: There is no abdominal tenderness.   Musculoskeletal:         General: No deformity.      Comments: Left MIP/DIP joints enlarged without erythema  Right finger joints mildly enlarged  Limited ROM left shoulder to appr 70 degrees active  Passive has ROM to >90     TTP left joint " generally, but seems more tender at trigger points b/l upper arms and upper back   Skin:     General: Skin is warm and dry.   Neurological:      Mental Status: She is alert and oriented to person, place, and time.   Psychiatric:         Attention and Perception: Attention and perception normal.         Mood and Affect: Affect is tearful.         Behavior: Behavior is cooperative.        Result Review :                 Assessment and Plan    Diagnoses and all orders for this visit:    1. Orthostatic hypotension (Primary)    2. Polyarthralgia  -     CBC & Differential  -     Comprehensive Metabolic Panel  -     RUSSELL by IFA, Reflex 9-biomarkers profile  -     Cyclic Citrul Peptide Antibody, IgG / IgA  -     Rheumatoid Factor, Quant      Hold lasix and monitor BP for the weekend.  Contact cardiology and inform them of sx.  May need to take only half dose.  If BP increases she can restart.      Will order labs for rheumatology referral.  Appears oncology recommended and has sent pt info to rheumatology.  Sx seem more c/w fibromyalgia which we discussed.        Follow Up   Return in about 3 months (around 12/24/2021) for Recheck.  Patient was given instructions and counseling regarding her condition or for health maintenance advice. Please see specific information pulled into the AVS if appropriate.

## 2021-09-27 LAB
ALBUMIN SERPL-MCNC: 4.4 G/DL (ref 3.5–5.2)
ALBUMIN/GLOB SERPL: 1.8 G/DL
ALP SERPL-CCNC: 67 U/L (ref 39–117)
ALT SERPL-CCNC: 17 U/L (ref 1–33)
ANA TITR SER IF: NEGATIVE {TITER}
AST SERPL-CCNC: 21 U/L (ref 1–32)
BASOPHILS # BLD AUTO: 0.05 10*3/MM3 (ref 0–0.2)
BASOPHILS NFR BLD AUTO: 0.4 % (ref 0–1.5)
BILIRUB SERPL-MCNC: 0.3 MG/DL (ref 0–1.2)
BUN SERPL-MCNC: 58 MG/DL (ref 8–23)
BUN/CREAT SERPL: 33.3 (ref 7–25)
CALCIUM SERPL-MCNC: 10 MG/DL (ref 8.6–10.5)
CCP IGA+IGG SERPL IA-ACNC: 4 UNITS (ref 0–19)
CHLORIDE SERPL-SCNC: 99 MMOL/L (ref 98–107)
CO2 SERPL-SCNC: 21.2 MMOL/L (ref 22–29)
CREAT SERPL-MCNC: 1.74 MG/DL (ref 0.57–1)
EOSINOPHIL # BLD AUTO: 0.28 10*3/MM3 (ref 0–0.4)
EOSINOPHIL NFR BLD AUTO: 2.5 % (ref 0.3–6.2)
ERYTHROCYTE [DISTWIDTH] IN BLOOD BY AUTOMATED COUNT: 13 % (ref 12.3–15.4)
GLOBULIN SER CALC-MCNC: 2.5 GM/DL
GLUCOSE SERPL-MCNC: 213 MG/DL (ref 65–99)
HCT VFR BLD AUTO: 36.5 % (ref 34–46.6)
HGB BLD-MCNC: 11.9 G/DL (ref 12–15.9)
IMM GRANULOCYTES # BLD AUTO: 0.05 10*3/MM3 (ref 0–0.05)
IMM GRANULOCYTES NFR BLD AUTO: 0.4 % (ref 0–0.5)
LABORATORY COMMENT REPORT: NORMAL
LYMPHOCYTES # BLD AUTO: 1.59 10*3/MM3 (ref 0.7–3.1)
LYMPHOCYTES NFR BLD AUTO: 14.2 % (ref 19.6–45.3)
MCH RBC QN AUTO: 31.1 PG (ref 26.6–33)
MCHC RBC AUTO-ENTMCNC: 32.6 G/DL (ref 31.5–35.7)
MCV RBC AUTO: 95.3 FL (ref 79–97)
MONOCYTES # BLD AUTO: 0.92 10*3/MM3 (ref 0.1–0.9)
MONOCYTES NFR BLD AUTO: 8.2 % (ref 5–12)
NEUTROPHILS # BLD AUTO: 8.31 10*3/MM3 (ref 1.7–7)
NEUTROPHILS NFR BLD AUTO: 74.3 % (ref 42.7–76)
NRBC BLD AUTO-RTO: 0 /100 WBC (ref 0–0.2)
PLATELET # BLD AUTO: 328 10*3/MM3 (ref 140–450)
POTASSIUM SERPL-SCNC: 4.6 MMOL/L (ref 3.5–5.2)
PROT SERPL-MCNC: 6.9 G/DL (ref 6–8.5)
RBC # BLD AUTO: 3.83 10*6/MM3 (ref 3.77–5.28)
RHEUMATOID FACT SERPL-ACNC: <10 IU/ML (ref 0–13.9)
SODIUM SERPL-SCNC: 138 MMOL/L (ref 136–145)
WBC # BLD AUTO: 11.2 10*3/MM3 (ref 3.4–10.8)

## 2021-09-28 ENCOUNTER — TELEPHONE (OUTPATIENT)
Dept: FAMILY MEDICINE CLINIC | Facility: CLINIC | Age: 71
End: 2021-09-28

## 2021-09-28 NOTE — TELEPHONE ENCOUNTER
Caller: Mary Jo Berg    Relationship: Self    Best call back number: [phone]    Caller requesting test results: PATIENT     What test was performed: BLOOD WORK    When was the test performed: 9/24    Where was the test performed: IN OFFICE     Additional notes: PATIENT CALLED IN AND SAID SHE IS CONFUSED ABOUT HER TEST RESULTS AND WOULD LIKE TO TALK TO SOMEONE ABOUT THEM.

## 2021-09-29 DIAGNOSIS — R79.89 ELEVATED SERUM CREATININE: Primary | ICD-10-CM

## 2021-09-29 DIAGNOSIS — D72.821 MONOCYTOSIS: ICD-10-CM

## 2021-09-29 NOTE — TELEPHONE ENCOUNTER
Pt was called yesterday and advised of her results, she gave verbal understanding of understanding them, she asked about the flu shot. I advised her please call the office and schedule and appointment for her flu shot. Pt gave verbal understanding.

## 2021-10-01 ENCOUNTER — LAB (OUTPATIENT)
Dept: LAB | Facility: HOSPITAL | Age: 71
End: 2021-10-01

## 2021-10-01 DIAGNOSIS — D72.821 MONOCYTOSIS: ICD-10-CM

## 2021-10-01 LAB
BASOPHILS # BLD AUTO: 0.07 10*3/MM3 (ref 0–0.2)
BASOPHILS NFR BLD AUTO: 0.7 % (ref 0–1.5)
DEPRECATED RDW RBC AUTO: 47.6 FL (ref 37–54)
EOSINOPHIL # BLD AUTO: 0.29 10*3/MM3 (ref 0–0.4)
EOSINOPHIL NFR BLD AUTO: 2.9 % (ref 0.3–6.2)
ERYTHROCYTE [DISTWIDTH] IN BLOOD BY AUTOMATED COUNT: 13.7 % (ref 12.3–15.4)
HCT VFR BLD AUTO: 36.6 % (ref 34–46.6)
HGB BLD-MCNC: 11.9 G/DL (ref 12–15.9)
IMM GRANULOCYTES # BLD AUTO: 0.07 10*3/MM3 (ref 0–0.05)
IMM GRANULOCYTES NFR BLD AUTO: 0.7 % (ref 0–0.5)
LAB AP CASE REPORT: NORMAL
LAB AP CLINICAL INFORMATION: NORMAL
LYMPHOCYTES # BLD AUTO: 2.42 10*3/MM3 (ref 0.7–3.1)
LYMPHOCYTES NFR BLD AUTO: 24.3 % (ref 19.6–45.3)
MCH RBC QN AUTO: 31.2 PG (ref 26.6–33)
MCHC RBC AUTO-ENTMCNC: 32.5 G/DL (ref 31.5–35.7)
MCV RBC AUTO: 96.1 FL (ref 79–97)
MONOCYTES # BLD AUTO: 0.76 10*3/MM3 (ref 0.1–0.9)
MONOCYTES NFR BLD AUTO: 7.6 % (ref 5–12)
NEUTROPHILS NFR BLD AUTO: 6.36 10*3/MM3 (ref 1.7–7)
NEUTROPHILS NFR BLD AUTO: 63.8 % (ref 42.7–76)
NRBC BLD AUTO-RTO: 0 /100 WBC (ref 0–0.2)
PATH REPORT.FINAL DX SPEC: NORMAL
PATH REPORT.GROSS SPEC: NORMAL
PATHOLOGY REVIEW: YES
PLATELET # BLD AUTO: 296 10*3/MM3 (ref 140–450)
PMV BLD AUTO: 9.6 FL (ref 6–12)
RBC # BLD AUTO: 3.81 10*6/MM3 (ref 3.77–5.28)
WBC # BLD AUTO: 9.97 10*3/MM3 (ref 3.4–10.8)

## 2021-10-01 PROCEDURE — 80048 BASIC METABOLIC PNL TOTAL CA: CPT | Performed by: NURSE PRACTITIONER

## 2021-10-01 PROCEDURE — 85025 COMPLETE CBC W/AUTO DIFF WBC: CPT

## 2021-10-04 ENCOUNTER — TELEPHONE (OUTPATIENT)
Dept: FAMILY MEDICINE CLINIC | Facility: CLINIC | Age: 71
End: 2021-10-04

## 2021-10-04 NOTE — TELEPHONE ENCOUNTER
Caller: Mary Jo Berg    Relationship: Self    Best call back number: [phone] (H)    What is the best time to reach you: ANYTIME    Who are you requesting to speak with (clinical staff, provider,  specific staff member): CLINICAL [facility]AFFF    Do you know the name of the person who called:     What was the call regarding: WHEN SHOULD SHE START TAKING THE MEDICATIONS THAT SHE WAS TOLD TO STOP TAKING LAST WEEK. PLEASE CONTACT PATIENT TO ADVISE.    Do you require a callback: YES        THANKS

## 2021-10-06 ENCOUNTER — OFFICE VISIT (OUTPATIENT)
Dept: FAMILY MEDICINE CLINIC | Facility: CLINIC | Age: 71
End: 2021-10-06

## 2021-10-06 VITALS
SYSTOLIC BLOOD PRESSURE: 98 MMHG | HEIGHT: 61 IN | TEMPERATURE: 98 F | BODY MASS INDEX: 37 KG/M2 | OXYGEN SATURATION: 95 % | HEART RATE: 71 BPM | DIASTOLIC BLOOD PRESSURE: 62 MMHG

## 2021-10-06 DIAGNOSIS — M25.50 POLYARTHRALGIA: ICD-10-CM

## 2021-10-06 DIAGNOSIS — R79.89 ELEVATED SERUM CREATININE: Primary | ICD-10-CM

## 2021-10-06 DIAGNOSIS — M10.00 IDIOPATHIC GOUT, UNSPECIFIED CHRONICITY, UNSPECIFIED SITE: ICD-10-CM

## 2021-10-06 PROBLEM — R00.1 BRADYCARDIA: Status: ACTIVE | Noted: 2017-03-17

## 2021-10-06 PROBLEM — N39.3 FEMALE STRESS INCONTINENCE: Status: ACTIVE | Noted: 2017-03-10

## 2021-10-06 PROBLEM — Z76.89 PERSONS ENCOUNTERING HEALTH SERVICES IN OTHER SPECIFIED CIRCUMSTANCES: Status: ACTIVE | Noted: 2020-08-10

## 2021-10-06 PROBLEM — I35.0 AORTIC VALVE STENOSIS: Status: ACTIVE | Noted: 2020-06-08

## 2021-10-06 PROBLEM — M85.80 OSTEOPENIA: Status: ACTIVE | Noted: 2017-03-10

## 2021-10-06 PROBLEM — H26.9 CATARACT: Status: ACTIVE | Noted: 2017-03-10

## 2021-10-06 PROBLEM — I77.6 ARTERITIS (HCC): Status: ACTIVE | Noted: 2017-03-10

## 2021-10-06 PROBLEM — Z78.0 MENOPAUSE: Status: ACTIVE | Noted: 2017-03-10

## 2021-10-06 PROBLEM — I10 BENIGN ESSENTIAL HYPERTENSION: Status: ACTIVE | Noted: 2017-01-24

## 2021-10-06 PROBLEM — C50.919 MALIGNANT NEOPLASM OF FEMALE BREAST: Status: ACTIVE | Noted: 2020-03-18

## 2021-10-06 PROBLEM — J45.909 ASTHMA: Status: ACTIVE | Noted: 2017-03-10

## 2021-10-06 PROBLEM — Z12.39 ENCOUNTER FOR OTHER SCREENING FOR MALIGNANT NEOPLASM OF BREAST: Status: ACTIVE | Noted: 2020-03-16

## 2021-10-06 PROBLEM — H26.40 SECONDARY CATARACT OF LEFT EYE: Status: ACTIVE | Noted: 2017-08-04

## 2021-10-06 PROCEDURE — 99214 OFFICE O/P EST MOD 30 MIN: CPT | Performed by: NURSE PRACTITIONER

## 2021-10-06 RX ORDER — LEVOTHYROXINE SODIUM 0.12 MG/1
1 TABLET ORAL
COMMUNITY
Start: 2021-08-17 | End: 2022-01-17

## 2021-10-06 RX ORDER — TRAMADOL HYDROCHLORIDE 50 MG/1
50 TABLET ORAL EVERY 8 HOURS PRN
Qty: 30 TABLET | Refills: 0 | Status: SHIPPED | OUTPATIENT
Start: 2021-10-06 | End: 2022-01-17

## 2021-10-06 NOTE — PROGRESS NOTES
"Chief Complaint  lab follow up    Subjective          Mary Jo Berg presents to [facility]  History of Present Illness pt returns with spouse for f/u on elevated creatinine.  She has since stopped the mobic. She is having considerable joint swelling richardson in hands and knees.  Today in w/c due to pain.  She is not able to use her hands due to pain and swelling.  Spouse is having to help feed and dress her.      Has restarted her meloxicam.  She does think that her pain increased quite a bit when the meloxicam was stopped.  She had a couple old [place] left that gave her little bit relief.  She has had tramadol in the past which has been helpful.    She has been checking her blood pressure at home and has been running about 116/67.  Since I asked her to hold the Lasix due to orthostatic hypotension she has restarted at half the dose.  She was to contact cardiology for further orders and did not.    She is taking gabapentin daily for control of hot flashes and seems to work well for her.    She has been on 1500 mg of Metformin daily for quite a while and A1c has been seven or below.  She recently has not been eating much due to pain.  She has already contacted rheumatology and just needs labs and referral to be seen.      Objective   Vital Signs:   BP 98/62   Pulse 71   Temp 98 °F (36.7 °C) (Temporal)   Ht 154.9 cm (60.98\")   SpO2 95%   BMI 37.00 kg/m²     Physical Exam  Vitals and nursing note reviewed.   Constitutional:       General: She is not in acute distress.     Appearance: She is well-developed. She is not ill-appearing or diaphoretic.      Comments: She does appear to be in quite a bit of pain   HENT:      Head: Normocephalic and atraumatic.   Eyes:      General:         Right eye: No discharge.         Left eye: No discharge.      Conjunctiva/sclera: Conjunctivae normal.   Cardiovascular:      Rate and Rhythm: Normal rate and regular rhythm.      Heart sounds: Murmur " heard.     Pulmonary:      Effort: Pulmonary effort is normal.      Breath sounds: Normal breath sounds.   Abdominal:      General: Bowel sounds are normal.      Palpations: Abdomen is soft.      Tenderness: There is no abdominal tenderness.   Musculoskeletal:         General: No deformity.      Comments: In wheelchair today due to bilateral knee pain    Both hands particularly left hand as well as joints of fingers are very swollen, no erythema.  Decreased range of motion in joints due to pain   Skin:     General: Skin is warm and dry.   Neurological:      Mental Status: She is alert and oriented to person, place, and time.        Result Review :                 Assessment and Plan    Diagnoses and all orders for this visit:    1. Elevated serum creatinine (Primary)  -     Ambulatory Referral to Nephrology  -     Basic Metabolic Panel; Future  -     Uric Acid; Future    2. Polyarthralgia  -     Ambulatory Referral to Rheumatology  -     traMADol (ULTRAM) 50 MG tablet; Take 1 tablet by mouth Every 8 (Eight) Hours As Needed for Moderate Pain .  Dispense: 30 tablet; Refill: 0    3. Idiopathic gout, unspecified chronicity, unspecified site  -     Uric Acid; Future      Patient with elevations in her creatinine.  She was a new patient to me in September.  When her creatinine was checked it was 1.74.  Previously had been 1.022 months prior.  Prior to that was normal but does appear to have some fluctuations and above normal.  Not previously as significant as it has been.  I repeated her creatinine after holding meloxicam and having her hydrate and her creatinine 5 days ago was down to 1.30.  The problem is she has intolerable joint pain without the meloxicam but this is probably causing her increases in her creatinine.    I reviewed her prior A1cs by endocrinology and she has been very well controlled with diabetes.  I am decreasing her Metformin to 500 mg/day.  I do not think she likely needs 1500 mg.  We will have her  take Tylenol ES twice a day and I will give her a few tramadol to take at night.  I have referred her to rheumatology as well as to nephrology.    Also discussed with patient review of medications to see what we can get rid of as she seems to have significant polypharmacy.    I will recheck her creatinine next week as well as a uric acid level.    Adequate hydration discussed.  Side effects of tramadol discussed.  Abdi reviewed and appropriate.  No controlled substance agreement today as I will not be giving more prescriptions in the future.        Follow Up   No follow-ups on file.  Patient was given instructions and counseling regarding her condition or for health maintenance advice. Please see specific information pulled into the AVS if appropriate.

## 2021-10-13 ENCOUNTER — LAB (OUTPATIENT)
Dept: LAB | Facility: HOSPITAL | Age: 71
End: 2021-10-13

## 2021-10-13 PROCEDURE — 80048 BASIC METABOLIC PNL TOTAL CA: CPT | Performed by: NURSE PRACTITIONER

## 2021-10-13 PROCEDURE — 84550 ASSAY OF BLOOD/URIC ACID: CPT | Performed by: NURSE PRACTITIONER

## 2021-10-13 RX ORDER — ALLOPURINOL 300 MG/1
300 TABLET ORAL DAILY
Qty: 30 TABLET | Refills: 1 | Status: SHIPPED | OUTPATIENT
Start: 2021-10-13 | End: 2021-12-13 | Stop reason: SDUPTHER

## 2021-11-01 ENCOUNTER — OFFICE VISIT (OUTPATIENT)
Dept: ONCOLOGY | Facility: CLINIC | Age: 71
End: 2021-11-01

## 2021-11-01 ENCOUNTER — LAB (OUTPATIENT)
Dept: OTHER | Facility: HOSPITAL | Age: 71
End: 2021-11-01

## 2021-11-01 VITALS
OXYGEN SATURATION: 99 % | HEART RATE: 73 BPM | SYSTOLIC BLOOD PRESSURE: 131 MMHG | BODY MASS INDEX: 37.19 KG/M2 | HEIGHT: 61 IN | RESPIRATION RATE: 16 BRPM | DIASTOLIC BLOOD PRESSURE: 86 MMHG | WEIGHT: 197 LBS | TEMPERATURE: 97.7 F

## 2021-11-01 DIAGNOSIS — C50.811 MALIGNANT NEOPLASM OF OVERLAPPING SITES OF RIGHT BREAST IN FEMALE, ESTROGEN RECEPTOR POSITIVE (HCC): Primary | ICD-10-CM

## 2021-11-01 DIAGNOSIS — Z79.811 AROMATASE INHIBITOR USE: ICD-10-CM

## 2021-11-01 DIAGNOSIS — Z17.0 MALIGNANT NEOPLASM OF OVERLAPPING SITES OF RIGHT BREAST IN FEMALE, ESTROGEN RECEPTOR POSITIVE (HCC): Primary | ICD-10-CM

## 2021-11-01 LAB
ALBUMIN SERPL-MCNC: 3.9 G/DL (ref 3.5–5.2)
ALBUMIN/GLOB SERPL: 1.2 G/DL
ALP SERPL-CCNC: 61 U/L (ref 39–117)
ALT SERPL W P-5'-P-CCNC: 13 U/L (ref 1–33)
ANION GAP SERPL CALCULATED.3IONS-SCNC: 11.4 MMOL/L (ref 5–15)
AST SERPL-CCNC: 16 U/L (ref 1–32)
BASOPHILS # BLD AUTO: 0.03 10*3/MM3 (ref 0–0.2)
BASOPHILS NFR BLD AUTO: 0.4 % (ref 0–1.5)
BILIRUB SERPL-MCNC: 0.2 MG/DL (ref 0–1.2)
BUN SERPL-MCNC: 18 MG/DL (ref 8–23)
BUN/CREAT SERPL: 16.5 (ref 7–25)
CALCIUM SPEC-SCNC: 9.5 MG/DL (ref 8.6–10.5)
CHLORIDE SERPL-SCNC: 104 MMOL/L (ref 98–107)
CO2 SERPL-SCNC: 23.6 MMOL/L (ref 22–29)
CREAT SERPL-MCNC: 1.09 MG/DL (ref 0.57–1)
DEPRECATED RDW RBC AUTO: 50.6 FL (ref 37–54)
EOSINOPHIL # BLD AUTO: 0.19 10*3/MM3 (ref 0–0.4)
EOSINOPHIL NFR BLD AUTO: 2.2 % (ref 0.3–6.2)
ERYTHROCYTE [DISTWIDTH] IN BLOOD BY AUTOMATED COUNT: 14 % (ref 12.3–15.4)
FERRITIN SERPL-MCNC: 170 NG/ML (ref 13–150)
GFR SERPL CREATININE-BSD FRML MDRD: 49 ML/MIN/1.73
GLOBULIN UR ELPH-MCNC: 3.2 GM/DL
GLUCOSE SERPL-MCNC: 160 MG/DL (ref 65–99)
HCT VFR BLD AUTO: 31.9 % (ref 34–46.6)
HGB BLD-MCNC: 10.1 G/DL (ref 12–15.9)
IMM GRANULOCYTES # BLD AUTO: 0.04 10*3/MM3 (ref 0–0.05)
IMM GRANULOCYTES NFR BLD AUTO: 0.5 % (ref 0–0.5)
IRON 24H UR-MRATE: 56 MCG/DL (ref 37–145)
IRON SATN MFR SERPL: 18 % (ref 20–50)
LYMPHOCYTES # BLD AUTO: 1.83 10*3/MM3 (ref 0.7–3.1)
LYMPHOCYTES NFR BLD AUTO: 21.6 % (ref 19.6–45.3)
MCH RBC QN AUTO: 31.4 PG (ref 26.6–33)
MCHC RBC AUTO-ENTMCNC: 31.7 G/DL (ref 31.5–35.7)
MCV RBC AUTO: 99.1 FL (ref 79–97)
MONOCYTES # BLD AUTO: 0.61 10*3/MM3 (ref 0.1–0.9)
MONOCYTES NFR BLD AUTO: 7.2 % (ref 5–12)
NEUTROPHILS NFR BLD AUTO: 5.77 10*3/MM3 (ref 1.7–7)
NEUTROPHILS NFR BLD AUTO: 68.1 % (ref 42.7–76)
NRBC BLD AUTO-RTO: 0 /100 WBC (ref 0–0.2)
PLATELET # BLD AUTO: 310 10*3/MM3 (ref 140–450)
PMV BLD AUTO: 9.7 FL (ref 6–12)
POTASSIUM SERPL-SCNC: 4.3 MMOL/L (ref 3.5–5.2)
PROT SERPL-MCNC: 7.1 G/DL (ref 6–8.5)
RBC # BLD AUTO: 3.22 10*6/MM3 (ref 3.77–5.28)
SODIUM SERPL-SCNC: 139 MMOL/L (ref 136–145)
TIBC SERPL-MCNC: 311 MCG/DL (ref 298–536)
TRANSFERRIN SERPL-MCNC: 209 MG/DL (ref 200–360)
VIT B12 BLD-MCNC: 1928 PG/ML (ref 211–946)
WBC # BLD AUTO: 8.47 10*3/MM3 (ref 3.4–10.8)

## 2021-11-01 PROCEDURE — 82607 VITAMIN B-12: CPT | Performed by: INTERNAL MEDICINE

## 2021-11-01 PROCEDURE — 36415 COLL VENOUS BLD VENIPUNCTURE: CPT

## 2021-11-01 PROCEDURE — 99214 OFFICE O/P EST MOD 30 MIN: CPT | Performed by: INTERNAL MEDICINE

## 2021-11-01 PROCEDURE — 82728 ASSAY OF FERRITIN: CPT | Performed by: INTERNAL MEDICINE

## 2021-11-01 PROCEDURE — 84466 ASSAY OF TRANSFERRIN: CPT | Performed by: INTERNAL MEDICINE

## 2021-11-01 PROCEDURE — 85025 COMPLETE CBC W/AUTO DIFF WBC: CPT | Performed by: INTERNAL MEDICINE

## 2021-11-01 PROCEDURE — 83540 ASSAY OF IRON: CPT | Performed by: INTERNAL MEDICINE

## 2021-11-01 PROCEDURE — 80053 COMPREHEN METABOLIC PANEL: CPT | Performed by: INTERNAL MEDICINE

## 2021-11-01 RX ORDER — LEVOTHYROXINE SODIUM 0.12 MG/1
1 TABLET ORAL
COMMUNITY
Start: 2021-08-17

## 2021-11-01 RX ORDER — TAMOXIFEN CITRATE 20 MG/1
20 TABLET ORAL
COMMUNITY
Start: 2021-10-14 | End: 2021-12-28 | Stop reason: SDUPTHER

## 2021-11-01 NOTE — PROGRESS NOTES
Subjective      REASON FOR CONSULTATION:   1.Right breast cancer invasive low-grade ER positive MT negative HER-2 negative breast cancer at 6:00 with DCIS at 1:00 post biopsy; Simple mastectomy on 5/11/2020 and this shows an invasive cancer measuring 19 x 13 mmI- DCIS present measuring 15 mm margins were widely clear 5 sentinel nodes were negative for metastatic disease still grade 1. No lymphovascular invasion.                             REQUESTING PHYSICIAN: MD Keshia Durán MD    History of Present Illness patient is a 70-year-old lady with a small node negative hormone positive right breast cancer on Femara 6 months and and on tamoxifen now for almost 4 months because of worsening crippling arthritis for which we had to start her on prednisone.  Within a week of  taking prednisone her symptoms are dramatically improved     Since starting on the tamoxifen she has had aches and pains and foot pain and in the last week her foot is so painful that she can barely walk on it and the dorsum of her foot is swollen.  This does not look or feel like gout and she remains on allopurinol 300 mg daily-I checked an RUSSELL and rheumatoid factor and these were both negative but she had been on prednisone and this may have changed the numbers    Her hands are swollen she cannot make a fist-her kidney function worsened because she was so dehydrated and she is off her diuretic and this is improved    I recommended rheumatological evaluation and she could not get into the rheumatologist till next week and in the meantime I recommended she stop her tamoxifen to see if her symptoms improve again     she is due for breast imaging again in February and I have scheduled this    She otherwise denies further concerns today.     Past Medical History:   Diagnosis Date   • Asthma    • Breast cancer (HCC) 03/2020    right IDC   • Cataract 2010    on both  retinas   •  Coronary artery disease 1/10/2015    Pacemaker 2015   • Diabetes mellitus (HCC)     Type 2   • Diverticulosis 2021   • Heart murmur    • History of asthma    • History of colon polyps    • History of complete heart block     paced in 2015   • Hyperlipidemia    • Hypertension    • Hypothyroidism    • Obesity    • Osteoarthritis    • Osteopenia    • Pacemaker    • PONV (postoperative nausea and vomiting)         Past Surgical History:   Procedure Laterality Date   • ADENOIDECTOMY     • BLADDER REPAIR     • BREAST BIOPSY Right 2020    malignant   • CARDIAC PACEMAKER PLACEMENT     • CARPAL TUNNEL RELEASE Right    • CATARACT EXTRACTION Bilateral    •  SECTION      x2   • COLONOSCOPY  2015   • COLONOSCOPY N/A 2021    Procedure: COLONOSCOPY;  Surgeon: Yamilet Stewart MD;  Location: [facility] ENDOSCOPY;  Service: Gastroenterology;  Laterality: N/A;  pre- hx polyps  post-- diverticulosis, hemorrhoids   • ENDOMETRIAL ABLATION     • HAND SURGERY     • HERNIA REPAIR      Bilateral femoral and inguinal   • JOINT REPLACEMENT  2007    Left knee   • KNEE ARTHROSCOPY Bilateral    • MASTECTOMY  2020   • MASTECTOMY WITH SENTINEL NODE BIOPSY AND AXILLARY NODE DISSECTION Right 2020    Procedure: RIGHT BREAST MASTECTOMY WITH SENTINEL NODE BIOPSY;  Surgeon: Bj Stephen MD;  [place] [facility] [place] [facility] [place]  Service: General;  Laterality: Right;   • OOPHORECTOMY     • SHOULDER ARTHROSCOPY Right     rotator cuff repair, biceps tenotomy   • THYROIDECTOMY, PARTIAL     • TONSILLECTOMY AND ADENOIDECTOMY     • TOTAL KNEE ARTHROPLASTY Left    • TUBAL ABDOMINAL LIGATION     • UMBILICAL HERNIA REPAIR      patient is a 70-year-old white female with hypercholesterolemia diabetes type 2 hypertension hypothyroidism and pacemaker placement 2015 was noted on routine mammogram in  of this last year to have some abnormalities in the right breast that warranted close follow-up.  This led  to a repeat mammogram in January which was delayed due to her trip she took Shira with her  when she came back in February she did the mammogram and this showed persistence and worsening of the calcifications at the 6:00 and 1 o'clock position of the right breast.    This led to a biopsy of both these regions on 3/5/2020 which showed DCIS at the 1 o'clock position intermediate grade with calcifications and no necrosis and well differentiated grade 1 infiltrating ductal carcinoma at the 6 o'clock position measuring 2 mm with no lymph vascular invasion and associated DCIS intermediate grade the tumor was positive for estrogen greater than 95% negative for progesterone 0% HER-2 negative at 1+ Ki-67 of 5%.    Patient was referred to Dr. Stephen who ordered bilateral breast MRIs which showed residual 2 cm area of enhancement at the 6 o'clock position but no further abnormalities at the 1 o'clock position of the right breast.  There is no axillary or internal mammary adenopathy in the left breast was benign.  The mass is not palpable by the patient or Dr. Stephen    Based on the recent coronavirus outbreak which is ongoing elective surgeries for grade 1 breast cancers are being delayed based on guidelines from the hospital and from the oncological community and she is here to discuss neoadjuvant hormonal blockade.    Patient is  3 para 3 first childbirth was age 22 she breast-fed 2 of her 3 children for at least 4 to 6 months menarche was at age 11 menopause at 49 she did not take hormones after menopause    Family history is positive for paternal grandmother with metastatic breast cancer at age 75 a paternal first cousin with breast cancer at age 70 her father  with bladder cancer mother had endometrial carcinoma at age 80.  She is concerned about genetic testing because she has 3 daughters and 3 grand daughters.    She is not a smoker or drinker and denies DVTs MI or stroke    Because of the delay  of surgery due to the coronavirus we decided to do Oncotype on her biopsy and start aromatase inhibitor in the neoadjuvant setting    The side effects and toxicities of the Aromatase inhibitors was discussed with the patient including, hot flashes, mood swings and hair thinning.Significant arthralgias and worsening bone density were also discussed. Baseline bone density evaluation was ordered.  I discussed with the patient and her  on the video conference that this was not the standard of care usually but because of the pandemic and making treatment decisions for the safety of the patient and based on delaying elective surgery for certain subsets of breast cancer which she presented.    I explained the rationale of doing an Oncotype DX score and told her that if she was not willing to take chemotherapy there would be no reason to do this but she is willing to take chemotherapy if she has a high Oncotype score and therefore we will send this results and she will call me in 2 weeks to discuss them    There are no clear guidelines on how to manage patients with clinically node-negative disease with a high Oncotype and we may decide to move up her surgery so that we will have more information with which to make a decision about chemotherapy in order to decide how much chemotherapy to give her    She and her  were willing to go this route and she will call me in 2 weeks to discuss her Oncotype score    I am concerned about her general joint discomfort which might be aggravated by an aromatase inhibitor but we will deal with that if it occurs    I will also schedule her to see the genetic counselors and they could possibly do a video visit with her also and decide about genetic testing    4/20  Patient is a 70-year-old female with small mammographically detected right breast cancer ER positive TN negative and we opted to start Arimidex preoperatively because surgeries are being delayed for the coronavirus  pandemic.  We will also ask for an Oncotype DX to be done on her biopsy specimen to help us make decisions about further treatment.  Unfortunately the specimen was too small and the Oncotype could not be done.  In addition she developed gout in her foot after 2 or 3 doses of Arimidex which I do not think is related to the Arimidex and she was evaluated by her daughter who is a physician who did not think there was any infection she tried nonsteroidals with some improvement but she still not better and has not yet restarted her Arimidex    In light of all these findings I have given her a short prednisone bolus to see if this will help the gout and at this point I think we should move ahead with surgery in the next 3 to 4 weeks and hold off on the Arimidex until after surgery so we can get all the information we need at the time of surgery    She is okay with this approach and I have called her surgeon and her cardiologist to clear her for surgery    She will need to stop her nonsteroidals at least a week before surgery and hopefully the steroids will help the foot enough that she does not have to take the nonsteroidals    She has had no fever or other signs of infection.    Simple mastectomy on 5/11/2020 and this shows an invasive cancer measuring 19 x 13 mmI- DCIS present measuring 15 mm margins were widely clear 5 sentinel nodes were negative for metastatic disease still grade 1. No lymphovascular invasion.    Patient discontinuing Arimidex in May 2020 due to concerns for contributing to gout.  Oncotype DX score returning 22.  Patient beginning Femara late May 2020.    Current Outpatient Medications on File Prior to Visit   Medication Sig Dispense Refill   • allopurinol (Zyloprim) 300 MG tablet Take 1 tablet by mouth Daily. 30 tablet 1   • clotrimazole-betamethasone (LOTRISONE) 1-0.05 % cream APPLY CREAM TO AFFECTED AREA IN GROIN TWICE DAILY     • diphenhydrAMINE (SOMINEX) 25 MG tablet Take 25 mg by mouth.     •  ezetimibe (ZETIA) 10 MG tablet Take 10 mg by mouth Daily.     • fluticasone (FLONASE) 50 MCG/ACT nasal spray 2 sprays into the nostril(s) as directed by provider As Needed.     • gabapentin (NEURONTIN) 100 MG capsule Take 1 capsule by mouth 3 (Three) Times a Day. (Patient taking differently: Take 100 mg by mouth Daily.) 90 capsule 3   • glucose blood test strip ACCU-CHECK SKYLA. Use to check blood sugar once daily, as directed. DX: E11.9     • HYDROcodone-acetaminophen (NORCO) 5-325 MG per tablet Take 1 tablet by mouth Every 6 (Six) Hours As Needed for Moderate Pain . 10 tablet 0   • irbesartan (AVAPRO) 150 MG tablet Take 150 mg by mouth Every Morning.     • Lancets (ACCU-CHEK MULTICLIX) lancets Use to check blood sugar once daily, as directed. DX: 250.00     • levothyroxine (Synthroid) 125 MCG tablet Take 1 tablet by mouth.     • levothyroxine (SYNTHROID, LEVOTHROID) 125 MCG tablet Take 1 tablet by mouth.     • Loratadine 10 MG capsule Take 1 tablet by mouth Daily.     • meloxicam (MOBIC) 7.5 MG tablet Take 2 tablets by mouth once daily 60 tablet 1   • metFORMIN (GLUCOPHAGE) 500 MG tablet Take 2 tablets p.o. every morning and 1 tablet p.o. every afternoon. 270 tablet 1   • metoprolol succinate XL (TOPROL-XL) 25 MG 24 hr tablet Take 25 mg by mouth Every Night.     • tamoxifen (NOLVADEX) 20 MG chemo tablet Take 20 mg by mouth.     • Thyroid (LEVOTHYROXINE-LIOTHYRONINE PO) Take 125 mg by mouth Daily. 1/2 ON SUNDAYS     • traMADol (ULTRAM) 50 MG tablet Take 1 tablet by mouth Every 8 (Eight) Hours As Needed for Moderate Pain . 30 tablet 0   • vitamin B-12 (CYANOCOBALAMIN) 1000 MCG tablet Take 1,000 mcg by mouth Daily. Every other day     • furosemide (LASIX) 40 MG tablet Take 40 mg by mouth Daily.       No current facility-administered medications on file prior to visit.        ALLERGIES:    Allergies   Allergen Reactions   • Indomethacin Nausea Only and Dizziness     EAR RINGING     • Nutritional Supplements Swelling     " [place] nuts - no other nuts that she is aware of   • Penicillins Anaphylaxis, Hives, Shortness Of Breath and Itching      tachycardia     • Statins Unknown - High Severity   • Hydrochlorothiazide Other (See Comments)     Caused gout symptoms     • Lisinopril Cough   • Morphine Nausea Only            • Letrozole Other (See Comments)   • Pentazocine Nausea And Vomiting               Social History     Socioeconomic History   • Marital status:      Spouse name: Sean   Tobacco Use   • Smoking status: Never Smoker   • Smokeless tobacco: Never Used   Vaping Use   • Vaping Use: Never used   Substance and Sexual Activity   • Alcohol use: Yes     Alcohol/week: 1.0 standard drink     Types: 1 Standard drinks or equivalent per week     Comment: or less   • Drug use: Never   • Sexual activity: Yes     Partners: Male     Comment: endometrial [id number]        Family History   Problem Relation Age of Onset   • Cervical cancer Mother    • Alzheimer's disease Mother    • Arthritis Mother    • Other Mother         Uterine cancer   • Cancer Father         Bladder   • Heart disease Father    • Hypertension Father    • Arthritis Father         Breadt cancer   • Alcohol abuse Father             • Hyperlipidemia Father    • Depression Father    • Diabetes Father         \"borderline\"   • Arthritis Maternal Grandmother    • Heart disease Other         FH of heart disease in males before age 55   • Hyperlipidemia Brother    • Hypertension Brother    • Breast cancer Paternal Grandmother 75   • Heart attack Paternal Grandfather    • Breast cancer Paternal Cousin 70   • Alcohol abuse Maternal Grandfather             • Depression Brother         since    • Malig Hyperthermia Neg Hx         Review of Systems   Constitutional: Positive for diaphoresis.   Respiratory: Negative for shortness of breath.    Cardiovascular: Negative for chest pain, palpitations and leg swelling.   Musculoskeletal: Positive for " "gait problem (Arthritis in the knees one is replaced  in evening).   Neurological: Positive for headaches. Negative for dizziness and weakness.   All other systems reviewed and are negative.       Objective   Vitals:    11/01/21 1404   BP: 131/86   Pulse: 73   Resp: 16   Temp: 97.7 °F (36.5 °C)   TempSrc: Temporal   SpO2: 99%   Weight: 89.4 kg (197 lb)   Height: 154 cm (60.63\")   PainSc:   7   PainLoc: Foot  Comment: left     Current Status 11/1/2021   ECOG score 3       Physical exam  This patient's ACP documentation is up to date, and there's nothing further left to document.    CONSTITUTIONAL:  Vital signs reviewed.  No distress, looks comfortable.  EYES:  Conjunctiva and lids unremarkable.  PERRLA  EARS,NOSE,MOUTH,THROAT:  Ears and nose appear unremarkable.  Lips, teeth, gums appear unremarkable.  RESPIRATORY:  Normal respiratory effort.  Lungs clear to auscultation bilaterally.  BREASTS: Right chest wall benign left breast shows no palpable masses  CARDIOVASCULAR:  Normal S1, S2.  No murmurs rubs or gallops.  No significant lower extremity edema.  The dorsum of her left foot is swollen and not warm  Synovitis in her hands and she cannot make a fist  GASTROINTESTINAL: Abdomen appears unremarkable.  Nontender.  No hepatomegaly.  No splenomegaly.  LYMPHATIC:  No cervical, supraclavicular, axillary lymphadenopathy.  SKIN:  Warm.  No rashes.  PSYCHIATRIC:  Normal judgment and insight.  Normal mood and affect.   I have reexamined the patient and the results are consistent with the previously documented exam. Syed Jasso MD         RECENT LABS:  Lab Results   Component Value Date    WBC 8.47 11/01/2021    HGB 10.1 (L) 11/01/2021    HCT 31.9 (L) 11/01/2021    MCV 99.1 (H) 11/01/2021     11/01/2021     Lab Results   Component Value Date    GLUCOSE 160 (H) 11/01/2021    BUN 18 11/01/2021    CREATININE 1.09 (H) 11/01/2021    EGFRIFNONA 49 (L) 11/01/2021    EGFRIFAFRI 35 (L) 09/24/2021    BCR 16.5 " 11/01/2021    K 4.3 11/01/2021    CO2 23.6 11/01/2021    CALCIUM 9.5 11/01/2021    PROTENTOTREF 6.9 09/24/2021    ALBUMIN 3.90 11/01/2021    LABIL2 1.8 09/24/2021    AST 16 11/01/2021    ALT 13 11/01/2021             Results from last 7 days   Lab Units 11/01/21  1346   WBC 10*3/mm3 8.47   NEUTROS ABS 10*3/mm3 5.77   HEMOGLOBIN g/dL 10.1*   HEMATOCRIT % 31.9*   PLATELETS 10*3/mm3 310     Results from last 7 days   Lab Units 11/01/21  1346   SODIUM mmol/L 139   POTASSIUM mmol/L 4.3   CHLORIDE mmol/L 104   CO2 mmol/L 23.6   BUN mg/dL 18   CREATININE mg/dL 1.09*   CALCIUM mg/dL 9.5   ALBUMIN g/dL 3.90   BILIRUBIN mg/dL 0.2   ALK PHOS U/L 61   ALT (SGPT) U/L 13   AST (SGOT) U/L 16   GLUCOSE mg/dL 160*             SPECIMEN   Procedure  Total mastectomy    Specimen Laterality  Right    TUMOR   Tumor Site  Lower inner quadrant    Clock Position of Tumor Site  6 o'clock    Histologic Type  Invasive carcinoma of no special type (invasive ductal carcinoma, not otherwise specified)    Glandular (Acinar) / Tubular Differentiation  Score 2    Nuclear Pleomorphism  Score 2    Mitotic Rate  Score 1    Overall Grade  Grade 1 (scores of 3, 4 or 5)    Tumor Size  Greatest dimension of largest invasive focus (Millimeters): 19 mm   Additional Dimension (Millimeters)  13 mm     10 mm   Tumor Focality  Single focus of invasive carcinoma    Ductal Carcinoma In Situ (DCIS)  Present      Negative for extensive intraductal component (EIC)    Size (Extent) of DCIS  Estimated size (extent) of DCIS is at least (Millimeters): 15 mm   Architectural Patterns  Cribriform      Papillary      Solid    Nuclear Grade  Grade II (intermediate)    Necrosis  Present, focal (small foci or single cell necrosis)    Lobular Carcinoma In Situ (LCIS)  No LCIS in specimen    Tumor Extent     Lymphovascular Invasion  Not identified    Dermal Lymphovascular Invasion  Not identified    Microcalcifications  Present in DCIS      Present in invasive carcinoma       Present in non-neoplastic tissue    Treatment Effect  No known presurgical therapy    MARGINS   Invasive Carcinoma Margins  Uninvolved by invasive carcinoma    Distance from Closest Margin (Millimeters)  10 mm   Closest Margin(s)  Lateral    Distance from Other Margins     Anterior Margin (Millimeters)  40 mm   Posterior Margin (Millimeters)  45 mm   Superior Margin (Millimeters)  18 mm   Inferior Margin (Millimeters)  13 mm   Medial Margin (Millimeters)  22 mm   Lateral Margin (Millimeters)  10 mm   DCIS Margins  Uninvolved by DCIS    Distance from Closest Margin (Millimeters)  14 mm   Closest Margin(s)  Superior      Inferior    Distance from Other Margins     Anterior Margin (Millimeters)  20 mm   Posterior Margin (Millimeters)  40 mm   Superior Margin (Millimeters)  14 mm   Inferior Margin (Millimeters)  14 mm   Medial Margin (Millimeters)  30 mm   Lateral Margin (Millimeters)  21 mm   LYMPH NODES   Regional Lymph Nodes  Uninvolved by tumor cells    Total Number of Lymph Nodes Examined  5    Number of [place] Nodes Examined  5    PATHOLOGIC STAGE CLASSIFICATION (pTNM, AJCC 8th Edition)   Primary Tumor (pT)  pT1c    Regional Lymph Nodes Modifier  (sn): Only sentinel node(s) evaluated.    Regional Lymph Nodes (pN)  pN0    .   Breast Biomarker Reporting Template   Breast.Bmk - 1   Protocol posted: 8/28/2019   Test(s) Performed     Estrogen Receptor (ER) Status  Positive    Percentage of Cells with Nuclear Positivity  %    Average Intensity of Staining  Strong    Test Type  Food and Drug Administration (FDA) cleared (test / vendor): [id number]    Primary Antibody  SP1    Scoring System  Ella    Proportion Score  5    Intensity Score  3    Total Ella Score  8    Test(s) Performed     Progesterone Receptor (PgR) Status  Positive    Percentage of Cells with Nuclear Positivity  5 %   Average Intensity of Staining  Moderate    Test Type  Food and Drug Administration (FDA) cleared (test / vendor): [id number]     Primary Antibody  1E2    Scoring System  Ella    Proportion Score  2    Intensity Score  2    Total Ella Score  4    Test(s) Performed     HER2 by Immunohistochemistry  Equivocal (Score 2+)    Percentage of Cells with Uniform Intense Complete Membrane Staining  0 %   Test Type  Food and Drug Administration (FDA) cleared (test / vendor): [place]    Primary Antibody  4B5    Test(s) Performed  Ki-67    Percentage of Positive Nuclei  10 %   Primary Antibody           Assessment/Plan   1.cT1cN0 grade 1 infiltrating ductal carcinoma right breast at 6:00 ER positive AL negative HER-2 negative with associated intermediate grade DCIS the 1 o'clock position of the breast both lesions nonpalpable  · pT1cN0 grade 1 ER positive AL negative HER-2 negative post mastectomy on 5/11/2020  · Oncotype DX score 22  · Patient discontinued Arimidex in early May 2020 and soon thereafter began Femara 2.5 mg daily.  · Discontinued Femara in December due to worsening hot flashes and difficulty sleeping  · Gabapentin added in 2/21 with Femara resumed  · Tamoxifen started in 7/21  · Worsening joint pains tamoxifen held 11/21 pending rheumatology evaluation-rheumatoid factor and RUSSELL negative  ·     2.  Type 2 diabetes/hypertension hypercholesterolemia/  3.  Hypothyroidism  4.  Significant degenerative arthritis on nonsteroidals  5.  Family history of breast cancer.  Genetics testing negative.  6.  Normal bone density in 2018  7.  Gout right foot, recurrent.  Patient initiated on allopurinol 100 mg daily per Dr. Mitchell in June 2020.    8.  Vaccinated against COVID-19  9.  New onset anemia?  Etiology    PLAN:  1.  Hold tamoxifen 20 mg and call me in 5 weeks to see if she is any better  2.   Continue baby aspirin  3.  Anemia work-up   4. follow-up with rheumatology   4. see me in 4 months for follow-up

## 2021-11-11 ENCOUNTER — LAB (OUTPATIENT)
Dept: LAB | Facility: HOSPITAL | Age: 71
End: 2021-11-11

## 2021-11-11 ENCOUNTER — TRANSCRIBE ORDERS (OUTPATIENT)
Dept: ADMINISTRATIVE | Facility: HOSPITAL | Age: 71
End: 2021-11-11

## 2021-11-11 DIAGNOSIS — I10 ESSENTIAL HYPERTENSION, MALIGNANT: ICD-10-CM

## 2021-11-11 DIAGNOSIS — E11.9 DIABETES MELLITUS WITHOUT COMPLICATION (HCC): ICD-10-CM

## 2021-11-11 DIAGNOSIS — M15.0 PRIMARY GENERALIZED HYPERTROPHIC OSTEOARTHROSIS: ICD-10-CM

## 2021-11-11 DIAGNOSIS — R79.89 HYPOURICEMIA: Primary | ICD-10-CM

## 2021-11-11 DIAGNOSIS — R79.89 HYPOURICEMIA: ICD-10-CM

## 2021-11-11 LAB
ALBUMIN SERPL-MCNC: 4.2 G/DL (ref 3.5–5.2)
ALBUMIN UR-MCNC: <1.2 MG/DL
ANION GAP SERPL CALCULATED.3IONS-SCNC: 12.7 MMOL/L (ref 5–15)
BUN SERPL-MCNC: 22 MG/DL (ref 8–23)
BUN/CREAT SERPL: 21.4 (ref 7–25)
CALCIUM SPEC-SCNC: 9.3 MG/DL (ref 8.6–10.5)
CHLORIDE SERPL-SCNC: 104 MMOL/L (ref 98–107)
CO2 SERPL-SCNC: 21.3 MMOL/L (ref 22–29)
CREAT SERPL-MCNC: 1.03 MG/DL (ref 0.57–1)
CREAT UR-MCNC: 153.2 MG/DL
CREAT UR-MCNC: 153.2 MG/DL
GFR SERPL CREATININE-BSD FRML MDRD: 53 ML/MIN/1.73
GLUCOSE SERPL-MCNC: 171 MG/DL (ref 65–99)
MICROALBUMIN/CREAT UR: NORMAL MG/G{CREAT}
PHOSPHATE SERPL-MCNC: 3.5 MG/DL (ref 2.5–4.5)
POTASSIUM SERPL-SCNC: 4.1 MMOL/L (ref 3.5–5.2)
PROT UR-MCNC: 12 MG/DL
PROT/CREAT UR: 78.3 MG/G CREA (ref 0–200)
SODIUM SERPL-SCNC: 138 MMOL/L (ref 136–145)

## 2021-11-11 PROCEDURE — 82043 UR ALBUMIN QUANTITATIVE: CPT

## 2021-11-11 PROCEDURE — 36415 COLL VENOUS BLD VENIPUNCTURE: CPT

## 2021-11-11 PROCEDURE — 81001 URINALYSIS AUTO W/SCOPE: CPT

## 2021-11-11 PROCEDURE — 80069 RENAL FUNCTION PANEL: CPT

## 2021-11-11 PROCEDURE — 84156 ASSAY OF PROTEIN URINE: CPT

## 2021-11-11 PROCEDURE — 82570 ASSAY OF URINE CREATININE: CPT

## 2021-11-12 LAB
BACTERIA UR QL AUTO: ABNORMAL /HPF
BILIRUB UR QL STRIP: NEGATIVE
CLARITY UR: CLEAR
COLOR UR: YELLOW
GLUCOSE UR STRIP-MCNC: NEGATIVE MG/DL
HGB UR QL STRIP.AUTO: NEGATIVE
HYALINE CASTS UR QL AUTO: ABNORMAL /LPF
KETONES UR QL STRIP: NEGATIVE
LEUKOCYTE ESTERASE UR QL STRIP.AUTO: ABNORMAL
NITRITE UR QL STRIP: POSITIVE
PH UR STRIP.AUTO: <=5 [PH] (ref 5–8)
PROT UR QL STRIP: NEGATIVE
RBC # UR: ABNORMAL /HPF
REF LAB TEST METHOD: ABNORMAL
SP GR UR STRIP: 1.02 (ref 1–1.03)
SQUAMOUS #/AREA URNS HPF: ABNORMAL /HPF
UROBILINOGEN UR QL STRIP: ABNORMAL
WBC UR QL AUTO: ABNORMAL /HPF

## 2021-12-12 DIAGNOSIS — M25.50 POLYARTHRALGIA: ICD-10-CM

## 2021-12-13 RX ORDER — TRAMADOL HYDROCHLORIDE 50 MG/1
TABLET ORAL
Qty: 30 TABLET | Refills: 0 | OUTPATIENT
Start: 2021-12-13

## 2021-12-13 RX ORDER — ALLOPURINOL 300 MG/1
TABLET ORAL
Qty: 30 TABLET | Refills: 0 | OUTPATIENT
Start: 2021-12-13

## 2021-12-13 RX ORDER — ALLOPURINOL 300 MG/1
300 TABLET ORAL DAILY
Qty: 60 TABLET | Refills: 1 | Status: SHIPPED | OUTPATIENT
Start: 2021-12-13 | End: 2022-01-17

## 2021-12-27 ENCOUNTER — OFFICE VISIT (OUTPATIENT)
Dept: FAMILY MEDICINE CLINIC | Facility: CLINIC | Age: 71
End: 2021-12-27

## 2021-12-27 VITALS
DIASTOLIC BLOOD PRESSURE: 63 MMHG | SYSTOLIC BLOOD PRESSURE: 123 MMHG | WEIGHT: 196.3 LBS | TEMPERATURE: 96.8 F | HEART RATE: 77 BPM | OXYGEN SATURATION: 98 % | BODY MASS INDEX: 37.06 KG/M2 | HEIGHT: 61 IN

## 2021-12-27 DIAGNOSIS — R21 RASH AND NONSPECIFIC SKIN ERUPTION: Primary | ICD-10-CM

## 2021-12-27 PROBLEM — N18.31 STAGE 3A CHRONIC KIDNEY DISEASE: Status: ACTIVE | Noted: 2021-11-15

## 2021-12-27 PROCEDURE — 99214 OFFICE O/P EST MOD 30 MIN: CPT | Performed by: NURSE PRACTITIONER

## 2021-12-27 RX ORDER — FLUCONAZOLE 150 MG/1
150 TABLET ORAL ONCE
Qty: 1 TABLET | Refills: 1 | Status: SHIPPED | OUTPATIENT
Start: 2021-12-27 | End: 2021-12-27

## 2021-12-27 RX ORDER — PREDNISONE 20 MG/1
TABLET ORAL
Qty: 11 TABLET | Refills: 0 | Status: SHIPPED | OUTPATIENT
Start: 2021-12-27 | End: 2022-01-04

## 2021-12-27 NOTE — PROGRESS NOTES
"Chief Complaint  Hypertension (3 month f/u htn)    Subjective          Mary Jo Berg presents to [facility]  History of Present Illness pt here for 3 month f/u on arthritis.  Since then has developed terrible. Painful rash.  Saw rheumatology who thought it was due to plaquenil and stopped it 2-3 weeks ago.  Rash improving but not better.  Was told to see UC for steroids, which she did but did not give her any due to diabetes.  After this she was seen by endo who also thought she needed steroids and OKd them since A1C has been so well controlled.  Still was not able to get more than 1 injection which was marginally helpful.    Stopped allopurinol appr 1 week ago, no changes in rash.      Has been to derm and given topical and told to RTO if no better-could consider bx.      She is fatigued.  No cough, dyspnea, fever, chills, worsening joint pain now.  Taking hydroxyzine and stopped OTC zyrtec.      Objective   Vital Signs:   /63   Pulse 77   Temp 96.8 °F (36 °C)   Ht 154 cm (60.63\")   Wt 89 kg (196 lb 4.8 oz)   SpO2 98%   BMI 37.54 kg/m²     Physical Exam  Vitals and nursing note reviewed.   Constitutional:       General: She is not in acute distress.     Appearance: She is well-developed. She is not ill-appearing, toxic-appearing or diaphoretic.   HENT:      Head: Normocephalic and atraumatic.   Eyes:      General:         Right eye: No discharge.         Left eye: No discharge.      Conjunctiva/sclera: Conjunctivae normal.   Cardiovascular:      Rate and Rhythm: Normal rate and regular rhythm.   Pulmonary:      Effort: Pulmonary effort is normal.      Breath sounds: Normal breath sounds.   Abdominal:      General: Bowel sounds are normal.      Palpations: Abdomen is soft.      Tenderness: There is no abdominal tenderness.   Musculoskeletal:         General: No deformity.      Cervical back: Neck supple.      Comments: Gait smooth and steady   Lymphadenopathy:      " Cervical: No cervical adenopathy.   Skin:     General: Skin is warm and dry.      Comments: Maculopapular rash on trunk, b/l ext with sloughing skin b/l hands and LEs   Neurological:      Mental Status: She is alert and oriented to person, place, and time.   Psychiatric:         Mood and Affect: Mood normal.         Behavior: Behavior normal.        Result Review :                 Assessment and Plan    Diagnoses and all orders for this visit:    1. Rash and nonspecific skin eruption (Primary)  -     predniSONE (DELTASONE) 20 MG tablet; Take 2 tablets by mouth Daily for 3 days, THEN 1 tablet Daily for 5 days. Take with breakfast  Dispense: 11 tablet; Refill: 0    Rash with skin sloughing-almost Beto-Milo appearing.  Cannot see derm note.  Will have her hold all non essential meds and supplements.  Start OTC antihistamine.  Will start prednisone taper.  Discussed possible blood sugar increases and monitoring. May need to contact endo with increases in BS.  Reviewed endo note who agrees pt likely needs steroids.  Take with food.  Needs to see derm for re-eval asap.  We discussed s/s requiring emergent tx.   Will have her let me know prior to end of week how she is doing, sooner if any worsening.             Follow Up   No follow-ups on file.  Patient was given instructions and counseling regarding her condition or for health maintenance advice. Please see specific information pulled into the AVS if appropriate.

## 2021-12-28 RX ORDER — TAMOXIFEN CITRATE 20 MG/1
20 TABLET ORAL DAILY
Qty: 90 TABLET | Refills: 0 | Status: SHIPPED | OUTPATIENT
Start: 2021-12-28 | End: 2022-04-14 | Stop reason: SDUPTHER

## 2021-12-28 NOTE — TELEPHONE ENCOUNTER
Caller: Mary Jo Berg    Relationship: Self    Best call back number: [phone]    Requested Prescriptions:   Requested Prescriptions     Pending Prescriptions Disp Refills   • tamoxifen (NOLVADEX) 20 MG chemo tablet       Sig: Take 1 tablet by mouth.        Pharmacy where request should be sent: [facility]ARMACY #[id number] [place], [place] - [address] AT [facility] [phone] [facility] [phone] FX     Additional details provided by patient: PATIENT WOULD LIKE A 90 DAY SUPPLY.    Does the patient have less than a 3 day supply:  [x] Yes  [] No    Stefani WALLACE Rep   12/28/21 11:07 EST

## 2022-01-17 ENCOUNTER — LAB (OUTPATIENT)
Dept: OTHER | Facility: HOSPITAL | Age: 72
End: 2022-01-17

## 2022-01-17 ENCOUNTER — OFFICE VISIT (OUTPATIENT)
Dept: ONCOLOGY | Facility: CLINIC | Age: 72
End: 2022-01-17

## 2022-01-17 VITALS
HEIGHT: 61 IN | WEIGHT: 192.8 LBS | RESPIRATION RATE: 18 BRPM | HEART RATE: 65 BPM | BODY MASS INDEX: 36.4 KG/M2 | OXYGEN SATURATION: 100 % | TEMPERATURE: 97.7 F | SYSTOLIC BLOOD PRESSURE: 138 MMHG | DIASTOLIC BLOOD PRESSURE: 77 MMHG

## 2022-01-17 DIAGNOSIS — Z87.39 HISTORY OF GOUT: Primary | ICD-10-CM

## 2022-01-17 DIAGNOSIS — Z17.0 MALIGNANT NEOPLASM OF OVERLAPPING SITES OF RIGHT BREAST IN FEMALE, ESTROGEN RECEPTOR POSITIVE: ICD-10-CM

## 2022-01-17 DIAGNOSIS — C50.811 MALIGNANT NEOPLASM OF OVERLAPPING SITES OF RIGHT BREAST IN FEMALE, ESTROGEN RECEPTOR POSITIVE: ICD-10-CM

## 2022-01-17 DIAGNOSIS — Z17.0 MALIGNANT NEOPLASM OF UPPER-OUTER QUADRANT OF RIGHT BREAST IN FEMALE, ESTROGEN RECEPTOR POSITIVE: Primary | ICD-10-CM

## 2022-01-17 DIAGNOSIS — C50.411 MALIGNANT NEOPLASM OF UPPER-OUTER QUADRANT OF RIGHT BREAST IN FEMALE, ESTROGEN RECEPTOR POSITIVE: Primary | ICD-10-CM

## 2022-01-17 DIAGNOSIS — M10.00 IDIOPATHIC GOUT, UNSPECIFIED CHRONICITY, UNSPECIFIED SITE: Primary | ICD-10-CM

## 2022-01-17 DIAGNOSIS — Z12.31 ENCOUNTER FOR SCREENING MAMMOGRAM FOR MALIGNANT NEOPLASM OF BREAST: ICD-10-CM

## 2022-01-17 LAB
ALBUMIN SERPL-MCNC: 3.6 G/DL (ref 3.5–5.2)
ALBUMIN/GLOB SERPL: 1.3 G/DL
ALP SERPL-CCNC: 60 U/L (ref 39–117)
ALT SERPL W P-5'-P-CCNC: 9 U/L (ref 1–33)
ANION GAP SERPL CALCULATED.3IONS-SCNC: 8.7 MMOL/L (ref 5–15)
AST SERPL-CCNC: 14 U/L (ref 1–32)
BASOPHILS # BLD AUTO: 0.03 10*3/MM3 (ref 0–0.2)
BASOPHILS NFR BLD AUTO: 0.6 % (ref 0–1.5)
BILIRUB SERPL-MCNC: 0.2 MG/DL (ref 0–1.2)
BUN SERPL-MCNC: 19 MG/DL (ref 8–23)
BUN/CREAT SERPL: 17.9 (ref 7–25)
CALCIUM SPEC-SCNC: 8.8 MG/DL (ref 8.6–10.5)
CHLORIDE SERPL-SCNC: 106 MMOL/L (ref 98–107)
CO2 SERPL-SCNC: 24.3 MMOL/L (ref 22–29)
CREAT SERPL-MCNC: 1.06 MG/DL (ref 0.57–1)
DEPRECATED RDW RBC AUTO: 49.3 FL (ref 37–54)
EOSINOPHIL # BLD AUTO: 0.13 10*3/MM3 (ref 0–0.4)
EOSINOPHIL NFR BLD AUTO: 2.4 % (ref 0.3–6.2)
ERYTHROCYTE [DISTWIDTH] IN BLOOD BY AUTOMATED COUNT: 13.2 % (ref 12.3–15.4)
GFR SERPL CREATININE-BSD FRML MDRD: 51 ML/MIN/1.73
GLOBULIN UR ELPH-MCNC: 2.7 GM/DL
GLUCOSE SERPL-MCNC: 205 MG/DL (ref 65–99)
HCT VFR BLD AUTO: 33.8 % (ref 34–46.6)
HGB BLD-MCNC: 10.4 G/DL (ref 12–15.9)
IMM GRANULOCYTES # BLD AUTO: 0.02 10*3/MM3 (ref 0–0.05)
IMM GRANULOCYTES NFR BLD AUTO: 0.4 % (ref 0–0.5)
LYMPHOCYTES # BLD AUTO: 1.94 10*3/MM3 (ref 0.7–3.1)
LYMPHOCYTES NFR BLD AUTO: 36.3 % (ref 19.6–45.3)
MCH RBC QN AUTO: 31.2 PG (ref 26.6–33)
MCHC RBC AUTO-ENTMCNC: 30.8 G/DL (ref 31.5–35.7)
MCV RBC AUTO: 101.5 FL (ref 79–97)
MONOCYTES # BLD AUTO: 0.38 10*3/MM3 (ref 0.1–0.9)
MONOCYTES NFR BLD AUTO: 7.1 % (ref 5–12)
NEUTROPHILS NFR BLD AUTO: 2.85 10*3/MM3 (ref 1.7–7)
NEUTROPHILS NFR BLD AUTO: 53.2 % (ref 42.7–76)
NRBC BLD AUTO-RTO: 0 /100 WBC (ref 0–0.2)
PLATELET # BLD AUTO: 210 10*3/MM3 (ref 140–450)
PMV BLD AUTO: 9.6 FL (ref 6–12)
POTASSIUM SERPL-SCNC: 4.4 MMOL/L (ref 3.5–5.2)
PROT SERPL-MCNC: 6.3 G/DL (ref 6–8.5)
RBC # BLD AUTO: 3.33 10*6/MM3 (ref 3.77–5.28)
SODIUM SERPL-SCNC: 139 MMOL/L (ref 136–145)
URATE SERPL-MCNC: 7.3 MG/DL (ref 2.4–5.7)
WBC NRBC COR # BLD: 5.35 10*3/MM3 (ref 3.4–10.8)

## 2022-01-17 PROCEDURE — 80053 COMPREHEN METABOLIC PANEL: CPT | Performed by: INTERNAL MEDICINE

## 2022-01-17 PROCEDURE — 84550 ASSAY OF BLOOD/URIC ACID: CPT | Performed by: INTERNAL MEDICINE

## 2022-01-17 PROCEDURE — 36415 COLL VENOUS BLD VENIPUNCTURE: CPT

## 2022-01-17 PROCEDURE — 85025 COMPLETE CBC W/AUTO DIFF WBC: CPT | Performed by: INTERNAL MEDICINE

## 2022-01-17 PROCEDURE — 99214 OFFICE O/P EST MOD 30 MIN: CPT | Performed by: NURSE PRACTITIONER

## 2022-01-17 RX ORDER — FEBUXOSTAT 40 MG/1
40 TABLET, FILM COATED ORAL DAILY
Qty: 30 TABLET | Refills: 1 | Status: SHIPPED | OUTPATIENT
Start: 2022-01-17 | End: 2022-10-03

## 2022-01-17 NOTE — PROGRESS NOTES
Subjective      REASON FOR CONSULTATION:   1.Right breast cancer invasive low-grade ER positive PA negative HER-2 negative breast cancer at 6:00 with DCIS at 1:00 post biopsy; Simple mastectomy on 5/11/2020 and this shows an invasive cancer measuring 19 x 13 mmI- DCIS present measuring 15 mm margins were widely clear 5 sentinel nodes were negative for metastatic disease still grade 1. No lymphovascular invasion.                             REQUESTING PHYSICIAN: MD Keshia Durán MD    History of Present Illness patient is a 71 y.o. lady with history of a small node negative hormone positive right breast cancer on Femara x6 months and then switched to tamoxifen in July 2021 due to worsening crippling arthritis for which she had to take prednisone.  Within a week of taking the prednisone her pain dramatically improved.  Since beginning tamoxifen she had recurrence of aches and pains especially in her foot affecting her ability to walk.  We did refer her to rheumatology and she was seen by Dr. Raza for initial evaluation 11/2/2021.  Patient was felt to have polyarthritis with further work-up ensuing.  Patient was placed on another prednisone pack with improvement.  Patient was seen just last week again by Dr. Raza though he did not at this time have access to those records.    Patient's medical story has been complicated over the last 6 weeks by a significant rash, being seen in urgent care twice within a week beginning 12/13/2021.  She was given more steroids during that whole ordeal and at one point it is even documented that the rash appeared like Jalloh-Milo syndrome.  The patient states her skin was indeed sloughing off and was so bad that she had to avoid going places.  The patient was never implicitly instructed to stop allopurinol but thank God she was smart enough to realize this might be the culprit and stopped it of her own  accord.  She has been off it about 3 weeks now and her rash is resolving.  I discussed with her that undoubtedly her rash was related to that and this would be at the top of the differential diagnoses.    As for tamoxifen, she has continued on this and it is difficult to say if she is tolerating it because she has been on so many steroids over the last month or so in relation to rash per above that it is difficult to know if she is having much in the way of aches and pains from the tamoxifen.  She is therefore agreeable to remain on this for now and monitor symptoms.    Discussed with her rechecking uric acid level today considering she is now off allopurinol and her history of gout.  She may require some other uric acid lowering medication such as Uloric.    She denies other concerns at this time.     Past Medical History:   Diagnosis Date   • Asthma    • Breast cancer (HCC) 2020    right IDC   • Cataract     on both  retinas   • Coronary artery disease 1/10/2015    Pacemaker 2015   • Diabetes mellitus (HCC)     Type 2   • Diverticulosis 2021   • Heart murmur    • History of asthma    • History of colon polyps    • History of complete heart block     paced in 2015   • Hyperlipidemia    • Hypertension    • Hypothyroidism    • Obesity    • Osteoarthritis    • Osteopenia    • Pacemaker    • PONV (postoperative nausea and vomiting)         Past Surgical History:   Procedure Laterality Date   • ADENOIDECTOMY     • BLADDER REPAIR     • BREAST BIOPSY Right 2020    malignant   • CARDIAC PACEMAKER PLACEMENT     • CARPAL TUNNEL RELEASE Right    • CATARACT EXTRACTION Bilateral    •  SECTION      x2   • COLONOSCOPY     • COLONOSCOPY N/A 2021    Procedure: COLONOSCOPY;  Surgeon: Yamilet Stewart MD;  Location: [facility] ENDOSCOPY;  Service: Gastroenterology;  Laterality: N/A;  pre- hx polyps  post-- diverticulosis, hemorrhoids   • ENDOMETRIAL ABLATION     • HAND SURGERY     •  HERNIA REPAIR      Bilateral femoral and inguinal   • JOINT REPLACEMENT  Nov 2007    Left knee   • KNEE ARTHROSCOPY Bilateral    • MASTECTOMY  03/11/2020   • MASTECTOMY WITH SENTINEL NODE BIOPSY AND AXILLARY NODE DISSECTION Right 5/11/2020    Procedure: RIGHT BREAST MASTECTOMY WITH SENTINEL NODE BIOPSY;  Surgeon: Bj Stephen MD;  Location[place] [facility] [place] [facility] [place];  Service: General;  Laterality: Right;   • OOPHORECTOMY     • SHOULDER ARTHROSCOPY Right 2016    rotator cuff repair, biceps tenotomy   • THYROIDECTOMY, PARTIAL  2005   • TONSILLECTOMY AND ADENOIDECTOMY     • TOTAL KNEE ARTHROPLASTY Left 2007   • TUBAL ABDOMINAL LIGATION     • UMBILICAL HERNIA REPAIR      patient is a 70-year-old white female with hypercholesterolemia diabetes type 2 hypertension hypothyroidism and pacemaker placement 2015 was noted on routine mammogram in June of this last year to have some abnormalities in the right breast that warranted close follow-up.  This led to a repeat mammogram in January which was delayed due to her trip she took Shira with her  when she came back in February she did the mammogram and this showed persistence and worsening of the calcifications at the 6:00 and 1 o'clock position of the right breast.    This led to a biopsy of both these regions on 3/5/2020 which showed DCIS at the 1 o'clock position intermediate grade with calcifications and no necrosis and well differentiated grade 1 infiltrating ductal carcinoma at the 6 o'clock position measuring 2 mm with no lymph vascular invasion and associated DCIS intermediate grade the tumor was positive for estrogen greater than 95% negative for progesterone 0% HER-2 negative at 1+ Ki-67 of 5%.    Patient was referred to Dr. Stephen who ordered bilateral breast MRIs which showed residual 2 cm area of enhancement at the 6 o'clock position but no further abnormalities at the 1 o'clock position of the right breast.  There is no axillary or internal mammary  adenopathy in the left breast was benign.  The mass is not palpable by the patient or Dr. Stephen    Based on the recent coronavirus outbreak which is ongoing elective surgeries for grade 1 breast cancers are being delayed based on guidelines from the hospital and from the oncological community and she is here to discuss neoadjuvant hormonal blockade.    Patient is  3 para 3 first childbirth was age 22 she breast-fed 2 of her 3 children for at least 4 to 6 months menarche was at age 11 menopause at 49 she did not take hormones after menopause    Family history is positive for paternal grandmother with metastatic breast cancer at age 75 a paternal first cousin with breast cancer at age 70 her father  with bladder cancer mother had endometrial carcinoma at age 80.  She is concerned about genetic testing because she has 3 daughters and 3 grand daughters.    She is not a smoker or drinker and denies DVTs MI or stroke    Because of the delay of surgery due to the coronavirus we decided to do Oncotype on her biopsy and start aromatase inhibitor in the neoadjuvant setting    The side effects and toxicities of the Aromatase inhibitors was discussed with the patient including, hot flashes, mood swings and hair thinning.Significant arthralgias and worsening bone density were also discussed. Baseline bone density evaluation was ordered.  I discussed with the patient and her  on the video conference that this was not the standard of care usually but because of the pandemic and making treatment decisions for the safety of the patient and based on delaying elective surgery for certain subsets of breast cancer which she presented.    I explained the rationale of doing an Oncotype DX score and told her that if she was not willing to take chemotherapy there would be no reason to do this but she is willing to take chemotherapy if she has a high Oncotype score and therefore we will send this results and she will  call me in 2 weeks to discuss them    There are no clear guidelines on how to manage patients with clinically node-negative disease with a high Oncotype and we may decide to move up her surgery so that we will have more information with which to make a decision about chemotherapy in order to decide how much chemotherapy to give her    She and her  were willing to go this route and she will call me in 2 weeks to discuss her Oncotype score    I am concerned about her general joint discomfort which might be aggravated by an aromatase inhibitor but we will deal with that if it occurs    I will also schedule her to see the genetic counselors and they could possibly do a video visit with her also and decide about genetic testing    4/20  Patient is a 70-year-old female with small mammographically detected right breast cancer ER positive OR negative and we opted to start Arimidex preoperatively because surgeries are being delayed for the coronavirus pandemic.  We will also ask for an Oncotype DX to be done on her biopsy specimen to help us make decisions about further treatment.  Unfortunately the specimen was too small and the Oncotype could not be done.  In addition she developed gout in her foot after 2 or 3 doses of Arimidex which I do not think is related to the Arimidex and she was evaluated by her daughter who is a physician who did not think there was any infection she tried nonsteroidals with some improvement but she still not better and has not yet restarted her Arimidex    In light of all these findings I have given her a short prednisone bolus to see if this will help the gout and at this point I think we should move ahead with surgery in the next 3 to 4 weeks and hold off on the Arimidex until after surgery so we can get all the information we need at the time of surgery    She is okay with this approach and I have called her surgeon and her cardiologist to clear her for surgery    She will need to stop  her nonsteroidals at least a week before surgery and hopefully the steroids will help the foot enough that she does not have to take the nonsteroidals    She has had no fever or other signs of infection.    Simple mastectomy on 5/11/2020 and this shows an invasive cancer measuring 19 x 13 mmI- DCIS present measuring 15 mm margins were widely clear 5 sentinel nodes were negative for metastatic disease still grade 1. No lymphovascular invasion.    Patient discontinuing Arimidex in May 2020 due to concerns for contributing to gout.  Oncotype DX score returning 22.  Patient beginning Femara late May 2020.    Patient switched to tamoxifen in July 2021 due to significant arthralgias with Femara.    Current Outpatient Medications on File Prior to Visit   Medication Sig Dispense Refill   • clotrimazole-betamethasone (LOTRISONE) 1-0.05 % cream APPLY CREAM TO AFFECTED AREA IN GROIN TWICE DAILY     • diphenhydrAMINE (SOMINEX) 25 MG tablet Take 25 mg by mouth.     • ezetimibe (ZETIA) 10 MG tablet Take 10 mg by mouth Daily.     • fluticasone (FLONASE) 50 MCG/ACT nasal spray 2 sprays into the nostril(s) as directed by provider As Needed.     • glucose blood test strip ACCU-CHECK SKYLA. Use to check blood sugar once daily, as directed. DX: E11.9     • Lancets (ACCU-CHEK MULTICLIX) lancets Use to check blood sugar once daily, as directed. DX: 250.00     • levothyroxine (SYNTHROID, LEVOTHROID) 125 MCG tablet Take 1 tablet by mouth.     • Loratadine 10 MG capsule Take 1 tablet by mouth Daily.     • metFORMIN (GLUCOPHAGE) 500 MG tablet Take 2 tablets p.o. every morning and 1 tablet p.o. every afternoon. 270 tablet 1   • metoprolol succinate XL (TOPROL-XL) 25 MG 24 hr tablet Take 25 mg by mouth Every Night.     • tamoxifen (NOLVADEX) 20 MG chemo tablet Take 1 tablet by mouth Daily. 90 tablet 0   • irbesartan (AVAPRO) 150 MG tablet Take 150 mg by mouth Every Morning.     • [DISCONTINUED] allopurinol (Zyloprim) 300 MG tablet Take 1 tablet  by mouth Daily. 60 tablet 1   • [DISCONTINUED] HYDROcodone-acetaminophen (NORCO) 5-325 MG per tablet Take 1 tablet by mouth Every 6 (Six) Hours As Needed for Moderate Pain . 10 tablet 0   • [DISCONTINUED] hydrOXYzine pamoate (VISTARIL) 50 MG capsule Take 1 capsule by mouth 3 (Three) Times a Day As Needed for Itching for up to 10 days. 30 capsule 0   • [DISCONTINUED] hydrOXYzine pamoate (VISTARIL) 50 MG capsule Take 1 capsule by mouth 4 (Four) Times a Day As Needed for Itching for up to 10 days. 40 capsule 0   • [DISCONTINUED] levothyroxine (Synthroid) 125 MCG tablet Take 1 tablet by mouth.     • [DISCONTINUED] Thyroid (LEVOTHYROXINE-LIOTHYRONINE PO) Take 125 mg by mouth Daily. 1/2 ON SUNDAYS     • [DISCONTINUED] traMADol (ULTRAM) 50 MG tablet Take 1 tablet by mouth Every 8 (Eight) Hours As Needed for Moderate Pain . 30 tablet 0   • [DISCONTINUED] vitamin B-12 (CYANOCOBALAMIN) 1000 MCG tablet Take 1,000 mcg by mouth Daily. Every other day       No current facility-administered medications on file prior to visit.        ALLERGIES:    Allergies   Allergen Reactions   • Indomethacin Nausea Only and Dizziness     EAR RINGING     • Nutritional Supplements Swelling     [place] nuts - no other nuts that she is aware of   • Penicillins Anaphylaxis, Hives, Shortness Of Breath and Itching      tachycardia     • Statins Unknown - High Severity   • Hydrochlorothiazide Other (See Comments)     Caused gout symptoms     • Lisinopril Cough   • Morphine Nausea Only            • Letrozole Other (See Comments)   • Pentazocine Nausea And Vomiting               Social History     Socioeconomic History   • Marital status:      Spouse name: Sean   Tobacco Use   • Smoking status: Never Smoker   • Smokeless tobacco: Never Used   Vaping Use   • Vaping Use: Never used   Substance and Sexual Activity   • Alcohol use: Yes     Alcohol/week: 1.0 standard drink     Types: 1 Standard drinks or equivalent per week     Comment: or less   •  "Drug use: Never   • Sexual activity: Yes     Partners: Male     Comment: endometrial [id number]        Family History   Problem Relation Age of Onset   • Cervical cancer Mother    • Alzheimer's disease Mother    • Arthritis Mother    • Other Mother         Uterine cancer   • Cancer Father         Bladder   • Heart disease Father    • Hypertension Father    • Arthritis Father         Breadt cancer   • Alcohol abuse Father             • Hyperlipidemia Father    • Depression Father    • Diabetes Father         \"borderline\"   • Arthritis Maternal Grandmother    • Heart disease Other         FH of heart disease in males before age 55   • Hyperlipidemia Brother    • Hypertension Brother    • Breast cancer Paternal Grandmother 75   • Heart attack Paternal Grandfather    • Breast cancer Paternal Cousin 70   • Alcohol abuse Maternal Grandfather             • Depression Brother         since    • Malig Hyperthermia Neg Hx         Review of Systems   Constitutional: Positive for diaphoresis.   Respiratory: Negative.  Negative for shortness of breath.    Cardiovascular: Negative for chest pain, palpitations and leg swelling.   Musculoskeletal: Positive for arthralgias.   Skin: Positive for rash (see HPI - resolving).   Neurological: Positive for headaches. Negative for dizziness and weakness.   Psychiatric/Behavioral: Negative.    All other systems reviewed and are negative.       Objective   Vitals:    22 0951   BP: 138/77   Pulse: 65   Resp: 18   Temp: 97.7 °F (36.5 °C)   TempSrc: Temporal   SpO2: 100%   Weight: 87.5 kg (192 lb 12.8 oz)   Height: 154 cm (60.63\")   PainSc: 0-No pain     Current Status 2022   ECOG score 0       Physical exam  This patient's ACP documentation is up to date, and there's nothing further left to document.    CONSTITUTIONAL:  Vital signs reviewed.  No distress, looks comfortable.  EYES:  Conjunctiva and lids unremarkable.  PERRLA  EARS,NOSE,MOUTH,THROAT:  " Ears and nose appear unremarkable. Mask in place.  RESPIRATORY:  Normal respiratory effort.  Lungs clear to auscultation bilaterally.  BREASTS: Right chest wall benign; left breast shows no palpable masses,  CARDIOVASCULAR:  Normal S1, S2.  No murmurs rubs or gallops.  No significant lower extremity edema.    GASTROINTESTINAL: Abdomen appears unremarkable.  Nontender.  No hepatomegaly.  No splenomegaly.  LYMPHATIC:  No cervical, supraclavicular, axillary lymphadenopathy.  SKIN:  Warm.  No rashes.  PSYCHIATRIC:  Normal judgment and insight.  Normal mood and affect.     I have reexamined the patient and the results are consistent with the previously documented exam except as updated. Tesha Cornell, APRN       RECENT LABS:  Results from last 7 days   Lab Units 01/17/22  0951   WBC 10*3/mm3 5.35   NEUTROS ABS 10*3/mm3 2.85   HEMOGLOBIN g/dL 10.4*   HEMATOCRIT % 33.8*   PLATELETS 10*3/mm3 210     Results from last 7 days   Lab Units 01/17/22  0951   SODIUM mmol/L 139   POTASSIUM mmol/L 4.4   CHLORIDE mmol/L 106   CO2 mmol/L 24.3   BUN mg/dL 19   CREATININE mg/dL 1.06*   CALCIUM mg/dL 8.8   ALBUMIN g/dL 3.60   BILIRUBIN mg/dL 0.2   ALK PHOS U/L 60   ALT (SGPT) U/L 9   AST (SGOT) U/L 14   GLUCOSE mg/dL 205*         Component      Latest Ref Rng & Units 8/20/2020 9/4/2020 10/13/2021 1/17/2022   Uric Acid      2.4 - 5.7 mg/dL 6.5 (H) 5.5 7.2 (H) 7.3 (H)       SPECIMEN   Procedure  Total mastectomy    Specimen Laterality  Right    TUMOR   Tumor Site  Lower inner quadrant    Clock Position of Tumor Site  6 o'clock    Histologic Type  Invasive carcinoma of no special type (invasive ductal carcinoma, not otherwise specified)    Glandular (Acinar) / Tubular Differentiation  Score 2    Nuclear Pleomorphism  Score 2    Mitotic Rate  Score 1    Overall Grade  Grade 1 (scores of 3, 4 or 5)    Tumor Size  Greatest dimension of largest invasive focus (Millimeters): 19 mm   Additional Dimension (Millimeters)  13 mm     10 mm    Tumor Focality  Single focus of invasive carcinoma    Ductal Carcinoma In Situ (DCIS)  Present      Negative for extensive intraductal component (EIC)    Size (Extent) of DCIS  Estimated size (extent) of DCIS is at least (Millimeters): 15 mm   Architectural Patterns  Cribriform      Papillary      Solid    Nuclear Grade  Grade II (intermediate)    Necrosis  Present, focal (small foci or single cell necrosis)    Lobular Carcinoma In Situ (LCIS)  No LCIS in specimen    Tumor Extent     Lymphovascular Invasion  Not identified    Dermal Lymphovascular Invasion  Not identified    Microcalcifications  Present in DCIS      Present in invasive carcinoma      Present in non-neoplastic tissue    Treatment Effect  No known presurgical therapy    MARGINS   Invasive Carcinoma Margins  Uninvolved by invasive carcinoma    Distance from Closest Margin (Millimeters)  10 mm   Closest Margin(s)  Lateral    Distance from Other Margins     Anterior Margin (Millimeters)  40 mm   Posterior Margin (Millimeters)  45 mm   Superior Margin (Millimeters)  18 mm   Inferior Margin (Millimeters)  13 mm   Medial Margin (Millimeters)  22 mm   Lateral Margin (Millimeters)  10 mm   DCIS Margins  Uninvolved by DCIS    Distance from Closest Margin (Millimeters)  14 mm   Closest Margin(s)  Superior      Inferior    Distance from Other Margins     Anterior Margin (Millimeters)  20 mm   Posterior Margin (Millimeters)  40 mm   Superior Margin (Millimeters)  14 mm   Inferior Margin (Millimeters)  14 mm   Medial Margin (Millimeters)  30 mm   Lateral Margin (Millimeters)  21 mm   LYMPH NODES   Regional Lymph Nodes  Uninvolved by tumor cells    Total Number of Lymph Nodes Examined  5    Number of Calvin Nodes Examined  5    PATHOLOGIC STAGE CLASSIFICATION (pTNM, AJCC 8th Edition)   Primary Tumor (pT)  pT1c    Regional Lymph Nodes Modifier  (sn): Only sentinel node(s) evaluated.    Regional Lymph Nodes (pN)  pN0    .   Breast Biomarker Reporting Template    Breast.Bmk - 1   Protocol posted: 8/28/2019   Test(s) Performed     Estrogen Receptor (ER) Status  Positive    Percentage of Cells with Nuclear Positivity  %    Average Intensity of Staining  Strong    Test Type  Food and Drug Administration (FDA) cleared (test / vendor): [employer]    Primary Antibody  SP1    Scoring System  Ella    Proportion Score  5    Intensity Score  3    Total Ella Score  8    Test(s) Performed     Progesterone Receptor (PgR) Status  Positive    Percentage of Cells with Nuclear Positivity  5 %   Average Intensity of Staining  Moderate    Test Type  Food and Drug Administration (FDA) cleared (test / vendor): [employer]    Primary Antibody  1E2    Scoring System  Ella    Proportion Score  2    Intensity Score  2    Total Ella Score  4    Test(s) Performed     HER2 by Immunohistochemistry  Equivocal (Score 2+)    Percentage of Cells with Uniform Intense Complete Membrane Staining  0 %   Test Type  Food and Drug Administration (FDA) cleared (test / vendor): [employer]    Primary Antibody  4B5    Test(s) Performed  Ki-67    Percentage of Positive Nuclei  10 %   Primary Antibody           Assessment/Plan   1.cT1cN0 grade 1 infiltrating ductal carcinoma right breast at 6:00 ER positive DC negative HER-2 negative with associated intermediate grade DCIS the 1 o'clock position of the breast both lesions nonpalpable  · pT1cN0 grade 1 ER positive DC negative HER-2 negative post mastectomy on 5/11/2020  · Oncotype DX score 22  · Patient discontinued Arimidex in early May 2020 and soon thereafter began Femara 2.5 mg daily.  · Discontinued Femara in December due to worsening hot flashes and difficulty sleeping  · Gabapentin added in 2/21 with Femara resumed  · Tamoxifen started in 7/21  · Worsening joint pains, referred to rheumatology evaluation-rheumatoid factor and RUSSELL negative. Nieves continued.  · 1/17/2022 patient continues on tamoxifen with it being difficult to assess true tolerance as she has  been on a significant amount of steroids over the last month and a half (see discussion below).  She will continue for now and monitor for worsening arthralgias    2.  Hypothyroidism and type 2 diabetes managed by Dr. Wynn.    3.  Significant degenerative arthritis  · Referred to rheumatology, seeing Dr. Raza November 2021.  Treated with another Pred pack.  · Patient reports being seen again 1/10/2022.  We have not yet have these records.  Patient has not started any additional medication for her arthritis at this time.    4.  Family history of breast cancer.  Genetics testing negative.  5.  Normal bone density in 2018    6.  History of recurrent gout.    · Patient most recently on allopurinol 300 mg daily prescribed per PCP.   · Patient self discontinued this after she developed significant systemic rash with sloughing of her skin in 12/2021.  · 1/17/2022 repeat uric acid level 7.3 today. Stable though still elevated. Unclear if patient may benefit from different uric acid reducer such as Uloric. Defer to PCP or Dr. Raza.    7.  New onset anemia as of 11/2021?  Etiology  · Anemia labs performed 11/1/2021 with no evidence of iron or B12 deficiency.  · ?  Perhaps element of mild CKD  · 1/17/2022 Hemoglobin is stable currently at 10.4 and we will monitor.    PLAN:  1.  Continue tamoxifen 20 mg  2.  Monitor for worsening arthralgias now that she has completed all steroids for both arthralgias and significant rash as outlined.    3.  Anemia work-up reviewed with no evidence of vitamin deficiency.  Monitor for now.  4.  Patient is felt to have had systemic drug rash related to allopurinol.  Thankfully she self discontinued this roughly 3 to 4 weeks ago and rash is slowly resolving.  I did order a uric acid level today which came back mildly elevated though stable at 7.3. I have messaged her PCP, ELIN Bond to see if she wants the patient on some type of other uric acid reducer such as Uloric as it is not  felt the patient should take anymore allopurinol.  Ultimately this may be best managed by Dr. Raza whom with the patient is now established.  5. patient due for screening left mammogram in May 2022, ordered today.  6.  Patient will return 1 week following mammogram for follow-up with Dr. Jasso with CBC, CMP, and uric acid for review.    ADDENDUM: Per discussion with GOLDIE Isaac, she has prescribed uloric for patient and will recheck her uric acid level in 2 weeks.    I spent 35 minutes caring for Mary Jo on this date of service. This time includes time spent by me in the following activities: preparing for the visit, reviewing tests, obtaining and/or reviewing a separately obtained history, performing a medically appropriate examination and/or evaluation, counseling and educating the patient/family/caregiver, referring and communicating with other health care professionals, documenting information in the medical record, independently interpreting results and communicating that information with the patient/family/caregiver and care coordination

## 2022-01-20 DIAGNOSIS — M10.00 IDIOPATHIC GOUT, UNSPECIFIED CHRONICITY, UNSPECIFIED SITE: ICD-10-CM

## 2022-01-20 NOTE — TELEPHONE ENCOUNTER
Rx Refill Note  Requested Prescriptions     Pending Prescriptions Disp Refills   • febuxostat (ULORIC) 40 MG tablet [Pharmacy Med Name: FEBUXOSTAT 40MG TABLETS] 90 tablet      Sig: TAKE 1 TABLET BY MOUTH EVERY DAY      Last office visit with prescribing clinician: 12/27/2021      Next office visit with prescribing clinician: Visit date not found            Carolina Dowd MA  01/20/22, 14:13 EST

## 2022-01-21 RX ORDER — FEBUXOSTAT 40 MG/1
TABLET, FILM COATED ORAL
Qty: 90 TABLET | Refills: 0 | OUTPATIENT
Start: 2022-01-21

## 2022-02-07 RX ORDER — ALLOPURINOL 300 MG/1
TABLET ORAL
Qty: 60 TABLET | Refills: 1 | OUTPATIENT
Start: 2022-02-07

## 2022-02-07 NOTE — TELEPHONE ENCOUNTER
Rx Refill Note  Requested Prescriptions     Pending Prescriptions Disp Refills   • allopurinol (ZYLOPRIM) 300 MG tablet [Pharmacy Med Name: ALLOPURINOL 300 MG TABLET] 60 tablet 1     Sig: TAKE 1 TABLET BY MOUTH EVERY DAY      Last office visit with prescribing clinician: 12/27/2021      Next office visit with prescribing clinician: Visit date not found       {TIP  Please add Last Relevant Lab Date if appropriate: 01/17/22    Kaykay Burleson MA  02/07/22, 08:43 EST     Pt completed therapy,  01/17/22  Pt stated she doesn't wish to continue.

## 2022-02-18 ENCOUNTER — HOSPITAL ENCOUNTER (OUTPATIENT)
Dept: GENERAL RADIOLOGY | Facility: HOSPITAL | Age: 72
Discharge: HOME | End: 2022-02-18
Admitting: INTERNAL MEDICINE

## 2022-02-18 ENCOUNTER — TRANSCRIBE ORDERS (OUTPATIENT)
Dept: ADMINISTRATIVE | Facility: HOSPITAL | Age: 72
End: 2022-02-18

## 2022-02-18 DIAGNOSIS — M13.0 POLYARTHROPATHY: Primary | ICD-10-CM

## 2022-02-18 DIAGNOSIS — M54.59 INTRACTABLE LOW BACK PAIN: ICD-10-CM

## 2022-02-18 DIAGNOSIS — M13.0 POLYARTHROPATHY: ICD-10-CM

## 2022-02-18 PROCEDURE — 72110 X-RAY EXAM L-2 SPINE 4/>VWS: CPT

## 2022-03-04 ENCOUNTER — TELEPHONE (OUTPATIENT)
Dept: FAMILY MEDICINE CLINIC | Facility: CLINIC | Age: 72
End: 2022-03-04

## 2022-03-04 NOTE — TELEPHONE ENCOUNTER
Caller: Mary Jo Berg    Relationship: Self    Best call back number: [phone]    What medication are you requesting:     What are your current symptoms: CHEST CONGESTION  How long have you been experiencing symptoms: 1 WEEK     Have you had these symptoms before:    [x] Yes  [] No    Have you been treated for these symptoms before:   [x] Yes  [] No    If a prescription is needed, what is your preferred pharmacy and phone number: [facility] #[id number] [facility] [address] AT [facility] [address] [facility] - [phone] [facility] [phone] FX     Additional notes:PATIENT STATES SHE FEELS LIKE SHE HAS HAD BRONCHITIS FOR ABOUT 1 WEEK .  IS USING INHALER AND TAKING CORICIDIN, BUT SYMPTOMS ARE NO BETTER. ASKING IF THERE IS SOMETHING ELSE SHE CAN TAKE OR THAT PROVIDER CAN PRESCRIBE.  PLEASE ADVISE

## 2022-03-07 ENCOUNTER — OFFICE VISIT (OUTPATIENT)
Dept: FAMILY MEDICINE CLINIC | Facility: CLINIC | Age: 72
End: 2022-03-07

## 2022-03-07 VITALS
HEART RATE: 86 BPM | HEIGHT: 61 IN | WEIGHT: 186 LBS | DIASTOLIC BLOOD PRESSURE: 78 MMHG | OXYGEN SATURATION: 96 % | BODY MASS INDEX: 35.12 KG/M2 | SYSTOLIC BLOOD PRESSURE: 121 MMHG | TEMPERATURE: 96.4 F

## 2022-03-07 DIAGNOSIS — J06.9 ACUTE URI: Primary | ICD-10-CM

## 2022-03-07 PROCEDURE — 99213 OFFICE O/P EST LOW 20 MIN: CPT | Performed by: NURSE PRACTITIONER

## 2022-03-07 RX ORDER — ALBUTEROL SULFATE 90 UG/1
2 AEROSOL, METERED RESPIRATORY (INHALATION) EVERY 4 HOURS PRN
Qty: 18 G | Refills: 0
Start: 2022-03-07 | End: 2023-02-24 | Stop reason: SDUPTHER

## 2022-03-07 RX ORDER — DEXTROMETHORPHAN HYDROBROMIDE AND PROMETHAZINE HYDROCHLORIDE 15; 6.25 MG/5ML; MG/5ML
5 SYRUP ORAL 4 TIMES DAILY PRN
Qty: 118 ML | Refills: 0 | Status: SHIPPED | OUTPATIENT
Start: 2022-03-07 | End: 2022-06-21

## 2022-03-07 NOTE — PROGRESS NOTES
"Chief Complaint  Cough and URI (1wk)    Subjective          Mary Jo Berg presents to [facility]  History of Present Illness pt is here for 1 week of cough, congestion.  No dyspnea.  Cough is mostly dry, worse at night.  Used inhaler for first time in five yrs for coughing fits. Not sure that it was at all helpful.  Thinks it is albuterol.    No fever, chills  No runny nose recently  No sore throat, ear ache  No known covid exposures  Taking normal loratadine without improvement.  Has seasonal allergies.  Spring fall are typical triggers.  Has 2 new houses under construction near her home.  Has noted a lot of dust.      Will be having right TKA in May.     No known covid exposures.  Does attend [employer]-social distance.  Spouse has been sick with same since getting pneumonia vaccine recently.  Around grandkids-none have been ill.     Is boosted. Not taken covid home test.       Objective   Vital Signs:   /78 (BP Location: Right arm, Patient Position: Sitting, Cuff Size: Adult)   Pulse 86   Temp 96.4 °F (35.8 °C) (Temporal)   Ht 154 cm (60.63\")   Wt 84.4 kg (186 lb)   SpO2 96%   BMI 35.57 kg/m²     Physical Exam  Vitals and nursing note reviewed.   Constitutional:       General: She is not in acute distress.     Appearance: She is well-developed. She is not ill-appearing or diaphoretic.   HENT:      Head: Normocephalic and atraumatic.      Right Ear: Ear canal and external ear normal.      Left Ear: Tympanic membrane, ear canal and external ear normal.      Ears:      Comments: Right ear canal impacted cerumen-unable to visualize TM     Mouth/Throat:      Mouth: Mucous membranes are moist.      Pharynx: No posterior oropharyngeal erythema.   Eyes:      General:         Right eye: No discharge.         Left eye: No discharge.      Conjunctiva/sclera: Conjunctivae normal.   Cardiovascular:      Rate and Rhythm: Normal rate and regular rhythm.      Heart sounds: Murmur " heard.   Pulmonary:      Effort: Pulmonary effort is normal.      Breath sounds: Normal breath sounds. No wheezing, rhonchi or rales.   Abdominal:      General: Bowel sounds are normal.      Palpations: Abdomen is soft.      Tenderness: There is no abdominal tenderness.   Musculoskeletal:         General: No deformity.      Cervical back: Neck supple.      Comments: Gait smooth and steady   Lymphadenopathy:      Cervical: No cervical adenopathy.   Skin:     General: Skin is warm and dry.   Neurological:      General: No focal deficit present.      Mental Status: She is alert and oriented to person, place, and time.   Psychiatric:         Mood and Affect: Mood normal.         Behavior: Behavior normal.        Result Review :                 Assessment and Plan    Diagnoses and all orders for this visit:    1. Acute URI (Primary)  -     promethazine-dextromethorphan (PROMETHAZINE-DM) 6.25-15 MG/5ML syrup; Take 5 mL by mouth 4 (Four) Times a Day As Needed for Cough.  Dispense: 118 mL; Refill: 0  -     COVID-19,[facility] ROUTINE, NP/OP SWAB IN TRANSPORT MEDIA OR ESWAB 72 HR TAT - Swab, Nasopharynx      Swabbed for covid.  Will give promethazine-DM for cough.  Most likely viral vs allergic.  I dont think abx will be helpful at this point.  If she worsens may reconsider.  If inhaler she has at home is not albuterol she will let me know.  Can use prn. She is aware of s/s complications that require emergent eval or f/u.    Recommend debrox at home for right ear cerumen impaction.  If not resolving will RTC for re eval and disimpaction.   Side effects promethazine-DM discussed.           Follow Up   Return if symptoms worsen or fail to improve.  Patient was given instructions and counseling regarding her condition or for health maintenance advice. Please see specific information pulled into the AVS if appropriate.

## 2022-03-08 LAB
LABCORP SARS-COV-2, NAA 2 DAY TAT: NORMAL
SARS-COV-2 RNA RESP QL NAA+PROBE: NOT DETECTED

## 2022-04-14 ENCOUNTER — TELEPHONE (OUTPATIENT)
Dept: ONCOLOGY | Facility: CLINIC | Age: 72
End: 2022-04-14

## 2022-04-14 RX ORDER — TAMOXIFEN CITRATE 20 MG/1
20 TABLET ORAL DAILY
Qty: 90 TABLET | Refills: 0 | Status: SHIPPED | OUTPATIENT
Start: 2022-04-14 | End: 2022-08-05

## 2022-04-14 NOTE — TELEPHONE ENCOUNTER
Caller: Mary Jo Berg    Relationship: Self    Best call back number: [phone]    Requested Prescriptions:   TAMOXIFEN 20 MG    Pharmacy where request should be sent:    [facility] #[id number] [address] AT [facility] OF [facility] & [facility] - [phone] [facility] [phone]       Does the patient have less than a 3 day supply:  [x] Yes  [] No    Stefani GREENE Rep   04/14/22 12:00 EDT

## 2022-04-21 ENCOUNTER — TELEPHONE (OUTPATIENT)
Dept: FAMILY MEDICINE CLINIC | Facility: CLINIC | Age: 72
End: 2022-04-21

## 2022-04-21 ENCOUNTER — OFFICE VISIT (OUTPATIENT)
Dept: FAMILY MEDICINE CLINIC | Facility: CLINIC | Age: 72
End: 2022-04-21

## 2022-04-21 VITALS
OXYGEN SATURATION: 97 % | HEIGHT: 61 IN | DIASTOLIC BLOOD PRESSURE: 64 MMHG | TEMPERATURE: 97.1 F | HEART RATE: 75 BPM | WEIGHT: 187 LBS | SYSTOLIC BLOOD PRESSURE: 96 MMHG | BODY MASS INDEX: 35.3 KG/M2

## 2022-04-21 DIAGNOSIS — I10 BENIGN ESSENTIAL HYPERTENSION: ICD-10-CM

## 2022-04-21 DIAGNOSIS — Z01.818 PREOP EXAMINATION: Primary | ICD-10-CM

## 2022-04-21 DIAGNOSIS — Z95.0 PACEMAKER: ICD-10-CM

## 2022-04-21 DIAGNOSIS — M25.561 CHRONIC PAIN OF RIGHT KNEE: ICD-10-CM

## 2022-04-21 DIAGNOSIS — G89.29 CHRONIC PAIN OF RIGHT KNEE: ICD-10-CM

## 2022-04-21 PROCEDURE — 99214 OFFICE O/P EST MOD 30 MIN: CPT | Performed by: NURSE PRACTITIONER

## 2022-04-21 RX ORDER — LEVOTHYROXINE SODIUM 0.12 MG/1
125 TABLET ORAL DAILY
COMMUNITY
Start: 2022-02-14 | End: 2022-05-23 | Stop reason: SDDI

## 2022-04-21 RX ORDER — TRAMADOL HYDROCHLORIDE 50 MG/1
50 TABLET ORAL AS NEEDED
COMMUNITY
Start: 2022-04-05

## 2022-04-21 NOTE — PROGRESS NOTES
"Chief Complaint  Pre-op Exam (Surgery clearance for partial R knee replacement )    Subjective          Mary Jo Berg presents to [facility]  History of Present Illness patient is here for surgery clearance for partial right knee replacement.  This is upcoming in May if cleared.  She had testing done at [place].  She is really looking forward to upcoming knee replacement due to her significant pain which impairs all ADLs.    She denies chest pain, palpitations, dizziness, headache.    Objective   Vital Signs:   BP 96/64   Pulse 75   Temp 97.1 °F (36.2 °C)   Ht 153.7 cm (60.5\")   Wt 84.8 kg (187 lb)   SpO2 97%   BMI 35.92 kg/m²            Physical Exam  Vitals and nursing note reviewed.   Constitutional:       General: She is not in acute distress.     Appearance: She is well-developed. She is not ill-appearing or diaphoretic.   HENT:      Head: Normocephalic and atraumatic.   Eyes:      General:         Right eye: No discharge.         Left eye: No discharge.      Conjunctiva/sclera: Conjunctivae normal.   Cardiovascular:      Rate and Rhythm: Normal rate and regular rhythm.      Heart sounds: Murmur heard.   Pulmonary:      Effort: Pulmonary effort is normal.      Breath sounds: Normal breath sounds.   Abdominal:      General: Bowel sounds are normal.      Palpations: Abdomen is soft.      Tenderness: There is no abdominal tenderness.   Musculoskeletal:         General: No deformity.      Comments: Gait smooth and steady   Skin:     General: Skin is warm and dry.   Neurological:      General: No focal deficit present.      Mental Status: She is alert and oriented to person, place, and time.   Psychiatric:         Mood and Affect: Mood normal.         Behavior: Behavior normal.      Comments: Very pleasant, conversant, good medical historian        Result Review :                 Assessment and Plan    Diagnoses and all orders for this visit:    1. Preop examination " (Primary)    2. Chronic pain of right knee    3. Pacemaker    4. Benign essential hypertension      Reviewed recent A1c which is at goal of 6.2.  Type 2 diabetes stable and in good control should not impede healing.      CBC was normal other than a mildly decreased RBC and mildly anemic.  Anemia has improved from 3 months ago when it was 10.4.      BMP shows good creatinine.  Her BUN is a little bit elevated and her creatinine clearance is 55.      Reviewed EKG that showed atrial sensed ventricular paced complexes.      I asked patient when her last pacer interrogation was done and patient reports it has been several years.  She has not been sending readings to cardiology recently either.  She reports that her heart rate has been about what it is today.  Her blood pressure has been a little bit on the lower side but she is not having any symptoms.  Her EKG is similar to previous and stable.    From a lab perspective I think she is fine for surgery.  However given her cardiac history I would like her to have cardiac clearance prior to surgery.  We have scheduled her an appointment prior to her surgery for this.  Recommend updating cardiology with home study.    She looks good today.  And seems to be excited about upcoming TKA.          Follow Up   No follow-ups on file.  Patient was given instructions and counseling regarding her condition or for health maintenance advice. Please see specific information pulled into the AVS if appropriate.

## 2022-04-21 NOTE — TELEPHONE ENCOUNTER
Caller: Brooklyn Berg    Relationship: Emergency Contact    Best call back number: [phone]    What is the best time to reach you: ANYTIME    Who are you requesting to speak with (clinical staff, provider,  specific staff member): MELISSA    What was the call regarding: DAUGHTER RETURNING THE CALL TO MELISSA.  DAUGHTER BELIEVES IT IS REGARDING A REFERRAL.  PLEASE ADVISE      Do you require a callback: YES

## 2022-05-20 ENCOUNTER — HOSPITAL ENCOUNTER (OUTPATIENT)
Dept: MAMMOGRAPHY | Facility: HOSPITAL | Age: 72
Discharge: HOME | End: 2022-05-20
Admitting: NURSE PRACTITIONER

## 2022-05-20 DIAGNOSIS — Z12.31 ENCOUNTER FOR SCREENING MAMMOGRAM FOR MALIGNANT NEOPLASM OF BREAST: ICD-10-CM

## 2022-05-20 DIAGNOSIS — C50.811 MALIGNANT NEOPLASM OF OVERLAPPING SITES OF RIGHT BREAST IN FEMALE, ESTROGEN RECEPTOR POSITIVE: ICD-10-CM

## 2022-05-20 DIAGNOSIS — Z17.0 MALIGNANT NEOPLASM OF OVERLAPPING SITES OF RIGHT BREAST IN FEMALE, ESTROGEN RECEPTOR POSITIVE: ICD-10-CM

## 2022-05-20 PROCEDURE — 77067 SCR MAMMO BI INCL CAD: CPT

## 2022-05-20 PROCEDURE — 77063 BREAST TOMOSYNTHESIS BI: CPT

## 2022-05-23 ENCOUNTER — OFFICE VISIT (OUTPATIENT)
Dept: MAMMOGRAPHY | Facility: CLINIC | Age: 72
End: 2022-05-23

## 2022-05-23 ENCOUNTER — LAB (OUTPATIENT)
Dept: OTHER | Facility: HOSPITAL | Age: 72
End: 2022-05-23

## 2022-05-23 ENCOUNTER — OFFICE VISIT (OUTPATIENT)
Dept: ONCOLOGY | Facility: CLINIC | Age: 72
End: 2022-05-23

## 2022-05-23 VITALS
DIASTOLIC BLOOD PRESSURE: 78 MMHG | HEART RATE: 74 BPM | OXYGEN SATURATION: 98 % | HEIGHT: 61 IN | BODY MASS INDEX: 34.93 KG/M2 | SYSTOLIC BLOOD PRESSURE: 145 MMHG | WEIGHT: 185 LBS

## 2022-05-23 VITALS
SYSTOLIC BLOOD PRESSURE: 145 MMHG | OXYGEN SATURATION: 97 % | DIASTOLIC BLOOD PRESSURE: 78 MMHG | RESPIRATION RATE: 18 BRPM | TEMPERATURE: 96.6 F | HEART RATE: 71 BPM | HEIGHT: 61 IN | BODY MASS INDEX: 35.01 KG/M2 | WEIGHT: 185.4 LBS

## 2022-05-23 DIAGNOSIS — C50.811 MALIGNANT NEOPLASM OF OVERLAPPING SITES OF RIGHT BREAST IN FEMALE, ESTROGEN RECEPTOR POSITIVE: Primary | ICD-10-CM

## 2022-05-23 DIAGNOSIS — C50.811 MALIGNANT NEOPLASM OF OVERLAPPING SITES OF RIGHT BREAST IN FEMALE, ESTROGEN RECEPTOR POSITIVE: ICD-10-CM

## 2022-05-23 DIAGNOSIS — Z12.31 ENCOUNTER FOR SCREENING MAMMOGRAM FOR MALIGNANT NEOPLASM OF BREAST: ICD-10-CM

## 2022-05-23 DIAGNOSIS — Z17.0 MALIGNANT NEOPLASM OF OVERLAPPING SITES OF RIGHT BREAST IN FEMALE, ESTROGEN RECEPTOR POSITIVE: Primary | ICD-10-CM

## 2022-05-23 DIAGNOSIS — Z17.0 MALIGNANT NEOPLASM OF OVERLAPPING SITES OF RIGHT BREAST IN FEMALE, ESTROGEN RECEPTOR POSITIVE: ICD-10-CM

## 2022-05-23 DIAGNOSIS — Z87.39 HISTORY OF GOUT: ICD-10-CM

## 2022-05-23 LAB
ALBUMIN SERPL-MCNC: 3.8 G/DL (ref 3.5–5.2)
ALBUMIN/GLOB SERPL: 1.3 G/DL
ALP SERPL-CCNC: 69 U/L (ref 39–117)
ALT SERPL W P-5'-P-CCNC: 9 U/L (ref 1–33)
ANION GAP SERPL CALCULATED.3IONS-SCNC: 11.1 MMOL/L (ref 5–15)
AST SERPL-CCNC: 16 U/L (ref 1–32)
BASOPHILS # BLD AUTO: 0.04 10*3/MM3 (ref 0–0.2)
BASOPHILS NFR BLD AUTO: 0.6 % (ref 0–1.5)
BILIRUB SERPL-MCNC: 0.3 MG/DL (ref 0–1.2)
BUN SERPL-MCNC: 22 MG/DL (ref 8–23)
BUN/CREAT SERPL: 20.8 (ref 7–25)
CALCIUM SPEC-SCNC: 9.2 MG/DL (ref 8.6–10.5)
CHLORIDE SERPL-SCNC: 101 MMOL/L (ref 98–107)
CO2 SERPL-SCNC: 24.9 MMOL/L (ref 22–29)
CREAT SERPL-MCNC: 1.06 MG/DL (ref 0.57–1)
DEPRECATED RDW RBC AUTO: 42.5 FL (ref 37–54)
EGFRCR SERPLBLD CKD-EPI 2021: 55.6 ML/MIN/1.73
EOSINOPHIL # BLD AUTO: 0.23 10*3/MM3 (ref 0–0.4)
EOSINOPHIL NFR BLD AUTO: 3.7 % (ref 0.3–6.2)
ERYTHROCYTE [DISTWIDTH] IN BLOOD BY AUTOMATED COUNT: 12.1 % (ref 12.3–15.4)
GLOBULIN UR ELPH-MCNC: 3 GM/DL
GLUCOSE SERPL-MCNC: 184 MG/DL (ref 65–99)
HCT VFR BLD AUTO: 33.7 % (ref 34–46.6)
HGB BLD-MCNC: 11 G/DL (ref 12–15.9)
IMM GRANULOCYTES # BLD AUTO: 0.03 10*3/MM3 (ref 0–0.05)
IMM GRANULOCYTES NFR BLD AUTO: 0.5 % (ref 0–0.5)
LYMPHOCYTES # BLD AUTO: 1.81 10*3/MM3 (ref 0.7–3.1)
LYMPHOCYTES NFR BLD AUTO: 29.3 % (ref 19.6–45.3)
MCH RBC QN AUTO: 31 PG (ref 26.6–33)
MCHC RBC AUTO-ENTMCNC: 32.6 G/DL (ref 31.5–35.7)
MCV RBC AUTO: 94.9 FL (ref 79–97)
MONOCYTES # BLD AUTO: 0.6 10*3/MM3 (ref 0.1–0.9)
MONOCYTES NFR BLD AUTO: 9.7 % (ref 5–12)
NEUTROPHILS NFR BLD AUTO: 3.46 10*3/MM3 (ref 1.7–7)
NEUTROPHILS NFR BLD AUTO: 56.2 % (ref 42.7–76)
NRBC BLD AUTO-RTO: 0 /100 WBC (ref 0–0.2)
PLATELET # BLD AUTO: 274 10*3/MM3 (ref 140–450)
PMV BLD AUTO: 9.5 FL (ref 6–12)
POTASSIUM SERPL-SCNC: 4.7 MMOL/L (ref 3.5–5.2)
PROT SERPL-MCNC: 6.8 G/DL (ref 6–8.5)
RBC # BLD AUTO: 3.55 10*6/MM3 (ref 3.77–5.28)
SODIUM SERPL-SCNC: 137 MMOL/L (ref 136–145)
WBC NRBC COR # BLD: 6.17 10*3/MM3 (ref 3.4–10.8)

## 2022-05-23 PROCEDURE — 85025 COMPLETE CBC W/AUTO DIFF WBC: CPT | Performed by: NURSE PRACTITIONER

## 2022-05-23 PROCEDURE — 36415 COLL VENOUS BLD VENIPUNCTURE: CPT

## 2022-05-23 PROCEDURE — 80053 COMPREHEN METABOLIC PANEL: CPT | Performed by: NURSE PRACTITIONER

## 2022-05-23 PROCEDURE — 99213 OFFICE O/P EST LOW 20 MIN: CPT | Performed by: SURGERY

## 2022-05-23 PROCEDURE — 99214 OFFICE O/P EST MOD 30 MIN: CPT | Performed by: INTERNAL MEDICINE

## 2022-05-23 RX ORDER — HYDROCODONE BITARTRATE AND ACETAMINOPHEN 10; 325 MG/1; MG/1
1 TABLET ORAL EVERY 6 HOURS PRN
COMMUNITY
Start: 2022-05-06 | End: 2022-10-03

## 2022-05-23 NOTE — PROGRESS NOTES
Subjective      REASON FOR CONSULTATION:   1.Right breast cancer invasive low-grade ER positive GA negative HER-2 negative breast cancer at 6:00 with DCIS at 1:00 post biopsy; Simple mastectomy on 5/11/2020 and this shows an invasive cancer measuring 19 x 13 mmI- DCIS present measuring 15 mm margins were widely clear 5 sentinel nodes were negative for metastatic disease still grade 1. No lymphovascular invasion.                             REQUESTING PHYSICIAN: MD Keshia Durán MD    History of Present Illness patient is a 72 y.o. lady with history of a node negative hormone positive right breast cancer on Femara x6 months and then switched to tamoxifen in July 2021 due to worsening crippling arthritis for which she had to take prednisone.  Within a week of taking the prednisone her pain dramatically improved.  Since beginning tamoxifen she had recurrence of aches and pains especially in her foot affecting her ability to walk.  We did refer her to rheumatology and she was seen by Dr. Raza for initial evaluation 11/2/2021.  Patient was felt to have polyarthritis with further work-up ensuing.  Patient was placed on another prednisone pack with improvement.  At this point she is continuing on tamoxifen with good tolerance    Her rash has not recurred since stopping her allopurinol although she also got a COVID vaccination around the same time there has been some rare reports of rashes from the vaccine but I am fairly comfortable blaming the allopurinol I told her never to take it again     She had her knee replaced couple weeks ago and is slowly walking on it and this probably accounts for the worse anemia but just because she has an aortic stenosis mild to moderate we will check iron haptoglobin reticulocyte count to make sure she is not hemolyzing    She has been prescribed Uloric but has not started taking it and wants to hold off for now  which is not unreasonable     she denies other concerns at this time.  Mammogram last week was thankfully benign and her bone density done 3 years ago was totally normal     Past Medical History:   Diagnosis Date   • Asthma    • Breast cancer (HCC) 2020    right IDC   • Cataract 2010    on both  retinas   • Coronary artery disease 1/10/2015    Pacemaker 2015   • Diabetes mellitus (HCC)     Type 2   • Diverticulosis 2021   • Heart murmur    • History of asthma    • History of colon polyps    • History of complete heart block     paced in    • Hyperlipidemia    • Hypertension    • Hypothyroidism    • Obesity    • Osteoarthritis    • Osteopenia    • Pacemaker    • PONV (postoperative nausea and vomiting)         Past Surgical History:   Procedure Laterality Date   • ADENOIDECTOMY     • BLADDER REPAIR     • BREAST BIOPSY Right 2020    malignant   • CARDIAC PACEMAKER PLACEMENT     • CARPAL TUNNEL RELEASE Right    • CATARACT EXTRACTION Bilateral    •  SECTION      x2   • COLONOSCOPY     • COLONOSCOPY N/A 2021    Procedure: COLONOSCOPY;  Surgeon: Yamilet Stewart MD;  Location: [facility] ENDOSCOPY;  Service: Gastroenterology;  Laterality: N/A;  pre- hx polyps  post-- diverticulosis, hemorrhoids   • ENDOMETRIAL ABLATION     • HAND SURGERY     • HERNIA REPAIR      Bilateral femoral and inguinal   • JOINT REPLACEMENT  2007    Left knee   • KNEE ARTHROSCOPY Bilateral    • MASTECTOMY  2020   • MASTECTOMY WITH SENTINEL NODE BIOPSY AND AXILLARY NODE DISSECTION Right 2020    Procedure: RIGHT BREAST MASTECTOMY WITH SENTINEL NODE BIOPSY;  Surgeon: Bj Stephen MD;  Location: [facility] OR [facility];  Service: General;  Laterality: Right;   • OOPHORECTOMY     • SHOULDER ARTHROSCOPY Right     rotator cuff repair, biceps tenotomy   • THYROIDECTOMY, PARTIAL     • TONSILLECTOMY AND ADENOIDECTOMY     • TOTAL KNEE ARTHROPLASTY Left    • TUBAL ABDOMINAL LIGATION     •  UMBILICAL HERNIA REPAIR      patient is a 70-year-old white female with hypercholesterolemia diabetes type 2 hypertension hypothyroidism and pacemaker placement 2015 was noted on routine mammogram in  of this last year to have some abnormalities in the right breast that warranted close follow-up.  This led to a repeat mammogram in January which was delayed due to her trip she took Sh[place] with her  when she came back in February she did the mammogram and this showed persistence and worsening of the calcifications at the 6:00 and 1 o'clock position of the right breast.    This led to a biopsy of both these regions on 3/5/2020 which showed DCIS at the 1 o'clock position intermediate grade with calcifications and no necrosis and well differentiated grade 1 infiltrating ductal carcinoma at the 6 o'clock position measuring 2 mm with no lymph vascular invasion and associated DCIS intermediate grade the tumor was positive for estrogen greater than 95% negative for progesterone 0% HER-2 negative at 1+ Ki-67 of 5%.    Patient was referred to Dr. Stephen who ordered bilateral breast MRIs which showed residual 2 cm area of enhancement at the 6 o'clock position but no further abnormalities at the 1 o'clock position of the right breast.  There is no axillary or internal mammary adenopathy in the left breast was benign.  The mass is not palpable by the patient or Dr. Stephen    Based on the recent coronavirus outbreak which is ongoing elective surgeries for grade 1 breast cancers are being delayed based on guidelines from the hospital and from the oncological community and she is here to discuss neoadjuvant hormonal blockade.    Patient is  3 para 3 first childbirth was age 22 she breast-fed 2 of her 3 children for at least 4 to 6 months menarche was at age 11 menopause at 49 she did not take hormones after menopause    Family history is positive for paternal grandmother with metastatic breast cancer at age 75  a paternal first cousin with breast cancer at age 70 her father  with bladder cancer mother had endometrial carcinoma at age 80.  She is concerned about genetic testing because she has 3 daughters and 3 grand daughters.    She is not a smoker or drinker and denies DVTs MI or stroke    Because of the delay of surgery due to the coronavirus we decided to do Oncotype on her biopsy and start aromatase inhibitor in the neoadjuvant setting    The side effects and toxicities of the Aromatase inhibitors was discussed with the patient including, hot flashes, mood swings and hair thinning.Significant arthralgias and worsening bone density were also discussed. Baseline bone density evaluation was ordered.  I discussed with the patient and her  on the video conference that this was not the standard of care usually but because of the pandemic and making treatment decisions for the safety of the patient and based on delaying elective surgery for certain subsets of breast cancer which she presented.    I explained the rationale of doing an Oncotype DX score and told her that if she was not willing to take chemotherapy there would be no reason to do this but she is willing to take chemotherapy if she has a high Oncotype score and therefore we will send this results and she will call me in 2 weeks to discuss them    There are no clear guidelines on how to manage patients with clinically node-negative disease with a high Oncotype and we may decide to move up her surgery so that we will have more information with which to make a decision about chemotherapy in order to decide how much chemotherapy to give her    She and her  were willing to go this route and she will call me in 2 weeks to discuss her Oncotype score    I am concerned about her general joint discomfort which might be aggravated by an aromatase inhibitor but we will deal with that if it occurs    I will also schedule her to see the genetic counselors and  they could possibly do a video visit with her also and decide about genetic testing    4/20  Patient is a 70-year-old female with small mammographically detected right breast cancer ER positive MT negative and we opted to start Arimidex preoperatively because surgeries are being delayed for the coronavirus pandemic.  We will also ask for an Oncotype DX to be done on her biopsy specimen to help us make decisions about further treatment.  Unfortunately the specimen was too small and the Oncotype could not be done.  In addition she developed gout in her foot after 2 or 3 doses of Arimidex which I do not think is related to the Arimidex and she was evaluated by her daughter who is a physician who did not think there was any infection she tried nonsteroidals with some improvement but she still not better and has not yet restarted her Arimidex    In light of all these findings I have given her a short prednisone bolus to see if this will help the gout and at this point I think we should move ahead with surgery in the next 3 to 4 weeks and hold off on the Arimidex until after surgery so we can get all the information we need at the time of surgery    She is okay with this approach and I have called her surgeon and her cardiologist to clear her for surgery    She will need to stop her nonsteroidals at least a week before surgery and hopefully the steroids will help the foot enough that she does not have to take the nonsteroidals    She has had no fever or other signs of infection.    Simple mastectomy on 5/11/2020 and this shows an invasive cancer measuring 19 x 13 mmI- DCIS present measuring 15 mm margins were widely clear 5 sentinel nodes were negative for metastatic disease still grade 1. No lymphovascular invasion.    Patient discontinuing Arimidex in May 2020 due to concerns for contributing to gout.  Oncotype DX score returning 22.  Patient beginning Femara late May 2020.    Patient switched to tamoxifen in July 2021  due to significant arthralgias with Femara.    Current Outpatient Medications on File Prior to Visit   Medication Sig Dispense Refill   • Acetaminophen (TYLENOL PO) Take 500 mg by mouth 2 (Two) Times a Day.     • albuterol sulfate  (90 Base) MCG/ACT inhaler Inhale 2 puffs Every 4 (Four) Hours As Needed for Wheezing or Shortness of Air. 18 g 0   • clotrimazole-betamethasone (LOTRISONE) 1-0.05 % cream APPLY CREAM TO AFFECTED AREA IN GROIN TWICE DAILY     • diphenhydrAMINE (SOMINEX) 25 MG tablet Take 25 mg by mouth.     • febuxostat (Uloric) 40 MG tablet Take 1 tablet by mouth Daily. 30 tablet 1   • fluticasone (FLONASE) 50 MCG/ACT nasal spray 2 sprays into the nostril(s) as directed by provider As Needed.     • glucose blood test strip ACCU-CHECK SKYLA. Use to check blood sugar once daily, as directed. DX: E11.9     • HYDROcodone-acetaminophen (NORCO)  MG per tablet Take 1 tablet by mouth Every 6 (Six) Hours As Needed.     • irbesartan (AVAPRO) 150 MG tablet Take 150 mg by mouth Every Morning.     • Lancets (ACCU-CHEK MULTICLIX) lancets Use to check blood sugar once daily, as directed. DX: 250.00     • levothyroxine (SYNTHROID, LEVOTHROID) 125 MCG tablet Take 1 tablet by mouth.     • Loratadine 10 MG capsule Take 1 tablet by mouth Daily.     • metFORMIN (GLUCOPHAGE) 500 MG tablet Take 2 tablets p.o. every morning and 1 tablet p.o. every afternoon. (Patient taking differently: 500 mg 2 (Two) Times a Day With Meals. Take 2 tablets p.o. every morning and 1 tablet p.o. every afternoon.) 270 tablet 1   • metoprolol succinate XL (TOPROL-XL) 25 MG 24 hr tablet Take 25 mg by mouth 2 (Two) Times a Day.     • tamoxifen (NOLVADEX) 20 MG chemo tablet Take 1 tablet by mouth Daily. 90 tablet 0   • traMADol (ULTRAM) 50 MG tablet Take 50 mg by mouth As Needed.     • promethazine-dextromethorphan (PROMETHAZINE-DM) 6.25-15 MG/5ML syrup Take 5 mL by mouth 4 (Four) Times a Day As Needed for Cough. 118 mL 0   • [DISCONTINUED]  "ezetimibe (ZETIA) 10 MG tablet Take 10 mg by mouth Daily.     • [DISCONTINUED] levothyroxine (SYNTHROID, LEVOTHROID) 125 MCG tablet Take 125 mcg by mouth Daily.       No current facility-administered medications on file prior to visit.        ALLERGIES:    Allergies   Allergen Reactions   • Indomethacin Nausea Only and Dizziness     EAR RINGING     • Nutritional Supplements Swelling     [place] nuts - no other nuts that she is aware of   • Penicillins Anaphylaxis, Hives, Shortness Of Breath and Itching      tachycardia     • Statins Unknown - High Severity   • Hydrochlorothiazide Unknown - Low Severity     Caused gout symptoms     • Lisinopril Cough   • Morphine Nausea Only            • Letrozole Unknown - Low Severity   • Pentazocine Nausea And Vomiting               Social History     Socioeconomic History   • Marital status:      Spouse name: Sean   Tobacco Use   • Smoking status: Never Smoker   • Smokeless tobacco: Never Used   Vaping Use   • Vaping Use: Never used   Substance and Sexual Activity   • Alcohol use: Yes     Alcohol/week: 1.0 standard drink     Types: 1 Standard drinks or equivalent per week     Comment: or less   • Drug use: Never   • Sexual activity: Yes     Partners: Male     Comment: endometrial [id number]        Family History   Problem Relation Age of Onset   • Cervical cancer Mother    • Alzheimer's disease Mother    • Arthritis Mother    • Other Mother         Uterine cancer   • Cancer Father         Bladder   • Heart disease Father    • Hypertension Father    • Arthritis Father         Breadt cancer   • Alcohol abuse Father             • Hyperlipidemia Father    • Depression Father    • Diabetes Father         \"borderline\"   • Arthritis Maternal Grandmother    • Heart disease Other         FH of heart disease in males before age 55   • Hyperlipidemia Brother    • Hypertension Brother    • Breast cancer Paternal Grandmother 75   • Heart attack Paternal Grandfather    • " "Breast cancer Paternal Cousin 70   • Alcohol abuse Maternal Grandfather             • Depression Brother         since    • Malig Hyperthermia Neg Hx         Review of Systems   Constitutional: Positive for diaphoresis.   Respiratory: Negative.  Negative for shortness of breath.    Cardiovascular: Negative for chest pain, palpitations and leg swelling.   Musculoskeletal: Positive for arthralgias.   Skin: Negative for rash.   Neurological: Negative for dizziness, weakness and headaches.   Psychiatric/Behavioral: Negative.    All other systems reviewed and are negative.       Objective   Vitals:    22 1028   BP: 145/78   Pulse: 71   Resp: 18   Temp: 96.6 °F (35.9 °C)   TempSrc: Temporal   SpO2: 97%   Weight: 84.1 kg (185 lb 6.4 oz)   Height: 153.7 cm (60.51\")   PainSc:   4   PainLoc: Knee  Comment: RT     Current Status 2022   ECOG score 1       Physical exam  The patient's ACP documentation has  and needs to be reviewed again.    CONSTITUTIONAL:  Vital signs reviewed.  No distress, looks comfortable.  EYES:  Conjunctiva and lids unremarkable.  PERRLA  EARS,NOSE,MOUTH,THROAT:  Ears and nose appear unremarkable. Mask in place.  RESPIRATORY:  Normal respiratory effort.  Lungs clear to auscultation bilaterally.  BREASTS: Right chest wall benign; left breast shows no palpable masses,  CARDIOVASCULAR:  Normal S1, S2.  No murmurs rubs or gallops.  No significant lower extremity edema.    GASTROINTESTINAL: Abdomen appears unremarkable.  Nontender.  No hepatomegaly.  No splenomegaly.  LYMPHATIC:  No cervical, supraclavicular, axillary lymphadenopathy.  SKIN:  Warm.  No rashes.  PSYCHIATRIC:  Normal judgment and insight.  Normal mood and affect.     I have reexamined the patient and the results are consistent with the previously documented exam except as updated. Syed Jasso MD       RECENT LABS:  Results from last 7 days   Lab Units 22  1000   WBC 10*3/mm3 6.17   NEUTROS ABS " 10*3/mm3 3.46   HEMOGLOBIN g/dL 11.0*   HEMATOCRIT % 33.7*   PLATELETS 10*3/mm3 274     Results from last 7 days   Lab Units 05/23/22  1000   SODIUM mmol/L 137   POTASSIUM mmol/L 4.7   CHLORIDE mmol/L 101   CO2 mmol/L 24.9   BUN mg/dL 22   CREATININE mg/dL 1.06*   CALCIUM mg/dL 9.2   ALBUMIN g/dL 3.80   BILIRUBIN mg/dL 0.3   ALK PHOS U/L 69   ALT (SGPT) U/L 9   AST (SGOT) U/L 16   GLUCOSE mg/dL 184*         Component      Latest Ref Rng & Units 8/20/2020 9/4/2020 10/13/2021 1/17/2022   Uric Acid      2.4 - 5.7 mg/dL 6.5 (H) 5.5 7.2 (H) 7.3 (H)       SPECIMEN   Procedure  Total mastectomy    Specimen Laterality  Right    TUMOR   Tumor Site  Lower inner quadrant    Clock Position of Tumor Site  6 o'clock    Histologic Type  Invasive carcinoma of no special type (invasive ductal carcinoma, not otherwise specified)    Glandular (Acinar) / Tubular Differentiation  Score 2    Nuclear Pleomorphism  Score 2    Mitotic Rate  Score 1    Overall Grade  Grade 1 (scores of 3, 4 or 5)    Tumor Size  Greatest dimension of largest invasive focus (Millimeters): 19 mm   Additional Dimension (Millimeters)  13 mm     10 mm   Tumor Focality  Single focus of invasive carcinoma    Ductal Carcinoma In Situ (DCIS)  Present      Negative for extensive intraductal component (EIC)    Size (Extent) of DCIS  Estimated size (extent) of DCIS is at least (Millimeters): 15 mm   Architectural Patterns  Cribriform      Papillary      Solid    Nuclear Grade  Grade II (intermediate)    Necrosis  Present, focal (small foci or single cell necrosis)    Lobular Carcinoma In Situ (LCIS)  No LCIS in specimen    Tumor Extent     Lymphovascular Invasion  Not identified    Dermal Lymphovascular Invasion  Not identified    Microcalcifications  Present in DCIS      Present in invasive carcinoma      Present in non-neoplastic tissue    Treatment Effect  No known presurgical therapy    MARGINS   Invasive Carcinoma Margins  Uninvolved by invasive carcinoma     Distance from Closest Margin (Millimeters)  10 mm   Closest Margin(s)  Lateral    Distance from Other Margins     Anterior Margin (Millimeters)  40 mm   Posterior Margin (Millimeters)  45 mm   Superior Margin (Millimeters)  18 mm   Inferior Margin (Millimeters)  13 mm   Medial Margin (Millimeters)  22 mm   Lateral Margin (Millimeters)  10 mm   DCIS Margins  Uninvolved by DCIS    Distance from Closest Margin (Millimeters)  14 mm   Closest Margin(s)  Superior      Inferior    Distance from Other Margins     Anterior Margin (Millimeters)  20 mm   Posterior Margin (Millimeters)  40 mm   Superior Margin (Millimeters)  14 mm   Inferior Margin (Millimeters)  14 mm   Medial Margin (Millimeters)  30 mm   Lateral Margin (Millimeters)  21 mm   LYMPH NODES   Regional Lymph Nodes  Uninvolved by tumor cells    Total Number of Lymph Nodes Examined  5    Number of [place] Nodes Examined  5    PATHOLOGIC STAGE CLASSIFICATION (pTNM, AJCC 8th Edition)   Primary Tumor (pT)  pT1c    Regional Lymph Nodes Modifier  (sn): Only sentinel node(s) evaluated.    Regional Lymph Nodes (pN)  pN0    .   Breast Biomarker Reporting Template   Breast.Bmk - 1   Protocol posted: 8/28/2019   Test(s) Performed     Estrogen Receptor (ER) Status  Positive    Percentage of Cells with Nuclear Positivity  %    Average Intensity of Staining  Strong    Test Type  Food and Drug Administration (FDA) cleared (test / vendor): [facility]    Primary Antibody  SP1    Scoring System  Ella    Proportion Score  5    Intensity Score  3    Total Ella Score  8    Test(s) Performed     Progesterone Receptor (PgR) Status  Positive    Percentage of Cells with Nuclear Positivity  5 %   Average Intensity of Staining  Moderate    Test Type  Food and Drug Administration (FDA) cleared (test / vendor): [employer] [facility]    Primary Antibody  1E2    Scoring System  Ella    Proportion Score  2    Intensity Score  2    Total Ella Score  4    Test(s) Performed     HER2 by  Immunohistochemistry  Equivocal (Score 2+)    Percentage of Cells with Uniform Intense Complete Membrane Staining  0 %   Test Type  Food and Drug Administration (FDA) cleared (test / vendor): [employer]    Primary Antibody  4B5    Test(s) Performed  Ki-67    Percentage of Positive Nuclei  10 %   Primary Antibody           Assessment & Plan   1.cT1cN0 grade 1 infiltrating ductal carcinoma right breast at 6:00 ER positive SD negative HER-2 negative with associated intermediate grade DCIS the 1 o'clock position of the breast both lesions nonpalpable  · pT1cN0 grade 1 ER positive SD negative HER-2 negative post mastectomy on 5/11/2020  · Oncotype DX score 22  · Patient discontinued Arimidex in early May 2020 and soon thereafter began Femara 2.5 mg daily.  · Discontinued Femara in December due to worsening hot flashes and difficulty sleeping  · Gabapentin added in 2/21 with Femara resumed  · Tamoxifen started in 7/21  · Worsening joint pains, referred to rheumatology evaluation-rheumatoid factor and RUSSELL negative. Nieves continued.  · 1/17/2022 patient continues on tamoxifen with it being difficult to assess true tolerance as she has been on a significant amount of steroids over the last month and a half (see discussion below).  She will continue for now and monitor for worsening arthralgias    2.  Hypothyroidism and type 2 diabetes managed by Dr. Wynn.    3.  Significant degenerative arthritis  · Referred to rheumatology, seeing Dr. Raza November 2021.  Treated with another Pred pack.  · Patient reports being seen again 1/10/2022.  We have not yet have these records.  Patient has not started any additional medication for her arthritis at this time.    4.  Family history of breast cancer.  Genetics testing negative.  5.  Normal bone density in 2018    6.  History of recurrent gout.    · Patient most recently on allopurinol 300 mg daily prescribed per PCP.   · Patient self discontinued this after she developed significant  systemic rash with sloughing of her skin in 12/2021.  · 1/17/2022 repeat uric acid level 7.3 today. Stable though still elevated. Unclear if patient may benefit from different uric acid reducer such as Uloric. Defer to PCP or Dr. Raza.    7.  New onset anemia as of 11/2021?  Etiology  · Anemia labs performed 11/1/2021 with no evidence of iron or B12 deficiency.  · ?  Perhaps element of mild CKD  · 1/17/2022 Hemoglobin is stable currently at 10.4 and we will monitor.    8. MODERATE AORTIC STENOSIS by echo in 5//22      PLAN:  1.  Continue tamoxifen 20 mg  2.  Monitor for worsening arthralgias now that she has completed all steroids for both arthralgias and significant rash as outlined.    3.  Anemia likely aggravated by recent knee replacement  4.  Avoid allopurinol  5. patient due for screening left mammogram in May 2023,   6.  Patient will return 6 mofor follow-up with Dr. Jasso with CBC, CMP

## 2022-05-23 NOTE — PROGRESS NOTES
Subjective   Mary Jo Berg is a 72 y.o. female     History of Present Illness she is now 2 years out from her right mastectomy to treat breast cancer.  The patient follows with the medical oncologists and had previously been on Femara.  Unfortunately, she did not tolerate this and is now on tamoxifen.  The patient has a little discomfort under the right axilla but has not detected any swelling of the arm.  She also has not palpated any masses along the chest wall or in the other breast.    A left mammogram was performed 2 weeks ago and did not show any abnormalities.  I have personally reviewed the imaging studies.  She has a few scattered benign-appearing calcifications but no areas of architectural distortion or focal asymmetry.        Review of Systems musculoskeletal- the patient has recently had a right knee replacement.  Past Medical History:   Diagnosis Date   • Asthma    • Breast cancer (HCC) 2020    right IDC   • Cataract     on both  retinas   • Coronary artery disease 1/10/2015    Pacemaker 2015   • Diabetes mellitus (HCC)     Type 2   • Diverticulosis 2021   • Heart murmur    • History of asthma    • History of colon polyps    • History of complete heart block     paced in    • Hyperlipidemia    • Hypertension    • Hypothyroidism    • Obesity    • Osteoarthritis    • Osteopenia    • Pacemaker    • PONV (postoperative nausea and vomiting)      Past Surgical History:   Procedure Laterality Date   • ADENOIDECTOMY     • BLADDER REPAIR     • BREAST BIOPSY Right 2020    malignant   • CARDIAC PACEMAKER PLACEMENT     • CARPAL TUNNEL RELEASE Right    • CATARACT EXTRACTION Bilateral    •  SECTION      x2   • COLONOSCOPY     • COLONOSCOPY N/A 2021    Procedure: COLONOSCOPY;  Surgeon: Yamilet Stewart MD;  Location: [facility] ENDOSCOPY;  Service: Gastroenterology;  Laterality: N/A;  pre- hx polyps  post-- diverticulosis, hemorrhoids   • ENDOMETRIAL ABLATION  "    • HAND SURGERY     • HERNIA REPAIR      Bilateral femoral and inguinal   • JOINT REPLACEMENT  2007    Left knee   • KNEE ARTHROPLASTY UNICOMPARTMENTAL Right 2022   • KNEE ARTHROSCOPY Bilateral    • MASTECTOMY  2020   • MASTECTOMY WITH SENTINEL NODE BIOPSY AND AXILLARY NODE DISSECTION Right 2020    Procedure: RIGHT BREAST MASTECTOMY WITH SENTINEL NODE BIOPSY;  Surgeon: Bj Stephen MD;  Location: [facility] OR [facility];  Service: General;  Laterality: Right;   • OOPHORECTOMY     • SHOULDER ARTHROSCOPY Right     rotator cuff repair, biceps tenotomy   • THYROIDECTOMY, PARTIAL     • TONSILLECTOMY AND ADENOIDECTOMY     • TOTAL KNEE ARTHROPLASTY Left    • TUBAL ABDOMINAL LIGATION     • UMBILICAL HERNIA REPAIR       Family History   Problem Relation Age of Onset   • Cervical cancer Mother    • Alzheimer's disease Mother    • Arthritis Mother    • Other Mother         Uterine cancer   • Cancer Father         Bladder   • Heart disease Father    • Hypertension Father    • Arthritis Father         Breadt cancer   • Alcohol abuse Father             • Hyperlipidemia Father    • Depression Father    • Diabetes Father         \"borderline\"   • Arthritis Maternal Grandmother    • Heart disease Other         FH of heart disease in males before age 55   • Hyperlipidemia Brother    • Hypertension Brother    • Breast cancer Paternal Grandmother 75   • Heart attack Paternal Grandfather    • Breast cancer Paternal Cousin 70   • Alcohol abuse Maternal Grandfather             • Depression Brother         since    • Malig Hyperthermia Neg Hx      Social History     Socioeconomic History   • Marital status:      Spouse name: Sean   Tobacco Use   • Smoking status: Never Smoker   • Smokeless tobacco: Never Used   Vaping Use   • Vaping Use: Never used   Substance and Sexual Activity   • Alcohol use: Yes     Alcohol/week: 1.0 standard drink     Types: 1 Standard drinks or " equivalent per week     Comment: or less   • Drug use: Never   • Sexual activity: Yes     Partners: Male     Comment: endometrial [id number]       Objective   Physical Exam   Constitutional- BMI 35, no acute distress  Right chest wall-status post mastectomy without reconstruction.  The incision is well-healed and the skin itself looks fine.  I do not palpate any masses beneath the skin flaps or along the incision.  Left breast- large size with ptosis.  There is a little bit of irritated skin along the inferior breast crease medially.  I do not see evidence of skin dimpling, nipple retraction, or nipple discharge and there are no palpable masses in the breast that are concerning.  Lymphatics- no cervical or axillary adenopathy  Extremities-full range of motion of both upper extremities, good strength in her upper extremities and no signs of lymphedema.    Assessment & Plan   Right breast cancer status postmastectomy without reconstruction 2 years ago.  Her imaging is up-to-date and her physical examination does not show any abnormalities.  The patient is tolerating tamoxifen well and following with medical oncologist.  I will see her in a year.    The encounter diagnosis was Malignant neoplasm of overlapping sites of right breast in female, estrogen receptor positive (HCC).

## 2022-06-03 ENCOUNTER — TELEPHONE (OUTPATIENT)
Dept: ONCOLOGY | Facility: CLINIC | Age: 72
End: 2022-06-03

## 2022-06-03 NOTE — TELEPHONE ENCOUNTER
Patient is calling stating that she is anemic based on a lab done in our office at her last appointment.  Her hemoglobin was 11.0. I explained to her that the hemoglobin is not the only lab to determine anemia and her's is pretty normal for her age.  She is reporting that she feels like she has no energy and is lethargic.  I explained that no iron levels were performed to determine anemia.  She wants to know what else to do.  Please advise.

## 2022-06-03 NOTE — TELEPHONE ENCOUNTER
Returned pts call and advised her to follow up with her PCP re: this new increase in fatigue. Her labs are stable, in fact her hgb is improved from a few months ago and would not be causing fatigue and lethargy at this leve. She v/u.

## 2022-06-03 NOTE — TELEPHONE ENCOUNTER
Caller: PARTH    Relationship to patient: SELF    Best call back number: [phone]    PT MAY NOT ANSWER BECAUSE SHE IS LEAVING FOR PHYSICAL THERAPY IN 20 MINS.    Patient is needing: TO KNOW IF SHE NEEDS TO DO ANYTHING SINCE HER LABS SHOW SHE IS ANEMIC. SHE STATES SHE FEELS LETHARGIC WITH NO ENERGY.

## 2022-06-21 ENCOUNTER — TRANSCRIBE ORDERS (OUTPATIENT)
Dept: ADMINISTRATIVE | Facility: HOSPITAL | Age: 72
End: 2022-06-21

## 2022-06-21 ENCOUNTER — OFFICE VISIT (OUTPATIENT)
Dept: FAMILY MEDICINE CLINIC | Facility: CLINIC | Age: 72
End: 2022-06-21

## 2022-06-21 ENCOUNTER — LAB (OUTPATIENT)
Dept: LAB | Facility: HOSPITAL | Age: 72
End: 2022-06-21

## 2022-06-21 VITALS
TEMPERATURE: 97.2 F | HEART RATE: 64 BPM | WEIGHT: 185.9 LBS | DIASTOLIC BLOOD PRESSURE: 78 MMHG | SYSTOLIC BLOOD PRESSURE: 123 MMHG | BODY MASS INDEX: 35.1 KG/M2 | HEIGHT: 61 IN | OXYGEN SATURATION: 96 %

## 2022-06-21 DIAGNOSIS — I10 ESSENTIAL HYPERTENSION: Primary | ICD-10-CM

## 2022-06-21 DIAGNOSIS — N18.31 STAGE 3A CHRONIC KIDNEY DISEASE: ICD-10-CM

## 2022-06-21 DIAGNOSIS — F32.9 REACTIVE DEPRESSION: ICD-10-CM

## 2022-06-21 DIAGNOSIS — F51.04 PSYCHOPHYSIOLOGICAL INSOMNIA: Primary | ICD-10-CM

## 2022-06-21 DIAGNOSIS — I10 ESSENTIAL HYPERTENSION: ICD-10-CM

## 2022-06-21 DIAGNOSIS — C50.811 MALIGNANT NEOPLASM OF OVERLAPPING SITES OF RIGHT BREAST IN FEMALE, ESTROGEN RECEPTOR POSITIVE: ICD-10-CM

## 2022-06-21 DIAGNOSIS — R41.3 MEMORY LOSS: ICD-10-CM

## 2022-06-21 DIAGNOSIS — Z17.0 MALIGNANT NEOPLASM OF OVERLAPPING SITES OF RIGHT BREAST IN FEMALE, ESTROGEN RECEPTOR POSITIVE: ICD-10-CM

## 2022-06-21 LAB
ALBUMIN SERPL-MCNC: 4 G/DL (ref 3.5–5.2)
ALBUMIN SERPL-MCNC: 4 G/DL (ref 3.5–5.2)
ALBUMIN UR-MCNC: <1.2 MG/DL
ALBUMIN/GLOB SERPL: 1.3 G/DL
ALP SERPL-CCNC: 57 U/L (ref 39–117)
ALT SERPL W P-5'-P-CCNC: 7 U/L (ref 1–33)
ANION GAP SERPL CALCULATED.3IONS-SCNC: 8.1 MMOL/L (ref 5–15)
ANION GAP SERPL CALCULATED.3IONS-SCNC: 8.1 MMOL/L (ref 5–15)
AST SERPL-CCNC: 15 U/L (ref 1–32)
BACTERIA UR QL AUTO: ABNORMAL /HPF
BILIRUB SERPL-MCNC: 0.2 MG/DL (ref 0–1.2)
BILIRUB UR QL STRIP: NEGATIVE
BUN SERPL-MCNC: 25 MG/DL (ref 8–23)
BUN SERPL-MCNC: 25 MG/DL (ref 8–23)
BUN/CREAT SERPL: 24.5 (ref 7–25)
BUN/CREAT SERPL: 24.5 (ref 7–25)
CALCIUM SPEC-SCNC: 9.4 MG/DL (ref 8.6–10.5)
CALCIUM SPEC-SCNC: 9.4 MG/DL (ref 8.6–10.5)
CHLORIDE SERPL-SCNC: 104 MMOL/L (ref 98–107)
CHLORIDE SERPL-SCNC: 104 MMOL/L (ref 98–107)
CLARITY UR: CLEAR
CO2 SERPL-SCNC: 24.9 MMOL/L (ref 22–29)
CO2 SERPL-SCNC: 24.9 MMOL/L (ref 22–29)
COLOR UR: YELLOW
CREAT SERPL-MCNC: 1.02 MG/DL (ref 0.57–1)
CREAT SERPL-MCNC: 1.02 MG/DL (ref 0.57–1)
CREAT UR-MCNC: 93.7 MG/DL
EGFRCR SERPLBLD CKD-EPI 2021: 58.2 ML/MIN/1.73
EGFRCR SERPLBLD CKD-EPI 2021: 58.2 ML/MIN/1.73
GLOBULIN UR ELPH-MCNC: 3 GM/DL
GLUCOSE SERPL-MCNC: 108 MG/DL (ref 65–99)
GLUCOSE SERPL-MCNC: 108 MG/DL (ref 65–99)
GLUCOSE UR STRIP-MCNC: NEGATIVE MG/DL
HGB UR QL STRIP.AUTO: NEGATIVE
HYALINE CASTS UR QL AUTO: ABNORMAL /LPF
KETONES UR QL STRIP: NEGATIVE
LEUKOCYTE ESTERASE UR QL STRIP.AUTO: ABNORMAL
MICROALBUMIN/CREAT UR: NORMAL MG/G{CREAT}
NITRITE UR QL STRIP: NEGATIVE
PH UR STRIP.AUTO: <=5 [PH] (ref 5–8)
PHOSPHATE SERPL-MCNC: 3.3 MG/DL (ref 2.5–4.5)
POTASSIUM SERPL-SCNC: 4.9 MMOL/L (ref 3.5–5.2)
POTASSIUM SERPL-SCNC: 4.9 MMOL/L (ref 3.5–5.2)
PROT SERPL-MCNC: 7 G/DL (ref 6–8.5)
PROT UR QL STRIP: NEGATIVE
RBC # UR STRIP: ABNORMAL /HPF
REF LAB TEST METHOD: ABNORMAL
SODIUM SERPL-SCNC: 137 MMOL/L (ref 136–145)
SODIUM SERPL-SCNC: 137 MMOL/L (ref 136–145)
SP GR UR STRIP: 1.02 (ref 1–1.03)
SQUAMOUS #/AREA URNS HPF: ABNORMAL /HPF
UROBILINOGEN UR QL STRIP: ABNORMAL
WBC # UR STRIP: ABNORMAL /HPF

## 2022-06-21 PROCEDURE — 80053 COMPREHEN METABOLIC PANEL: CPT

## 2022-06-21 PROCEDURE — 82570 ASSAY OF URINE CREATININE: CPT

## 2022-06-21 PROCEDURE — 99214 OFFICE O/P EST MOD 30 MIN: CPT | Performed by: NURSE PRACTITIONER

## 2022-06-21 PROCEDURE — 84100 ASSAY OF PHOSPHORUS: CPT

## 2022-06-21 PROCEDURE — 36415 COLL VENOUS BLD VENIPUNCTURE: CPT

## 2022-06-21 PROCEDURE — 81001 URINALYSIS AUTO W/SCOPE: CPT

## 2022-06-21 PROCEDURE — 82043 UR ALBUMIN QUANTITATIVE: CPT

## 2022-06-21 RX ORDER — TRAZODONE HYDROCHLORIDE 50 MG/1
25-50 TABLET ORAL NIGHTLY PRN
Qty: 30 TABLET | Refills: 0 | Status: SHIPPED | OUTPATIENT
Start: 2022-06-21 | End: 2022-11-14

## 2022-06-21 NOTE — PROGRESS NOTES
Chief Complaint  Depression (C/o depression, memory issues, and trouble sleeping )    Subjective        Mary Jo Berg presents to [facility]  History of Present Illness patient is here with her daughter for suspected depression.  She has also had noticeable memory loss ever since she developed rash due to medication allergy.  She also has chronic insomnia.  He is in physical therapy for partial right knee replacement in May.  Healing well but slowly.    Insomnia: Distant past took Ambien which worked well and had limited side effects.  But not taking anything other than over-the-counter medications recently and these have not been helpful.  Sometimes only gets 2 hours of sleep per night.  She has trouble both falling and staying asleep.  She does not think insomnia due to anxiety.    Depression: She feels a lack of motivation and energy due to isolation and chronicity of her recent health problems.  She is a very social person and not been getting out of the house with others.  She does nap sometimes during the day.  And family members feel like she also has low energy.  Does have a family history of depression, not sure what medications others are using.    Memory loss: She becomes forgetful and forgets things her spouse has told her unless she writes it down.  She has trouble finding words.  She has no trouble with steps to manage such things as cooking.  She has not been lost or found object such as keys in weird places.    She has weaned off any pain medicine and at the most takes it occasionally in the evening for pain control and to help her sleep.    She has been keeping appointments with endocrinology for type 2 diabetes and hypothyroidism.  Most recent TSH was therapeutic.    She has an appointment with nephrology tomorrow for monitoring of CKD.    She has regular follow-ups with cardiology.     Daughter here to also provide some history and context, share  "concerns.      PHQ-9 Total Score:  7  GODWIN 7 Total Score: 8          Objective   Vital Signs:  /78   Pulse 64   Temp 97.2 °F (36.2 °C)   Ht 154.9 cm (61\")   Wt 84.3 kg (185 lb 14.4 oz)   SpO2 96%   BMI 35.13 kg/m²   Estimated body mass index is 35.13 kg/m² as calculated from the following:    Height as of this encounter: 154.9 cm (61\").    Weight as of this encounter: 84.3 kg (185 lb 14.4 oz).          Physical Exam  Vitals and nursing note reviewed.   Constitutional:       General: She is not in acute distress.     Appearance: She is well-developed. She is not ill-appearing or diaphoretic.   HENT:      Head: Normocephalic and atraumatic.   Eyes:      General:         Right eye: No discharge.         Left eye: No discharge.      Conjunctiva/sclera: Conjunctivae normal.   Pulmonary:      Effort: Pulmonary effort is normal.   Musculoskeletal:         General: No deformity.      Comments: Ambulating with cane, well-healed right knee incision   Skin:     General: Skin is warm and dry.   Neurological:      General: No focal deficit present.      Mental Status: She is alert and oriented to person, place, and time.   Psychiatric:         Attention and Perception: Attention and perception normal.         Mood and Affect: Mood is depressed.         Speech: Speech normal.         Behavior: Behavior normal. Behavior is cooperative.         Thought Content: Thought content normal.         Cognition and Memory: Cognition normal.        Result Review :                Assessment and Plan   Diagnoses and all orders for this visit:    1. Psychophysiological insomnia (Primary)  -     traZODone (DESYREL) 50 MG tablet; Take 0.5-1 tablets by mouth At Night As Needed for Sleep.  Dispense: 30 tablet; Refill: 0    2. Reactive depression    3. Memory loss  -     Ambulatory Referral to Geriatrics      Slums done and does not show significant memory impairment.  She did struggle with remembering objects, naming animals-timed. "     Her PHQ and GODWIN are surprisingly lower than I would have thought.  Patient's daughter thinks they may actually be higher than reported.  When discussing depression I think a lot of it seems to be reactive.  We discussed Lexapro but I think that her depression is more low energy and she does not report anxiety some not sure how helpful Lexapro would be over something like Cymbalta which may also help with some of her chronic pain.    Patient feels like insomnia is her biggest concern.  She has not been on trazodone in the past.  We will start with low-dose of trazodone which can be titrated up as needed.  We discussed side effects.  Fall precautions also reinforced.  If not helpful or side effects she will let me know.  She may be more motivated and less fatigued if she is able to sleep at night.  We will have [facility] and let me know how she is doing.  If we need to start antidepressives we can do that.    Family is requesting referral to geriatrics for memory loss evaluation and I have put in a referral.    Reviewed most recent endocrinology notes as well as labs.  Thyroid is therapeutic.  Her A1c is at goal.  Rheumatology notes as well as heme-onc notes also reviewed.  Seems to be doing well in these areas.  She has a nephrology appointment tomorrow.  We will review her BMP tomorrow.          Follow Up   No follow-ups on file.  Patient was given instructions and counseling regarding her condition or for health maintenance advice. Please see specific information pulled into the AVS if appropriate.

## 2022-07-11 DIAGNOSIS — F51.04 PSYCHOPHYSIOLOGICAL INSOMNIA: ICD-10-CM

## 2022-07-11 RX ORDER — TRAZODONE HYDROCHLORIDE 50 MG/1
TABLET ORAL
Qty: 30 TABLET | Refills: 0 | OUTPATIENT
Start: 2022-07-11

## 2022-07-18 RX ORDER — ESCITALOPRAM OXALATE 5 MG/1
5 TABLET ORAL DAILY
Qty: 30 TABLET | Refills: 1 | Status: SHIPPED | OUTPATIENT
Start: 2022-07-18 | End: 2022-08-29

## 2022-08-05 RX ORDER — TAMOXIFEN CITRATE 20 MG/1
TABLET ORAL
Qty: 30 TABLET | Refills: 6 | Status: SHIPPED | OUTPATIENT
Start: 2022-08-05 | End: 2023-02-13

## 2022-08-29 RX ORDER — ESCITALOPRAM OXALATE 5 MG/1
5 TABLET ORAL DAILY
Qty: 30 TABLET | Refills: 1 | Status: SHIPPED | OUTPATIENT
Start: 2022-08-29 | End: 2022-09-07 | Stop reason: SDUPTHER

## 2022-08-29 NOTE — TELEPHONE ENCOUNTER
Rx Refill Note  Requested Prescriptions     Pending Prescriptions Disp Refills   • escitalopram (LEXAPRO) 5 MG tablet [Pharmacy Med Name: ESCITALOPRAM 5MG TABLETS] 30 tablet 1     Sig: TAKE 1 TABLET BY MOUTH DAILY      Last office visit with prescribing clinician: 6/21/2022      Next office visit with prescribing clinician: 9/20/2022            Nazanin Lewis MA  08/29/22, 11:04 EDT

## 2022-09-07 NOTE — TELEPHONE ENCOUNTER
Rx Refill Note  Requested Prescriptions     Pending Prescriptions Disp Refills   • escitalopram (LEXAPRO) 5 MG tablet 30 tablet 1     Sig: Take 1 tablet by mouth Daily.      Last office visit with prescribing clinician: 6/21/2022      Next office visit with prescribing clinician: 9/20/2022            Josh Helms  09/07/22, 15:57 EDT

## 2022-09-08 RX ORDER — ESCITALOPRAM OXALATE 5 MG/1
5 TABLET ORAL DAILY
Qty: 30 TABLET | Refills: 1 | Status: SHIPPED | OUTPATIENT
Start: 2022-09-08 | End: 2022-09-16

## 2022-09-16 RX ORDER — ESCITALOPRAM OXALATE 5 MG/1
5 TABLET ORAL DAILY
Qty: 90 TABLET | Refills: 0 | Status: SHIPPED | OUTPATIENT
Start: 2022-09-16 | End: 2022-10-03 | Stop reason: SDUPTHER

## 2022-09-16 NOTE — TELEPHONE ENCOUNTER
Rx Refill Note  Requested Prescriptions     Pending Prescriptions Disp Refills   • escitalopram (LEXAPRO) 5 MG tablet [Pharmacy Med Name: ESCITALOPRAM 5MG TABLETS] 90 tablet      Sig: TAKE 1 TABLET BY MOUTH DAILY      Last office visit with prescribing clinician: 6/21/2022      Next office visit with prescribing clinician: 10/3/2022            Tatiana Pedraza MA  09/16/22, 13:39 EDT

## 2022-09-20 ENCOUNTER — TELEPHONE (OUTPATIENT)
Dept: FAMILY MEDICINE CLINIC | Facility: CLINIC | Age: 72
End: 2022-09-20

## 2022-09-20 NOTE — TELEPHONE ENCOUNTER
Caller: Mary Jo Berg    Relationship: Self    Best call back number: [phone]    What form or medical record are you requesting: PATIENT STATES SHE GOT A LETTER FOR [employer]. PATIENT STATES SHE HAS MEMORY PROBLEMS AND WOULD LIKE A LETTER TO VERIFY THAT SHE IS NOT A GOOD CANDIDATE FOR THIS. SHE NEEDS THIS SIGNED FROM LINDY TAN.     How would you like to receive the form or medical records (pick-up, mail, fax):  FROM THE OFFICE    Timeframe paperwork needed: ASAP    Additional notes: PLEASE ADVISE PATIENT WHEN LETTER IS READY TO BE PICKED UP.

## 2022-10-03 ENCOUNTER — OFFICE VISIT (OUTPATIENT)
Dept: FAMILY MEDICINE CLINIC | Facility: CLINIC | Age: 72
End: 2022-10-03

## 2022-10-03 VITALS
OXYGEN SATURATION: 97 % | RESPIRATION RATE: 14 BRPM | HEART RATE: 82 BPM | TEMPERATURE: 97 F | WEIGHT: 192.4 LBS | BODY MASS INDEX: 36.32 KG/M2 | SYSTOLIC BLOOD PRESSURE: 136 MMHG | DIASTOLIC BLOOD PRESSURE: 68 MMHG | HEIGHT: 61 IN

## 2022-10-03 DIAGNOSIS — F32.9 REACTIVE DEPRESSION: ICD-10-CM

## 2022-10-03 DIAGNOSIS — Z23 NEED FOR VACCINATION: ICD-10-CM

## 2022-10-03 DIAGNOSIS — R41.3 MEMORY LOSS: ICD-10-CM

## 2022-10-03 DIAGNOSIS — Z00.00 ENCOUNTER FOR INITIAL ANNUAL WELLNESS VISIT (AWV) IN MEDICARE PATIENT: Primary | ICD-10-CM

## 2022-10-03 PROCEDURE — 99214 OFFICE O/P EST MOD 30 MIN: CPT | Performed by: NURSE PRACTITIONER

## 2022-10-03 PROCEDURE — 90662 IIV NO PRSV INCREASED AG IM: CPT | Performed by: NURSE PRACTITIONER

## 2022-10-03 PROCEDURE — G0439 PPPS, SUBSEQ VISIT: HCPCS | Performed by: NURSE PRACTITIONER

## 2022-10-03 PROCEDURE — G0008 ADMIN INFLUENZA VIRUS VAC: HCPCS | Performed by: NURSE PRACTITIONER

## 2022-10-03 PROCEDURE — 1170F FXNL STATUS ASSESSED: CPT | Performed by: NURSE PRACTITIONER

## 2022-10-03 PROCEDURE — 1160F RVW MEDS BY RX/DR IN RCRD: CPT | Performed by: NURSE PRACTITIONER

## 2022-10-03 RX ORDER — LETROZOLE 2.5 MG/1
2.5 TABLET, FILM COATED ORAL DAILY
COMMUNITY
End: 2022-11-14

## 2022-10-03 RX ORDER — ESCITALOPRAM OXALATE 5 MG/1
5 TABLET ORAL DAILY
Qty: 90 TABLET | Refills: 1 | Status: SHIPPED | OUTPATIENT
Start: 2022-10-03 | End: 2022-11-14

## 2022-10-03 RX ORDER — EZETIMIBE 10 MG/1
10 TABLET ORAL DAILY
COMMUNITY

## 2022-10-03 NOTE — PROGRESS NOTES
"The ABCs of the Annual Wellness Visit  Subsequent Medicare Wellness Visit    Chief Complaint   Patient presents with   • medication follow up      Subjective    History of Present Illness:  Mary Jo Berg is a 72 y.o. female who presents for a Subsequent Medicare Wellness Visit.    The patient is a 72-year-old female who presents for follow-up on medication for depression. She is accompanied by her .    The patient states that she is doing well except that she has a \"croupy\" cough. She states that she is getting better; she used an old cough medication she had on hand. Her  states that he hears her \"coughing her head off.\"    The patient confirms she is taking Lexapro, and has 3 or 4 tablets left. She and her  agree that she has been happier, and sleeping better. The patient states that she has not had an appointment with a neurologist for memory loss. Her spouse states that the patient's memory has gotten worse since 06/2022. He reports that she often repeats herself. He adds that she is less tolerant. They report that she has not had any imaging done. They would like to proceed with imaging. The patient's spouse reports that other than her memory loss, the patient's health has improved overall.    The patient states that the dizziness she was experiencing in 06/2022 has resolved. She states that she occasionally gets headaches, but nothing new.    The patient states she was released by her nephrologist. Her  states that she is seeing an endocrinologist for her thyroid and diabetes mellitus. They report that the labs have been fine.    The patient denies hearing problems. Her spouse states she does have some difficulty hearing. He states they will have her hearing tested at [facility].     The patient reports that she is able to be active. She recalls that she had a partial knee arthroplasty which limited her for a while. The patient adds that on 10/01/2022 her right knee started " bothering her. She denies a known injury. The patient states that she takes 2 Tylenol prior to bed, but no other pain medications.    The patient requests a flu vaccination.    Review of Systems has been reviewed and is negative other than what has been discussed within the HPI.     The following portions of the patient's history were reviewed and   updated as appropriate: allergies, current medications, past family history, past medical history, past social history, past surgical history and problem list.    Compared to one year ago, the patient feels her physical   health is better.    Compared to one year ago, the patient feels her mental   health is worse.    Recent Hospitalizations:  She was not admitted to the hospital during the last year.       Current Medical Providers:  Patient Care Team:  Betty Isaac APRN as PCP - General (Nurse Practitioner)  Syed Jasso MD as Consulting Physician (Hematology and Oncology)  Bj Stephen MD as Referring Physician (Breast Surgery)    Outpatient Medications Prior to Visit   Medication Sig Dispense Refill   • Acetaminophen (TYLENOL PO) Take 500 mg by mouth 2 (Two) Times a Day.     • albuterol sulfate  (90 Base) MCG/ACT inhaler Inhale 2 puffs Every 4 (Four) Hours As Needed for Wheezing or Shortness of Air. 18 g 0   • clotrimazole-betamethasone (LOTRISONE) 1-0.05 % cream As Needed.     • diphenhydrAMINE (SOMINEX) 25 MG tablet Take 25 mg by mouth.     • DOXYLAMINE SUCCINATE, SLEEP, PO Take 25 mg by mouth.     • ezetimibe (ZETIA) 10 MG tablet Take 10 mg by mouth Daily.     • fluticasone (FLONASE) 50 MCG/ACT nasal spray 2 sprays into the nostril(s) as directed by provider As Needed.     • irbesartan (AVAPRO) 150 MG tablet Take 150 mg by mouth Every Morning.     • letrozole (FEMARA) 2.5 MG tablet Take 2.5 mg by mouth Daily.     • levothyroxine (SYNTHROID, LEVOTHROID) 125 MCG tablet Take 1 tablet by mouth.     • Loratadine 10 MG capsule Take 1  tablet by mouth Daily.     • metFORMIN (GLUCOPHAGE) 500 MG tablet Take 2 tablets p.o. every morning and 1 tablet p.o. every afternoon. (Patient taking differently: 500 mg 2 (Two) Times a Day With Meals. Take 2 tablets p.o. every morning and 1 tablet p.o. every afternoon.) 270 tablet 1   • metoprolol succinate XL (TOPROL-XL) 25 MG 24 hr tablet Take 25 mg by mouth 2 (Two) Times a Day.     • tamoxifen (NOLVADEX) 20 MG chemo tablet TAKE 1 TABLET BY MOUTH EVERY DAY 30 tablet 6   • glucose blood test strip ACCU-CHECK SKYLA. Use to check blood sugar once daily, as directed. DX: E11.9     • Lancets (ACCU-CHEK MULTICLIX) lancets Use to check blood sugar once daily, as directed. DX: 250.00     • traMADol (ULTRAM) 50 MG tablet Take 50 mg by mouth As Needed.     • traZODone (DESYREL) 50 MG tablet Take 0.5-1 tablets by mouth At Night As Needed for Sleep. 30 tablet 0   • escitalopram (LEXAPRO) 5 MG tablet TAKE 1 TABLET BY MOUTH DAILY 90 tablet 0   • febuxostat (Uloric) 40 MG tablet Take 1 tablet by mouth Daily. 30 tablet 1   • HYDROcodone-acetaminophen (NORCO)  MG per tablet Take 1 tablet by mouth Every 6 (Six) Hours As Needed.       No facility-administered medications prior to visit.       Opioid medication/s are on active medication list.  and I have evaluated her active treatment plan and pain score trends (see table).  Vitals:    10/03/22 0922   PainSc: 0-No pain     I have reviewed the chart for potential of high risk medication and harmful drug interactions in the elderly.            Aspirin is not on active medication list.  Aspirin use is not indicated based on review of current medical condition/s. Risk of harm outweighs potential benefits.  .    Patient Active Problem List   Diagnosis   • Malignant neoplasm of overlapping sites of right female breast (HCC)   • Allergic rhinitis   • Complete heart block (HCC)   • De Quervain's tenosynovitis, left   • CTS (carpal tunnel syndrome)   • Degenerative arthritis of thumb    • Failed total knee, left (HCC)   • Gout   • Hyperlipidemia   • Postoperative hypothyroidism   • Lumbar radiculopathy   • Lumbar stenosis without neurogenic claudication   • Spondylolisthesis at L5-S1 level   • Diabetes 1.5, managed as type 2 (HCC)   • Aromatase inhibitor use   • Morbidly obese (HCC)   • Personal history of colonic polyps   • Pacemaker   • Hypertension   • Aortic valve stenosis   • Arteritis (HCC)   • Asthma   • Benign essential hypertension   • Bradycardia   • Cataract   • Encounter for other screening for malignant neoplasm of breast   • Female stress incontinence   • Malignant neoplasm of female breast (HCC)   • Menopause   • Osteopenia   • Persons encountering health services in other specified circumstances   • Secondary cataract of left eye   • Type 2 diabetes mellitus without complication (HCC)   • Hypothyroidism   • Stage 3a chronic kidney disease ([facility])     Advance Care Planning  Advance Directive is not on file.  ACP discussion was held with the patient during this visit. Patient has an advance directive (not in EMR), copy requested.    Review of Systems   Constitutional: Positive for activity change and fatigue.   HENT: Positive for congestion and hearing loss. Negative for ear pain, sore throat and trouble swallowing.    Eyes: Negative for pain and visual disturbance.   Respiratory: Positive for cough. Negative for shortness of breath and wheezing.    Cardiovascular: Negative for chest pain and palpitations.   Gastrointestinal: Negative for abdominal pain and blood in stool.   Genitourinary: Negative for difficulty urinating and hematuria.   Musculoskeletal: Positive for arthralgias and gait problem.   Skin: Negative for rash and wound.   Neurological: Positive for weakness. Negative for dizziness.   Psychiatric/Behavioral: Positive for sleep disturbance (improved). Negative for dysphoric mood. The patient is not nervous/anxious.         Objective    Vitals:    10/03/22 0922   BP:  "136/68   Pulse: 82   Resp: 14   Temp: 97 °F (36.1 °C)   TempSrc: Infrared   SpO2: 97%   Weight: 87.3 kg (192 lb 6.4 oz)   Height: 154.9 cm (61\")   PainSc: 0-No pain     Estimated body mass index is 36.35 kg/m² as calculated from the following:    Height as of this encounter: 154.9 cm (61\").    Weight as of this encounter: 87.3 kg (192 lb 6.4 oz).    Class 2 Severe Obesity (BMI >=35 and <=39.9). Obesity-related health conditions include the following: hypertension, diabetes mellitus, dyslipidemias and osteoarthritis. Obesity is unchanged. BMI is is above average; BMI management plan is completed. We discussed portion control and increasing exercise.      Does the patient have evidence of cognitive impairment? Yes    Physical Exam  Vitals and nursing note reviewed.   Constitutional:       General: She is not in acute distress.     Appearance: She is well-developed. She is obese. She is not ill-appearing.   HENT:      Head: Normocephalic and atraumatic.      Right Ear: Ear canal and external ear normal.      Left Ear: Ear canal and external ear normal.      Ears:      Comments: Bilateral TM dull, no erythema     Mouth/Throat:      Mouth: Mucous membranes are moist.      Pharynx: Uvula midline. No posterior oropharyngeal erythema.   Eyes:      General: No scleral icterus.        Right eye: No discharge.         Left eye: No discharge.      Conjunctiva/sclera: Conjunctivae normal.      Pupils: Pupils are equal, round, and reactive to light.   Neck:      Thyroid: No thyromegaly.   Cardiovascular:      Rate and Rhythm: Normal rate and regular rhythm.      Heart sounds: Normal heart sounds. No murmur heard.  Pulmonary:      Effort: Pulmonary effort is normal.      Breath sounds: Normal breath sounds. No wheezing, rhonchi or rales.      Comments: Infrequent course cough noted  Abdominal:      General: Bowel sounds are normal.      Palpations: Abdomen is soft.      Tenderness: There is no abdominal tenderness. "   Musculoskeletal:         General: No deformity.      Cervical back: Neck supple.      Comments: Antalgia and a little difficulty stepping up secondary to chronic right knee pain   Lymphadenopathy:      Cervical: No cervical adenopathy.   Skin:     General: Skin is warm and dry.   Neurological:      General: No focal deficit present.      Mental Status: She is alert and oriented to person, place, and time.   Psychiatric:         Mood and Affect: Mood normal.         Behavior: Behavior normal.         Thought Content: Thought content normal.      Comments: Very pleasant, conversant, does seem to have some short term memory loss-spouse had to assist with history at times     The patient's head CT from 06/01/2022 was reviewed. It was normal.            HEALTH RISK ASSESSMENT    Smoking Status:  Social History     Tobacco Use   Smoking Status Never Smoker   Smokeless Tobacco Never Used     Alcohol Consumption:  Social History     Substance and Sexual Activity   Alcohol Use Yes   • Alcohol/week: 1.0 standard drink   • Types: 1 Standard drinks or equivalent per week    Comment: or less     Fall Risk Screen:    STEADI Fall Risk Assessment was completed, and patient is at LOW risk for falls.Assessment completed on:10/3/2022    Depression Screening:  PHQ-2/PHQ-9 Depression Screening 10/3/2022   Retired PHQ-9 Total Score -   Retired Total Score -   Little Interest or Pleasure in Doing Things 0-->not at all   Feeling Down, Depressed or Hopeless 0-->not at all   Trouble Falling or Staying Asleep, or Sleeping Too Much -   Feeling Tired or Having Little Energy -   Poor Appetite or Overeating -   Trouble Concentrating on Things, Such as Reading the Newspaper or Watching Television -   PHQ-9: Brief Depression Severity Measure Score 0       Health Habits and Functional and Cognitive Screening:  Functional & Cognitive Status 10/3/2022   Do you have difficulty preparing food and eating? No   Do you have difficulty bathing yourself,  getting dressed or grooming yourself? No   Do you have difficulty using the toilet? No   Do you have difficulty moving around from place to place? No   Do you have trouble with steps or getting out of a bed or a chair? No   Current Diet Well Balanced Diet   Dental Exam Up to date   Eye Exam Up to date   Exercise (times per week) 5 times per week   Current Exercises Include Walking   Current Exercise Activities Include -   Do you need help using the phone?  No   Are you deaf or do you have serious difficulty hearing?  No   Do you need help with transportation? No   Do you need help shopping? No   Do you need help preparing meals?  No   Do you need help with housework?  No   Do you need help with laundry? No   Do you need help taking your medications? No   Do you need help managing money? No   Do you ever drive or ride in a car without wearing a seat belt? No   Have you felt unusual stress, anger or loneliness in the last month? No   Who do you live with? Spouse   If you need help, do you have trouble finding someone available to you? No   Have you been bothered in the last four weeks by sexual problems? No   Do you have difficulty concentrating, remembering or making decisions? Yes       Age-appropriate Screening Schedule:  Refer to the list below for future screening recommendations based on patient's age, sex and/or medical conditions. Orders for these recommended tests are listed in the plan section. The patient has been provided with a written plan.    Health Maintenance   Topic Date Due   • DIABETIC FOOT EXAM  Never done   • ZOSTER VACCINE (3 of 3) 05/20/2021   • DXA SCAN  10/23/2021   • HEMOGLOBIN A1C  03/16/2023   • LIPID PANEL  03/23/2023   • URINE MICROALBUMIN  06/21/2023   • DIABETIC EYE EXAM  08/18/2023   • TDAP/TD VACCINES (3 - Td or Tdap) 11/25/2029   • INFLUENZA VACCINE  Completed              Assessment & Plan   CMS Preventative Services Quick Reference  Risk Factors Identified During  Encounter  Dementia/Memory   Depression/Dysphoria  Obesity/Overweight   The above risks/problems have been discussed with the patient.  Follow up actions/plans if indicated are seen below in the Assessment/Plan Section.  Pertinent information has been shared with the patient in the After Visit Summary.    Diagnoses and all orders for this visit:    1. Encounter for initial annual wellness visit (AWV) in Medicare patient (Primary)    2. Memory loss  -     MRI Brain Without Contrast; Future  -     Ambulatory Referral to Neurology    3. Need for vaccination  -     Fluzone High-Dose 65+yrs ([id number])    4. Reactive depression  -     escitalopram (LEXAPRO) 5 MG tablet; Take 1 tablet by mouth Daily.  Dispense: 90 tablet; Refill: 1       Appropriate health maintenance and prevention topics specific for this patient were discussed today.  Additionally, health goals, and health concerns addressed as appropriate.  Pt was encouraged to stay up to date on recommended screenings and vaccines based on USPSTF guidelines.     Memory loss  Needs further eval  The patient will be referred to a neurologist. The patient's daughter previously stated that she would work to have the patient to be seen at the [facility]. They will check with her to see what the status of that is, and let us know. We will order an MRI of her brain today. She has a pacer and had previous MRI without problem      Hearing difficulty  The patient will have her hearing tested at [facility].    Depression  Improved on lexapro, will continue  The patient's Lexapro prescription was refilled.    Health maintenance  We will administer a flu vaccine today.    Follow Up:   No follow-ups on file.     An After Visit Summary and PPPS were made available to the patient.                     Answers for HPI/ROS submitted by the patient on 10/2/2022  Please describe your symptoms.: Follow up, I also have a cough but no fever  Have you had these symptoms  before?: Yes  How long have you been having these symptoms?: 5-7 days  Please list any medications you are currently taking for this condition.: Promenthazine DM syrup  Please describe any probable cause for these symptoms. : Seasonal allergies  What is the primary reason for your visit?: Other    Transcribed from ambient dictation for ELIN Joseph by Emilee Magana.  10/03/22   10:59 EDT    Patient verbalized consent to the visit recording.  I have personally performed the services described in this document as transcribed by the above individual, and it is both accurate and complete.

## 2022-10-03 NOTE — PROGRESS NOTES
"Chief Complaint  medication follow up    Subjective    {Problem List  Visit Diagnosis   Encounters  Notes  Medications  Labs  Result Review Imaging  Media :23}    Mary Jo Berg presents to [facility]  History of Present Illness    Objective   Vital Signs:  /68   Pulse 82   Temp 97 °F (36.1 °C) (Infrared)   Resp 14   Ht 154.9 cm (61\")   Wt 87.3 kg (192 lb 6.4 oz)   SpO2 97%   BMI 36.35 kg/m²   Estimated body mass index is 36.35 kg/m² as calculated from the following:    Height as of this encounter: 154.9 cm (61\").    Weight as of this encounter: 87.3 kg (192 lb 6.4 oz).    {Class 2 Severe Obesity (BMI >=35 and <=39.9. (Optional):17457}      Physical Exam   Result Review :{Labs  Result Review  Imaging  Med Tab  Media  Procedures  :23}  {The following data was reviewed by (Optional):05451}  {Ambulatory Labs (Optional):21541}  {Data reviewed (Optional):44639:::1}          Assessment and Plan {CC Problem List  Visit Diagnosis   ROS  Review (Popup)  Health Maintenance  Quality  BestPractice  Medications  SmartSets  SnapShot Encounters  Media :23}  There are no diagnoses linked to this encounter.       {Time Spent (Optional):57035}  Follow Up {Instructions Charge Capture  Follow-up Communications :23}  No follow-ups on file.  Patient was given instructions and counseling regarding her condition or for health maintenance advice. Please see specific information pulled into the AVS if appropriate.       Answers for HPI/ROS submitted by the patient on 10/2/2022  Please describe your symptoms.: Follow up, I also have a cough but no fever  Have you had these symptoms before?: Yes  How long have you been having these symptoms?: 5-7 days  Please list any medications you are currently taking for this condition.: Promenthazine DM syrup  Please describe any probable cause for these symptoms. : Seasonal allergies  What is the primary reason for your visit?: " Other

## 2022-10-31 ENCOUNTER — HOSPITAL ENCOUNTER (OUTPATIENT)
Dept: MRI IMAGING | Facility: HOSPITAL | Age: 72
Discharge: HOME | End: 2022-10-31

## 2022-10-31 DIAGNOSIS — R41.3 MEMORY LOSS: ICD-10-CM

## 2022-11-14 ENCOUNTER — LAB (OUTPATIENT)
Dept: OTHER | Facility: HOSPITAL | Age: 72
End: 2022-11-14

## 2022-11-14 ENCOUNTER — TELEPHONE (OUTPATIENT)
Dept: FAMILY MEDICINE CLINIC | Facility: CLINIC | Age: 72
End: 2022-11-14

## 2022-11-14 ENCOUNTER — OFFICE VISIT (OUTPATIENT)
Dept: ONCOLOGY | Facility: CLINIC | Age: 72
End: 2022-11-14

## 2022-11-14 VITALS
BODY MASS INDEX: 36.72 KG/M2 | OXYGEN SATURATION: 100 % | SYSTOLIC BLOOD PRESSURE: 141 MMHG | RESPIRATION RATE: 18 BRPM | HEART RATE: 74 BPM | HEIGHT: 61 IN | TEMPERATURE: 97.8 F | DIASTOLIC BLOOD PRESSURE: 82 MMHG | WEIGHT: 194.5 LBS

## 2022-11-14 DIAGNOSIS — C50.811 MALIGNANT NEOPLASM OF OVERLAPPING SITES OF RIGHT BREAST IN FEMALE, ESTROGEN RECEPTOR POSITIVE: ICD-10-CM

## 2022-11-14 DIAGNOSIS — Z17.0 MALIGNANT NEOPLASM OF OVERLAPPING SITES OF RIGHT BREAST IN FEMALE, ESTROGEN RECEPTOR POSITIVE: ICD-10-CM

## 2022-11-14 DIAGNOSIS — C50.811 MALIGNANT NEOPLASM OF OVERLAPPING SITES OF RIGHT BREAST IN FEMALE, ESTROGEN RECEPTOR POSITIVE: Primary | ICD-10-CM

## 2022-11-14 DIAGNOSIS — Z17.0 MALIGNANT NEOPLASM OF OVERLAPPING SITES OF RIGHT BREAST IN FEMALE, ESTROGEN RECEPTOR POSITIVE: Primary | ICD-10-CM

## 2022-11-14 LAB
ALBUMIN SERPL-MCNC: 3.8 G/DL (ref 3.5–5.2)
ALBUMIN/GLOB SERPL: 1.1 G/DL
ALP SERPL-CCNC: 60 U/L (ref 39–117)
ALT SERPL W P-5'-P-CCNC: 15 U/L (ref 1–33)
ANION GAP SERPL CALCULATED.3IONS-SCNC: 12.8 MMOL/L (ref 5–15)
AST SERPL-CCNC: 19 U/L (ref 1–32)
BASOPHILS # BLD AUTO: 0.03 10*3/MM3 (ref 0–0.2)
BASOPHILS NFR BLD AUTO: 0.4 % (ref 0–1.5)
BILIRUB SERPL-MCNC: 0.3 MG/DL (ref 0–1.2)
BUN SERPL-MCNC: 20 MG/DL (ref 8–23)
BUN/CREAT SERPL: 17.5 (ref 7–25)
CALCIUM SPEC-SCNC: 9.7 MG/DL (ref 8.6–10.5)
CHLORIDE SERPL-SCNC: 102 MMOL/L (ref 98–107)
CO2 SERPL-SCNC: 24.2 MMOL/L (ref 22–29)
CREAT SERPL-MCNC: 1.14 MG/DL (ref 0.57–1)
DEPRECATED RDW RBC AUTO: 46.4 FL (ref 37–54)
EGFRCR SERPLBLD CKD-EPI 2021: 50.9 ML/MIN/1.73
EOSINOPHIL # BLD AUTO: 0.26 10*3/MM3 (ref 0–0.4)
EOSINOPHIL NFR BLD AUTO: 3.4 % (ref 0.3–6.2)
ERYTHROCYTE [DISTWIDTH] IN BLOOD BY AUTOMATED COUNT: 12.7 % (ref 12.3–15.4)
GLOBULIN UR ELPH-MCNC: 3.4 GM/DL
GLUCOSE SERPL-MCNC: 206 MG/DL (ref 65–99)
HCT VFR BLD AUTO: 38.2 % (ref 34–46.6)
HGB BLD-MCNC: 12 G/DL (ref 12–15.9)
IMM GRANULOCYTES # BLD AUTO: 0.03 10*3/MM3 (ref 0–0.05)
IMM GRANULOCYTES NFR BLD AUTO: 0.4 % (ref 0–0.5)
LYMPHOCYTES # BLD AUTO: 2.12 10*3/MM3 (ref 0.7–3.1)
LYMPHOCYTES NFR BLD AUTO: 27.5 % (ref 19.6–45.3)
MCH RBC QN AUTO: 31.2 PG (ref 26.6–33)
MCHC RBC AUTO-ENTMCNC: 31.4 G/DL (ref 31.5–35.7)
MCV RBC AUTO: 99.2 FL (ref 79–97)
MONOCYTES # BLD AUTO: 0.71 10*3/MM3 (ref 0.1–0.9)
MONOCYTES NFR BLD AUTO: 9.2 % (ref 5–12)
NEUTROPHILS NFR BLD AUTO: 4.55 10*3/MM3 (ref 1.7–7)
NEUTROPHILS NFR BLD AUTO: 59.1 % (ref 42.7–76)
NRBC BLD AUTO-RTO: 0 /100 WBC (ref 0–0.2)
PLATELET # BLD AUTO: 257 10*3/MM3 (ref 140–450)
PMV BLD AUTO: 9.6 FL (ref 6–12)
POTASSIUM SERPL-SCNC: 4.2 MMOL/L (ref 3.5–5.2)
PROT SERPL-MCNC: 7.2 G/DL (ref 6–8.5)
RBC # BLD AUTO: 3.85 10*6/MM3 (ref 3.77–5.28)
SODIUM SERPL-SCNC: 139 MMOL/L (ref 136–145)
WBC NRBC COR # BLD: 7.7 10*3/MM3 (ref 3.4–10.8)

## 2022-11-14 PROCEDURE — 80053 COMPREHEN METABOLIC PANEL: CPT | Performed by: INTERNAL MEDICINE

## 2022-11-14 PROCEDURE — 85025 COMPLETE CBC W/AUTO DIFF WBC: CPT | Performed by: INTERNAL MEDICINE

## 2022-11-14 PROCEDURE — 36415 COLL VENOUS BLD VENIPUNCTURE: CPT

## 2022-11-14 PROCEDURE — 99214 OFFICE O/P EST MOD 30 MIN: CPT | Performed by: INTERNAL MEDICINE

## 2022-11-14 NOTE — PROGRESS NOTES
Subjective      REASON FOR CONSULTATION:   1.Right breast cancer invasive low-grade ER positive IN negative HER-2 negative breast cancer at 6:00 with DCIS at 1:00 post biopsy; Simple mastectomy on 5/11/2020 and this shows an invasive cancer measuring 19 x 13 mmI- DCIS present measuring 15 mm margins were widely clear 5 sentinel nodes were negative for metastatic disease still grade 1. No lymphovascular invasion.                             REQUESTING PHYSICIAN: MD Keshia Durán MD    History of Present Illness patient is a 72 y.o. lady with history of a node negative hormone positive right breast cancer on Femara x6 months and then switched to tamoxifen in July 2021 due to worsening crippling arthritis for which she had to take prednisone.  Within a week of taking the prednisone her pain dramatically improved.  Since beginning tamoxifen she had recurrence of aches and pains especially in her foot affecting her ability to walk.  We did refer her to rheumatology and she was seen by Dr. Raza for initial evaluation 11/2/2021.  Patient was felt to have polyarthritis with further work-up ensuing.  Patient was placed on another prednisone pack with improvement.  At this point she is continuing on tamoxifen with good tolerance    Her rash has not recurred since stopping her allopurinol although she also got a COVID vaccination around the same time there has been some rare reports of rashes from the vaccine but I am fairly comfortable blaming the allopurinol I told her never to take it again -she is currently having another attack of gout in her right foot and I suggested Uloric which she will talk to her family physician about  Hemoglobin is back to normal after knee replacement and apart from creatinine 1.1 her labs look good  she denies other concerns at this time.  Mammogram in May was thankfully benign and her bone density done 3 years ago was  totally normal     Past Medical History:   Diagnosis Date   • Asthma    • Breast cancer (HCC) 2020    right IDC   • Cataract 2010    on both  retinas   • Coronary artery disease 1/10/2015    Pacemaker 2015   • Diabetes mellitus (HCC)     Type 2   • Diverticulosis 2021   • Heart murmur    • History of asthma    • History of colon polyps    • History of complete heart block     paced in 2015   • Hyperlipidemia    • Hypertension    • Hypothyroidism    • Obesity    • Osteoarthritis    • Osteopenia    • Pacemaker    • PONV (postoperative nausea and vomiting)         Past Surgical History:   Procedure Laterality Date   • ADENOIDECTOMY     • BLADDER REPAIR     • BREAST BIOPSY Right 2020    malignant   • CARDIAC PACEMAKER PLACEMENT     • CARPAL TUNNEL RELEASE Right 2015   • CATARACT EXTRACTION Bilateral    •  SECTION      x2   • COLONOSCOPY     • COLONOSCOPY N/A 2021    Procedure: COLONOSCOPY;  Surgeon: Yamilet Stewart MD;  Location: [facility] ENDOSCOPY;  Service: Gastroenterology;  Laterality: N/A;  pre- hx polyps  post-- diverticulosis, hemorrhoids   • ENDOMETRIAL ABLATION     • HAND SURGERY     • HERNIA REPAIR      Bilateral femoral and inguinal   • JOINT REPLACEMENT  2007    Left knee   • KNEE ARTHROPLASTY UNICOMPARTMENTAL Right 2022   • KNEE ARTHROSCOPY Bilateral    • MASTECTOMY  2020   • MASTECTOMY WITH SENTINEL NODE BIOPSY AND AXILLARY NODE DISSECTION Right 2020    Procedure: RIGHT BREAST MASTECTOMY WITH SENTINEL NODE BIOPSY;  Surgeon: Bj Stephen MD;  Location: [facility] OR [facility] [place];  Service: General;  Laterality: Right;   • OOPHORECTOMY     • SHOULDER ARTHROSCOPY Right     rotator cuff repair, biceps tenotomy   • THYROIDECTOMY, PARTIAL     • TONSILLECTOMY AND ADENOIDECTOMY     • TOTAL KNEE ARTHROPLASTY Left    • TUBAL ABDOMINAL LIGATION     • UMBILICAL HERNIA REPAIR      patient is a 70-year-old white female with hypercholesterolemia  diabetes type 2 hypertension hypothyroidism and pacemaker placement 2015 was noted on routine mammogram in  of this last year to have some abnormalities in the right breast that warranted close follow-up.  This led to a repeat mammogram in January which was delayed due to her trip she took Shira with her  when she came back in February she did the mammogram and this showed persistence and worsening of the calcifications at the 6:00 and 1 o'clock position of the right breast.    This led to a biopsy of both these regions on 3/5/2020 which showed DCIS at the 1 o'clock position intermediate grade with calcifications and no necrosis and well differentiated grade 1 infiltrating ductal carcinoma at the 6 o'clock position measuring 2 mm with no lymph vascular invasion and associated DCIS intermediate grade the tumor was positive for estrogen greater than 95% negative for progesterone 0% HER-2 negative at 1+ Ki-67 of 5%.    Patient was referred to Dr. Stephen who ordered bilateral breast MRIs which showed residual 2 cm area of enhancement at the 6 o'clock position but no further abnormalities at the 1 o'clock position of the right breast.  There is no axillary or internal mammary adenopathy in the left breast was benign.  The mass is not palpable by the patient or Dr. Stephen    Based on the recent coronavirus outbreak which is ongoing elective surgeries for grade 1 breast cancers are being delayed based on guidelines from the hospital and from the oncological community and she is here to discuss neoadjuvant hormonal blockade.    Patient is  3 para 3 first childbirth was age 22 she breast-fed 2 of her 3 children for at least 4 to 6 months menarche was at age 11 menopause at 49 she did not take hormones after menopause    Family history is positive for paternal grandmother with metastatic breast cancer at age 75 a paternal first cousin with breast cancer at age 70 her father  with bladder cancer  mother had endometrial carcinoma at age 80.  She is concerned about genetic testing because she has 3 daughters and 3 grand daughters.    She is not a smoker or drinker and denies DVTs MI or stroke    Because of the delay of surgery due to the coronavirus we decided to do Oncotype on her biopsy and start aromatase inhibitor in the neoadjuvant setting    The side effects and toxicities of the Aromatase inhibitors was discussed with the patient including, hot flashes, mood swings and hair thinning.Significant arthralgias and worsening bone density were also discussed. Baseline bone density evaluation was ordered.  I discussed with the patient and her  on the video conference that this was not the standard of care usually but because of the pandemic and making treatment decisions for the safety of the patient and based on delaying elective surgery for certain subsets of breast cancer which she presented.    I explained the rationale of doing an Oncotype DX score and told her that if she was not willing to take chemotherapy there would be no reason to do this but she is willing to take chemotherapy if she has a high Oncotype score and therefore we will send this results and she will call me in 2 weeks to discuss them    There are no clear guidelines on how to manage patients with clinically node-negative disease with a high Oncotype and we may decide to move up her surgery so that we will have more information with which to make a decision about chemotherapy in order to decide how much chemotherapy to give her    She and her  were willing to go this route and she will call me in 2 weeks to discuss her Oncotype score    I am concerned about her general joint discomfort which might be aggravated by an aromatase inhibitor but we will deal with that if it occurs    I will also schedule her to see the genetic counselors and they could possibly do a video visit with her also and decide about genetic  testing    4/20  Patient is a 70-year-old female with small mammographically detected right breast cancer ER positive [place] negative and we opted to start Arimidex preoperatively because surgeries are being delayed for the coronavirus pandemic.  We will also ask for an Oncotype DX to be done on her biopsy specimen to help us make decisions about further treatment.  Unfortunately the specimen was too small and the Oncotype could not be done.  In addition she developed gout in her foot after 2 or 3 doses of Arimidex which I do not think is related to the Arimidex and she was evaluated by her daughter who is a physician who did not think there was any infection she tried nonsteroidals with some improvement but she still not better and has not yet restarted her Arimidex    In light of all these findings I have given her a short prednisone bolus to see if this will help the gout and at this point I think we should move ahead with surgery in the next 3 to 4 weeks and hold off on the Arimidex until after surgery so we can get all the information we need at the time of surgery    She is okay with this approach and I have called her surgeon and her cardiologist to clear her for surgery    She will need to stop her nonsteroidals at least a week before surgery and hopefully the steroids will help the foot enough that she does not have to take the nonsteroidals    She has had no fever or other signs of infection.    Simple mastectomy on 5/11/2020 and this shows an invasive cancer measuring 19 x 13 mmI- DCIS present measuring 15 mm margins were widely clear 5 sentinel nodes were negative for metastatic disease still grade 1. No lymphovascular invasion.    Patient discontinuing Arimidex in May 2020 due to concerns for contributing to gout.  Oncotype DX score returning 22.  Patient beginning Femara late May 2020.    Patient switched to tamoxifen in July 2021 due to significant arthralgias with Femara.    Current Outpatient  Medications on File Prior to Visit   Medication Sig Dispense Refill   • Acetaminophen (TYLENOL PO) Take 500 mg by mouth 2 (Two) Times a Day.     • albuterol sulfate  (90 Base) MCG/ACT inhaler Inhale 2 puffs Every 4 (Four) Hours As Needed for Wheezing or Shortness of Air. 18 g 0   • clotrimazole-betamethasone (LOTRISONE) 1-0.05 % cream As Needed.     • diphenhydrAMINE (SOMINEX) 25 MG tablet Take 25 mg by mouth.     • DOXYLAMINE SUCCINATE, SLEEP, PO Take 25 mg by mouth.     • ezetimibe (ZETIA) 10 MG tablet Take 10 mg by mouth Daily.     • fluticasone (FLONASE) 50 MCG/ACT nasal spray 2 sprays into the nostril(s) as directed by provider As Needed.     • glucose blood test strip ACCU-CHECK SKYLA. Use to check blood sugar once daily, as directed. DX: E11.9     • irbesartan (AVAPRO) 150 MG tablet Take 150 mg by mouth Every Morning.     • Lancets (ACCU-CHEK MULTICLIX) lancets Use to check blood sugar once daily, as directed. DX: 250.00     • letrozole (FEMARA) 2.5 MG tablet Take 2.5 mg by mouth Daily.     • levothyroxine (SYNTHROID, LEVOTHROID) 125 MCG tablet Take 1 tablet by mouth.     • Loratadine 10 MG capsule Take 1 tablet by mouth Daily.     • metFORMIN (GLUCOPHAGE) 500 MG tablet Take 2 tablets p.o. every morning and 1 tablet p.o. every afternoon. (Patient taking differently: 1 tablet 2 (Two) Times a Day With Meals. Take 2 tablets p.o. every morning and 1 tablet p.o. every afternoon.) 270 tablet 1   • metoprolol succinate XL (TOPROL-XL) 25 MG 24 hr tablet Take 25 mg by mouth 2 (Two) Times a Day.     • tamoxifen (NOLVADEX) 20 MG chemo tablet TAKE 1 TABLET BY MOUTH EVERY DAY 30 tablet 6   • traMADol (ULTRAM) 50 MG tablet Take 50 mg by mouth As Needed.     • traZODone (DESYREL) 50 MG tablet Take 0.5-1 tablets by mouth At Night As Needed for Sleep. 30 tablet 0   • escitalopram (LEXAPRO) 5 MG tablet Take 1 tablet by mouth Daily. 90 tablet 1     No current facility-administered medications on file prior to visit.     "    ALLERGIES:    Allergies   Allergen Reactions   • Indomethacin Nausea Only and Dizziness     EAR RINGING     • Nutritional Supplements Swelling     [place] nuts - no other nuts that she is aware of   • Penicillins Anaphylaxis, Hives, Shortness Of Breath and Itching      tachycardia     • Statins Unknown - High Severity   • Hydrochlorothiazide Unknown - Low Severity     Caused gout symptoms     • Lisinopril Cough   • Morphine Nausea Only            • Letrozole Unknown - Low Severity   • Pentazocine Nausea And Vomiting               Social History     Socioeconomic History   • Marital status:      Spouse name: Sean   Tobacco Use   • Smoking status: Never   • Smokeless tobacco: Never   Vaping Use   • Vaping Use: Never used   Substance and Sexual Activity   • Alcohol use: Yes     Alcohol/week: 1.0 standard drink     Types: 1 Standard drinks or equivalent per week     Comment: or less   • Drug use: Never   • Sexual activity: Yes     Partners: Male     Comment: endometrial [id number]        Family History   Problem Relation Age of Onset   • Cervical cancer Mother    • Alzheimer's disease Mother    • Arthritis Mother    • Other Mother         Uterine cancer   • Cancer Father         Bladder   • Heart disease Father    • Hypertension Father    • Arthritis Father         Breadt cancer   • Alcohol abuse Father             • Hyperlipidemia Father    • Depression Father    • Diabetes Father         \"borderline\"   • Arthritis Maternal Grandmother    • Heart disease Other         FH of heart disease in males before age 55   • Hyperlipidemia Brother    • Hypertension Brother    • Breast cancer Paternal Grandmother 75   • Heart attack Paternal Grandfather    • Breast cancer Paternal Cousin 70   • Alcohol abuse Maternal Grandfather             • Depression Brother         since    • Malig Hyperthermia Neg Hx         Review of Systems   Constitutional: Positive for diaphoresis.   Respiratory: " "Negative.  Negative for shortness of breath.    Cardiovascular: Negative for chest pain, palpitations and leg swelling.   Musculoskeletal: Positive for arthralgias.   Skin: Negative for rash.   Neurological: Negative for dizziness, weakness and headaches.   Psychiatric/Behavioral: Negative.    All other systems reviewed and are negative.       Objective   Vitals:    11/14/22 1031   BP: 141/82   Pulse: 74   Resp: 18   Temp: 97.8 °F (36.6 °C)   TempSrc: Temporal   SpO2: 100%   Weight: 88.2 kg (194 lb 8 oz)   Height: 154.9 cm (60.98\")   PainSc: 4  Comment: pt stated right knee pain   PainLoc: Knee     Current Status 11/14/2022   ECOG score 0       Physical exam  This patient's ACP documentation is up to date, and there's nothing further left to document.    CONSTITUTIONAL:  Vital signs reviewed.  No distress, looks comfortable.  EYES:  Conjunctiva and lids unremarkable.  PERRLA  EARS,NOSE,MOUTH,THROAT:  Ears and nose appear unremarkable. Mask in place.  RESPIRATORY:  Normal respiratory effort.  Lungs clear to auscultation bilaterally.  BREASTS: Right chest wall benign; left breast shows no palpable masses,  CARDIOVASCULAR:  Normal S1, S2.  No murmurs rubs or gallops.  No significant lower extremity edema.    GASTROINTESTINAL: Abdomen appears unremarkable.  Nontender.  No hepatomegaly.  No splenomegaly.  LYMPHATIC:  No cervical, supraclavicular, axillary lymphadenopathy.  SKIN:  Warm.  No rashes.  PSYCHIATRIC:  Normal judgment and insight.  Normal mood and affect.     I have reexamined the patient and the results are consistent with the previously documented exam except as updated. Syed Jasso MD       RECENT LABS:  Results from last 7 days   Lab Units 11/14/22  1024   WBC 10*3/mm3 7.70   NEUTROS ABS 10*3/mm3 4.55   HEMOGLOBIN g/dL 12.0   HEMATOCRIT % 38.2   PLATELETS 10*3/mm3 257     Results from last 7 days   Lab Units 11/14/22  1024   SODIUM mmol/L 139   POTASSIUM mmol/L 4.2   CHLORIDE mmol/L 102   CO2 " mmol/L 24.2   BUN mg/dL 20   CREATININE mg/dL 1.14*   CALCIUM mg/dL 9.7   ALBUMIN g/dL 3.80   BILIRUBIN mg/dL 0.3   ALK PHOS U/L 60   ALT (SGPT) U/L 15   AST (SGOT) U/L 19   GLUCOSE mg/dL 206*         Component      Latest Ref Rng & Units 8/20/2020 9/4/2020 10/13/2021 1/17/2022   Uric Acid      2.4 - 5.7 mg/dL 6.5 (H) 5.5 7.2 (H) 7.3 (H)       SPECIMEN   Procedure  Total mastectomy    Specimen Laterality  Right    TUMOR   Tumor Site  Lower inner quadrant    Clock Position of Tumor Site  6 o'clock    Histologic Type  Invasive carcinoma of no special type (invasive ductal carcinoma, not otherwise specified)    Glandular (Acinar) / Tubular Differentiation  Score 2    Nuclear Pleomorphism  Score 2    Mitotic Rate  Score 1    Overall Grade  Grade 1 (scores of 3, 4 or 5)    Tumor Size  Greatest dimension of largest invasive focus (Millimeters): 19 mm   Additional Dimension (Millimeters)  13 mm     10 mm   Tumor Focality  Single focus of invasive carcinoma    Ductal Carcinoma In Situ (DCIS)  Present      Negative for extensive intraductal component (EIC)    Size (Extent) of DCIS  Estimated size (extent) of DCIS is at least (Millimeters): 15 mm   Architectural Patterns  Cribriform      Papillary      Solid    Nuclear Grade  Grade II (intermediate)    Necrosis  Present, focal (small foci or single cell necrosis)    Lobular Carcinoma In Situ (LCIS)  No LCIS in specimen    Tumor Extent     Lymphovascular Invasion  Not identified    Dermal Lymphovascular Invasion  Not identified    Microcalcifications  Present in DCIS      Present in invasive carcinoma      Present in non-neoplastic tissue    Treatment Effect  No known presurgical therapy    MARGINS   Invasive Carcinoma Margins  Uninvolved by invasive carcinoma    Distance from Closest Margin (Millimeters)  10 mm   Closest Margin(s)  Lateral    Distance from Other Margins     Anterior Margin (Millimeters)  40 mm   Posterior Margin (Millimeters)  45 mm   Superior Margin  (Millimeters)  18 mm   Inferior Margin (Millimeters)  13 mm   Medial Margin (Millimeters)  22 mm   Lateral Margin (Millimeters)  10 mm   DCIS Margins  Uninvolved by DCIS    Distance from Closest Margin (Millimeters)  14 mm   Closest Margin(s)  Superior      Inferior    Distance from Other Margins     Anterior Margin (Millimeters)  20 mm   Posterior Margin (Millimeters)  40 mm   Superior Margin (Millimeters)  14 mm   Inferior Margin (Millimeters)  14 mm   Medial Margin (Millimeters)  30 mm   Lateral Margin (Millimeters)  21 mm   LYMPH NODES   Regional Lymph Nodes  Uninvolved by tumor cells    Total Number of Lymph Nodes Examined  5    Number of [place] Nodes Examined  5    PATHOLOGIC STAGE CLASSIFICATION (pTNM, AJCC 8th Edition)   Primary Tumor (pT)  pT1c    Regional Lymph Nodes Modifier  (sn): Only sentinel node(s) evaluated.    Regional Lymph Nodes (pN)  pN0    .   Breast Biomarker Reporting Template   Breast.Bmk - 1   Protocol posted: 8/28/2019   Test(s) Performed     Estrogen Receptor (ER) Status  Positive    Percentage of Cells with Nuclear Positivity  %    Average Intensity of Staining  Strong    Test Type  Food and Drug Administration (FDA) cleared (test / vendor): [employer]    Primary Antibody  SP1    Scoring System  Ella    Proportion Score  5    Intensity Score  3    Total Ella Score  8    Test(s) Performed     Progesterone Receptor (PgR) Status  Positive    Percentage of Cells with Nuclear Positivity  5 %   Average Intensity of Staining  Moderate    Test Type  Food and Drug Administration (FDA) cleared (test / vendor): [employer]    Primary Antibody  1E2    Scoring System  Ella    Proportion Score  2    Intensity Score  2    Total Ella Score  4    Test(s) Performed     HER2 by Immunohistochemistry  Equivocal (Score 2+)    Percentage of Cells with Uniform Intense Complete Membrane Staining  0 %   Test Type  Food and Drug Administration (FDA) cleared (test / vendor): [place]    Primary Antibody   4B5    Test(s) Performed  Ki-67    Percentage of Positive Nuclei  10 %   Primary Antibody           Assessment & Plan   1.cT1cN0 grade 1 infiltrating ductal carcinoma right breast at 6:00 ER positive AL negative HER-2 negative with associated intermediate grade DCIS the 1 o'clock position of the breast both lesions nonpalpable  · pT1cN0 grade 1 ER positive AL negative HER-2 negative post mastectomy on 5/11/2020  · Oncotype DX score 22  · Patient discontinued Arimidex in early May 2020 and soon thereafter began Femara 2.5 mg daily.  · Discontinued Femara in December due to worsening hot flashes and difficulty sleeping  · Gabapentin added in 2/21 with Femara resumed  · Tamoxifen started in 7/21  · Worsening joint pains, referred to rheumatology evaluation-rheumatoid factor and RUSSELL negative. Nieves continued.  · 1/17/2022 patient continues on tamoxifen with it being difficult to assess true tolerance as she has been on a significant amount of steroids over the last month and a half (see discussion below).  She will continue for now and monitor for worsening arthralgias    2.  Hypothyroidism and type 2 diabetes managed by Dr. Wynn.    3.  Significant degenerative arthritis  · Referred to rheumatology, seeing Dr. Raza November 2021.  Treated with another Pred pack.  · Patient reports being seen again 1/10/2022.  We have not yet have these records.  Patient has not started any additional medication for her arthritis at this time.    4.  Family history of breast cancer.  Genetics testing negative.  5.  Normal bone density in 2018    6.  History of recurrent gout.    · Patient most recently on allopurinol 300 mg daily prescribed per PCP.   · Patient self discontinued this after she developed significant systemic rash with sloughing of her skin in 12/2021.  · 1/17/2022 repeat uric acid level 7.3 today. Stable though still elevated. Unclear if patient may benefit from different uric acid reducer such as Uloric. Defer to PCP  or Dr. Raza.    7.  New onset anemia as of 11/2021?  Etiology  · Anemia labs performed 11/1/2021 with no evidence of iron or B12 deficiency.  · ?  Perhaps element of mild CKD  · 1/17/2022 Hemoglobin is stable currently at 10.4 and we will monitor.    8. MODERATE AORTIC STENOSIS by echo in 5/22      PLAN:  1.  Continue tamoxifen 20 mg  2.  Monitor for worsening arthralgias now that she has completed all steroids for both arthralgias and significant rash as outlined.    3.  Consider Uloric for gout  4.  Avoid allopurinol  5. patient due for screening left mammogram in May 2023,   6.  Patient will return 6 mofor follow-up with Dr. Jasso with CBC, CMP

## 2022-11-14 NOTE — TELEPHONE ENCOUNTER
Patient was seen in CBC today and was advised by doctor to request an alternative medication for Allopurinol for gout since pt would be following up with PCP. Pt stated she had a reaction to Allopurinol     Please advise

## 2022-11-16 ENCOUNTER — OFFICE VISIT (OUTPATIENT)
Dept: FAMILY MEDICINE CLINIC | Facility: CLINIC | Age: 72
End: 2022-11-16

## 2022-11-16 VITALS
HEART RATE: 67 BPM | BODY MASS INDEX: 36.75 KG/M2 | TEMPERATURE: 97.3 F | DIASTOLIC BLOOD PRESSURE: 73 MMHG | SYSTOLIC BLOOD PRESSURE: 136 MMHG | HEIGHT: 61 IN

## 2022-11-16 DIAGNOSIS — M10.9 GOUTY ARTHRITIS OF LEFT GREAT TOE: Primary | ICD-10-CM

## 2022-11-16 PROCEDURE — 99214 OFFICE O/P EST MOD 30 MIN: CPT | Performed by: NURSE PRACTITIONER

## 2022-11-16 RX ORDER — METHYLPREDNISOLONE 4 MG/1
TABLET ORAL
Qty: 1 EACH | Refills: 0 | Status: SHIPPED | OUTPATIENT
Start: 2022-11-16 | End: 2023-02-24

## 2022-11-16 NOTE — PROGRESS NOTES
"Chief Complaint  Gout (F/u gout, pt was advised by Dr. Haynes to be on a long term medication for gout)    Subjective        Mary Jo Berg presents to [facility]  History of Present Illness   Pt here for right great toe gouty flare.  Last flare was early this yr.  Pain has been going on appr 1 week.  Gradually improved-still hard to walk, had to put on shoes due to swelling.     She was previously started on allopurinol and after difficult course was thought to be due to allopurinol and stopped.      Recently at heme-onc who recommended starting uloric.      Objective   Vital Signs:  /73   Pulse 67   Temp 97.3 °F (36.3 °C)   Ht 154.9 cm (61\")   BMI 36.75 kg/m²   Estimated body mass index is 36.75 kg/m² as calculated from the following:    Height as of this encounter: 154.9 cm (61\").    Weight as of 11/14/22: 88.2 kg (194 lb 8 oz).    Class 2 Severe Obesity (BMI >=35 and <=39.9). Obesity-related health conditions include the following: hypertension, dyslipidemias and osteoarthritis. Obesity is unchanged. BMI is is above average; BMI management plan is completed. We discussed portion control and increasing exercise.      Physical Exam  Vitals and nursing note reviewed.   Constitutional:       General: She is not in acute distress.     Appearance: She is well-developed. She is not ill-appearing or diaphoretic.   HENT:      Head: Normocephalic and atraumatic.   Eyes:      General:         Right eye: No discharge.         Left eye: No discharge.      Conjunctiva/sclera: Conjunctivae normal.   Cardiovascular:      Rate and Rhythm: Normal rate and regular rhythm.      Heart sounds: Murmur heard.   Pulmonary:      Effort: Pulmonary effort is normal.      Breath sounds: Normal breath sounds.   Abdominal:      General: Bowel sounds are normal.      Palpations: Abdomen is soft.      Tenderness: There is no abdominal tenderness.   Musculoskeletal:         General: No deformity.      " Comments: Right great toe MTC with diffuse erythema, swelling, and very TTP   Skin:     General: Skin is warm and dry.   Neurological:      General: No focal deficit present.      Mental Status: She is alert and oriented to person, place, and time.   Psychiatric:         Mood and Affect: Mood normal.         Behavior: Behavior normal.         Cognition and Memory: Memory is impaired.        Result Review :                Assessment and Plan   Diagnoses and all orders for this visit:    1. Gouty arthritis of left great toe (Primary)  -     Uric acid  -     methylPREDNISolone (MEDROL) 4 MG dose pack; Take as directed on package instructions.  Dispense: 1 each; Refill: 0    Patient was intolerant to allopurinol due to rash.  I would be leery to restart it given how bad her rash was previously.  We discussed Uloric which has a black box warning for cardiovascular disease.  With her significant cardiovascular history I would be very leery to start Uloric.  Would probably want cardiology input for that.  I think we need to get uric acid first to see how high her uric acid level is and then we can consider any medication options.  Her last gouty flare was early this past year in January so if uric acid is not extremely elevated we could treat flares and patient can follow low purine diet to see if this is helpful.  We will give steroids today for pain relief which she has tolerated well in the past.  Side effects reviewed.    She has an upcoming neuro evaluation in December for her memory loss which seems to be stable.         Follow Up   No follow-ups on file.  Patient was given instructions and counseling regarding her condition or for health maintenance advice. Please see specific information pulled into the AVS if appropriate.

## 2022-11-17 LAB — URATE SERPL-MCNC: 5.4 MG/DL (ref 2.4–5.7)

## 2022-12-21 ENCOUNTER — HOSPITAL ENCOUNTER (OUTPATIENT)
Dept: MRI IMAGING | Facility: HOSPITAL | Age: 72
Discharge: HOME | End: 2022-12-21
Admitting: NURSE PRACTITIONER

## 2022-12-21 ENCOUNTER — TELEPHONE (OUTPATIENT)
Dept: FAMILY MEDICINE CLINIC | Facility: CLINIC | Age: 72
End: 2022-12-21

## 2022-12-21 VITALS
OXYGEN SATURATION: 99 % | HEART RATE: 70 BPM | DIASTOLIC BLOOD PRESSURE: 77 MMHG | BODY MASS INDEX: 33.61 KG/M2 | SYSTOLIC BLOOD PRESSURE: 162 MMHG | RESPIRATION RATE: 18 BRPM | HEIGHT: 61 IN | TEMPERATURE: 98.4 F | WEIGHT: 178 LBS

## 2022-12-21 PROCEDURE — 70551 MRI BRAIN STEM W/O DYE: CPT

## 2022-12-21 RX ORDER — ESCITALOPRAM OXALATE 5 MG/1
5 TABLET ORAL DAILY
Qty: 90 TABLET | Refills: 0 | Status: SHIPPED | OUTPATIENT
Start: 2022-12-21

## 2022-12-21 NOTE — TELEPHONE ENCOUNTER
Caller: Mary Jo Berg    Relationship: Self    Best call back number: [phone]    What is the best time to reach you: ANY TIME    Who are you requesting to speak with (clinical staff, provider,  specific staff member): CLINICAL STAFF    What was the call regarding: PATIENT SAYS THAT SHE WANTS TO KNOW IF LINDY TAN IS THE PROVIDER WHO ORDERED THE MRI SCAN OF HER BRAIN THAT SHE COMPLETED TODAY 12/21/22.  SHE WOULD LIKE A CALLBACK TO DISCUSS.    Do you require a callback: YES    PLEASE ADVISE.

## 2023-02-13 RX ORDER — TAMOXIFEN CITRATE 20 MG/1
TABLET ORAL
Qty: 30 TABLET | Refills: 6 | Status: SHIPPED | OUTPATIENT
Start: 2023-02-13

## 2023-02-24 ENCOUNTER — OFFICE VISIT (OUTPATIENT)
Dept: FAMILY MEDICINE CLINIC | Facility: CLINIC | Age: 73
End: 2023-02-24
Payer: MEDICARE

## 2023-02-24 ENCOUNTER — TELEPHONE (OUTPATIENT)
Dept: FAMILY MEDICINE CLINIC | Facility: CLINIC | Age: 73
End: 2023-02-24

## 2023-02-24 VITALS
HEART RATE: 94 BPM | HEIGHT: 61 IN | TEMPERATURE: 96.9 F | OXYGEN SATURATION: 99 % | BODY MASS INDEX: 33.63 KG/M2 | SYSTOLIC BLOOD PRESSURE: 125 MMHG | DIASTOLIC BLOOD PRESSURE: 78 MMHG

## 2023-02-24 DIAGNOSIS — R05.1 ACUTE COUGH: Primary | ICD-10-CM

## 2023-02-24 DIAGNOSIS — J06.9 ACUTE URI: Primary | ICD-10-CM

## 2023-02-24 LAB
EXPIRATION DATE: NORMAL
FLUAV AG UPPER RESP QL IA.RAPID: NOT DETECTED
FLUBV AG UPPER RESP QL IA.RAPID: NOT DETECTED
INTERNAL CONTROL: NORMAL
Lab: NORMAL
SARS-COV-2 AG UPPER RESP QL IA.RAPID: NOT DETECTED

## 2023-02-24 PROCEDURE — 99213 OFFICE O/P EST LOW 20 MIN: CPT | Performed by: NURSE PRACTITIONER

## 2023-02-24 PROCEDURE — 87428 SARSCOV & INF VIR A&B AG IA: CPT | Performed by: NURSE PRACTITIONER

## 2023-02-24 RX ORDER — DEXTROMETHORPHAN HYDROBROMIDE AND PROMETHAZINE HYDROCHLORIDE 15; 6.25 MG/5ML; MG/5ML
5 SYRUP ORAL 4 TIMES DAILY PRN
Qty: 118 ML | Refills: 0 | Status: SHIPPED | OUTPATIENT
Start: 2023-02-24

## 2023-02-24 RX ORDER — ALBUTEROL SULFATE 90 UG/1
2 AEROSOL, METERED RESPIRATORY (INHALATION) EVERY 4 HOURS PRN
Qty: 18 G | Refills: 0 | Status: SHIPPED | OUTPATIENT
Start: 2023-02-24

## 2023-02-24 RX ORDER — DONEPEZIL HYDROCHLORIDE 5 MG/1
1 TABLET, FILM COATED ORAL DAILY
COMMUNITY
Start: 2023-01-18

## 2023-02-24 RX ORDER — ALBUTEROL SULFATE 90 UG/1
2 AEROSOL, METERED RESPIRATORY (INHALATION) EVERY 4 HOURS PRN
Qty: 18 G | Refills: 0
Start: 2023-02-24 | End: 2023-02-24 | Stop reason: SDUPTHER

## 2023-02-24 NOTE — PROGRESS NOTES
"Chief Complaint  Cough (C/o cough since wed, and fatigue )    Subjective        Mary Jo Berg presents to [facility]  History of Present Illness   Right ear cerumen    Mrs. Berg is a 73-year-old female who presents today for a cough for 1 day. She is accompanied by her spouse.    Her cough started on Wednesday, 02/22/2022. She has been very lethargic since returning from vacation. She was lethargic before they went on vacation but more lethargic with cough.     She has a runny nose sometimes. She denies fever, chills, nausea, vomiting, diarrhea, sore throat, or earache. She did vomit a couple of times when she first returned home from vacation, but she thinks it was due to air flight.  No known sick contacts. She is not taking anything for the cough. She called here yesterday, 02/23/2023 and they told her that she could not get anything until she saw me. She is coughing more at night.  If she sleeps sitting up in her recliner, she does not cough as much. She has some phlegm when she coughs. She has taken promethazine DM in the past and it worked really well.    She last saw cardiology 3 to 6 months ago.  She denies chest pain and does not feel like she is having exertional fatigue.    Objective   Vital Signs:  /78   Pulse 94   Temp 96.9 °F (36.1 °C)   Ht 154.9 cm (61\")   SpO2 99%   BMI 33.63 kg/m²   Estimated body mass index is 33.63 kg/m² as calculated from the following:    Height as of this encounter: 154.9 cm (61\").    Weight as of 12/21/22: 80.7 kg (178 lb).       BMI is >= 30 and <35. (Class 1 Obesity). The following options were offered after discussion;: weight loss educational material (shared in after visit summary)      Physical Exam  Vitals and nursing note reviewed.   Constitutional:       General: She is not in acute distress.     Appearance: She is well-developed. She is not ill-appearing or diaphoretic.      Comments: Well dressed, well appearing "   HENT:      Head: Normocephalic and atraumatic.      Right Ear: There is impacted cerumen.      Left Ear: Ear canal and external ear normal.      Ears:      Comments: Left TM cloudy     Mouth/Throat:      Mouth: Mucous membranes are moist.      Pharynx: No posterior oropharyngeal erythema.   Eyes:      General: No scleral icterus.        Right eye: No discharge.         Left eye: No discharge.      Conjunctiva/sclera: Conjunctivae normal.   Cardiovascular:      Rate and Rhythm: Normal rate and regular rhythm.      Heart sounds: Murmur heard.   Pulmonary:      Effort: Pulmonary effort is normal.      Breath sounds: Normal breath sounds. No wheezing, rhonchi or rales.   Abdominal:      General: Bowel sounds are normal.      Palpations: Abdomen is soft.      Tenderness: There is no abdominal tenderness.   Musculoskeletal:         General: No deformity.      Cervical back: Neck supple.      Comments: Gait smooth and steady   Lymphadenopathy:      Cervical: No cervical adenopathy.   Skin:     General: Skin is warm and dry.   Neurological:      Mental Status: She is alert and oriented to person, place, and time.   Psychiatric:         Mood and Affect: Mood normal.         Behavior: Behavior normal.        Result Review :                   Assessment and Plan   Diagnoses and all orders for this visit:    1. Acute cough (Primary)  -     promethazine-dextromethorphan (PROMETHAZINE-DM) 6.25-15 MG/5ML syrup; Take 5 mL by mouth 4 (Four) Times a Day As Needed for Cough.  Dispense: 118 mL; Refill: 0    Other orders  -     albuterol sulfate  (90 Base) MCG/ACT inhaler; Inhale 2 puffs Every 4 (Four) Hours As Needed for Wheezing or Shortness of Air.  Dispense: 18 g; Refill: 0    Promethazine-DM helpful in past.  We will give her a refill on her albuterol inhaler encouraged to use every 4-6 hours while awake.  Expected course, duration discussed.  Manage allergies.  Recommend cardiology f/u if fatigue continues. Murmur sounds  stable. RTC or seek emergent eval for worsening, no improvement         Follow Up   Return if symptoms worsen or fail to improve.  Patient was given instructions and counseling regarding her condition or for health maintenance advice. Please see specific information pulled into the AVS if appropriate.       Transcribed from ambient dictation for ELIN Joseph by Francie Millard.  02/24/23   11:41 EST    Patient or patient representative verbalized consent to the visit recording.  I have personally performed the services described in this document as transcribed by the above individual, and it is both accurate and complete.

## 2023-04-12 ENCOUNTER — OFFICE VISIT (OUTPATIENT)
Dept: FAMILY MEDICINE CLINIC | Facility: CLINIC | Age: 73
End: 2023-04-12
Payer: MEDICARE

## 2023-04-12 VITALS
HEIGHT: 61 IN | HEART RATE: 74 BPM | OXYGEN SATURATION: 97 % | SYSTOLIC BLOOD PRESSURE: 131 MMHG | DIASTOLIC BLOOD PRESSURE: 78 MMHG | TEMPERATURE: 96.8 F | BODY MASS INDEX: 38.14 KG/M2 | WEIGHT: 202 LBS

## 2023-04-12 DIAGNOSIS — Z79.899 ENCOUNTER FOR MEDICATION MANAGEMENT: Primary | ICD-10-CM

## 2023-04-12 DIAGNOSIS — E13.9 DIABETES 1.5, MANAGED AS TYPE 2: ICD-10-CM

## 2023-04-12 DIAGNOSIS — F32.9 REACTIVE DEPRESSION: ICD-10-CM

## 2023-04-12 DIAGNOSIS — J30.2 SEASONAL ALLERGIC RHINITIS, UNSPECIFIED TRIGGER: ICD-10-CM

## 2023-04-12 DIAGNOSIS — I10 ESSENTIAL HYPERTENSION: ICD-10-CM

## 2023-04-12 DIAGNOSIS — G31.84 MILD COGNITIVE IMPAIRMENT WITH MEMORY LOSS: ICD-10-CM

## 2023-04-12 RX ORDER — UBIDECARENONE 75 MG
50 CAPSULE ORAL DAILY
COMMUNITY

## 2023-04-12 RX ORDER — ESCITALOPRAM OXALATE 5 MG/1
5 TABLET ORAL DAILY
Qty: 90 TABLET | Refills: 0 | Status: SHIPPED | OUTPATIENT
Start: 2023-04-12

## 2023-04-12 NOTE — PROGRESS NOTES
"Chief Complaint  Diabetes (6 month f/u dm) and Cough (F/u cough)    Subjective        Mary Jo Berg presents to [facility]  History of Present Illness     Mary Jo Berg is a 73-year-old female who presents today for a 6-month follow-up. She is accompanied by her daughter. She consents to the use of KITTY.    The patient states that she thinks she is doing fine. She has had a cough, but she has not coughed at all this last week. Her daughter states she is reminding her to use her albuterol. The patient reiterates that it has resolved.    Her daughter states that they saw Dr. Navarro, the geriatric memory specialist, in 01/2023, and she was prescribed donepezil. Her daughter realized when filling her medication box this week that she had not been taking it and thinks other medications also are not being taking as ordered.  Would like to reconcile meds today. The patient has been resistant to the medication box, preferring to line her bottles up and take them in a rotation order like she always has. The patient states that she \"takes her medication when she is supposed to take it, and she does not need to be micromanaged.\" She adds that two of her daughters jumped in to fill the medication box with and it was overwhelming to her. The patient's daughter agrees to have a family discussion to resolve this issue. Today we will reconcile her medications to ensure the patient and her family know what she should be taking.    The patient has not been taking Lexapro 5 mg for a while. The patient did not realize she needed to continue taking it. She agrees to complete a PHQ and GODWIN today.         She is no longer taking anything for diabetes mellitus. Her last HbA1c was 6.8 percent. It has been at least 3 months since her metformin prescription was filled. She is followed by Slava guan,    The patient has been off irbesartan for at least a week. The pharmacy told them that irbesartan was " "discontinued. She occasionally checks her blood pressure at home. Followed by [facility].     Neurology recommended occupational therapy. The patient was hesitant. After some discussion about occupational therapy as it could help with medication planning, she agrees to try it. Her daughter will contact their office to request that it be scheduled.     The patient states that she drinks plenty of water but admits that sometimes she gets busy and does not drink as much as she should.    In 05/2023, the patient has appointments with her oncologist, Dr. Jasso,  her breast surgeon, and Dr. Lemons, cardiology.    The Review of Systems has been reviewed and is negative other than what has been discussed within the HPI.    GODWIN-7 Score: GODWIN 7 Total Score: 2  PHQ-9 Total Score: PHQ-9: Brief Depression Severity Measure Score: 5      Objective   Vital Signs:  /78   Pulse 74   Temp 96.8 °F (36 °C)   Ht 154.9 cm (61\")   Wt 91.6 kg (202 lb)   SpO2 97%   BMI 38.17 kg/m²   Estimated body mass index is 38.17 kg/m² as calculated from the following:    Height as of this encounter: 154.9 cm (61\").    Weight as of this encounter: 91.6 kg (202 lb).       Class 2 Severe Obesity (BMI >=35 and <=39.9). Obesity-related health conditions include the following: hypertension, diabetes mellitus and dyslipidemias. Obesity is worsening. BMI is is above average; BMI management plan is completed. We discussed portion control and increasing exercise.      Physical Exam  Vitals and nursing note reviewed.   Constitutional:       General: She is not in acute distress.     Appearance: She is well-developed. She is not ill-appearing or diaphoretic.   HENT:      Head: Normocephalic and atraumatic.   Eyes:      General:         Right eye: No discharge.         Left eye: No discharge.      Conjunctiva/sclera: Conjunctivae normal.   Cardiovascular:      Rate and Rhythm: Normal rate and regular rhythm.      Heart sounds: Murmur heard. "   Pulmonary:      Effort: Pulmonary effort is normal.      Breath sounds: Normal breath sounds. No wheezing.   Abdominal:      General: Bowel sounds are normal.      Palpations: Abdomen is soft.      Tenderness: There is no abdominal tenderness.   Musculoskeletal:         General: No deformity.      Comments: Gait smooth and steady   Skin:     General: Skin is warm and dry.   Neurological:      Mental Status: She is alert and oriented to person, place, and time.   Psychiatric:         Mood and Affect: Mood normal.         Behavior: Behavior normal.        Result Review :          The patient's laboratory results from 03/24/2023 were reviewed with her. Her glucose and creatinine were a little elevated.  Her vitamin B12 level was low normal.         Assessment and Plan   Diagnoses and all orders for this visit:    1. Encounter for medication management (Primary)    2. 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 Essential hypertension    5. Reactive depression  -     escitalopram (LEXAPRO) 5 MG tablet; Take 1 tablet by mouth Daily.  Dispense: 90 tablet; Refill: 0    6. Seasonal allergic rhinitis, unspecified trigger      We have attempted to reconcile her medications.  We have discussed management strategies for making sure medications are taken appropriately.  Apparently patient has not been taking irbesartan for a while.  Chart reviewed and it appears that she had been taking 75 mg daily when she saw nephrology on 6/22/2022 and I see a refill request for endocrinology for irbesartan on January 12, 2023 that looks like it was sent.  At that time it was written for a full tab but I think patient has been just taking half previously.  I do not see that anybody has discontinued that.    The patient's daughter will work to get occupational therapy scheduled.    Hypertension  She was asked to check her blood pressure twice a week to make sure at goal.  Within discussed with  cardiology whether or not she needs to restart that irbesartan that apparently she has not been taking.    Depression: PHQ and GODWIN were done by patient with family members present.  Family members feel like patient's scores do not reflect patient's true mood and thinks several scores would be higher.  Patient at this time is agreeable to restart the Lexapro.  We will reevaluate in 6 weeks.    Discussed with patient and family members use of albuterol inhaler for chronic cough.  Also recommend restarting OTC allergy medicine of her choice and daily fluticasone to see if that manages her symptoms.  Patient denies cough but her spouse says she was coughing all morning.  I have not noticed a cough here today.  May need to start a controller inhaler which she has previously had.         I spent 50 minutes caring for Mary Jo on this date of service. This time includes time spent by me in the following activities:preparing for the visit, reviewing tests, obtaining and/or reviewing a separately obtained history, performing a medically appropriate examination and/or evaluation , counseling and educating the patient/family/caregiver, ordering medications, tests, or procedures and documenting information in the medical record  Follow Up   No follow-ups on file.  Patient was given instructions and counseling regarding her condition or for health maintenance advice. Please see specific information pulled into the AVS if appropriate.     Transcribed from ambient dictation for ELIN Joseph by Emilee Magana.  04/12/23   12:21 EDT    Patient or patient representative verbalized consent to the visit recording.  I have personally performed the services described in this document as transcribed by the above individual, and it is both accurate and complete.

## 2023-04-17 ENCOUNTER — TELEPHONE (OUTPATIENT)
Dept: FAMILY MEDICINE CLINIC | Facility: CLINIC | Age: 73
End: 2023-04-17

## 2023-04-17 NOTE — TELEPHONE ENCOUNTER
Caller: Mary Jo Berg    Relationship: Self    Best call back number:[phone]    What was the call regarding: PATIENT WAS ON VACATION AND HAS HER PURSE STOLEN. PATIENT DIDN'T KNOW IF THERE WAS ANYWAY TO COME BY AND GET COPY'S OF HER INSURANCE CARD ON FILE SINCE IT WILL TAKE A MONTH TO GET NEW ONES.     Do you require a callback: YES

## 2023-04-18 ENCOUNTER — TRANSCRIBE ORDERS (OUTPATIENT)
Dept: ADMINISTRATIVE | Facility: HOSPITAL | Age: 73
End: 2023-04-18
Payer: MEDICARE

## 2023-04-18 DIAGNOSIS — Z12.31 VISIT FOR SCREENING MAMMOGRAM: Primary | ICD-10-CM

## 2023-05-01 ENCOUNTER — OFFICE VISIT (OUTPATIENT)
Dept: ONCOLOGY | Facility: CLINIC | Age: 73
End: 2023-05-01
Payer: MEDICARE

## 2023-05-01 ENCOUNTER — LAB (OUTPATIENT)
Dept: OTHER | Facility: HOSPITAL | Age: 73
End: 2023-05-01
Payer: MEDICARE

## 2023-05-01 VITALS
HEART RATE: 90 BPM | HEIGHT: 61 IN | TEMPERATURE: 97.8 F | WEIGHT: 202.3 LBS | BODY MASS INDEX: 38.19 KG/M2 | OXYGEN SATURATION: 97 % | DIASTOLIC BLOOD PRESSURE: 72 MMHG | SYSTOLIC BLOOD PRESSURE: 126 MMHG | RESPIRATION RATE: 16 BRPM

## 2023-05-01 DIAGNOSIS — Z17.0 MALIGNANT NEOPLASM OF OVERLAPPING SITES OF RIGHT BREAST IN FEMALE, ESTROGEN RECEPTOR POSITIVE: Primary | ICD-10-CM

## 2023-05-01 DIAGNOSIS — C50.811 MALIGNANT NEOPLASM OF OVERLAPPING SITES OF RIGHT BREAST IN FEMALE, ESTROGEN RECEPTOR POSITIVE: ICD-10-CM

## 2023-05-01 DIAGNOSIS — C50.811 MALIGNANT NEOPLASM OF OVERLAPPING SITES OF RIGHT BREAST IN FEMALE, ESTROGEN RECEPTOR POSITIVE: Primary | ICD-10-CM

## 2023-05-01 DIAGNOSIS — Z17.0 MALIGNANT NEOPLASM OF OVERLAPPING SITES OF RIGHT BREAST IN FEMALE, ESTROGEN RECEPTOR POSITIVE: ICD-10-CM

## 2023-05-01 LAB
ALBUMIN SERPL-MCNC: 3.9 G/DL (ref 3.5–5.2)
ALBUMIN/GLOB SERPL: 1.3 G/DL
ALP SERPL-CCNC: 50 U/L (ref 39–117)
ALT SERPL W P-5'-P-CCNC: 6 U/L (ref 1–33)
ANION GAP SERPL CALCULATED.3IONS-SCNC: 14.2 MMOL/L (ref 5–15)
AST SERPL-CCNC: 15 U/L (ref 1–32)
BASOPHILS # BLD AUTO: 0.03 10*3/MM3 (ref 0–0.2)
BASOPHILS NFR BLD AUTO: 0.5 % (ref 0–1.5)
BILIRUB SERPL-MCNC: 0.3 MG/DL (ref 0–1.2)
BUN SERPL-MCNC: 21 MG/DL (ref 8–23)
BUN/CREAT SERPL: 18.6 (ref 7–25)
CALCIUM SPEC-SCNC: 9.5 MG/DL (ref 8.6–10.5)
CHLORIDE SERPL-SCNC: 107 MMOL/L (ref 98–107)
CO2 SERPL-SCNC: 21.8 MMOL/L (ref 22–29)
CREAT SERPL-MCNC: 1.13 MG/DL (ref 0.57–1)
DEPRECATED RDW RBC AUTO: 45.5 FL (ref 37–54)
EGFRCR SERPLBLD CKD-EPI 2021: 51.2 ML/MIN/1.73
EOSINOPHIL # BLD AUTO: 0.24 10*3/MM3 (ref 0–0.4)
EOSINOPHIL NFR BLD AUTO: 3.7 % (ref 0.3–6.2)
ERYTHROCYTE [DISTWIDTH] IN BLOOD BY AUTOMATED COUNT: 13 % (ref 12.3–15.4)
GLOBULIN UR ELPH-MCNC: 3.1 GM/DL
GLUCOSE SERPL-MCNC: 231 MG/DL (ref 65–99)
HCT VFR BLD AUTO: 37.4 % (ref 34–46.6)
HGB BLD-MCNC: 12.3 G/DL (ref 12–15.9)
IMM GRANULOCYTES # BLD AUTO: 0.03 10*3/MM3 (ref 0–0.05)
IMM GRANULOCYTES NFR BLD AUTO: 0.5 % (ref 0–0.5)
LYMPHOCYTES # BLD AUTO: 1.76 10*3/MM3 (ref 0.7–3.1)
LYMPHOCYTES NFR BLD AUTO: 27.4 % (ref 19.6–45.3)
MCH RBC QN AUTO: 31.5 PG (ref 26.6–33)
MCHC RBC AUTO-ENTMCNC: 32.9 G/DL (ref 31.5–35.7)
MCV RBC AUTO: 95.9 FL (ref 79–97)
MONOCYTES # BLD AUTO: 0.49 10*3/MM3 (ref 0.1–0.9)
MONOCYTES NFR BLD AUTO: 7.6 % (ref 5–12)
NEUTROPHILS NFR BLD AUTO: 3.88 10*3/MM3 (ref 1.7–7)
NEUTROPHILS NFR BLD AUTO: 60.3 % (ref 42.7–76)
NRBC BLD AUTO-RTO: 0 /100 WBC (ref 0–0.2)
PLATELET # BLD AUTO: 203 10*3/MM3 (ref 140–450)
PMV BLD AUTO: 10.4 FL (ref 6–12)
POTASSIUM SERPL-SCNC: 4.5 MMOL/L (ref 3.5–5.2)
PROT SERPL-MCNC: 7 G/DL (ref 6–8.5)
RBC # BLD AUTO: 3.9 10*6/MM3 (ref 3.77–5.28)
SODIUM SERPL-SCNC: 143 MMOL/L (ref 136–145)
WBC NRBC COR # BLD: 6.43 10*3/MM3 (ref 3.4–10.8)

## 2023-05-01 PROCEDURE — 85025 COMPLETE CBC W/AUTO DIFF WBC: CPT | Performed by: INTERNAL MEDICINE

## 2023-05-01 PROCEDURE — 80053 COMPREHEN METABOLIC PANEL: CPT | Performed by: INTERNAL MEDICINE

## 2023-05-01 PROCEDURE — 36415 COLL VENOUS BLD VENIPUNCTURE: CPT

## 2023-05-01 NOTE — PROGRESS NOTES
Subjective      REASON FOR CONSULTATION:   1.Right breast cancer invasive low-grade ER positive WI negative HER-2 negative breast cancer at 6:00 with DCIS at 1:00 post biopsy; Simple mastectomy on 5/11/2020 and this shows an invasive cancer measuring 19 x 13 mmI- DCIS present measuring 15 mm margins were widely clear 5 sentinel nodes were negative for metastatic disease still grade 1. No lymphovascular invasion.                             REQUESTING PHYSICIAN: MD Keshia Durán MD    History of Present Illness patient is a 73 y.o. lady with history of a node negative hormone positive right breast cancer on Femara x6 months and then switched to tamoxifen in July 2021 due to worsening crippling arthritis for which she had to take prednisone.  Within a week of taking the prednisone her pain dramatically improved.  Since beginning tamoxifen she had recurrence of aches and pains especially in her foot affecting her ability to walk.  We did refer her to rheumatology and she was seen by Dr. Raza for initial evaluation 11/2/2021.  Patient was felt to have polyarthritis with further work-up ensuing.  Patient was placed on another prednisone pack with improvement.  At this point she is continuing on tamoxifen with good tolerance    She is here with her daughter today and her daughter tells me that she was started on Aricept for some memory issues but otherwise she is clinically stable    Her rash has not recurred since stopping her allopurinol and she has had no further gout    Hemoglobin is back to normal after knee replacement and apart from creatinine 1.1 her labs look good  she denies other concerns at this time.  Mammogram is due in 2 weeks    Past Medical History:   Diagnosis Date   • Asthma    • Breast cancer 03/2020    right IDC   • Cataract 2010    on both  retinas   • Coronary artery disease 1/10/2015    Pacemaker 1/27/2015   • Diabetes  mellitus     Type 2   • Diverticulosis 2021   • Heart murmur 2015   • History of asthma    • History of colon polyps    • History of complete heart block     paced in 2015   • Hyperlipidemia    • Hypertension    • Hypothyroidism    • Obesity    • Osteoarthritis    • Osteopenia    • Pacemaker    • PONV (postoperative nausea and vomiting)         Past Surgical History:   Procedure Laterality Date   • ADENOIDECTOMY     • BLADDER REPAIR     • BREAST BIOPSY Right 2020    malignant   • CARDIAC PACEMAKER PLACEMENT     • CARPAL TUNNEL RELEASE Right 2015   • CATARACT EXTRACTION Bilateral    •  SECTION      x2   • COLONOSCOPY  2015   • COLONOSCOPY N/A 2021    Procedure: COLONOSCOPY;  Surgeon: Yamilet Stewart MD;  Location: [facility] ENDOSCOPY;  Service: Gastroenterology;  Laterality: N/A;  pre- hx polyps  post-- diverticulosis, hemorrhoids   • ENDOMETRIAL ABLATION     • HAND SURGERY     • HERNIA REPAIR      Bilateral femoral and inguinal   • JOINT REPLACEMENT  2007    Left knee   • KNEE ARTHROPLASTY UNICOMPARTMENTAL Right 2022   • KNEE ARTHROSCOPY Bilateral    • MASTECTOMY  2020   • MASTECTOMY WITH SENTINEL NODE BIOPSY AND AXILLARY NODE DISSECTION Right 2020    Procedure: RIGHT BREAST MASTECTOMY WITH SENTINEL NODE BIOPSY;  Surgeon: Bj Stephen MD;  [place] [facility] [place] [facility] [place]  Service: General;  Laterality: Right;   • OOPHORECTOMY     • SHOULDER ARTHROSCOPY Right     rotator cuff repair, biceps tenotomy   • THYROIDECTOMY, PARTIAL     • TONSILLECTOMY AND ADENOIDECTOMY     • TOTAL KNEE ARTHROPLASTY Left    • TUBAL ABDOMINAL LIGATION     • UMBILICAL HERNIA REPAIR      patient is a 70-year-old white female with hypercholesterolemia diabetes type 2 hypertension hypothyroidism and pacemaker placement 2015 was noted on routine mammogram in  of this last year to have some abnormalities in the right breast that warranted close follow-up.  This led to a repeat  mammogram in January which was delayed due to her trip she took Shira with her  when she came back in February she did the mammogram and this showed persistence and worsening of the calcifications at the 6:00 and 1 o'clock position of the right breast.    This led to a biopsy of both these regions on 3/5/2020 which showed DCIS at the 1 o'clock position intermediate grade with calcifications and no necrosis and well differentiated grade 1 infiltrating ductal carcinoma at the 6 o'clock position measuring 2 mm with no lymph vascular invasion and associated DCIS intermediate grade the tumor was positive for estrogen greater than 95% negative for progesterone 0% HER-2 negative at 1+ Ki-67 of 5%.    Patient was referred to Dr. Stephen who ordered bilateral breast MRIs which showed residual 2 cm area of enhancement at the 6 o'clock position but no further abnormalities at the 1 o'clock position of the right breast.  There is no axillary or internal mammary adenopathy in the left breast was benign.  The mass is not palpable by the patient or Dr. Stephen    Based on the recent coronavirus outbreak which is ongoing elective surgeries for grade 1 breast cancers are being delayed based on guidelines from the hospital and from the oncological community and she is here to discuss neoadjuvant hormonal blockade.    Patient is  3 para 3 first childbirth was age 22 she breast-fed 2 of her 3 children for at least 4 to 6 months menarche was at age 11 menopause at 49 she did not take hormones after menopause    Family history is positive for paternal grandmother with metastatic breast cancer at age 75 a paternal first cousin with breast cancer at age 70 her father  with bladder cancer mother had endometrial carcinoma at age 80.  She is concerned about genetic testing because she has 3 daughters and 3 grand daughters.    She is not a smoker or drinker and denies DVTs MI or stroke    Because of the delay of surgery  due to the coronavirus we decided to do Oncotype on her biopsy and start aromatase inhibitor in the neoadjuvant setting    The side effects and toxicities of the Aromatase inhibitors was discussed with the patient including, hot flashes, mood swings and hair thinning.Significant arthralgias and worsening bone density were also discussed. Baseline bone density evaluation was ordered.  I discussed with the patient and her  on the video conference that this was not the standard of care usually but because of the pandemic and making treatment decisions for the safety of the patient and based on delaying elective surgery for certain subsets of breast cancer which she presented.    I explained the rationale of doing an Oncotype DX score and told her that if she was not willing to take chemotherapy there would be no reason to do this but she is willing to take chemotherapy if she has a high Oncotype score and therefore we will send this results and she will call me in 2 weeks to discuss them    There are no clear guidelines on how to manage patients with clinically node-negative disease with a high Oncotype and we may decide to move up her surgery so that we will have more information with which to make a decision about chemotherapy in order to decide how much chemotherapy to give her    She and her  were willing to go this route and she will call me in 2 weeks to discuss her Oncotype score    I am concerned about her general joint discomfort which might be aggravated by an aromatase inhibitor but we will deal with that if it occurs    I will also schedule her to see the genetic counselors and they could possibly do a video visit with her also and decide about genetic testing    4/20  Patient is a 70-year-old female with small mammographically detected right breast cancer ER positive AL negative and we opted to start Arimidex preoperatively because surgeries are being delayed for the coronavirus pandemic.  We  will also ask for an Oncotype DX to be done on her biopsy specimen to help us make decisions about further treatment.  Unfortunately the specimen was too small and the Oncotype could not be done.  In addition she developed gout in her foot after 2 or 3 doses of Arimidex which I do not think is related to the Arimidex and she was evaluated by her daughter who is a physician who did not think there was any infection she tried nonsteroidals with some improvement but she still not better and has not yet restarted her Arimidex    In light of all these findings I have given her a short prednisone bolus to see if this will help the gout and at this point I think we should move ahead with surgery in the next 3 to 4 weeks and hold off on the Arimidex until after surgery so we can get all the information we need at the time of surgery    She is okay with this approach and I have called her surgeon and her cardiologist to clear her for surgery    She will need to stop her nonsteroidals at least a week before surgery and hopefully the steroids will help the foot enough that she does not have to take the nonsteroidals    She has had no fever or other signs of infection.    Simple mastectomy on 5/11/2020 and this shows an invasive cancer measuring 19 x 13 mmI- DCIS present measuring 15 mm margins were widely clear 5 sentinel nodes were negative for metastatic disease still grade 1. No lymphovascular invasion.    Patient discontinuing Arimidex in May 2020 due to concerns for contributing to gout.  Oncotype DX score returning 22.  Patient beginning Femara late May 2020.    Patient switched to tamoxifen in July 2021 due to significant arthralgias with Femara.    Current Outpatient Medications on File Prior to Visit   Medication Sig Dispense Refill   • Acetaminophen (TYLENOL PO) Take 500 mg by mouth 2 (Two) Times a Day.     • albuterol sulfate  (90 Base) MCG/ACT inhaler Inhale 2 puffs Every 4 (Four) Hours As Needed for  Wheezing or Shortness of Air. 18 g 0   • clotrimazole-betamethasone (LOTRISONE) 1-0.05 % cream As Needed.     • donepezil (ARICEPT) 5 MG tablet Take 1 tablet by mouth Daily.     • DOXYLAMINE SUCCINATE, SLEEP, PO Take 25 mg by mouth.     • escitalopram (LEXAPRO) 5 MG tablet Take 1 tablet by mouth Daily. 90 tablet 0   • ezetimibe (ZETIA) 10 MG tablet Take 1 tablet by mouth Daily.     • fluticasone (FLONASE) 50 MCG/ACT nasal spray 2 sprays into the nostril(s) as directed by provider As Needed.     • glucose blood test strip ACCU-CHECK SKYLA. Use to check blood sugar once daily, as directed. DX: E11.9     • Lancets (ACCU-CHEK MULTICLIX) lancets Use to check blood sugar once daily, as directed. DX: 250.00     • levothyroxine (SYNTHROID, LEVOTHROID) 125 MCG tablet Take 1 tablet by mouth.     • Loratadine 10 MG capsule Take 1 capsule by mouth Daily.     • metoprolol succinate XL (TOPROL-XL) 25 MG 24 hr tablet Take 1 tablet by mouth 2 (Two) Times a Day.     • tamoxifen (NOLVADEX) 20 MG chemo tablet TAKE 1 TABLET BY MOUTH EVERY DAY 30 tablet 6   • traMADol (ULTRAM) 50 MG tablet Take 1 tablet by mouth As Needed.     • vitamin B-12 (CYANOCOBALAMIN) 100 MCG tablet Take 50 mcg by mouth Daily. Mon, wed, fri     • irbesartan (AVAPRO) 150 MG tablet Take 1 tablet by mouth Every Morning. (Patient not taking: Reported on 5/1/2023)       No current facility-administered medications on file prior to visit.        ALLERGIES:    Allergies   Allergen Reactions   • Indomethacin Nausea Only and Dizziness     EAR RINGING     • Nutritional Supplements Swelling     Julian nuts - no other nuts that she is aware of   • Penicillins Anaphylaxis, Hives, Shortness Of Breath and Itching      tachycardia     • Statins Unknown - High Severity   • Hydrochlorothiazide Unknown - Low Severity     Caused gout symptoms     • Lisinopril Cough   • Morphine Nausea Only            • Letrozole Unknown - Low Severity   • Pentazocine Nausea And Vomiting            "    Social History     Socioeconomic History   • Marital status:      Spouse name: Sean   Tobacco Use   • Smoking status: Never   • Smokeless tobacco: Never   Vaping Use   • Vaping Use: Never used   Substance and Sexual Activity   • Alcohol use: Yes     Alcohol/week: 1.0 standard drink     Types: 1 Standard drinks or equivalent per week     Comment: or less   • Drug use: Never   • Sexual activity: Yes     Partners: Male     Comment: endometrial [id number]        Family History   Problem Relation Age of Onset   • Cervical cancer Mother    • Alzheimer's disease Mother    • Arthritis Mother    • Other Mother         Uterine cancer   • Cancer Father         Bladder   • Heart disease Father    • Hypertension Father    • Arthritis Father         Breadt cancer   • Alcohol abuse Father             • Hyperlipidemia Father    • Depression Father    • Diabetes Father         \"borderline\"   • Arthritis Maternal Grandmother    • Heart disease Other         FH of heart disease in males before age 55   • Hyperlipidemia Brother    • Hypertension Brother    • Breast cancer Paternal Grandmother 75   • Heart attack Paternal Grandfather    • Breast cancer Paternal Cousin 70   • Alcohol abuse Maternal Grandfather             • Depression Brother         since    • Malig Hyperthermia Neg Hx         Review of Systems   Constitutional: Negative for diaphoresis.   Respiratory: Negative.  Negative for shortness of breath.    Cardiovascular: Negative for chest pain, palpitations and leg swelling.   Musculoskeletal: Positive for arthralgias.   Skin: Negative for rash.   Neurological: Negative for dizziness, weakness and headaches.   Psychiatric/Behavioral: Positive for decreased concentration.   All other systems reviewed and are negative.       Objective   Vitals:    23 1046   BP: 126/72   Pulse: 90   Resp: 16   Temp: 97.8 °F (36.6 °C)   TempSrc: Temporal   SpO2: 97%   Weight: 91.8 kg (202 lb 4.8 " "oz)   Height: 154.9 cm (60.98\")   PainSc: 0-No pain         5/1/2023    10:42 AM   Current Status   ECOG score 0       Physical exam  This patient's ACP documentation is up to date, and there's nothing further left to document.    CONSTITUTIONAL:  Vital signs reviewed.  No distress, looks comfortable.  EYES:  Conjunctiva and lids unremarkable.  PERRLA  EARS,NOSE,MOUTH,THROAT:  Ears and nose appear unremarkable. Mask in place.  RESPIRATORY:  Normal respiratory effort.  Lungs clear to auscultation bilaterally.  BREASTS: Right chest wall benign; left breast shows no palpable masses, fibrocystic nodularity at 5:00  CARDIOVASCULAR:  Normal S1, S2.  No murmurs rubs or gallops.  No significant lower extremity edema.    GASTROINTESTINAL: Abdomen appears unremarkable.  Nontender.  No hepatomegaly.  No splenomegaly.  LYMPHATIC:  No cervical, supraclavicular, axillary lymphadenopathy.  SKIN:  Warm.  No rashes.  PSYCHIATRIC:  Normal judgment and insight.  Normal mood and affect.     I have reexamined the patient and the results are consistent with the previously documented exam except as updated. Syed Jasso MD       RECENT LABS:  Results from last 7 days   Lab Units 05/01/23  1036   WBC 10*3/mm3 6.43   NEUTROS ABS 10*3/mm3 3.88   HEMOGLOBIN g/dL 12.3   HEMATOCRIT % 37.4   PLATELETS 10*3/mm3 203     Results from last 7 days   Lab Units 05/01/23  1036   SODIUM mmol/L 143   POTASSIUM mmol/L 4.5   CHLORIDE mmol/L 107   CO2 mmol/L 21.8*   BUN mg/dL 21   CREATININE mg/dL 1.13*   CALCIUM mg/dL 9.5   ALBUMIN g/dL 3.9   BILIRUBIN mg/dL 0.3   ALK PHOS U/L 50   ALT (SGPT) U/L 6   AST (SGOT) U/L 15   GLUCOSE mg/dL 231*         Component      Latest Ref Rng & Units 8/20/2020 9/4/2020 10/13/2021 1/17/2022   Uric Acid      2.4 - 5.7 mg/dL 6.5 (H) 5.5 7.2 (H) 7.3 (H)       SPECIMEN   Procedure  Total mastectomy    Specimen Laterality  Right    TUMOR   Tumor Site  Lower inner quadrant    Clock Position of Tumor Site  6 o'clock  "   Histologic Type  Invasive carcinoma of no special type (invasive ductal carcinoma, not otherwise specified)    Glandular (Acinar) / Tubular Differentiation  Score 2    Nuclear Pleomorphism  Score 2    Mitotic Rate  Score 1    Overall Grade  Grade 1 (scores of 3, 4 or 5)    Tumor Size  Greatest dimension of largest invasive focus (Millimeters): 19 mm   Additional Dimension (Millimeters)  13 mm     10 mm   Tumor Focality  Single focus of invasive carcinoma    Ductal Carcinoma In Situ (DCIS)  Present      Negative for extensive intraductal component (EIC)    Size (Extent) of DCIS  Estimated size (extent) of DCIS is at least (Millimeters): 15 mm   Architectural Patterns  Cribriform      Papillary      Solid    Nuclear Grade  Grade II (intermediate)    Necrosis  Present, focal (small foci or single cell necrosis)    Lobular Carcinoma In Situ (LCIS)  No LCIS in specimen    Tumor Extent     Lymphovascular Invasion  Not identified    Dermal Lymphovascular Invasion  Not identified    Microcalcifications  Present in DCIS      Present in invasive carcinoma      Present in non-neoplastic tissue    Treatment Effect  No known presurgical therapy    MARGINS   Invasive Carcinoma Margins  Uninvolved by invasive carcinoma    Distance from Closest Margin (Millimeters)  10 mm   Closest Margin(s)  Lateral    Distance from Other Margins     Anterior Margin (Millimeters)  40 mm   Posterior Margin (Millimeters)  45 mm   Superior Margin (Millimeters)  18 mm   Inferior Margin (Millimeters)  13 mm   Medial Margin (Millimeters)  22 mm   Lateral Margin (Millimeters)  10 mm   DCIS Margins  Uninvolved by DCIS    Distance from Closest Margin (Millimeters)  14 mm   Closest Margin(s)  Superior      Inferior    Distance from Other Margins     Anterior Margin (Millimeters)  20 mm   Posterior Margin (Millimeters)  40 mm   Superior Margin (Millimeters)  14 mm   Inferior Margin (Millimeters)  14 mm   Medial Margin (Millimeters)  30 mm   Lateral Margin  (Millimeters)  21 mm   LYMPH NODES   Regional Lymph Nodes  Uninvolved by tumor cells    Total Number of Lymph Nodes Examined  5    Number of [place] Nodes Examined  5    PATHOLOGIC STAGE CLASSIFICATION (pTNM, AJCC 8th Edition)   Primary Tumor (pT)  pT1c    Regional Lymph Nodes Modifier  (sn): Only sentinel node(s) evaluated.    Regional Lymph Nodes (pN)  pN0    .   Breast Biomarker Reporting Template   Breast.Bmk - 1   Protocol posted: 8/28/2019   Test(s) Performed     Estrogen Receptor (ER) Status  Positive    Percentage of Cells with Nuclear Positivity  %    Average Intensity of Staining  Strong    Test Type  Food and Drug Administration (FDA) cleared (test / vendor): [facility]    Primary Antibody  SP1    Scoring System  Ella    Proportion Score  5    Intensity Score  3    Total Ella Score  8    Test(s) Performed     Progesterone Receptor (PgR) Status  Positive    Percentage of Cells with Nuclear Positivity  5 %   Average Intensity of Staining  Moderate    Test Type  Food and Drug Administration (FDA) cleared (test / vendor): [employer]    Primary Antibody  1E2    Scoring System  Ella    Proportion Score  2    Intensity Score  2    Total Ella Score  4    Test(s) Performed     HER2 by Immunohistochemistry  Equivocal (Score 2+)    Percentage of Cells with Uniform Intense Complete Membrane Staining  0 %   Test Type  Food and Drug Administration (FDA) cleared (test / vendor): [employer]    Primary Antibody  4B5    Test(s) Performed  Ki-67    Percentage of Positive Nuclei  10 %   Primary Antibody           Assessment & Plan   1.cT1cN0 grade 1 infiltrating ductal carcinoma right breast at 6:00 ER positive NC negative HER-2 negative with associated intermediate grade DCIS the 1 o'clock position of the breast both lesions nonpalpable  · pT1cN0 grade 1 ER positive NC negative HER-2 negative post mastectomy on 5/11/2020  · Oncotype DX score 22  · Patient discontinued Arimidex in early May 2020 and soon thereafter  began Femara 2.5 mg daily.  · Discontinued Femara in December due to worsening hot flashes and difficulty sleeping  · Gabapentin added in 2/21 with Femara resumed  · Tamoxifen started in 7/21  · Worsening joint pains, referred to rheumatology evaluation-rheumatoid factor and RUSSELL negative. Nieves continued.  · 1/17/2022 patient continues on tamoxifen with it being difficult to assess true tolerance as she has been on a significant amount of steroids over the last month and a half (see discussion below).  She will continue for now and monitor for worsening arthralgias  · Tolerating tamoxifen well as of 5/23    2.  Hypothyroidism and type 2 diabetes managed by Dr. Wynn.    3.  Significant degenerative arthritis  · Referred to rheumatology, seeing Dr. Raza November 2021.  Treated with another Pred pack.  · Patient reports being seen again 1/10/2022.  We have not yet have these records.  Patient has not started any additional medication for her arthritis at this time.    4.  Family history of breast cancer.  Genetics testing negative.  5.  Normal bone density in 2018    6.  History of recurrent gout.    · Patient most recently on allopurinol 300 mg daily prescribed per PCP.   · Patient self discontinued this after she developed significant systemic rash with sloughing of her skin in 12/2021.  · 1/17/2022 repeat uric acid level 7.3 today. Stable though still elevated. Unclear if patient may benefit from different uric acid reducer such as Uloric. Defer to PCP or Dr. Raza.    7.  New onset anemia as of 11/2021?  Etiology  · Anemia labs performed 11/1/2021 with no evidence of iron or B12 deficiency.  · ?  Perhaps element of mild CKD  · 1/17/2022 Hemoglobin is stable currently at 10.4 and we will monitor.    8. MODERATE AORTIC STENOSIS by echo in 5/22    9.  Aricept started for mild memory issues      PLAN:  1.  Continue tamoxifen 20 mg  2.   Avoid allopurinol  3. patient due for screening left mammogram in May 2023,  minimal nodularity at 5:00 is suspected fibrocystic I will let Dr. Stephen evaluate her also and decide whether further imaging is needed such as ultrasound  4.  Patient will return 6 mofor follow-up with Dr. Jasso with CBC, CMP

## 2023-05-22 ENCOUNTER — TELEPHONE (OUTPATIENT)
Dept: MAMMOGRAPHY | Facility: CLINIC | Age: 73
End: 2023-05-22

## 2023-05-22 ENCOUNTER — HOSPITAL ENCOUNTER (OUTPATIENT)
Dept: MAMMOGRAPHY | Facility: HOSPITAL | Age: 73
Discharge: HOME | End: 2023-05-22
Admitting: SURGERY
Payer: MEDICARE

## 2023-05-22 ENCOUNTER — OFFICE VISIT (OUTPATIENT)
Dept: MAMMOGRAPHY | Facility: CLINIC | Age: 73
End: 2023-05-22
Payer: MEDICARE

## 2023-05-22 VITALS
HEIGHT: 61 IN | SYSTOLIC BLOOD PRESSURE: 120 MMHG | HEART RATE: 74 BPM | WEIGHT: 203 LBS | BODY MASS INDEX: 38.33 KG/M2 | OXYGEN SATURATION: 96 % | DIASTOLIC BLOOD PRESSURE: 61 MMHG

## 2023-05-22 DIAGNOSIS — C50.811 MALIGNANT NEOPLASM OF OVERLAPPING SITES OF RIGHT BREAST IN FEMALE, ESTROGEN RECEPTOR POSITIVE: Primary | ICD-10-CM

## 2023-05-22 DIAGNOSIS — Z12.31 VISIT FOR SCREENING MAMMOGRAM: ICD-10-CM

## 2023-05-22 DIAGNOSIS — Z17.0 MALIGNANT NEOPLASM OF OVERLAPPING SITES OF RIGHT BREAST IN FEMALE, ESTROGEN RECEPTOR POSITIVE: Primary | ICD-10-CM

## 2023-05-22 PROCEDURE — 1159F MED LIST DOCD IN RCRD: CPT | Performed by: SURGERY

## 2023-05-22 PROCEDURE — 3078F DIAST BP <80 MM HG: CPT | Performed by: SURGERY

## 2023-05-22 PROCEDURE — 77063 BREAST TOMOSYNTHESIS BI: CPT

## 2023-05-22 PROCEDURE — 1160F RVW MEDS BY RX/DR IN RCRD: CPT | Performed by: SURGERY

## 2023-05-22 PROCEDURE — 99213 OFFICE O/P EST LOW 20 MIN: CPT | Performed by: SURGERY

## 2023-05-22 PROCEDURE — 77067 SCR MAMMO BI INCL CAD: CPT

## 2023-05-22 PROCEDURE — 3074F SYST BP LT 130 MM HG: CPT | Performed by: SURGERY

## 2023-05-22 NOTE — PROGRESS NOTES
Subjective   Mary Jo Berg is a 73 y.o. female     History of Present Illness   She is a very nice 73-year-old white female well-known to me.  Approximately 3 years ago she had a right mastectomy to treat right breast cancer.  The patient did not go under go reconstructive surgery.  She remains on tamoxifen and appears to be tolerating that well.  The patient is following with Dr. Jasso and there was a questionable area palpated in the remaining left breast at the 5 o'clock position.  She did undergo a left mammogram earlier today.  I have personally reviewed the images although the report is pending.  I see some scattered fibroglandular densities but no suspicious microcalcifications, masses, or areas of architectural distortion.  The patient herself had not palpated anything of concern or had any pain.        Review of Systems constitutional-no unusual changes in weight or appetite.  Past Medical History:   Diagnosis Date   • Asthma    • Breast cancer 2020    right IDC   • Cataract     on both  retinas   • Coronary artery disease 1/10/2015    Pacemaker 2015   • Diabetes mellitus     Type 2   • Diverticulosis 2021   • Heart murmur    • History of asthma    • History of colon polyps    • History of complete heart block     paced in 2015   • Hyperlipidemia    • Hypertension    • Hypothyroidism    • Obesity    • Osteoarthritis    • Osteopenia    • Pacemaker    • PONV (postoperative nausea and vomiting)      Past Surgical History:   Procedure Laterality Date   • ADENOIDECTOMY     • BLADDER REPAIR     • BREAST BIOPSY Right 2020    malignant   • CARDIAC PACEMAKER PLACEMENT     • CARPAL TUNNEL RELEASE Right    • CATARACT EXTRACTION Bilateral    •  SECTION      x2   • COLONOSCOPY     • COLONOSCOPY N/A 2021    Procedure: COLONOSCOPY;  Surgeon: Yamilet Stewart MD;  Location: [facility] ENDOSCOPY;  Service: Gastroenterology;  Laterality: N/A;  pre- hx polyps  post--  "diverticulosis, hemorrhoids   • ENDOMETRIAL ABLATION     • HAND SURGERY     • HERNIA REPAIR      Bilateral femoral and inguinal   • JOINT REPLACEMENT  2007    Left knee   • KNEE ARTHROPLASTY UNICOMPARTMENTAL Right 2022   • KNEE ARTHROSCOPY Bilateral    • MASTECTOMY  2020   • MASTECTOMY WITH SENTINEL NODE BIOPSY AND AXILLARY NODE DISSECTION Right 2020    Procedure: RIGHT BREAST MASTECTOMY WITH SENTINEL NODE BIOPSY;  Surgeon: Bj Stephen MD;  Location: [facility] OR [facility];  Service: General;  Laterality: Right;   • OOPHORECTOMY     • SHOULDER ARTHROSCOPY Right     rotator cuff repair, biceps tenotomy   • THYROIDECTOMY, PARTIAL     • TONSILLECTOMY AND ADENOIDECTOMY     • TOTAL KNEE ARTHROPLASTY Left    • TUBAL ABDOMINAL LIGATION     • UMBILICAL HERNIA REPAIR       Family History   Problem Relation Age of Onset   • Cervical cancer Mother    • Alzheimer's disease Mother    • Arthritis Mother    • Other Mother         Uterine cancer   • Cancer Father         Bladder   • Heart disease Father    • Hypertension Father    • Arthritis Father         Breadt cancer   • Alcohol abuse Father             • Hyperlipidemia Father    • Depression Father    • Diabetes Father         \"borderline\"   • Arthritis Maternal Grandmother    • Heart disease Other         FH of heart disease in males before age 55   • Hyperlipidemia Brother    • Hypertension Brother    • Breast cancer Paternal Grandmother 75   • Heart attack Paternal Grandfather    • Breast cancer Paternal Cousin 70   • Alcohol abuse Maternal Grandfather             • Depression Brother         since    • Malig Hyperthermia Neg Hx      Social History     Socioeconomic History   • Marital status:      Spouse name: Sean   Tobacco Use   • Smoking status: Never   • Smokeless tobacco: Never   Vaping Use   • Vaping Use: Never used   Substance and Sexual Activity   • Alcohol use: Yes     Alcohol/week: 1.0 standard " drink     Types: 1 Standard drinks or equivalent per week     Comment: or less   • Drug use: Never   • Sexual activity: Yes     Partners: Male     Comment: endometrial [id number]       Objective   Physical Exam   Constitutional-BMI 38.4, the patient is sad because her daughter recently left for the mission field.  Left breast- large and pendulous.  I do not see any skin dimpling with the arm raised over the pectoralis muscle contracted.  The skin of the breast looks normal and there is no nipple discharge or nipple retraction.  I do not feel any masses anywhere in the breast.  The 5 o'clock position was carefully palpated and she has some smooth nodularity in spots but nothing that appears to be different or dominant.  Right chest wall-status postmastectomy without reconstruction.  The incision is well-healed and I do not palpate any masses beneath the skin flaps along the incision.  Lymphatics-no cervical or axillary adenopathy  Assessment & Plan    Personal history of right breast cancer status postmastectomy.  There was a concern about left breast mass.  I do not palpate anything that concerns me on physical examination today.  I have reviewed the mammogram she had earlier today which looks okay.  We will follow-up with the official report on that and call her.  If everything looks good, I would see her back in a year.      The encounter diagnosis was Malignant neoplasm of overlapping sites of right breast in female, estrogen receptor positive.

## 2023-05-22 NOTE — LETTER
May 22, 2023     Syed Jasso MD  [address]  [facility] [address]  [place] [id number]    Patient: Mary Jo Berg   YOB: 1950   Date of Visit: 5/22/2023       Dear Dr. Romero MD:    Thank you for referring Mary Jo Berg to me for evaluation. Below are the relevant portions of my assessment and plan of care.    If you have questions, please do not hesitate to call me. I look forward to following Mary Jo along with you.         Sincerely,        Bj Stephen MD        CC: Betty Isaac, ELIN Stephen, Bj PATEL MD  05/22/23 1011  Signed  Subjective    Mary Jo Berg is a 73 y.o. female     History of Present Illness   She is a very nice 73-year-old white female well-known to me.  Approximately 3 years ago she had a right mastectomy to treat right breast cancer.  The patient did not go under go reconstructive surgery.  She remains on tamoxifen and appears to be tolerating that well.  The patient is following with Dr. Jasso and there was a questionable area palpated in the remaining left breast at the 5 o'clock position.  She did undergo a left mammogram earlier today.  I have personally reviewed the images although the report is pending.  I see some scattered fibroglandular densities but no suspicious microcalcifications, masses, or areas of architectural distortion.  The patient herself had not palpated anything of concern or had any pain.        Review of Systems constitutional-no unusual changes in weight or appetite.  Past Medical History:   Diagnosis Date   • Asthma    • Breast cancer 03/2020    right IDC   • Cataract 2010    on both  retinas   • Coronary artery disease 1/10/2015    Pacemaker 1/27/2015   • Diabetes mellitus     Type 2   • Diverticulosis 2/7/2021   • Heart murmur 2015   • History of asthma    • History of colon polyps    • History of complete heart block     paced in 2015   • Hyperlipidemia    • Hypertension    • Hypothyroidism    • Obesity    •  "Osteoarthritis    • Osteopenia    • Pacemaker    • PONV (postoperative nausea and vomiting)      Past Surgical History:   Procedure Laterality Date   • ADENOIDECTOMY     • BLADDER REPAIR     • BREAST BIOPSY Right 2020    malignant   • CARDIAC PACEMAKER PLACEMENT     • CARPAL TUNNEL RELEASE Right 2015   • CATARACT EXTRACTION Bilateral    •  SECTION      x2   • COLONOSCOPY  2015   • COLONOSCOPY N/A 2021    Procedure: COLONOSCOPY;  Surgeon: Yamilet Stewart MD;  Location: [facility] ENDOSCOPY;  Service: Gastroenterology;  Laterality: N/A;  pre- hx polyps  post-- diverticulosis, hemorrhoids   • ENDOMETRIAL ABLATION     • HAND SURGERY     • HERNIA REPAIR      Bilateral femoral and inguinal   • JOINT REPLACEMENT  2007    Left knee   • KNEE ARTHROPLASTY UNICOMPARTMENTAL Right 2022   • KNEE ARTHROSCOPY Bilateral    • MASTECTOMY  2020   • MASTECTOMY WITH SENTINEL NODE BIOPSY AND AXILLARY NODE DISSECTION Right 2020    Procedure: RIGHT BREAST MASTECTOMY WITH SENTINEL NODE BIOPSY;  Surgeon: Bj Stephen MD;  Location: [facility] OR [facility];  Service: General;  Laterality: Right;   • OOPHORECTOMY     • SHOULDER ARTHROSCOPY Right     rotator cuff repair, biceps tenotomy   • THYROIDECTOMY, PARTIAL     • TONSILLECTOMY AND ADENOIDECTOMY     • TOTAL KNEE ARTHROPLASTY Left    • TUBAL ABDOMINAL LIGATION     • UMBILICAL HERNIA REPAIR       Family History   Problem Relation Age of Onset   • Cervical cancer Mother    • Alzheimer's disease Mother    • Arthritis Mother    • Other Mother         Uterine cancer   • Cancer Father         Bladder   • Heart disease Father    • Hypertension Father    • Arthritis Father         Breadt cancer   • Alcohol abuse Father             • Hyperlipidemia Father    • Depression Father    • Diabetes Father         \"borderline\"   • Arthritis Maternal Grandmother    • Heart disease Other         FH of heart disease in males before age 55   • " Hyperlipidemia Brother    • Hypertension Brother    • Breast cancer Paternal Grandmother 75   • Heart attack Paternal Grandfather    • Breast cancer Paternal Cousin 70   • Alcohol abuse Maternal Grandfather             • Depression Brother         since    • Malig Hyperthermia Neg Hx      Social History     Socioeconomic History   • Marital status:      Spouse name: Sean   Tobacco Use   • Smoking status: Never   • Smokeless tobacco: Never   Vaping Use   • Vaping Use: Never used   Substance and Sexual Activity   • Alcohol use: Yes     Alcohol/week: 1.0 standard drink     Types: 1 Standard drinks or equivalent per week     Comment: or less   • Drug use: Never   • Sexual activity: Yes     Partners: Male     Comment: endometrial [id number]       Objective    Physical Exam   Constitutional-BMI 38.4, the patient is sad because her daughter recently left for the mission field.  Left breast- large and pendulous.  I do not see any skin dimpling with the arm raised over the pectoralis muscle contracted.  The skin of the breast looks normal and there is no nipple discharge or nipple retraction.  I do not feel any masses anywhere in the breast.  The 5 o'clock position was carefully palpated and she has some smooth nodularity in spots but nothing that appears to be different or dominant.  Right chest wall-status postmastectomy without reconstruction.  The incision is well-healed and I do not palpate any masses beneath the skin flaps along the incision.  Lymphatics-no cervical or axillary adenopathy  Assessment & Plan    Personal history of right breast cancer status postmastectomy.  There was a concern about left breast mass.  I do not palpate anything that concerns me on physical examination today.  I have reviewed the mammogram she had earlier today which looks okay.  We will follow-up with the official report on that and call her.  If everything looks good, I would see her back in a year.      The  encounter diagnosis was Malignant neoplasm of overlapping sites of right breast in female, estrogen receptor positive.

## 2023-05-22 NOTE — TELEPHONE ENCOUNTER
I called her and told her that the mammogram did not show any abnormalities in the area of concern.

## 2023-06-16 PROBLEM — G31.84 MCI (MILD COGNITIVE IMPAIRMENT): Status: ACTIVE | Noted: 2023-01-18

## 2023-06-16 PROBLEM — M51.369 DEGENERATION OF LUMBAR INTERVERTEBRAL DISC: Status: ACTIVE | Noted: 2023-06-16

## 2023-06-16 PROBLEM — M51.36 DEGENERATION OF LUMBAR INTERVERTEBRAL DISC: Status: ACTIVE | Noted: 2023-06-16

## 2023-09-06 ENCOUNTER — TELEPHONE (OUTPATIENT)
Dept: ONCOLOGY | Facility: CLINIC | Age: 73
End: 2023-09-06
Payer: MEDICARE

## 2023-09-06 NOTE — TELEPHONE ENCOUNTER
Called Sean back and he stated patient had a CTA done through  on 8/30 and were contacted today to make them aware that she had right lower and middle lobe pulmonary emboli and that she would be started on a blood thinner but also that they wanted to schedule a procedure to replace her aortic valve.  is not happy and does not think the procedure should take place yet but wanted to know what Dr. Jasso thought. I let him know I would talk with her and get back with them. He v/u

## 2023-09-06 NOTE — TELEPHONE ENCOUNTER
Caller: BergKailey    Relationship: Emergency Contact    Best call back number: [phone]    What is the best time to reach you: ANYTIME    Who are you requesting to speak with (clinical staff, provider, specific staff member): CLINICAL    What was the call regarding: KAILEY STATED THAT PATIENT HAS BLOOD CLOTS IN HER LUNG. HE IS CONCERNED ABOUT THEM DOING THE HEART SURGERY WITH THE CLOTS IN THE LUNG. HE WOULD LIKE TO SPEAK WITH DR RADHA RESTREPO IF POSSIBLE.

## 2023-09-07 NOTE — TELEPHONE ENCOUNTER
Dr Jasso returned call to pt's  to discuss concerns re: their visits and communications with cardiothoracic surgery at [facility]. Dr. Jasso also spoke with cardiology office on behalf of pt and  to clarify plan of care.

## 2023-09-08 NOTE — TELEPHONE ENCOUNTER
Caller: Kailey Berg    Relationship: Emergency Contact    Best call back number: [phone]    What is the best time to reach you: ANYTIME    Who are you requesting to speak with (clinical staff, provider,  specific staff member): DR. GARCIA    Do you know the name of the person who called: KAILEY    What was the call regarding: KAILEY WAS WAITING ON A CALL BACK FROM DR. GARCIA REGARDING THE BLOOD CLOT AND PATIENT AORTIC VALVE BEING REPAIRED, WHICH ONE SHOULD BE TAKEN CARE OF FIRST?    CALL TO DISCUSS     Is it okay if the provider responds through MyChart: NO

## 2023-09-26 ENCOUNTER — TELEPHONE (OUTPATIENT)
Dept: FAMILY MEDICINE CLINIC | Facility: CLINIC | Age: 73
End: 2023-09-26

## 2023-09-26 NOTE — TELEPHONE ENCOUNTER
Caller: Sean Berg    Relationship: Emergency Contact    Best call back number: [phone]     What was the call regarding: PATIENT'S  STATED THAT FOR 3-4 DAYS SHE HAS EXPERIENCING SWELLING AND PAIN IN HER FOOT. PATIENT'S  STATED SHE HAS SURGERY THIS THURSDAY FOR AORTIC VALVE REPLACEMENT. PATIENT'S  STATED HE IS CONCERNED THAT IT MAY BE POSTPONED DUE TO THIS ISSUE. PATIENT'S  STATED HE WOULD LIKE TO RECEIVE A CALL BACK FROM LINDY TAN. PLEASE ADVISE.

## 2023-10-02 ENCOUNTER — TELEPHONE (OUTPATIENT)
Dept: FAMILY MEDICINE CLINIC | Facility: CLINIC | Age: 73
End: 2023-10-02

## 2023-10-02 NOTE — TELEPHONE ENCOUNTER
Caller: Sean Berg    Relationship: Emergency Contact    Best call back number: [phone]     What medication are you requesting: SOMETHING FOR THE BELOW SYMPTOMS    What are your current symptoms: GOUT FLARE UP IN RIGHT FOOT    How long have you been experiencing symptoms:     Have you had these symptoms before:    [x] Yes  [] No    Have you been treated for these symptoms before:   [x] Yes  [] No    If a prescription is needed, what is your preferred pharmacy and phone number: [id number] [id number] AT [facility] - [phone] [facility] [phone] FX     Additional notes:

## 2023-10-03 NOTE — TELEPHONE ENCOUNTER
S/w pt and voiced understanding that she will need to be seen and is going to try and come in today at 3pm

## 2023-10-03 NOTE — TELEPHONE ENCOUNTER
PATIENT'S CARDIOLOGIST ORIGINALLY PRESCRIBED STEROIDS FOR THIS ISSUE WHILE PATIENT WAS RECENTLY IN THE HOSPITAL AND THEY WERE TOLD TO FOLLOW UP WITH THEIR PCP TO CONTINUE THE MED. PATIENT STATES SHE DOES NOT WISH TO COME IN AT THIS TIME AND WOULD LIKE TO CONTINUE MEDS. PLEASE ADVISE.

## 2023-10-06 ENCOUNTER — TELEPHONE (OUTPATIENT)
Dept: FAMILY MEDICINE CLINIC | Facility: CLINIC | Age: 73
End: 2023-10-06

## 2023-10-06 NOTE — TELEPHONE ENCOUNTER
Hub staff attempted to follow warm transfer process and was unsuccessful     Caller: Brooklyn Berg    Relationship to patient: Emergency Contact    Best call back number: [phone]    Patient is needing: SHE HAS AN APPOINTMENT ON 10/10/23 BUT TESTED POSITIVE FOR COVID YESTERDAY 10/5/23.  SHE WOULD LIKE TO KNOW IF SHE NEEDS TO RESCHEDULE OR IF SHE CAN COME IN WEARING A MASK?  PLEASE CALL AND ADVISE ASAP.

## 2023-10-13 ENCOUNTER — OFFICE VISIT (OUTPATIENT)
Dept: FAMILY MEDICINE CLINIC | Facility: CLINIC | Age: 73
End: 2023-10-13
Payer: MEDICARE

## 2023-10-13 VITALS — TEMPERATURE: 97.1 F | HEIGHT: 61 IN | OXYGEN SATURATION: 94 % | WEIGHT: 196 LBS | BODY MASS INDEX: 37 KG/M2

## 2023-10-13 DIAGNOSIS — E11.9 TYPE 2 DIABETES MELLITUS WITHOUT COMPLICATION, WITHOUT LONG-TERM CURRENT USE OF INSULIN: Primary | Chronic | ICD-10-CM

## 2023-10-13 DIAGNOSIS — Z23 FLU VACCINE NEED: ICD-10-CM

## 2023-10-13 DIAGNOSIS — E03.9 ACQUIRED HYPOTHYROIDISM: Chronic | ICD-10-CM

## 2023-10-13 RX ORDER — GLIMEPIRIDE 2 MG/1
1 TABLET ORAL
COMMUNITY
Start: 2023-09-18

## 2023-10-13 RX ORDER — APIXABAN 5 MG/1
5 TABLET, FILM COATED ORAL
COMMUNITY
Start: 2023-09-06

## 2023-10-13 RX ORDER — CLOPIDOGREL BISULFATE 75 MG/1
75 TABLET ORAL DAILY
COMMUNITY

## 2023-10-13 RX ORDER — DONEPEZIL HYDROCHLORIDE 10 MG/1
10 TABLET, FILM COATED ORAL NIGHTLY
COMMUNITY

## 2023-10-13 RX ORDER — ATORVASTATIN CALCIUM 40 MG/1
40 TABLET, FILM COATED ORAL
COMMUNITY

## 2023-10-13 RX ORDER — ZINC GLUCONATE 50 MG
1 TABLET ORAL DAILY
COMMUNITY
Start: 2023-09-29

## 2023-10-13 RX ORDER — NITROGLYCERIN 0.4 MG/1
0.4 TABLET SUBLINGUAL
COMMUNITY
Start: 2023-07-01

## 2023-10-13 RX ORDER — PREDNISONE 20 MG/1
20 TABLET ORAL 2 TIMES DAILY WITH MEALS
COMMUNITY
Start: 2023-10-03

## 2023-10-13 RX ORDER — IRBESARTAN 75 MG/1
TABLET ORAL
COMMUNITY
Start: 2023-10-02

## 2023-10-13 RX ORDER — ALPHA-D-GALACTOSIDASE 400 UNIT
25 TABLET ORAL
COMMUNITY
Start: 2023-06-30

## 2023-10-13 NOTE — PROGRESS NOTES
"Chief Complaint  Follow-up (F/u medication )    Subjective        Mary Jo Berg presents to [facility]IMARY [facility]  History of Present Illness pt here with daughter and spouse for f/u on recent hospitalization and TAVR on 9/28/23 and to discuss taking over management of hypothyroidism and type 2 diabetes: Both of these have been stable on current medications.  Last A1c was at goal.    Denies s/s hypo/hyperthyroid and is taking medication as directed.     T2DM:  controlled with current meds without side effects.  Mainly diet mods.  Not active. Does not check glucose at home.      Patient has had some dizziness when standing.  No recent falls.  She is taking and tolerating all medications as directed.    She is completing steroids for recently diagnosed gout.     Objective   Vital Signs:  Temp 97.1 °F (36.2 °C)   Ht 154.9 cm (61\")   Wt 88.9 kg (196 lb)   SpO2 94%   BMI 37.03 kg/m²   Estimated body mass index is 37.03 kg/m² as calculated from the following:    Height as of this encounter: 154.9 cm (61\").    Weight as of this encounter: 88.9 kg (196 lb).               Physical Exam  Vitals and nursing note reviewed.   Constitutional:       General: She is not in acute distress.     Appearance: She is well-developed. She is not ill-appearing or diaphoretic.   HENT:      Head: Normocephalic and atraumatic.   Eyes:      General:         Right eye: No discharge.         Left eye: No discharge.      Conjunctiva/sclera: Conjunctivae normal.   Cardiovascular:      Rate and Rhythm: Normal rate and regular rhythm.   Pulmonary:      Effort: Pulmonary effort is normal.      Breath sounds: Normal breath sounds.   Abdominal:      General: Bowel sounds are normal.      Palpations: Abdomen is soft.      Tenderness: There is no abdominal tenderness.   Musculoskeletal:         General: No deformity.      Comments: Gait smooth and steady   Skin:     General: Skin is warm and dry.   Neurological:      Mental " Status: She is alert and oriented to person, place, and time.   Psychiatric:         Mood and Affect: Mood and affect normal.         Speech: Speech normal.         Behavior: Behavior is cooperative.         Cognition and Memory: Memory is impaired.        Result Review :                   Assessment and Plan   Diagnoses and all orders for this visit:    1. Type 2 diabetes mellitus without complication, without long-term current use of insulin (Primary)    2. Acquired hypothyroidism    3. Flu vaccine need  -     Fluzone High-Dose 65+yrs ([id number])    Patient seems to be recovering well from recent TAVR.  We checked orthostatics today and they were negative.  I do not see reason for dizziness at least from orthostatic hypotension.    We will review notes from endo regarding type 2 diabetes and hypothyroidism.  I will be willing to manage these.  Seems like her diabetes has been controlled.  Most recent A1c done mid-September was 7.8.  I think her goal should be between 7 and 8.  We did discuss stopping glimepiride.  Patient and family agreeable.  We will recheck A1c 3 months after.      Hypothyroidism: Seems to be well controlled with current medication which I will continue.  Most recent TSH done mid-September was therapeutic.  Continue current.    Importance of fall precautions discussed with patient and family.             Follow Up   No follow-ups on file.  Patient was given instructions and counseling regarding her condition or for health maintenance advice. Please see specific information pulled into the AVS if appropriate.

## 2023-11-13 ENCOUNTER — OFFICE VISIT (OUTPATIENT)
Dept: ONCOLOGY | Facility: CLINIC | Age: 73
End: 2023-11-13
Payer: MEDICARE

## 2023-11-13 ENCOUNTER — LAB (OUTPATIENT)
Dept: OTHER | Facility: HOSPITAL | Age: 73
End: 2023-11-13
Payer: MEDICARE

## 2023-11-13 VITALS
WEIGHT: 194 LBS | TEMPERATURE: 97.5 F | RESPIRATION RATE: 16 BRPM | BODY MASS INDEX: 36.63 KG/M2 | SYSTOLIC BLOOD PRESSURE: 116 MMHG | HEART RATE: 70 BPM | HEIGHT: 61 IN | DIASTOLIC BLOOD PRESSURE: 72 MMHG | OXYGEN SATURATION: 96 %

## 2023-11-13 DIAGNOSIS — Z17.0 MALIGNANT NEOPLASM OF OVERLAPPING SITES OF RIGHT BREAST IN FEMALE, ESTROGEN RECEPTOR POSITIVE: ICD-10-CM

## 2023-11-13 DIAGNOSIS — Z09 ENCOUNTER FOR FOLLOW-UP EXAMINATION AFTER COMPLETED TREATMENT FOR CONDITIONS OTHER THAN MALIGNANT NEOPLASM: ICD-10-CM

## 2023-11-13 DIAGNOSIS — Z87.39 HISTORY OF GOUT: Primary | ICD-10-CM

## 2023-11-13 DIAGNOSIS — C50.811 MALIGNANT NEOPLASM OF OVERLAPPING SITES OF RIGHT BREAST IN FEMALE, ESTROGEN RECEPTOR POSITIVE: ICD-10-CM

## 2023-11-13 LAB
ALBUMIN SERPL-MCNC: 3.8 G/DL (ref 3.5–5.2)
ALBUMIN/GLOB SERPL: 1.5 G/DL
ALP SERPL-CCNC: 71 U/L (ref 39–117)
ALT SERPL W P-5'-P-CCNC: 12 U/L (ref 1–33)
ANION GAP SERPL CALCULATED.3IONS-SCNC: 11.9 MMOL/L (ref 5–15)
AST SERPL-CCNC: 18 U/L (ref 1–32)
BASOPHILS # BLD AUTO: 0.04 10*3/MM3 (ref 0–0.2)
BASOPHILS NFR BLD AUTO: 0.5 % (ref 0–1.5)
BILIRUB SERPL-MCNC: 0.3 MG/DL (ref 0–1.2)
BUN SERPL-MCNC: 31 MG/DL (ref 8–23)
BUN/CREAT SERPL: 21.5 (ref 7–25)
CALCIUM SPEC-SCNC: 9.3 MG/DL (ref 8.6–10.5)
CHLORIDE SERPL-SCNC: 101 MMOL/L (ref 98–107)
CO2 SERPL-SCNC: 26.1 MMOL/L (ref 22–29)
CREAT SERPL-MCNC: 1.44 MG/DL (ref 0.57–1)
DEPRECATED RDW RBC AUTO: 45.1 FL (ref 37–54)
EGFRCR SERPLBLD CKD-EPI 2021: 38.2 ML/MIN/1.73
EOSINOPHIL # BLD AUTO: 0.2 10*3/MM3 (ref 0–0.4)
EOSINOPHIL NFR BLD AUTO: 2.5 % (ref 0.3–6.2)
ERYTHROCYTE [DISTWIDTH] IN BLOOD BY AUTOMATED COUNT: 13.3 % (ref 12.3–15.4)
GLOBULIN UR ELPH-MCNC: 2.5 GM/DL
GLUCOSE SERPL-MCNC: 177 MG/DL (ref 65–99)
HCT VFR BLD AUTO: 35.2 % (ref 34–46.6)
HGB BLD-MCNC: 11.3 G/DL (ref 12–15.9)
IMM GRANULOCYTES # BLD AUTO: 0.03 10*3/MM3 (ref 0–0.05)
IMM GRANULOCYTES NFR BLD AUTO: 0.4 % (ref 0–0.5)
LYMPHOCYTES # BLD AUTO: 2.57 10*3/MM3 (ref 0.7–3.1)
LYMPHOCYTES NFR BLD AUTO: 31.7 % (ref 19.6–45.3)
MCH RBC QN AUTO: 30.1 PG (ref 26.6–33)
MCHC RBC AUTO-ENTMCNC: 32.1 G/DL (ref 31.5–35.7)
MCV RBC AUTO: 93.6 FL (ref 79–97)
MONOCYTES # BLD AUTO: 0.63 10*3/MM3 (ref 0.1–0.9)
MONOCYTES NFR BLD AUTO: 7.8 % (ref 5–12)
NEUTROPHILS NFR BLD AUTO: 4.65 10*3/MM3 (ref 1.7–7)
NEUTROPHILS NFR BLD AUTO: 57.1 % (ref 42.7–76)
NRBC BLD AUTO-RTO: 0 /100 WBC (ref 0–0.2)
PLATELET # BLD AUTO: 240 10*3/MM3 (ref 140–450)
PMV BLD AUTO: 10.4 FL (ref 6–12)
POTASSIUM SERPL-SCNC: 3.9 MMOL/L (ref 3.5–5.2)
PROT SERPL-MCNC: 6.3 G/DL (ref 6–8.5)
RBC # BLD AUTO: 3.76 10*6/MM3 (ref 3.77–5.28)
SODIUM SERPL-SCNC: 139 MMOL/L (ref 136–145)
WBC NRBC COR # BLD: 8.12 10*3/MM3 (ref 3.4–10.8)

## 2023-11-13 PROCEDURE — 80053 COMPREHEN METABOLIC PANEL: CPT | Performed by: INTERNAL MEDICINE

## 2023-11-13 PROCEDURE — 85025 COMPLETE CBC W/AUTO DIFF WBC: CPT | Performed by: INTERNAL MEDICINE

## 2023-11-13 PROCEDURE — 36415 COLL VENOUS BLD VENIPUNCTURE: CPT

## 2023-11-13 RX ORDER — PREDNISONE 5 MG/1
5 TABLET ORAL DAILY
Qty: 30 TABLET | Refills: 5 | Status: SHIPPED | OUTPATIENT
Start: 2023-11-13

## 2023-11-13 NOTE — PROGRESS NOTES
Subjective      REASON FOR CONSULTATION:   1.Right breast cancer invasive low-grade ER positive IL negative HER-2 negative breast cancer at 6:00 with DCIS at 1:00 post biopsy; Simple mastectomy on 5/11/2020 and this shows an invasive cancer measuring 19 x 13 mmI- DCIS present measuring 15 mm margins were widely clear 5 sentinel nodes were negative for metastatic disease still grade 1. No lymphovascular invasion.                             REQUESTING PHYSICIAN: MD Keshia Durán MD    History of Present Illness patient is a 73 y.o. lady with history of a node negative hormone positive right breast cancer on Femara x6 months after joint pains on Arimidex and then switched to tamoxifen in July 2021 due to worsening crippling arthritis for which she had to take prednisone.  Within a week of taking the prednisone her pain dramatically improved.  Since beginning tamoxifen she had recurrence of aches and pains especially in her foot affecting her ability to walk.  We did refer her to rheumatology and she was seen by Dr. Raza for initial evaluation 11/2/2021.  Patient was felt to have polyarthritis with further work-up ensuing.  Patient was placed on another prednisone pack with improvement.  At this point she is continuing on tamoxifen with good tolerance and then had TAVR last month and postoperatively developed swelling of the right leg for which she had prednisone 20 mg taper with good improvement and since has been off prednisone now her left ankle is swollen.    She had Dopplers which were negative and is on Eliquis and Plavix I doubt very much she has any DVTs    She is here with her  who tells me her memory is slowly getting worse and she is now on Aricept      Her rash has not recurred since stopping her allopurinol and she has had no further gout    Hemoglobin is back to normal after  TAVR and apart from creatinine 1.4 and glucose  of 177 her labs look good  she denies other concerns at this time.  Mammogram is due in May 2024  And a DEXA scan soon  Past Medical History:   Diagnosis Date    Asthma     Breast cancer 2020    right IDC    Cataract 2010    on both  retinas    Coronary artery disease 01/10/2015    Pacemaker 2015    Diabetes mellitus     Type 2    Diverticulosis 2021    Heart murmur     History of asthma     History of colon polyps     History of complete heart block     paced in     Hyperlipidemia     Hypertension     Hypothyroidism     Myocardial infarction     Obesity     Osteoarthritis     Osteopenia     Pacemaker     PONV (postoperative nausea and vomiting)     Pulmonary embolism         Past Surgical History:   Procedure Laterality Date    ADENOIDECTOMY      AORTIC VALVE REPAIR/REPLACEMENT  2023    BLADDER REPAIR  2012    BREAST BIOPSY Right 2020    malignant    CARDIAC PACEMAKER PLACEMENT      CARPAL TUNNEL RELEASE Right 2015    CATARACT EXTRACTION Bilateral      SECTION      x2    COLONOSCOPY      COLONOSCOPY N/A 2021    Procedure: COLONOSCOPY;  Surgeon: Yamilet Stewart MD;  Location: [facility] ENDOSCOPY;  Service: Gastroenterology;  Laterality: N/A;  pre- hx polyps  post-- diverticulosis, hemorrhoids    CORONARY ANGIOPLASTY WITH STENT PLACEMENT  2023    ENDOMETRIAL ABLATION      HAND SURGERY      HERNIA REPAIR      Bilateral femoral and inguinal    JOINT REPLACEMENT  2007    Left knee    KNEE ARTHROPLASTY UNICOMPARTMENTAL Right 2022    KNEE ARTHROSCOPY Bilateral     MASTECTOMY  2020    MASTECTOMY WITH SENTINEL NODE BIOPSY AND AXILLARY NODE DISSECTION Right 2020    Procedure: RIGHT BREAST MASTECTOMY WITH SENTINEL NODE BIOPSY;  Surgeon: Bj Stephen MD;  Location: [facility] OR [facility] [place];  Service: General;  Laterality: Right;    OOPHORECTOMY      SHOULDER ARTHROSCOPY Right 2016    rotator cuff repair, biceps tenotomy    THYROIDECTOMY, PARTIAL  2005     TONSILLECTOMY AND ADENOIDECTOMY      TOTAL KNEE ARTHROPLASTY Left 2007    TUBAL ABDOMINAL LIGATION      UMBILICAL HERNIA REPAIR      patient is a 70-year-old white female with hypercholesterolemia diabetes type 2 hypertension hypothyroidism and pacemaker placement 2015 was noted on routine mammogram in  of this last year to have some abnormalities in the right breast that warranted close follow-up.  This led to a repeat mammogram in January which was delayed due to her trip she took [place] with her  when she came back in February she did the mammogram and this showed persistence and worsening of the calcifications at the 6:00 and 1 o'clock position of the right breast.    This led to a biopsy of both these regions on 3/5/2020 which showed DCIS at the 1 o'clock position intermediate grade with calcifications and no necrosis and well differentiated grade 1 infiltrating ductal carcinoma at the 6 o'clock position measuring 2 mm with no lymph vascular invasion and associated DCIS intermediate grade the tumor was positive for estrogen greater than 95% negative for progesterone 0% HER-2 negative at 1+ Ki-67 of 5%.    Patient was referred to Dr. Stephen who ordered bilateral breast MRIs which showed residual 2 cm area of enhancement at the 6 o'clock position but no further abnormalities at the 1 o'clock position of the right breast.  There is no axillary or internal mammary adenopathy in the left breast was benign.  The mass is not palpable by the patient or Dr. Stephen    Based on the recent coronavirus outbreak which is ongoing elective surgeries for grade 1 breast cancers are being delayed based on guidelines from the hospital and from the oncological community and she is here to discuss neoadjuvant hormonal blockade.    Patient is  3 para 3 first childbirth was age 22 she breast-fed 2 of her 3 children for at least 4 to 6 months menarche was at age 11 menopause at 49 she did not take hormones  after menopause    Family history is positive for paternal grandmother with metastatic breast cancer at age 75 a paternal first cousin with breast cancer at age 70 her father  with bladder cancer mother had endometrial carcinoma at age 80.  She is concerned about genetic testing because she has 3 daughters and 3 grand daughters.    She is not a smoker or drinker and denies DVTs MI or stroke    Because of the delay of surgery due to the coronavirus we decided to do Oncotype on her biopsy and start aromatase inhibitor in the neoadjuvant setting    The side effects and toxicities of the Aromatase inhibitors was discussed with the patient including, hot flashes, mood swings and hair thinning.Significant arthralgias and worsening bone density were also discussed. Baseline bone density evaluation was ordered.  I discussed with the patient and her  on the video conference that this was not the standard of care usually but because of the pandemic and making treatment decisions for the safety of the patient and based on delaying elective surgery for certain subsets of breast cancer which she presented.    I explained the rationale of doing an Oncotype DX score and told her that if she was not willing to take chemotherapy there would be no reason to do this but she is willing to take chemotherapy if she has a high Oncotype score and therefore we will send this results and she will call me in 2 weeks to discuss them    There are no clear guidelines on how to manage patients with clinically node-negative disease with a high Oncotype and we may decide to move up her surgery so that we will have more information with which to make a decision about chemotherapy in order to decide how much chemotherapy to give her    She and her  were willing to go this route and she will call me in 2 weeks to discuss her Oncotype score    I am concerned about her general joint discomfort which might be aggravated by an aromatase  inhibitor but we will deal with that if it occurs    I will also schedule her to see the genetic counselors and they could possibly do a video visit with her also and decide about genetic testing    4/20  Patient is a 70-year-old female with small mammographically detected right breast cancer ER positive VT negative and we opted to start Arimidex preoperatively because surgeries are being delayed for the coronavirus pandemic.  We will also ask for an Oncotype DX to be done on her biopsy specimen to help us make decisions about further treatment.  Unfortunately the specimen was too small and the Oncotype could not be done.  In addition she developed gout in her foot after 2 or 3 doses of Arimidex which I do not think is related to the Arimidex and she was evaluated by her daughter who is a physician who did not think there was any infection she tried nonsteroidals with some improvement but she still not better and has not yet restarted her Arimidex    In light of all these findings I have given her a short prednisone bolus to see if this will help the gout and at this point I think we should move ahead with surgery in the next 3 to 4 weeks and hold off on the Arimidex until after surgery so we can get all the information we need at the time of surgery    She is okay with this approach and I have called her surgeon and her cardiologist to clear her for surgery    She will need to stop her nonsteroidals at least a week before surgery and hopefully the steroids will help the foot enough that she does not have to take the nonsteroidals    She has had no fever or other signs of infection.    Simple mastectomy on 5/11/2020 and this shows an invasive cancer measuring 19 x 13 mmI- DCIS present measuring 15 mm margins were widely clear 5 sentinel nodes were negative for metastatic disease still grade 1. No lymphovascular invasion.    Patient discontinuing Arimidex in May 2020 due to concerns for contributing to gout.   Oncotype DX score returning 22.  Patient beginning Femara late May 2020.    Patient switched to tamoxifen in July 2021 due to significant arthralgias with Femara.    Current Outpatient Medications on File Prior to Visit   Medication Sig Dispense Refill    Acetaminophen (TYLENOL PO) Take 500 mg by mouth 2 (Two) Times a Day.      albuterol sulfate  (90 Base) MCG/ACT inhaler Inhale 2 puffs Every 4 (Four) Hours As Needed for Wheezing or Shortness of Air. 18 g 0    atorvastatin (LIPITOR) 40 MG tablet Take 1 tablet by mouth every night at bedtime.      bumetanide (BUMEX) 2 MG tablet Take 1 tablet by mouth 2 (Two) Times a Day.      clopidogrel (PLAVIX) 75 MG tablet Take 1 tablet by mouth Daily.      clotrimazole-betamethasone (LOTRISONE) 1-0.05 % cream As Needed.      diphenhydrAMINE (Sominex Nighttime Sleep-Aid) 25 MG tablet 1 tablet.      donepezil (ARICEPT) 10 MG tablet Take 1 tablet by mouth Every Night.      Eliquis 5 MG tablet tablet 1 tablet.      glimepiride (AMARYL) 2 MG tablet 0.5 tablets.      glucose blood test strip ACCU-CHECK SKYLA. Use to check blood sugar once daily, as directed. DX: E11.9      irbesartan (AVAPRO) 75 MG tablet TAKE 1 TABLET BY MOUTH ONE TIME DAILY      Lancets (ACCU-CHEK MULTICLIX) lancets Use to check blood sugar once daily, as directed. DX: 250.00      levothyroxine (SYNTHROID, LEVOTHROID) 125 MCG tablet Take 1 tablet by mouth.      LORATADINE PO 10 mg.      metoprolol succinate XL (TOPROL-XL) 25 MG 24 hr tablet Take 1 tablet by mouth 2 (Two) Times a Day.      nitroglycerin (NITROSTAT) 0.4 MG SL tablet Place 1 tablet under the tongue.      tamoxifen (NOLVADEX) 20 MG chemo tablet TAKE 1 TABLET BY MOUTH EVERY DAY 30 tablet 6    [DISCONTINUED] predniSONE (DELTASONE) 20 MG tablet Take 1 tablet by mouth 2 (Two) Times a Day With Meals.      [DISCONTINUED] zinc gluconate 50 MG tablet Take 1 tablet by mouth Daily.       No current facility-administered medications on file prior to visit.     "    ALLERGIES:    Allergies   Allergen Reactions    Indomethacin Nausea Only and Dizziness     EAR RINGING      Nutritional Supplements Swelling     [place] nuts - no other nuts that she is aware of    Penicillins Anaphylaxis, Hives, Shortness Of Breath and Itching      tachycardia      Statins Unknown - High Severity    Hydrochlorothiazide Unknown - Low Severity     Caused gout symptoms      Lisinopril Cough    Morphine Nausea Only             Letrozole Unknown - Low Severity    Pentazocine Nausea And Vomiting               Social History     Socioeconomic History    Marital status:      Spouse name: Sean   Tobacco Use    Smoking status: Never    Smokeless tobacco: Never   Vaping Use    Vaping Use: Never used   Substance and Sexual Activity    Alcohol use: Yes     Alcohol/week: 1.0 standard drink of alcohol     Types: 1 Standard drinks or equivalent per week     Comment: or less    Drug use: Never    Sexual activity: Yes     Partners: Male     Comment: endometrial [id number]        Family History   Problem Relation Age of Onset    Cervical cancer Mother     Alzheimer's disease Mother     Arthritis Mother     Other Mother         Uterine cancer    Cancer Father         Bladder    Heart disease Father     Hypertension Father     Arthritis Father         Breadt cancer    Alcohol abuse Father              Hyperlipidemia Father     Depression Father     Diabetes Father         \"borderline\"    Arthritis Maternal Grandmother     Heart disease Other         FH of heart disease in males before age 55    Hyperlipidemia Brother     Hypertension Brother     Breast cancer Paternal Grandmother 75    Heart attack Paternal Grandfather     Breast cancer Paternal Cousin 70    Alcohol abuse Maternal Grandfather              Depression Brother         since     Malig Hyperthermia Neg Hx         Review of Systems   Constitutional:  Negative for diaphoresis.   Respiratory: Negative.  Negative for " "shortness of breath.    Cardiovascular:  Negative for chest pain, palpitations and leg swelling.   Musculoskeletal:  Positive for arthralgias.   Skin:  Negative for rash.   Neurological:  Negative for dizziness, weakness and headaches.   Psychiatric/Behavioral:  Positive for decreased concentration.    All other systems reviewed and are negative.       Objective   Vitals:    11/13/23 1300   BP: 116/72   Pulse: 70   Resp: 16   Temp: 97.5 °F (36.4 °C)   TempSrc: Temporal   SpO2: 96%   Weight: 88 kg (194 lb)   Height: 154.9 cm (60.98\")   PainSc:   2   PainLoc: Foot  Comment: left feet swollen           11/13/2023    12:54 PM   Current Status   ECOG score 0       Physical exam  This patient's ACP documentation is up to date, and there's nothing further left to document.    CONSTITUTIONAL:  Vital signs reviewed.  No distress, looks comfortable.  EYES:  Conjunctiva and lids unremarkable.  PERRLA  EARS,NOSE,MOUTH,THROAT:  Ears and nose appear unremarkable. Mask in place.  RESPIRATORY:  Normal respiratory effort.  Lungs clear to auscultation bilaterally.  BREASTS: Right chest wall benign; left breast shows no palpable masses, fibrocystic nodularity at 5:00  CARDIOVASCULAR:  Normal S1, S2.  No murmurs rubs or gallops.  No significant lower extremity edema.  Left ankle swollen and tender  GASTROINTESTINAL: Abdomen appears unremarkable.  Nontender.  No hepatomegaly.  No splenomegaly.  LYMPHATIC:  No cervical, supraclavicular, axillary lymphadenopathy.  SKIN:  Warm.  No rashes.  PSYCHIATRIC:  Normal judgment and insight.  Normal mood and affect.     I have reexamined the patient and the results are consistent with the previously documented exam except as updated. Syed Jasso MD       RECENT LABS:  Results from last 7 days   Lab Units 11/13/23  1248   WBC 10*3/mm3 8.12   NEUTROS ABS 10*3/mm3 4.65   HEMOGLOBIN g/dL 11.3*   HEMATOCRIT % 35.2   PLATELETS 10*3/mm3 240       Results from last 7 days   Lab Units " 11/13/23  1248   SODIUM mmol/L 139   POTASSIUM mmol/L 3.9   CHLORIDE mmol/L 101   CO2 mmol/L 26.1   BUN mg/dL 31*   CREATININE mg/dL 1.44*   CALCIUM mg/dL 9.3   ALBUMIN g/dL 3.8   BILIRUBIN mg/dL 0.3   ALK PHOS U/L 71   ALT (SGPT) U/L 12   AST (SGOT) U/L 18   GLUCOSE mg/dL 177*           Component      Latest Ref Rng & Units 8/20/2020 9/4/2020 10/13/2021 1/17/2022   Uric Acid      2.4 - 5.7 mg/dL 6.5 (H) 5.5 7.2 (H) 7.3 (H)       SPECIMEN   Procedure  Total mastectomy    Specimen Laterality  Right    TUMOR   Tumor Site  Lower inner quadrant    Clock Position of Tumor Site  6 o'clock    Histologic Type  Invasive carcinoma of no special type (invasive ductal carcinoma, not otherwise specified)    Glandular (Acinar) / Tubular Differentiation  Score 2    Nuclear Pleomorphism  Score 2    Mitotic Rate  Score 1    Overall Grade  Grade 1 (scores of 3, 4 or 5)    Tumor Size  Greatest dimension of largest invasive focus (Millimeters): 19 mm   Additional Dimension (Millimeters)  13 mm     10 mm   Tumor Focality  Single focus of invasive carcinoma    Ductal Carcinoma In Situ (DCIS)  Present      Negative for extensive intraductal component (EIC)    Size (Extent) of DCIS  Estimated size (extent) of DCIS is at least (Millimeters): 15 mm   Architectural Patterns  Cribriform      Papillary      Solid    Nuclear Grade  Grade II (intermediate)    Necrosis  Present, focal (small foci or single cell necrosis)    Lobular Carcinoma In Situ (LCIS)  No LCIS in specimen    Tumor Extent     Lymphovascular Invasion  Not identified    Dermal Lymphovascular Invasion  Not identified    Microcalcifications  Present in DCIS      Present in invasive carcinoma      Present in non-neoplastic tissue    Treatment Effect  No known presurgical therapy    MARGINS   Invasive Carcinoma Margins  Uninvolved by invasive carcinoma    Distance from Closest Margin (Millimeters)  10 mm   Closest Margin(s)  Lateral    Distance from Other Margins     Anterior Margin  (Millimeters)  40 mm   Posterior Margin (Millimeters)  45 mm   Superior Margin (Millimeters)  18 mm   Inferior Margin (Millimeters)  13 mm   Medial Margin (Millimeters)  22 mm   Lateral Margin (Millimeters)  10 mm   DCIS Margins  Uninvolved by DCIS    Distance from Closest Margin (Millimeters)  14 mm   Closest Margin(s)  Superior      Inferior    Distance from Other Margins     Anterior Margin (Millimeters)  20 mm   Posterior Margin (Millimeters)  40 mm   Superior Margin (Millimeters)  14 mm   Inferior Margin (Millimeters)  14 mm   Medial Margin (Millimeters)  30 mm   Lateral Margin (Millimeters)  21 mm   LYMPH NODES   Regional Lymph Nodes  Uninvolved by tumor cells    Total Number of Lymph Nodes Examined  5    Number of [place] Nodes Examined  5    PATHOLOGIC STAGE CLASSIFICATION (pTNM, AJCC 8th Edition)   Primary Tumor (pT)  pT1c    Regional Lymph Nodes Modifier  (sn): Only sentinel node(s) evaluated.    Regional Lymph Nodes (pN)  pN0    .   Breast Biomarker Reporting Template   Breast.Bmk - 1   Protocol posted: 8/28/2019   Test(s) Performed     Estrogen Receptor (ER) Status  Positive    Percentage of Cells with Nuclear Positivity  %    Average Intensity of Staining  Strong    Test Type  Food and Drug Administration (FDA) cleared (test / vendor): [employer]    Primary Antibody  SP1    Scoring System  Ella    Proportion Score  5    Intensity Score  3    Total Ella Score  8    Test(s) Performed     Progesterone Receptor (PgR) Status  Positive    Percentage of Cells with Nuclear Positivity  5 %   Average Intensity of Staining  Moderate    Test Type  Food and Drug Administration (FDA) cleared (test / vendor): [employer] [place]    Primary Antibody  1E2    Scoring System  Ella    Proportion Score  2    Intensity Score  2    Total Ella Score  4    Test(s) Performed     HER2 by Immunohistochemistry  Equivocal (Score 2+)    Percentage of Cells with Uniform Intense Complete Membrane Staining  0 %   Test Type  Food and  Drug Administration (FDA) cleared (test / vendor): [employer]    Primary Antibody  4B5    Test(s) Performed  Ki-67    Percentage of Positive Nuclei  10 %   Primary Antibody           Assessment & Plan   1.cT1cN0 grade 1 infiltrating ductal carcinoma right breast at 6:00 ER positive NE negative HER-2 negative with associated intermediate grade DCIS the 1 o'clock position of the breast both lesions nonpalpable  pT1cN0 grade 1 ER positive NE negative HER-2 negative post mastectomy on 5/11/2020  Oncotype DX score 22  Patient discontinued Arimidex in early May 2020 and soon thereafter began Femara 2.5 mg daily.  Discontinued Femara in December due to worsening hot flashes and difficulty sleeping  Gabapentin added in 2/21 with Femara resumed  Tamoxifen started in 7/21  Worsening joint pains, referred to rheumatology evaluation-rheumatoid factor and RUSSELL negative. Nieves continued.  1/17/2022 patient continues on tamoxifen with it being difficult to assess true tolerance as she has been on a significant amount of steroids over the last month and a half (see discussion below).  She will continue for now and monitor for worsening arthralgias  Tolerating tamoxifen well as of 5/23  Tolerating tamoxifen well as of 11/23 currently on Plavix and Eliquis after TAVR in 10/23    2.  Hypothyroidism and type 2 diabetes managed by Dr. Wynn.    3.  Significant degenerative arthritis  Referred to rheumatology, seeing Dr. Raza November 2021.  Treated with another Pred pack.  Patient reports being seen again 1/10/2022.  We have not yet have these records.  Patient has not started any additional medication for her arthritis at this time.    4.  Family history of breast cancer.  Genetics testing negative.  5.  Normal bone density in 2018    6.  History of recurrent gout.    Patient most recently on allopurinol 300 mg daily prescribed per PCP.   Patient self discontinued this after she developed significant systemic rash with sloughing of her  skin in 12/2021.  1/17/2022 repeat uric acid level 7.3 today. Stable though still elevated. Unclear if patient may benefit from different uric acid reducer such as Uloric. Defer to PCP or Dr. Raza.  Rheumatoid negative polyarthropathy responsive to steroids we will add prednisone 5 mg 11/23    7.  New onset anemia as of 11/2021?  Etiology  Anemia labs performed 11/1/2021 with no evidence of iron or B12 deficiency.  ?  Perhaps element of mild CKD  1/17/2022 Hemoglobin is stable currently at 10.4 and we will monitor.  Hemoglobin stable at 11.4    8. MODERATE AORTIC STENOSIS by echo in 5/22  Had TAVR in 10/23    9.  Aricept started for mild memory issues    10. Incidentally found PE on CT A prior to TAVR- on eliquis since 10/23    PLAN:  1.  Continue tamoxifen 20 mg  2.   Avoid allopurinol  3.  Prednisone 10 mg for 3 days followed by 5 mg daily   4.patient due for screening left mammogram in May 2024,   5.  DEXA scan soon.    6. Patient will return 6 mofor follow-up with Dr. Jasso with CBC, CMP    I encouraged him to follow-up with Dr. Raza for rheumatological care if her joint pain improves with prednisone again

## 2023-12-07 ENCOUNTER — TELEPHONE (OUTPATIENT)
Dept: FAMILY MEDICINE CLINIC | Facility: CLINIC | Age: 73
End: 2023-12-07

## 2023-12-07 NOTE — TELEPHONE ENCOUNTER
Caller: Sean Berg    Relationship to patient: Emergency Contact    Best call back number: [phone]     Patient is needing: PATIENT SPOUSE CALLING, SWELLING HAS RETURNED AFTER COMPLETING PREDNISONE, WOULD LIKE TO DISCUSS FURTHER TREATMENT.

## 2023-12-14 ENCOUNTER — OFFICE VISIT (OUTPATIENT)
Dept: FAMILY MEDICINE CLINIC | Facility: CLINIC | Age: 73
End: 2023-12-14
Payer: MEDICARE

## 2023-12-14 VITALS
SYSTOLIC BLOOD PRESSURE: 105 MMHG | WEIGHT: 191 LBS | HEIGHT: 61 IN | OXYGEN SATURATION: 96 % | HEART RATE: 72 BPM | DIASTOLIC BLOOD PRESSURE: 65 MMHG | TEMPERATURE: 97.7 F | BODY MASS INDEX: 36.06 KG/M2

## 2023-12-14 DIAGNOSIS — E03.9 ACQUIRED HYPOTHYROIDISM: ICD-10-CM

## 2023-12-14 DIAGNOSIS — M79.672 LEFT FOOT PAIN: Primary | ICD-10-CM

## 2023-12-14 LAB — URATE SERPL-MCNC: 12.8 MG/DL (ref 2.4–5.7)

## 2023-12-14 NOTE — PROGRESS NOTES
"Chief Complaint  Leg Swelling (F/u L leg swelling, spouse states that once pt has completed steroids pt leg swelling returns )    Subjective        Mary Jo Berg presents to [facility]  History of Present Illness  pt here with spouse and she tells me only at his behest for left foot pain, swelling.  Pt says her foot is not painful and she does not notice swelling.  Spouse says that swelling returns every time pt stops steroids and concerned for gout.  Did not have UA with last labs-I ordered it-willing to get it done today.  She says it does not limit her activity.  She denies left knee pain.  Is sedentary during day with feet in dependent position.  Monitoring weight and breathing s/p cardiac surgery.  She has just started cardiac rehab, but spouse concerned that pt is not wanting to go. Has gone only weekly-she just really has not wanted to go.  She cannot really give reason other than she doesn't think it is beneficial.  She denies not going to rehab due to pain, fatigue, depression, bowel issues.      Over last 2 weeks pt has had 4 episodes of stool urgency with incontinence.  Spouse doubled up on donepezil and has not helped.  She o/w has had normal stools, usually once per day.  Sometimes a little loose and not always formed, but not abnormal.  She is not sure what caused incontinence.      O/W no med changes.  She is followed by endo and had recent therapeutic TSH.       Objective   Vital Signs:  /65   Pulse 72   Temp 97.7 °F (36.5 °C)   Ht 154.9 cm (61\")   Wt 86.6 kg (191 lb)   SpO2 96%   BMI 36.09 kg/m²   Estimated body mass index is 36.09 kg/m² as calculated from the following:    Height as of this encounter: 154.9 cm (61\").    Weight as of this encounter: 86.6 kg (191 lb).               Physical Exam  Vitals and nursing note reviewed.   Constitutional:       General: She is not in acute distress.     Appearance: She is well-developed. She is not " ill-appearing or diaphoretic.   HENT:      Head: Normocephalic and atraumatic.   Eyes:      General:         Right eye: No discharge.         Left eye: No discharge.      Conjunctiva/sclera: Conjunctivae normal.   Cardiovascular:      Rate and Rhythm: Normal rate and regular rhythm.      Heart sounds: Murmur heard.   Pulmonary:      Effort: Pulmonary effort is normal.      Breath sounds: Normal breath sounds.   Abdominal:      General: Bowel sounds are normal.      Palpations: Abdomen is soft.      Tenderness: There is no abdominal tenderness.   Musculoskeletal:         General: No deformity.      Comments: Left foot mildly swollen and pink diffusely around first MTC joint  Antalgic gait   Skin:     General: Skin is warm and dry.   Neurological:      Mental Status: She is alert and oriented to person, place, and time.   Psychiatric:      Comments: Pt appears angry with spouse-disputes spouse's version of complaints, verbalizes that she does not like being told what to do        Result Review :                   Assessment and Plan   Diagnoses and all orders for this visit:    1. Left foot pain (Primary)  -     Uric acid    2. Acquired hypothyroidism    Left foot pain-will get uric acid which was previously ordered but not obtained.  Pt does not seem to be limiting ambulation now and is agreeable to await results prior to starting tx,  She has had several rounds of steroids previously with improvement in sx.    Adequate hydration discussed richardson on diuretic which can exacerbate gout.      Recommend counseling for more effective communication between spouse and pt.  They will take this under advisement.     Reviewed recent TSH which was therapeutic. I font think this is likely causing her malaise or stool issues.  If she was doubled up on Aricept it may have made her groggy enough that she was not able to get to BR,  Will monitor.      Discussed with pt importance of cardiac rehab and strongly advise compliance.  If she  thinks it is too easy-she should discuss with therapist.        I spent 40 minutes caring for Mary Jo on this date of service. This time includes time spent by me in the following activities:preparing for the visit, reviewing tests, obtaining and/or reviewing a separately obtained history, performing a medically appropriate examination and/or evaluation , counseling and educating the patient/family/caregiver, ordering medications, tests, or procedures, and documenting information in the medical record  Follow Up   No follow-ups on file.  Patient was given instructions and counseling regarding her condition or for health maintenance advice. Please see specific information pulled into the AVS if appropriate.

## 2023-12-18 RX ORDER — TAMOXIFEN CITRATE 20 MG/1
20 TABLET ORAL DAILY
Qty: 30 TABLET | Refills: 2 | Status: SHIPPED | OUTPATIENT
Start: 2023-12-18

## 2023-12-18 RX ORDER — TAMOXIFEN CITRATE 20 MG/1
TABLET ORAL
Refills: 6 | OUTPATIENT
Start: 2023-12-18

## 2023-12-18 NOTE — TELEPHONE ENCOUNTER
Caller: Kailey Berg    Relationship: Emergency Contact    Best call back number: [phone]    Requested Prescriptions:   Requested Prescriptions     Pending Prescriptions Disp Refills    tamoxifen (NOLVADEX) 20 MG chemo tablet 30 tablet 6     Sig: Take 1 tablet by mouth Daily.        Pharmacy where request should be sent: [id number] [place], [place] - [address] AT [facility], [place] [phone] [facility] [phone]      Last office visit with prescribing clinician: 11/13/2023   Last telemedicine visit with prescribing clinician: Visit date not found   Next office visit with prescribing clinician: 5/13/2024     Additional details provided by patient: KAILEY STATED THAT PATIENT WOULD LIKE A 30 DAY SUPPLY NOW INSTEAD OF 90 DAY SUPPLY SINCE SHE WILL HAVE PHARMACY COVERAGE AGAIN IN JANUARY.     Does the patient have less than a 3 day supply:  [x] Yes  [] No    Would you like a call back once the refill request has been completed: [] Yes [x] No    If the office needs to give you a call back, can they leave a voicemail: [] Yes [x] No

## 2023-12-19 DIAGNOSIS — E79.0 HYPERURICEMIA: Primary | ICD-10-CM

## 2023-12-19 RX ORDER — ALLOPURINOL 100 MG/1
100 TABLET ORAL DAILY
Qty: 30 TABLET | Refills: 1 | Status: SHIPPED | OUTPATIENT
Start: 2023-12-19

## 2024-01-01 ENCOUNTER — TELEPHONE (OUTPATIENT)
Dept: FAMILY MEDICINE CLINIC | Facility: CLINIC | Age: 74
End: 2024-01-01

## 2024-01-03 ENCOUNTER — LAB (OUTPATIENT)
Dept: LAB | Facility: HOSPITAL | Age: 74
End: 2024-01-03
Payer: MEDICARE

## 2024-01-03 ENCOUNTER — HOSPITAL ENCOUNTER (OUTPATIENT)
Dept: BONE DENSITY | Facility: HOSPITAL | Age: 74
Discharge: HOME | End: 2024-01-03
Payer: MEDICARE

## 2024-01-03 DIAGNOSIS — Z09 ENCOUNTER FOR FOLLOW-UP EXAMINATION AFTER COMPLETED TREATMENT FOR CONDITIONS OTHER THAN MALIGNANT NEOPLASM: ICD-10-CM

## 2024-01-03 DIAGNOSIS — Z87.39 HISTORY OF GOUT: ICD-10-CM

## 2024-01-03 PROCEDURE — 77080 DXA BONE DENSITY AXIAL: CPT

## 2024-01-03 PROCEDURE — 80048 BASIC METABOLIC PNL TOTAL CA: CPT | Performed by: NURSE PRACTITIONER

## 2024-01-03 PROCEDURE — 84550 ASSAY OF BLOOD/URIC ACID: CPT | Performed by: NURSE PRACTITIONER

## 2024-01-04 DIAGNOSIS — E79.0 HYPERURICEMIA: Primary | ICD-10-CM

## 2024-01-04 DIAGNOSIS — N18.31 STAGE 3A CHRONIC KIDNEY DISEASE: ICD-10-CM

## 2024-01-04 RX ORDER — ALLOPURINOL 100 MG/1
200 TABLET ORAL DAILY
Qty: 60 TABLET | Refills: 0 | Status: SHIPPED | OUTPATIENT
Start: 2024-01-04

## 2024-01-24 ENCOUNTER — OFFICE VISIT (OUTPATIENT)
Dept: FAMILY MEDICINE CLINIC | Facility: CLINIC | Age: 74
End: 2024-01-24
Payer: MEDICARE

## 2024-01-24 VITALS
OXYGEN SATURATION: 100 % | TEMPERATURE: 97.3 F | SYSTOLIC BLOOD PRESSURE: 106 MMHG | RESPIRATION RATE: 14 BRPM | DIASTOLIC BLOOD PRESSURE: 56 MMHG | WEIGHT: 184 LBS | BODY MASS INDEX: 34.74 KG/M2 | HEIGHT: 61 IN | HEART RATE: 75 BPM

## 2024-01-24 DIAGNOSIS — I50.30 DIASTOLIC CONGESTIVE HEART FAILURE, UNSPECIFIED HF CHRONICITY: ICD-10-CM

## 2024-01-24 DIAGNOSIS — M79.671 CHRONIC FOOT PAIN, RIGHT: Primary | ICD-10-CM

## 2024-01-24 DIAGNOSIS — R60.9 DEPENDENT EDEMA: ICD-10-CM

## 2024-01-24 DIAGNOSIS — N18.32 ACUTE RENAL FAILURE SUPERIMPOSED ON STAGE 3B CHRONIC KIDNEY DISEASE, UNSPECIFIED ACUTE RENAL FAILURE TYPE: ICD-10-CM

## 2024-01-24 DIAGNOSIS — M10.9 GOUT, UNSPECIFIED CAUSE, UNSPECIFIED CHRONICITY, UNSPECIFIED SITE: ICD-10-CM

## 2024-01-24 DIAGNOSIS — N17.9 ACUTE RENAL FAILURE SUPERIMPOSED ON STAGE 3B CHRONIC KIDNEY DISEASE, UNSPECIFIED ACUTE RENAL FAILURE TYPE: ICD-10-CM

## 2024-01-24 DIAGNOSIS — G89.29 CHRONIC FOOT PAIN, RIGHT: Primary | ICD-10-CM

## 2024-01-24 PROCEDURE — 99214 OFFICE O/P EST MOD 30 MIN: CPT | Performed by: NURSE PRACTITIONER

## 2024-01-24 PROCEDURE — 3078F DIAST BP <80 MM HG: CPT | Performed by: NURSE PRACTITIONER

## 2024-01-24 PROCEDURE — 3074F SYST BP LT 130 MM HG: CPT | Performed by: NURSE PRACTITIONER

## 2024-01-24 NOTE — PATIENT INSTRUCTIONS
Discharge instructions    Elevate legs when sitting  If possible compression socks she may not be able to tolerate these you can try a lower power such as 15 to 20 mmHg knee-high proper fit which may help with the edema and less likely to cause pain but what ever you can tolerate here is great    Labs today, outpatient MRI evaluate chronic foot pain, consider seeing podiatry or second opinion with orthopedic specialist such as Dr. Bey if not already    Continue to get your uric acid less than 6,  Hydrate well,  Should you have increased shortness of breath chest pain increase swelling emergency room,  Otherwise this is likely dependent edema    Should you have unilateral lower extremity edema chronically without a explanation, that you should consider recommend seeing a vascular specialist specialist to rule out other higher level obstructions which rarely can occur    Recommend following up with nephrology if  Hyponatremia still present or if renal function does not return to baseline

## 2024-01-24 NOTE — PROGRESS NOTES
Chief Complaint  Foot Swelling (Pt having swelling and pain in rt foot )    Subjective        Mary Jo Berg presents to [facility]  History of Present Illness  Very pleasant patient here today with her , also  accompanied by their daughter who is a physician practicing in [place] presently on the phone, most of the visit with pt permission , patient had chronic foot pain quite a few months intermittently, just always hurts, with intermittent flares recently is thought that she may have gout  Elevated uric acid levels, allopurinol is being titrated  She is here today because she has had increased swelling bilateral but mostly in the right side and is exacerbated the foot she has no redness no fever no chills no calf pain, no medial thigh pain, no history of DVT PE as well as she presently takes Eliquis for recent valve replacement TAVR history of diastolic heart failure but she is doing well now not thought to be any acute or chronic heart failure presently, denies acute shortness of breath, she has had no weight gain Recently her daughter works with her and helps with adjusting the Bumex and recently Bumex 2 mg twice daily was decreased to once a day but no change in breathing or weight gain and the edema was started prior to this.  She has chronic renal insufficiency, and with the Bumex and diuretics, during treatment for her heart failure with valve replacement needing surgery, we have had some dehydration due to the diuretics and some increased acute on chronic renal insufficiency.  She has seen nephrology in the past.  She does not think she needs to see nephrology at this time.      Daughter is concerned that something else may be going on the foot she thinks is probably gout but is curious why she is having the foot pain so long on whether it is a stress fracture or other possible?  She would like an MRI and no referrals at this time to orthopedic etc., as she is  "seeing orthopedist already they were not impressed with her foot pain, at that time.            Objective   Vital Signs:  /56   Pulse 75   Temp 97.3 °F (36.3 °C) (Infrared)   Resp 14   Ht 154.9 cm (60.98\")   Wt 83.5 kg (184 lb)   SpO2 100%   BMI 34.78 kg/m²   Estimated body mass index is 34.78 kg/m² as calculated from the following:    Height as of this encounter: 154.9 cm (60.98\").    Weight as of this encounter: 83.5 kg (184 lb).            Physical Exam  Vitals reviewed.   Constitutional:       General: She is not in acute distress.     Appearance: Normal appearance. She is well-developed. She is not ill-appearing, toxic-appearing or diaphoretic.      Comments: Pleasant, appears well no distress,   HENT:      Head: Normocephalic.      Nose: Nose normal.   Eyes:      General: No scleral icterus.     Conjunctiva/sclera: Conjunctivae normal.      Pupils: Pupils are equal, round, and reactive to light.   Neck:      Thyroid: No thyromegaly.      Vascular: No JVD.   Cardiovascular:      Rate and Rhythm: Normal rate and regular rhythm.      Heart sounds: Normal heart sounds. No murmur heard.     No friction rub. No gallop.   Pulmonary:      Effort: Pulmonary effort is normal. No respiratory distress.      Breath sounds: Normal breath sounds. No stridor. No wheezing or rales.   Abdominal:      General: Bowel sounds are normal. There is no distension.      Palpations: Abdomen is soft.      Tenderness: There is no abdominal tenderness.      Comments: No hepatosplenomegaly, no ascites,   Musculoskeletal:         General: Swelling and tenderness present.      Cervical back: Neck supple.      Comments: 1+ tibia edema left 1-2+ right right modestly increased compared to left lower extremity, no redness,  Pain, metatarsal pain, ankle pain, as well as the metatarsal joints, otherwise no gross deformity, neurovascular intact, no redness to suggest cellulitis or other worrisome findings.    Negative Homans negative " calf tenderness medial thigh tenderness bilateral,     Lymphadenopathy:      Cervical: No cervical adenopathy.   Skin:     General: Skin is warm and dry.      Findings: No erythema or rash.   Neurological:      Mental Status: She is alert and oriented to person, place, and time.      Deep Tendon Reflexes: Reflexes are normal and symmetric.   Psychiatric:         Behavior: Behavior normal.         Thought Content: Thought content normal.         Judgment: Judgment normal.        Result Review :                Assessment and Plan   Diagnoses and all orders for this visit:    1. Chronic foot pain, right (Primary)  -     MRI foot right wo contrast    2. Dependent edema  -     Basic Metabolic Panel  -     Uric Acid  -     BNP ([facility] Only)  -     CBC & Differential    3. Gout, unspecified cause, unspecified chronicity, unspecified site  -     Basic Metabolic Panel  -     Uric Acid  -     BNP ([facility] Only)  -     CBC & Differential    4. Diastolic congestive heart failure, unspecified HF chronicity  Comments:  Not acute  Orders:  -     Basic Metabolic Panel  -     Uric Acid  -     BNP ([facility] Only)  -     CBC & Differential    5. Acute renal failure superimposed on stage 3b chronic kidney disease, unspecified acute renal failure type  Comments:  Most recent GFR 38             Follow Up   Return 2 weeks elle.  Patient was given instructions and counseling regarding her condition or for health maintenance advice. Please see specific information pulled into the AVS if appropriate.     Patient Instructions   Discharge instructions    Elevate legs when sitting  If possible compression socks she may not be able to tolerate these you can try a lower power such as 15 to 20 mmHg knee-high proper fit which may help with the edema and less likely to cause pain but what ever you can tolerate here is great    Labs today, outpatient MRI evaluate chronic foot pain, consider seeing podiatry or second opinion with orthopedic  specialist such as Dr. Bey if not already    Continue to get your uric acid less than 6,  Hydrate well,  Should you have increased shortness of breath chest pain increase swelling emergency room,  Otherwise this is likely dependent edema    Should you have unilateral lower extremity edema chronically without a explanation, that you should consider recommend seeing a vascular specialist specialist to rule out other higher level obstructions which rarely can occur    Recommend following up with nephrology if  Hyponatremia still present or if renal function does not return to baseline                   Patient reports no fluid restrictions  Plenty of fluids for hydration of kidneys  Deferring generic Bumex to patient's daughter who is a physician has been managing this  Presently 2 mg daily    Unlikely any acute heart failure today she is without shortness of breath, she is doing well,  I do not see the typical presentation of gout here today but certainly it is possible   Eliquis so not worried about any acute DVT  Recent sodium only borderline 134      Outpatient MRI withholding contrast due to renal insufficiency, MRI foot,  Recommend orthopedic Dr. Bey  Should she have chronic unilateral edema or significant edema right leg chronically more than left  She should have a further evaluation to rule out any obstruction higher up or even into the pelvis as discussed      She will follow-up in 2 weeks if she have any other further concerns he should out reach to me immediately on here to help them    See patient instructions    Discharge instructions    Elevate legs when sitting  If possible compression socks she may not be able to tolerate these you can try a lower power such as 15 to 20 mmHg knee-high proper fit which may help with the edema and less likely to cause pain but what ever you can tolerate here is great    Labs today, outpatient MRI evaluate chronic foot pain, consider seeing podiatry or second  opinion with orthopedic specialist such as Dr. Bey if not already    Continue to get your uric acid less than 6,  Hydrate well,  Should you have increased shortness of breath chest pain increase swelling emergency room,  Otherwise this is likely dependent edema    Should you have unilateral lower extremity edema chronically without a explanation, that you should consider recommend seeing a vascular specialist specialist to rule out other higher level obstructions which rarely can occur    Recommend following up with nephrology if  Hyponatremia still present or if renal function does not return to baseline

## 2024-01-25 LAB
BASOPHILS # BLD AUTO: 0 X10E3/UL (ref 0–0.2)
BASOPHILS NFR BLD AUTO: 1 %
BNP SERPL-MCNC: 157.6 PG/ML (ref 0–100)
BUN SERPL-MCNC: 23 MG/DL (ref 8–27)
BUN/CREAT SERPL: 19 (ref 12–28)
CALCIUM SERPL-MCNC: 9.3 MG/DL (ref 8.7–10.3)
CHLORIDE SERPL-SCNC: 101 MMOL/L (ref 96–106)
CO2 SERPL-SCNC: 19 MMOL/L (ref 20–29)
CREAT SERPL-MCNC: 1.24 MG/DL (ref 0.57–1)
EGFRCR SERPLBLD CKD-EPI 2021: 46 ML/MIN/1.73
EOSINOPHIL # BLD AUTO: 0.3 X10E3/UL (ref 0–0.4)
EOSINOPHIL NFR BLD AUTO: 4 %
ERYTHROCYTE [DISTWIDTH] IN BLOOD BY AUTOMATED COUNT: 12.7 % (ref 11.7–15.4)
GLUCOSE SERPL-MCNC: 273 MG/DL (ref 70–99)
HCT VFR BLD AUTO: 34.6 % (ref 34–46.6)
HGB BLD-MCNC: 11.3 G/DL (ref 11.1–15.9)
IMM GRANULOCYTES # BLD AUTO: 0 X10E3/UL (ref 0–0.1)
IMM GRANULOCYTES NFR BLD AUTO: 0 %
LYMPHOCYTES # BLD AUTO: 1.6 X10E3/UL (ref 0.7–3.1)
LYMPHOCYTES NFR BLD AUTO: 25 %
MCH RBC QN AUTO: 30.8 PG (ref 26.6–33)
MCHC RBC AUTO-ENTMCNC: 32.7 G/DL (ref 31.5–35.7)
MCV RBC AUTO: 94 FL (ref 79–97)
MONOCYTES # BLD AUTO: 0.4 X10E3/UL (ref 0.1–0.9)
MONOCYTES NFR BLD AUTO: 7 %
NEUTROPHILS # BLD AUTO: 4.1 X10E3/UL (ref 1.4–7)
NEUTROPHILS NFR BLD AUTO: 63 %
PLATELET # BLD AUTO: 217 X10E3/UL (ref 150–450)
POTASSIUM SERPL-SCNC: 4.2 MMOL/L (ref 3.5–5.2)
RBC # BLD AUTO: 3.67 X10E6/UL (ref 3.77–5.28)
SODIUM SERPL-SCNC: 138 MMOL/L (ref 134–144)
URATE SERPL-MCNC: 7.3 MG/DL (ref 3.1–7.9)
WBC # BLD AUTO: 6.4 X10E3/UL (ref 3.4–10.8)

## 2024-02-08 ENCOUNTER — HOSPITAL ENCOUNTER (OUTPATIENT)
Dept: CARDIOLOGY | Facility: HOSPITAL | Age: 74
Discharge: HOME | End: 2024-02-08
Admitting: NURSE PRACTITIONER
Payer: MEDICARE

## 2024-02-08 ENCOUNTER — OFFICE VISIT (OUTPATIENT)
Dept: FAMILY MEDICINE CLINIC | Facility: CLINIC | Age: 74
End: 2024-02-08
Payer: MEDICARE

## 2024-02-08 VITALS
BODY MASS INDEX: 37.31 KG/M2 | TEMPERATURE: 98.2 F | WEIGHT: 197.6 LBS | HEART RATE: 68 BPM | HEIGHT: 61 IN | RESPIRATION RATE: 12 BRPM | SYSTOLIC BLOOD PRESSURE: 116 MMHG | OXYGEN SATURATION: 92 % | DIASTOLIC BLOOD PRESSURE: 58 MMHG

## 2024-02-08 DIAGNOSIS — M79.89 RIGHT LEG SWELLING: ICD-10-CM

## 2024-02-08 DIAGNOSIS — M79.671 RIGHT FOOT PAIN: Primary | ICD-10-CM

## 2024-02-08 DIAGNOSIS — R05.2 SUBACUTE COUGH: ICD-10-CM

## 2024-02-08 DIAGNOSIS — E13.9 DIABETES 1.5, MANAGED AS TYPE 2: ICD-10-CM

## 2024-02-08 DIAGNOSIS — E79.0 HYPERURICEMIA: ICD-10-CM

## 2024-02-08 DIAGNOSIS — I50.30 DIASTOLIC CONGESTIVE HEART FAILURE, UNSPECIFIED HF CHRONICITY: ICD-10-CM

## 2024-02-08 LAB
BH CV LOWER VASCULAR LEFT COMMON FEMORAL AUGMENT: NORMAL
BH CV LOWER VASCULAR LEFT COMMON FEMORAL COMPETENT: NORMAL
BH CV LOWER VASCULAR LEFT COMMON FEMORAL COMPRESS: NORMAL
BH CV LOWER VASCULAR LEFT COMMON FEMORAL PHASIC: NORMAL
BH CV LOWER VASCULAR LEFT COMMON FEMORAL SPONT: NORMAL
BH CV LOWER VASCULAR RIGHT COMMON FEMORAL AUGMENT: NORMAL
BH CV LOWER VASCULAR RIGHT COMMON FEMORAL COMPETENT: NORMAL
BH CV LOWER VASCULAR RIGHT COMMON FEMORAL COMPRESS: NORMAL
BH CV LOWER VASCULAR RIGHT COMMON FEMORAL PHASIC: NORMAL
BH CV LOWER VASCULAR RIGHT COMMON FEMORAL SPONT: NORMAL
BH CV LOWER VASCULAR RIGHT DISTAL FEMORAL COMPRESS: NORMAL
BH CV LOWER VASCULAR RIGHT GASTRONEMIUS COMPRESS: NORMAL
BH CV LOWER VASCULAR RIGHT GREATER SAPH AK COMPRESS: NORMAL
BH CV LOWER VASCULAR RIGHT GREATER SAPH BK COMPRESS: NORMAL
BH CV LOWER VASCULAR RIGHT LESSER SAPH COMPRESS: NORMAL
BH CV LOWER VASCULAR RIGHT MID FEMORAL AUGMENT: NORMAL
BH CV LOWER VASCULAR RIGHT MID FEMORAL COMPETENT: NORMAL
BH CV LOWER VASCULAR RIGHT MID FEMORAL COMPRESS: NORMAL
BH CV LOWER VASCULAR RIGHT MID FEMORAL PHASIC: NORMAL
BH CV LOWER VASCULAR RIGHT MID FEMORAL SPONT: NORMAL
BH CV LOWER VASCULAR RIGHT PERONEAL COMPRESS: NORMAL
BH CV LOWER VASCULAR RIGHT POPLITEAL AUGMENT: NORMAL
BH CV LOWER VASCULAR RIGHT POPLITEAL COMPETENT: NORMAL
BH CV LOWER VASCULAR RIGHT POPLITEAL COMPRESS: NORMAL
BH CV LOWER VASCULAR RIGHT POPLITEAL PHASIC: NORMAL
BH CV LOWER VASCULAR RIGHT POPLITEAL SPONT: NORMAL
BH CV LOWER VASCULAR RIGHT POSTERIOR TIBIAL COMPRESS: NORMAL
BH CV LOWER VASCULAR RIGHT PROFUNDA FEMORAL COMPRESS: NORMAL
BH CV LOWER VASCULAR RIGHT PROXIMAL FEMORAL COMPRESS: NORMAL
BH CV LOWER VASCULAR RIGHT SAPHENOFEMORAL JUNCTION COMPRESS: NORMAL
BH CV VAS PRELIMINARY FINDINGS SCRIPTING: 1

## 2024-02-08 PROCEDURE — 93971 EXTREMITY STUDY: CPT

## 2024-02-08 NOTE — PROGRESS NOTES
"Chief Complaint  Foot Pain (2 week f/u) and Medicare Wellness-subsequent    Subjective        Mary Jo Berg presents to [facility]  History of Present Illness  pt here for right foot pain that has worsened over the last 2 weeks.  Has leg swelling in right leg that is worse with sitting and not totally resolved with elevation.  She was seen by another provider in this office for same.  There has been an MRI ordered but not yet done.  She was previously seen by orthopedist for right foot pain-but seems that nothing was ever found or done.  She did try compression which has made it worse.      Patient has been dismissed from cardiac rehab because she has missed so many appointments due to right foot pain.    We discussed medications today-which at times has been difficult to manage and it appears that pt has only been taking 100 mg allopurinol and was not aware to increase it to 300 mg.     Pt reports chronic cough x several months-dry but does keep her awake at night.  No orthopnea.  Monitoring weight which has been stable and taking diuretics as directed.  She has no fever, chills, and denies acid reflux.  Does become winded with ambulation but both pt and spouse think this is deconditioning.        Objective   Vital Signs:  /58   Pulse 68   Temp 98.2 °F (36.8 °C) (Temporal)   Resp 12   Ht 154.9 cm (60.98\")   Wt 89.6 kg (197 lb 9.6 oz)   SpO2 92%   BMI 37.36 kg/m²   Estimated body mass index is 37.36 kg/m² as calculated from the following:    Height as of this encounter: 154.9 cm (60.98\").    Weight as of this encounter: 89.6 kg (197 lb 9.6 oz).               Physical Exam  Vitals and nursing note reviewed.   Constitutional:       General: She is not in acute distress.     Appearance: She is well-developed. She is obese. She is not ill-appearing or diaphoretic.   HENT:      Head: Normocephalic and atraumatic.   Eyes:      General:         Right eye: No discharge.    "      Left eye: No discharge.      Conjunctiva/sclera: Conjunctivae normal.   Cardiovascular:      Rate and Rhythm: Normal rate and regular rhythm.      Pulses:           Dorsalis pedis pulses are 1+ on the right side.      Heart sounds: Murmur heard.   Pulmonary:      Effort: Pulmonary effort is normal.      Breath sounds: Normal breath sounds. No wheezing or rales.   Abdominal:      General: Bowel sounds are normal.      Palpations: Abdomen is soft.   Musculoskeletal:         General: No deformity.      Right foot: Decreased range of motion. No deformity.      Comments: Ambulates with walker  Right leg mildly swollen compared to left-no focal pain in leg or foot.  No calf pain or erythema   Feet:      Right foot:      Skin integrity: Erythema and dry skin present. No ulcer or blister.      Toenail Condition: Right toenails are abnormally thick and long.      Comments: Right foot swollen-warm and dry-dependent rubor  Skin:     General: Skin is warm and dry.   Neurological:      Mental Status: She is alert and oriented to person, place, and time.   Psychiatric:      Comments: Pt not good historian due to memory loss-spouse struggling a little with recall of medication mgmt.  Pt and spouse have seemed to be at odds with pt medical mgmt         Result Review :                     Assessment and Plan     Diagnoses and all orders for this visit:    1. Right foot pain (Primary)    2. Right leg swelling  -     Duplex Venous Lower Extremity - Right CAR; Future  -     XR Chest PA & Lateral; Future    3. Subacute cough  -     XR Chest PA & Lateral; Future    4. Hyperuricemia  -     Comprehensive Metabolic Panel    5. 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  No changes made to treatment plan.  MRI pending.  No focal pain.  I think leg swelling is  exacerbating her foot pain and although anticoagulated and unlikely to have DVT I am concerned that there may have been some missed doses since allopurinol has not been given as directed and want to be complete.  Both are agreeable with this plan.    Will get a chest x-ray for her subacute cough-May be able to help identify CHF versus other etiology.  Meticulous monitoring of blood pressure, diuresis and daily weights discussed with family.  Could consider PPI to see if that might be helpful.    Will get a CMP today with decreased renal function and A1c.  Discussed increasing allopurinol to 300 mg although I think this is unlikely gout.  And will put in labs to recheck uric acid level in 2 weeks.    Emphasized signs and symptoms that require emergent evaluation.         Follow Up     No follow-ups on file.  Patient was given instructions and counseling regarding her condition or for health maintenance advice. Please see specific information pulled into the AVS if appropriate.

## 2024-02-09 LAB
ALBUMIN SERPL-MCNC: 4.1 G/DL (ref 3.5–5.2)
ALBUMIN/GLOB SERPL: 1.6 G/DL
ALP SERPL-CCNC: 75 U/L (ref 39–117)
ALT SERPL-CCNC: 10 U/L (ref 1–33)
AST SERPL-CCNC: 17 U/L (ref 1–32)
BILIRUB SERPL-MCNC: 0.3 MG/DL (ref 0–1.2)
BUN SERPL-MCNC: 34 MG/DL (ref 8–23)
BUN/CREAT SERPL: 25.2 (ref 7–25)
CALCIUM SERPL-MCNC: 9.5 MG/DL (ref 8.6–10.5)
CHLORIDE SERPL-SCNC: 99 MMOL/L (ref 98–107)
CO2 SERPL-SCNC: 23.8 MMOL/L (ref 22–29)
CREAT SERPL-MCNC: 1.35 MG/DL (ref 0.57–1)
EGFRCR SERPLBLD CKD-EPI 2021: 41.1 ML/MIN/1.73
GLOBULIN SER CALC-MCNC: 2.5 GM/DL
GLUCOSE SERPL-MCNC: 159 MG/DL (ref 65–99)
HBA1C MFR BLD: 8.5 % (ref 4.8–5.6)
POTASSIUM SERPL-SCNC: 4.5 MMOL/L (ref 3.5–5.2)
PROT SERPL-MCNC: 6.6 G/DL (ref 6–8.5)
SODIUM SERPL-SCNC: 137 MMOL/L (ref 136–145)

## 2024-02-16 RX ORDER — ALLOPURINOL 100 MG/1
200 TABLET ORAL DAILY
Qty: 60 TABLET | Refills: 0 | Status: SHIPPED | OUTPATIENT
Start: 2024-02-16

## 2024-02-17 LAB
HBA1C MFR BLD: 8.1 % (ref 4.8–5.6)
WRITTEN AUTHORIZATION: NORMAL

## 2024-02-27 ENCOUNTER — HOSPITAL ENCOUNTER (OUTPATIENT)
Dept: MRI IMAGING | Facility: HOSPITAL | Age: 74
Discharge: HOME | End: 2024-02-27
Payer: MEDICARE

## 2024-02-27 ENCOUNTER — HOSPITAL ENCOUNTER (OUTPATIENT)
Dept: GENERAL RADIOLOGY | Facility: HOSPITAL | Age: 74
Discharge: HOME | End: 2024-02-27
Payer: MEDICARE

## 2024-02-27 VITALS
BODY MASS INDEX: 34.93 KG/M2 | HEIGHT: 61 IN | SYSTOLIC BLOOD PRESSURE: 107 MMHG | RESPIRATION RATE: 18 BRPM | TEMPERATURE: 98 F | HEART RATE: 84 BPM | DIASTOLIC BLOOD PRESSURE: 47 MMHG | WEIGHT: 185 LBS | OXYGEN SATURATION: 92 %

## 2024-02-27 DIAGNOSIS — M79.89 RIGHT LEG SWELLING: ICD-10-CM

## 2024-02-27 DIAGNOSIS — R05.2 SUBACUTE COUGH: ICD-10-CM

## 2024-02-27 PROCEDURE — 71046 X-RAY EXAM CHEST 2 VIEWS: CPT

## 2024-02-27 PROCEDURE — 73718 MRI LOWER EXTREMITY W/O DYE: CPT

## 2024-03-06 DIAGNOSIS — M79.671 RIGHT FOOT PAIN: Primary | ICD-10-CM

## 2024-03-14 RX ORDER — TAMOXIFEN CITRATE 20 MG/1
20 TABLET ORAL DAILY
Qty: 90 TABLET | Refills: 0 | Status: SHIPPED | OUTPATIENT
Start: 2024-03-14

## 2024-03-28 ENCOUNTER — TELEPHONE (OUTPATIENT)
Dept: ONCOLOGY | Facility: CLINIC | Age: 74
End: 2024-03-28
Payer: MEDICARE

## 2024-03-28 ENCOUNTER — TRANSCRIBE ORDERS (OUTPATIENT)
Dept: ADMINISTRATIVE | Facility: HOSPITAL | Age: 74
End: 2024-03-28
Payer: MEDICARE

## 2024-03-28 DIAGNOSIS — Z12.31 VISIT FOR SCREENING MAMMOGRAM: Primary | ICD-10-CM

## 2024-03-28 NOTE — TELEPHONE ENCOUNTER
Caller: Sean Berg    Relationship to patient: Emergency Contact    Best call back number: [phone]     Chief complaint: R/S    Type of visit: LAB & FOLLOW UP 1    Requested date: PLEASE CALL TO R/S FOR A TUESDAY OR THURSDAY IF POSSIBLE.  NO BH VERBAL ON FILE.  CALLER ASKED TO RESCHEDULE THE 5/13 APPT.    If rescheduling, when is the original appointment: 5/13

## 2024-05-09 ENCOUNTER — LAB (OUTPATIENT)
Dept: LAB | Facility: HOSPITAL | Age: 74
End: 2024-05-09
Payer: MEDICARE

## 2024-05-09 ENCOUNTER — OFFICE VISIT (OUTPATIENT)
Dept: ONCOLOGY | Facility: CLINIC | Age: 74
End: 2024-05-09
Payer: MEDICARE

## 2024-05-09 VITALS
BODY MASS INDEX: 35.91 KG/M2 | OXYGEN SATURATION: 99 % | SYSTOLIC BLOOD PRESSURE: 125 MMHG | DIASTOLIC BLOOD PRESSURE: 78 MMHG | RESPIRATION RATE: 18 BRPM | TEMPERATURE: 97.9 F | HEIGHT: 61 IN | HEART RATE: 66 BPM | WEIGHT: 190.2 LBS

## 2024-05-09 DIAGNOSIS — Z17.0 MALIGNANT NEOPLASM OF OVERLAPPING SITES OF RIGHT BREAST IN FEMALE, ESTROGEN RECEPTOR POSITIVE: Primary | ICD-10-CM

## 2024-05-09 DIAGNOSIS — C50.811 MALIGNANT NEOPLASM OF OVERLAPPING SITES OF RIGHT BREAST IN FEMALE, ESTROGEN RECEPTOR POSITIVE: ICD-10-CM

## 2024-05-09 DIAGNOSIS — C50.811 MALIGNANT NEOPLASM OF OVERLAPPING SITES OF RIGHT BREAST IN FEMALE, ESTROGEN RECEPTOR POSITIVE: Primary | ICD-10-CM

## 2024-05-09 DIAGNOSIS — Z17.0 MALIGNANT NEOPLASM OF OVERLAPPING SITES OF RIGHT BREAST IN FEMALE, ESTROGEN RECEPTOR POSITIVE: ICD-10-CM

## 2024-05-09 LAB
ALBUMIN SERPL-MCNC: 3.9 G/DL (ref 3.5–5.2)
ALBUMIN/GLOB SERPL: 1.4 G/DL
ALP SERPL-CCNC: 78 U/L (ref 39–117)
ALT SERPL W P-5'-P-CCNC: 17 U/L (ref 1–33)
ANION GAP SERPL CALCULATED.3IONS-SCNC: 12.8 MMOL/L (ref 5–15)
AST SERPL-CCNC: 33 U/L (ref 1–32)
BASOPHILS # BLD AUTO: 0.04 10*3/MM3 (ref 0–0.2)
BASOPHILS NFR BLD AUTO: 0.7 % (ref 0–1.5)
BILIRUB SERPL-MCNC: 0.3 MG/DL (ref 0–1.2)
BUN SERPL-MCNC: 34 MG/DL (ref 8–23)
BUN/CREAT SERPL: 24.5 (ref 7–25)
CALCIUM SPEC-SCNC: 9.6 MG/DL (ref 8.6–10.5)
CHLORIDE SERPL-SCNC: 100 MMOL/L (ref 98–107)
CO2 SERPL-SCNC: 23.2 MMOL/L (ref 22–29)
CREAT SERPL-MCNC: 1.39 MG/DL (ref 0.57–1)
DEPRECATED RDW RBC AUTO: 52.5 FL (ref 37–54)
EGFRCR SERPLBLD CKD-EPI 2021: 39.7 ML/MIN/1.73
EOSINOPHIL # BLD AUTO: 0.29 10*3/MM3 (ref 0–0.4)
EOSINOPHIL NFR BLD AUTO: 4.8 % (ref 0.3–6.2)
ERYTHROCYTE [DISTWIDTH] IN BLOOD BY AUTOMATED COUNT: 14.9 % (ref 12.3–15.4)
GLOBULIN UR ELPH-MCNC: 2.7 GM/DL
GLUCOSE SERPL-MCNC: 164 MG/DL (ref 65–99)
HCT VFR BLD AUTO: 37.2 % (ref 34–46.6)
HGB BLD-MCNC: 12.1 G/DL (ref 12–15.9)
IMM GRANULOCYTES # BLD AUTO: 0.02 10*3/MM3 (ref 0–0.05)
IMM GRANULOCYTES NFR BLD AUTO: 0.3 % (ref 0–0.5)
LYMPHOCYTES # BLD AUTO: 2.01 10*3/MM3 (ref 0.7–3.1)
LYMPHOCYTES NFR BLD AUTO: 33.3 % (ref 19.6–45.3)
MCH RBC QN AUTO: 31.3 PG (ref 26.6–33)
MCHC RBC AUTO-ENTMCNC: 32.5 G/DL (ref 31.5–35.7)
MCV RBC AUTO: 96.4 FL (ref 79–97)
MONOCYTES # BLD AUTO: 0.42 10*3/MM3 (ref 0.1–0.9)
MONOCYTES NFR BLD AUTO: 7 % (ref 5–12)
NEUTROPHILS NFR BLD AUTO: 3.26 10*3/MM3 (ref 1.7–7)
NEUTROPHILS NFR BLD AUTO: 53.9 % (ref 42.7–76)
NRBC BLD AUTO-RTO: 0 /100 WBC (ref 0–0.2)
PLATELET # BLD AUTO: 215 10*3/MM3 (ref 140–450)
PMV BLD AUTO: 10.2 FL (ref 6–12)
POTASSIUM SERPL-SCNC: 4.2 MMOL/L (ref 3.5–5.2)
PROT SERPL-MCNC: 6.6 G/DL (ref 6–8.5)
RBC # BLD AUTO: 3.86 10*6/MM3 (ref 3.77–5.28)
SODIUM SERPL-SCNC: 136 MMOL/L (ref 136–145)
WBC NRBC COR # BLD AUTO: 6.04 10*3/MM3 (ref 3.4–10.8)

## 2024-05-09 PROCEDURE — 85025 COMPLETE CBC W/AUTO DIFF WBC: CPT

## 2024-05-09 PROCEDURE — 36415 COLL VENOUS BLD VENIPUNCTURE: CPT

## 2024-05-09 PROCEDURE — 80053 COMPREHEN METABOLIC PANEL: CPT

## 2024-05-09 RX ORDER — MEMANTINE HYDROCHLORIDE 5 MG/1
5 TABLET ORAL 2 TIMES DAILY
COMMUNITY
Start: 2024-03-07 | End: 2024-09-03

## 2024-05-09 RX ORDER — COLCHICINE 0.6 MG/1
0.6 TABLET ORAL
COMMUNITY
Start: 2024-04-02 | End: 2024-08-01

## 2024-05-21 ENCOUNTER — TELEPHONE (OUTPATIENT)
Dept: ONCOLOGY | Facility: CLINIC | Age: 74
End: 2024-05-21
Payer: MEDICARE

## 2024-05-21 NOTE — TELEPHONE ENCOUNTER
Caller: JOHN PAUL    Relationship: Spouse    Best call back number:  [phone]    What is the best time to reach you: ASAP    Who are you requesting to speak with (clinical staff, provider,  specific staff member): SCHEDULING      What was the call regarding: PATIENT'S , JOHN PAUL, NOT LISTED ON  VERBAL , IS ASKING TO CHANGE THE PATIENT'S APPT FROM 10/24 TO A DIFFERENT DAY.  PLEASE CALL PT TO GET THIS RESCHEDULED.

## 2024-05-28 ENCOUNTER — HOSPITAL ENCOUNTER (OUTPATIENT)
Dept: MAMMOGRAPHY | Facility: HOSPITAL | Age: 74
Discharge: HOME | End: 2024-05-28
Admitting: SURGERY
Payer: MEDICARE

## 2024-05-28 DIAGNOSIS — Z12.31 VISIT FOR SCREENING MAMMOGRAM: ICD-10-CM

## 2024-05-28 PROCEDURE — 77067 SCR MAMMO BI INCL CAD: CPT

## 2024-05-28 PROCEDURE — 77063 BREAST TOMOSYNTHESIS BI: CPT

## 2024-05-29 ENCOUNTER — DOCUMENTATION (OUTPATIENT)
Dept: MAMMOGRAPHY | Facility: CLINIC | Age: 74
End: 2024-05-29
Payer: MEDICARE

## 2024-05-29 NOTE — PROGRESS NOTES
I left a message stating that the recent left-sided mammogram showed no concerning findings.  I am due to see her in the office next week.

## 2024-06-03 ENCOUNTER — OFFICE VISIT (OUTPATIENT)
Dept: MAMMOGRAPHY | Facility: CLINIC | Age: 74
End: 2024-06-03
Payer: MEDICARE

## 2024-06-03 VITALS
OXYGEN SATURATION: 90 % | SYSTOLIC BLOOD PRESSURE: 135 MMHG | HEIGHT: 61 IN | BODY MASS INDEX: 34.93 KG/M2 | HEART RATE: 76 BPM | WEIGHT: 185 LBS | DIASTOLIC BLOOD PRESSURE: 67 MMHG

## 2024-06-03 DIAGNOSIS — Z85.3 PERSONAL HISTORY OF BREAST CANCER: Primary | ICD-10-CM

## 2024-06-03 PROCEDURE — 1160F RVW MEDS BY RX/DR IN RCRD: CPT | Performed by: SURGERY

## 2024-06-03 PROCEDURE — 3078F DIAST BP <80 MM HG: CPT | Performed by: SURGERY

## 2024-06-03 PROCEDURE — 1159F MED LIST DOCD IN RCRD: CPT | Performed by: SURGERY

## 2024-06-03 PROCEDURE — 99213 OFFICE O/P EST LOW 20 MIN: CPT | Performed by: SURGERY

## 2024-06-03 PROCEDURE — 3075F SYST BP GE 130 - 139MM HG: CPT | Performed by: SURGERY

## 2024-06-03 NOTE — PROGRESS NOTES
Subjective   Mary Jo Berg is a 74 y.o. female     History of Present Illness   She is a nice 74-year-old white female treated for right breast cancer in 2020 with a mastectomy and sentinel lymph node biopsy.  The patient was initially placed on aromatase inhibitors and did not tolerate them well but was eventually put on tamoxifen.  Lately she has had recurrent gout and they are concerned that the tamoxifen may be contributing to that.  The family has decided that they would no longer like to take tamoxifen.  The patient has completed 4 years of treatment.  They of course we will talk with Dr. Jasso about this.    The patient has some cognitive impairment and the family has also decided that they would not like to continue mammography in view of that and her other medical problems.  She did have a left mammogram performed in May 2024.  I have personally reviewed that imaging study along with the reports.  There were scattered fibroglandular densities but no masses, worrisome microcalcifications, or areas of architectural distortion.      Review of Systems constitutional-the patient has lost weight mainly because of portion control.  Past Medical History:   Diagnosis Date    Asthma     Breast cancer 03/2020    right IDC    Cataract 2010    on both  retinas    Coronary artery disease 01/10/2015    Pacemaker 1/27/2015    Diabetes mellitus     Type 2    Diverticulosis 02/07/2021    Encounter for other screening for malignant neoplasm of breast 03/16/2020    Heart murmur 2015    History of asthma     History of colon polyps     History of complete heart block     paced in 2015    Hyperlipidemia     Hypertension     Hypothyroidism     Myocardial infarction     Obesity     Osteoarthritis     Osteopenia     Pacemaker     PONV (postoperative nausea and vomiting)     Pulmonary embolism      Past Surgical History:   Procedure Laterality Date    ADENOIDECTOMY  1954    AORTIC VALVE REPAIR/REPLACEMENT  09/28/2023     "BLADDER REPAIR  2012    BREAST BIOPSY Right 2020    malignant    CARDIAC PACEMAKER PLACEMENT      CARPAL TUNNEL RELEASE Right 2015    CATARACT EXTRACTION Bilateral      SECTION      x2    COLONOSCOPY  2015    COLONOSCOPY N/A 2021    Procedure: COLONOSCOPY;  Surgeon: Yamilet Stewart MD;  Location: [facility] ENDOSCOPY;  Service: Gastroenterology;  Laterality: N/A;  pre- hx polyps  post-- diverticulosis, hemorrhoids    CORONARY ANGIOPLASTY WITH STENT PLACEMENT  2023    ENDOMETRIAL ABLATION      HAND SURGERY      HERNIA REPAIR      Bilateral femoral and inguinal    JOINT REPLACEMENT  2007    Left knee    KNEE ARTHROPLASTY UNICOMPARTMENTAL Right 2022    KNEE ARTHROSCOPY Bilateral     MASTECTOMY  2020    MASTECTOMY WITH SENTINEL NODE BIOPSY AND AXILLARY NODE DISSECTION Right 2020    Procedure: RIGHT BREAST MASTECTOMY WITH SENTINEL NODE BIOPSY;  Surgeon: Bj Stephen MD;  [place] [facility] [place] [facility] [place]  Service: General;  Laterality: Right;    OOPHORECTOMY      SHOULDER ARTHROSCOPY Right     rotator cuff repair, biceps tenotomy    THYROIDECTOMY, PARTIAL  2005    TONSILLECTOMY AND ADENOIDECTOMY      TOTAL KNEE ARTHROPLASTY Left     TUBAL ABDOMINAL LIGATION      UMBILICAL HERNIA REPAIR       Family History   Problem Relation Age of Onset    Cervical cancer Mother     Alzheimer's disease Mother     Arthritis Mother     Other Mother         Uterine cancer    Cancer Father         Bladder    Heart disease Father     Hypertension Father     Arthritis Father         Breadt cancer    Alcohol abuse Father              Hyperlipidemia Father     Depression Father     Diabetes Father         \"borderline\"    Arthritis Maternal Grandmother     Heart disease Other         FH of heart disease in males before age 55    Hyperlipidemia Brother     Hypertension Brother     Breast cancer Paternal Grandmother 75    Heart attack Paternal Grandfather     Breast cancer Paternal " Cousin 70    Alcohol abuse Maternal Grandfather              Depression Brother         since     Malig Hyperthermia Neg Hx      Social History     Socioeconomic History    Marital status:      Spouse name: Sean   Tobacco Use    Smoking status: Never    Smokeless tobacco: Never   Vaping Use    Vaping status: Never Used   Substance and Sexual Activity    Alcohol use: Yes     Alcohol/week: 1.0 standard drink of alcohol     Types: 1 Standard drinks or equivalent per week     Comment: or less    Drug use: Never    Sexual activity: Yes     Partners: Male     Comment: endometrial [id number]       Objective   Physical Exam  BMI is >= 30 and <35. (Class 1 Obesity). The following options were offered after discussion;: The patient is actually lost about 12 pounds since February with portion control.  She will continue this plan and understands that it is also helpful from the breast cancer standpoint.  Right chest wall-status post mastectomy without reconstruction.  The incision is well-healed.  There is a little redundant tissue in the axilla.  I do not palpate any masses beneath the skin flaps or along the incision.  Left breast-large with ptosis.  The skin of the breast looks normal although she does have some irritation along the inferior breast crease likely related to friction and moisture.  I could not express any nipple discharge and there was no skin dimpling or nipple retraction when the arms were raised.  There were no worrisome palpable masses within the breast either.  Lymphatics-no cervical or axillary adenopathy.   Extremities-good range of motion of both upper extremities and no signs of lymphedema.    Assessment & Plan   Personal history of right breast cancer status postmastectomy-the family has elected to discontinue hormone blocking therapy.  Certainly we would encourage 5 years of treatment but giving the setting of joint discomfort and her cognitive impairment it is probably  reasonable to stop it.  The patient also had her breast cancer treated with a mastectomy which would be a little more reassuring in terms of a lessening of local recurrence.  I would recommend at least a yearly breast examination and I think that can be done in her primary care doctor's office.  For now, I will see her on an as-needed basis.    The encounter diagnosis was Personal history of breast cancer.

## 2024-06-03 NOTE — LETTER
Laurence 3, 2024     ELIN Joseph  [address]  [address]  [facility] [address]    Patient: Mary Jo Berg   YOB: 1950   Date of Visit: 6/3/2024     Dear ELIN Joseph:       Thank you for referring Mary Jo Berg to me for evaluation. Below are the relevant portions of my assessment and plan of care.    If you have questions, please do not hesitate to call me. I look forward to following Mary Jo along with you.         Sincerely,        Bj Stephen MD        CC: MD Hailee Soares, Bj PATEL MD  06/03/24 0910  Sign when Signing Visit  Subjective  Mary Jo Berg is a 74 y.o. female     History of Present Illness   She is a nice 74-year-old white female treated for right breast cancer in 2020 with a mastectomy and sentinel lymph node biopsy.  The patient was initially placed on aromatase inhibitors and did not tolerate them well but was eventually put on tamoxifen.  Lately she has had recurrent gout and they are concerned that the tamoxifen may be contributing to that.  The family has decided that they would no longer like to take tamoxifen.  The patient has completed 4 years of treatment.  They of course we will talk with Dr. Jasso about this.    The patient has some cognitive impairment and the family has also decided that they would not like to continue mammography in view of that and her other medical problems.  She did have a left mammogram performed in May 2024.  I have personally reviewed that imaging study along with the reports.  There were scattered fibroglandular densities but no masses, worrisome microcalcifications, or areas of architectural distortion.      Review of Systems constitutional-the patient has lost weight mainly because of portion control.  Past Medical History:   Diagnosis Date   • Asthma    • Breast cancer 03/2020    right IDC   • Cataract 2010    on both  retinas   • Coronary artery disease 01/10/2015    Pacemaker  2015   • Diabetes mellitus     Type 2   • Diverticulosis 2021   • Encounter for other screening for malignant neoplasm of breast 2020   • Heart murmur    • History of asthma    • History of colon polyps    • History of complete heart block     paced in 2015   • Hyperlipidemia    • Hypertension    • Hypothyroidism    • Myocardial infarction    • Obesity    • Osteoarthritis    • Osteopenia    • Pacemaker    • PONV (postoperative nausea and vomiting)    • Pulmonary embolism      Past Surgical History:   Procedure Laterality Date   • ADENOIDECTOMY     • AORTIC VALVE REPAIR/REPLACEMENT  2023   • BLADDER REPAIR     • BREAST BIOPSY Right 2020    malignant   • CARDIAC PACEMAKER PLACEMENT     • CARPAL TUNNEL RELEASE Right    • CATARACT EXTRACTION Bilateral    •  SECTION      x2   • COLONOSCOPY     • COLONOSCOPY N/A 2021    Procedure: COLONOSCOPY;  Surgeon: Yamilet Stewart MD;  Location: [facility] ENDOSCOPY;  Service: Gastroenterology;  Laterality: N/A;  pre- hx polyps  post-- diverticulosis, hemorrhoids   • CORONARY ANGIOPLASTY WITH STENT PLACEMENT  2023   • ENDOMETRIAL ABLATION     • HAND SURGERY     • HERNIA REPAIR      Bilateral femoral and inguinal   • JOINT REPLACEMENT  2007    Left knee   • KNEE ARTHROPLASTY UNICOMPARTMENTAL Right 2022   • KNEE ARTHROSCOPY Bilateral    • MASTECTOMY  2020   • MASTECTOMY WITH SENTINEL NODE BIOPSY AND AXILLARY NODE DISSECTION Right 2020    Procedure: RIGHT BREAST MASTECTOMY WITH SENTINEL NODE BIOPSY;  Surgeon: Bj Stephen MD;  [place] [facility] [place] [facility] [place];  Service: General;  Laterality: Right;   • OOPHORECTOMY     • SHOULDER ARTHROSCOPY Right     rotator cuff repair, biceps tenotomy   • THYROIDECTOMY, PARTIAL     • TONSILLECTOMY AND ADENOIDECTOMY     • TOTAL KNEE ARTHROPLASTY Left    • TUBAL ABDOMINAL LIGATION     • UMBILICAL HERNIA REPAIR       Family History   Problem Relation Age of  "Onset   • Cervical cancer Mother    • Alzheimer's disease Mother    • Arthritis Mother    • Other Mother         Uterine cancer   • Cancer Father         Bladder   • Heart disease Father    • Hypertension Father    • Arthritis Father         Breadt cancer   • Alcohol abuse Father             • Hyperlipidemia Father    • Depression Father    • Diabetes Father         \"borderline\"   • Arthritis Maternal Grandmother    • Heart disease Other         FH of heart disease in males before age 55   • Hyperlipidemia Brother    • Hypertension Brother    • Breast cancer Paternal Grandmother 75   • Heart attack Paternal Grandfather    • Breast cancer Paternal Cousin 70   • Alcohol abuse Maternal Grandfather             • Depression Brother         since    • Malig Hyperthermia Neg Hx      Social History     Socioeconomic History   • Marital status:      Spouse name: Sean   Tobacco Use   • Smoking status: Never   • Smokeless tobacco: Never   Vaping Use   • Vaping status: Never Used   Substance and Sexual Activity   • Alcohol use: Yes     Alcohol/week: 1.0 standard drink of alcohol     Types: 1 Standard drinks or equivalent per week     Comment: or less   • Drug use: Never   • Sexual activity: Yes     Partners: Male     Comment: endometrial [id number]       Objective  Physical Exam  BMI is >= 30 and <35. (Class 1 Obesity). The following options were offered after discussion;: The patient is actually lost about 12 pounds since February with portion control.  She will continue this plan and understands that it is also helpful from the breast cancer standpoint.  Right chest wall-status post mastectomy without reconstruction.  The incision is well-healed.  There is a little redundant tissue in the axilla.  I do not palpate any masses beneath the skin flaps or along the incision.  Left breast-large with ptosis.  The skin of the breast looks normal although she does have some irritation along the " inferior breast crease likely related to friction and moisture.  I could not express any nipple discharge and there was no skin dimpling or nipple retraction when the arms were raised.  There were no worrisome palpable masses within the breast either.  Lymphatics-no cervical or axillary adenopathy.   Extremities-good range of motion of both upper extremities and no signs of lymphedema.    Assessment & Plan  Personal history of right breast cancer status postmastectomy-the family has elected to discontinue hormone blocking therapy.  Certainly we would encourage 5 years of treatment but giving the setting of joint discomfort and her cognitive impairment it is probably reasonable to stop it.  The patient also had her breast cancer treated with a mastectomy which would be a little more reassuring in terms of a lessening of local recurrence.  I would recommend at least a yearly breast examination and I think that can be done in her primary care doctor's office.  For now, I will see her on an as-needed basis.    The encounter diagnosis was Personal history of breast cancer.

## 2024-06-14 ENCOUNTER — OFFICE VISIT (OUTPATIENT)
Dept: FAMILY MEDICINE CLINIC | Facility: CLINIC | Age: 74
End: 2024-06-14
Payer: MEDICARE

## 2024-06-14 VITALS
SYSTOLIC BLOOD PRESSURE: 113 MMHG | WEIGHT: 187 LBS | OXYGEN SATURATION: 96 % | HEIGHT: 61 IN | TEMPERATURE: 97.3 F | RESPIRATION RATE: 14 BRPM | BODY MASS INDEX: 35.3 KG/M2 | HEART RATE: 73 BPM | DIASTOLIC BLOOD PRESSURE: 66 MMHG

## 2024-06-14 DIAGNOSIS — R39.15 URINARY URGENCY: Primary | ICD-10-CM

## 2024-06-14 DIAGNOSIS — I50.30 DIASTOLIC CONGESTIVE HEART FAILURE, UNSPECIFIED HF CHRONICITY: ICD-10-CM

## 2024-06-14 DIAGNOSIS — G31.84 MCI (MILD COGNITIVE IMPAIRMENT): ICD-10-CM

## 2024-06-14 PROCEDURE — 1126F AMNT PAIN NOTED NONE PRSNT: CPT | Performed by: NURSE PRACTITIONER

## 2024-06-14 PROCEDURE — 3074F SYST BP LT 130 MM HG: CPT | Performed by: NURSE PRACTITIONER

## 2024-06-14 PROCEDURE — 1159F MED LIST DOCD IN RCRD: CPT | Performed by: NURSE PRACTITIONER

## 2024-06-14 PROCEDURE — 3052F HG A1C>EQUAL 8.0%<EQUAL 9.0%: CPT | Performed by: NURSE PRACTITIONER

## 2024-06-14 PROCEDURE — 99213 OFFICE O/P EST LOW 20 MIN: CPT | Performed by: NURSE PRACTITIONER

## 2024-06-14 PROCEDURE — 3078F DIAST BP <80 MM HG: CPT | Performed by: NURSE PRACTITIONER

## 2024-06-14 PROCEDURE — 1160F RVW MEDS BY RX/DR IN RCRD: CPT | Performed by: NURSE PRACTITIONER

## 2024-06-14 NOTE — PROGRESS NOTES
Chief Complaint  bladder problems    Subjective        Mary Jo Berg presents to [facility]  History of Present Illness    History of Present Illness  Not taking tamoxifen  Taking 1/2 dose of eliquis and alternating with baby asa per what daughter has instructed them to do      The patient is a 73-year-old female who presents for evaluation of frequent urination. She is accompanied by her .    The patient reports experiencing frequent urination, although this frequency has recently decreased. During the day, she spends approximately every 2 to 2.5 hours seated in a chair, during which she experiences an urgency to urinate, often unable to reach the restroom in time. The frequency of these episodes is predominantly in the morning. Her  administers Bumex in the morning. Recently, she has experienced occasional dysuria, but denies any malodorous urine, dark, or cloudy urine. She does not frequently change her clothes or experience urinary incontinence, although she occasionally experiences incontinence-spouse says it is daily. She has not yet started using Depends due to their bulky nature and has purchased 2 different brands. She stays dry all night.     She occasionally experiences daytime somnolence, particularly when watching television. She denies any dyspnea or cough. Her bowel movements are regular, occurring 4 to 5 times per week, and she denies any constipation. Her appetite remains unaffected.    She has been seen by oncology and breast surgeon and they have decided to stop tamoxifen due to side effects.  Pt will no longer do mammo but will have yrly breast exam.  They have been taking 1/2 dose of eliquis and alternating with baby asa daily.  This is not at the advice of oncologist, but at advice of daughter.     Spouse is gone during the day to work part time from appr 9-3 couple days of week. Sometimes when he returns home he has to clean up after pt  "secondary to incontinence       Objective   Vital Signs:  /66   Pulse 73   Temp 97.3 °F (36.3 °C) (Infrared)   Resp 14   Ht 154.9 cm (61\")   Wt 84.8 kg (187 lb)   SpO2 96%   BMI 35.33 kg/m²   Estimated body mass index is 35.33 kg/m² as calculated from the following:    Height as of this encounter: 154.9 cm (61\").    Weight as of this encounter: 84.8 kg (187 lb).               Physical Exam  Vitals and nursing note reviewed.   Constitutional:       General: She is not in acute distress.     Appearance: She is well-developed. She is obese. She is not ill-appearing or diaphoretic.   HENT:      Head: Normocephalic and atraumatic.   Eyes:      General:         Right eye: No discharge.         Left eye: No discharge.      Conjunctiva/sclera: Conjunctivae normal.   Cardiovascular:      Rate and Rhythm: Normal rate and regular rhythm.      Heart sounds: Murmur heard.   Pulmonary:      Effort: Pulmonary effort is normal.      Breath sounds: Normal breath sounds.   Abdominal:      General: Bowel sounds are normal. There is no distension.      Palpations: Abdomen is soft.   Musculoskeletal:         General: No deformity.      Right lower leg: No edema.      Left lower leg: No edema.      Comments: Gait is steady   Skin:     General: Skin is warm and dry.   Neurological:      Mental Status: She is alert and oriented to person, place, and time.          Physical Exam       Result Review :            Results                  Assessment and Plan     Diagnoses and all orders for this visit:    1. Urinary urgency (Primary)    2. Diastolic congestive heart failure, unspecified HF chronicity    3. MCI (mild cognitive impairment)        Assessment & Plan  1. Urinary urgency  The patient's urinary frequency may be attributed to her Bumex regimen, a diuretic.   Pt has been given supplies to collect urine at home and will bring sample to office on Monday.  She urinated today in office, but urine was spilled.  She has been " advised to wear Depends during the day, particularly when alone. We discussed several times that I am concerned she will fall trying to rush to BR with urgency.   The possibility of moving Namenda from Bumex has been discussed-may be causing drowsiness in am on top of bumex.     Importance of taking bumex discussed with pt with CHF.  CHF seems to be currently controlled.     Recommend to spouse contacting oncology for directions on anticoagulants as baby asa is not the same as eliquis to prevent PE/DVT.            Follow Up     No follow-ups on file.  Patient was given instructions and counseling regarding her condition or for health maintenance advice. Please see specific information pulled into the AVS if appropriate.    Patient or patient representative verbalized consent for the use of Ambient Listening during the visit with  ELIN Joseph for chart documentation. 6/14/2024  17:22 EDT

## 2024-06-18 LAB
BILIRUB BLD-MCNC: NEGATIVE MG/DL
CLARITY, POC: CLEAR
COLOR UR: YELLOW
EXPIRATION DATE: ABNORMAL
GLUCOSE UR STRIP-MCNC: NEGATIVE MG/DL
KETONES UR QL: NEGATIVE
LEUKOCYTE EST, POC: ABNORMAL
Lab: ABNORMAL
NITRITE UR-MCNC: POSITIVE MG/ML
PH UR: 5.5 [PH] (ref 5–8)
PROT UR STRIP-MCNC: NEGATIVE MG/DL
RBC # UR STRIP: ABNORMAL /UL
SP GR UR: 1.02 (ref 1–1.03)
UROBILINOGEN UR QL: ABNORMAL

## 2024-06-22 LAB
BACTERIA UR CULT: ABNORMAL
BACTERIA UR CULT: ABNORMAL
OTHER ANTIBIOTIC SUSC ISLT: ABNORMAL

## 2024-06-24 RX ORDER — NITROFURANTOIN 25; 75 MG/1; MG/1
100 CAPSULE ORAL 2 TIMES DAILY
Qty: 14 CAPSULE | Refills: 0 | Status: SHIPPED | OUTPATIENT
Start: 2024-06-24

## 2024-08-12 ENCOUNTER — TELEPHONE (OUTPATIENT)
Dept: ONCOLOGY | Facility: CLINIC | Age: 74
End: 2024-08-12

## 2024-08-12 NOTE — TELEPHONE ENCOUNTER
Caller: Brooklyn Berg    Relationship to patient: Emergency Contact    Best call back number: [phone]    Chief complaint: PT NEEDS TO RESCHEDULE     Type of visit: LAB AND FOLLOW UP 1    Requested date: 10-31-24 AROUND 9:30 IF NOT ABLE TO SWITCH TO THE AM RESCHEDULE TO ANOTHER TUESDAY OR THURSDAY     If rescheduling, when is the original appointment: 10-31-24

## 2024-08-16 ENCOUNTER — PATIENT OUTREACH (OUTPATIENT)
Dept: CASE MANAGEMENT | Facility: OTHER | Age: 74
End: 2024-08-16
Payer: MEDICARE

## 2024-08-16 NOTE — OUTREACH NOTE
AMBULATORY CASE MANAGEMENT NOTE    Names and Relationships of Patient/Support Persons: Contact: Sean Berg; Relationship: Emergency Contact -     Patient Outreach    Introduced self, explained ACM RN role and provided contact information. Spoke with patient's . He states her dementia has progressed to the point he assists with managing her care. ACM reviewed with  patient's CHF and elevated HgbA1C results and offered supportive services.  declined needs and support at this time. He is to call back if needs arise.     Education Documentation  No documentation found.        Tyesha MARIEE  Ambulatory Case Management    8/16/2024, 11:32 EDT

## 2024-09-19 RX ORDER — LEVOTHYROXINE SODIUM 125 UG/1
TABLET ORAL
Qty: 90 TABLET | Refills: 0 | Status: SHIPPED | OUTPATIENT
Start: 2024-09-19

## 2024-09-24 ENCOUNTER — OFFICE VISIT (OUTPATIENT)
Dept: FAMILY MEDICINE CLINIC | Facility: CLINIC | Age: 74
End: 2024-09-24
Payer: MEDICARE

## 2024-09-24 VITALS
TEMPERATURE: 97.5 F | BODY MASS INDEX: 34.8 KG/M2 | HEIGHT: 61 IN | WEIGHT: 184.3 LBS | HEART RATE: 78 BPM | SYSTOLIC BLOOD PRESSURE: 110 MMHG | DIASTOLIC BLOOD PRESSURE: 63 MMHG | OXYGEN SATURATION: 99 % | RESPIRATION RATE: 14 BRPM

## 2024-09-24 DIAGNOSIS — Z23 NEED FOR VACCINATION: ICD-10-CM

## 2024-09-24 DIAGNOSIS — E03.9 ACQUIRED HYPOTHYROIDISM: ICD-10-CM

## 2024-09-24 DIAGNOSIS — E79.0 HYPERURICEMIA: ICD-10-CM

## 2024-09-24 DIAGNOSIS — E11.9 TYPE 2 DIABETES MELLITUS WITHOUT COMPLICATION, WITHOUT LONG-TERM CURRENT USE OF INSULIN: ICD-10-CM

## 2024-09-24 DIAGNOSIS — I50.30 DIASTOLIC CONGESTIVE HEART FAILURE, UNSPECIFIED HF CHRONICITY: ICD-10-CM

## 2024-09-24 DIAGNOSIS — N18.31 STAGE 3A CHRONIC KIDNEY DISEASE: Primary | ICD-10-CM

## 2024-09-24 PROCEDURE — 1126F AMNT PAIN NOTED NONE PRSNT: CPT | Performed by: NURSE PRACTITIONER

## 2024-09-24 PROCEDURE — 3074F SYST BP LT 130 MM HG: CPT | Performed by: NURSE PRACTITIONER

## 2024-09-24 PROCEDURE — 3078F DIAST BP <80 MM HG: CPT | Performed by: NURSE PRACTITIONER

## 2024-09-24 PROCEDURE — 90662 IIV NO PRSV INCREASED AG IM: CPT | Performed by: NURSE PRACTITIONER

## 2024-09-24 PROCEDURE — 3051F HG A1C>EQUAL 7.0%<8.0%: CPT | Performed by: NURSE PRACTITIONER

## 2024-09-24 PROCEDURE — 99214 OFFICE O/P EST MOD 30 MIN: CPT | Performed by: NURSE PRACTITIONER

## 2024-09-24 PROCEDURE — G0008 ADMIN INFLUENZA VIRUS VAC: HCPCS | Performed by: NURSE PRACTITIONER

## 2024-09-24 RX ORDER — COLCHICINE 0.6 MG/1
TABLET ORAL
COMMUNITY

## 2024-09-24 RX ORDER — MEMANTINE HYDROCHLORIDE 5 MG/1
5 TABLET ORAL NIGHTLY
COMMUNITY
End: 2024-10-14 | Stop reason: HOSPADM

## 2024-09-24 NOTE — PROGRESS NOTES
Chief Complaint  Hypothyroidism    Subjective        Mary Jo Berg presents to [facility]  History of Present Illness    History of Present Illness  The patient is a 74-year-old female who presents for evaluation of multiple medical concerns. She is accompanied by her .    She has been experiencing diarrhea, characterized by loose stools, approximately 2 to 3 times a week for the past 9 months. She often does not realize she needs to use the restroom until she stands up from her chair. This issue also occurs during the night, resulting in soiled sheets. Prior to this, she had no issues with her bowel movements. She does not recall any changes in her medication around the time these symptoms started. Her diet is low in fiber, with bagels being her primary source. She reports no falls or choking incidents while eating. There is no discernible pattern to her loose stools.    Her uric acid level was last checked at the end of 07/2024 and was found to be 6.7. She has been consistent with her medication regimen and has not experienced any gout flares. Her allopurinol dosage was increased from 200 mg to 300 mg after her last check.    She recently refilled her thyroid medication. Her last thyroid check was conducted 3 months ago.    She is currently taking metoprolol and levothyroxine. She uses an over-the-counter sleep aid as needed. She stopped taking Eliquis about 9 months ago under the guidance of Dr. Jasso, oncologist. She has an upcoming appointment with her cardiologist in 2 weeks. She has decided to stop seeing her rheumatologist and neurologist. Her leg swelling has improved. She experiences chest pain and heart palpitations when walking quickly. She reports no nausea, vomiting, constipation, shortness of breath, or cough. She does not monitor her weight daily and has not noticed any swelling. She is not currently seeing a nephrologist, unclear why. Does not seem to be  "taking diuretic-not been prescribed in awhile or seen by cardiology in awhile       Objective   Vital Signs:  /63   Pulse 78   Temp 97.5 °F (36.4 °C) (Temporal)   Resp 14   Ht 154.9 cm (61\")   Wt 83.6 kg (184 lb 4.8 oz)   SpO2 99%   BMI 34.82 kg/m²   Estimated body mass index is 34.82 kg/m² as calculated from the following:    Height as of this encounter: 154.9 cm (61\").    Weight as of this encounter: 83.6 kg (184 lb 4.8 oz).               Physical Exam  Vitals and nursing note reviewed.   Constitutional:       General: She is not in acute distress.     Appearance: She is well-developed. She is not ill-appearing or diaphoretic.   HENT:      Head: Normocephalic and atraumatic.   Eyes:      General:         Right eye: No discharge.         Left eye: No discharge.      Conjunctiva/sclera: Conjunctivae normal.   Cardiovascular:      Rate and Rhythm: Normal rate and regular rhythm.   Pulmonary:      Effort: Pulmonary effort is normal.   Abdominal:      General: Bowel sounds are normal.      Palpations: Abdomen is soft.      Tenderness: There is no abdominal tenderness.   Musculoskeletal:         General: No deformity.      Comments: B/l ankle edema-1+     Skin:     General: Skin is warm and dry.   Neurological:      Mental Status: She is alert and oriented to person, place, and time.   Psychiatric:         Mood and Affect: Mood normal.         Behavior: Behavior normal.          Physical Exam       Result Review :            Results  Laboratory Studies  Uric acid level was 6.7 at the end of July.                Assessment and Plan     Diagnoses and all orders for this visit:    1. Stage 3a chronic kidney disease (Primary)  -     CBC (No Diff)  -     Comprehensive Metabolic Panel  -     Vitamin D,25-Hydroxy    2. Type 2 diabetes mellitus without complication, without long-term current use of insulin  -     Hemoglobin A1c    3. Diastolic congestive heart failure, unspecified HF chronicity    4. Acquired " hypothyroidism  -     TSH    5. Hyperuricemia  -     Uric Acid    6. Need for vaccination  -     Fluzone High-Dose 65+yrs        Assessment & Plan  1. Diarrhea.  Given her age, weakened sphincters may be contributing to her lack of control over bowel movements as can dementia, diet etc. Additionally, her low-fiber diet could be causing loose stools. A gradual increase in dietary fiber is recommended to bulk up her stool. Over-the-counter Metamucil can be used for this purpose. The dose should be gradually increased each week, ensuring it doesn't lead to constipation.    2. Hyperuricemia.  Her uric acid levels will be checked to monitor her condition. She has been taking allopurinol 300 mg regularly with no gouty flares.    3. Hypothyroidism.  Her thyroid levels will be checked to ensure appropriate dosing of her medication. She has recently received a refill on her thyroid medication.    4. Health Maintenance.  She will receive her influenza vaccine today. Comprehensive lab work will be ordered to assess her kidney function, liver function, and complete blood count (CBC). Her A1c will also be checked.    Health history and medications a little difficult to reconcile as she sees providers at [facility] and pt and spouse are poor medical historians for pt medications, providers, tx plan    Pt will RTC for labs since lab is already closed.              Follow Up     No follow-ups on file.  Patient was given instructions and counseling regarding her condition or for health maintenance advice. Please see specific information pulled into the AVS if appropriate.    Patient or patient representative verbalized consent for the use of Ambient Listening during the visit with  ELIN Joseph for chart documentation. 10/10/2024  12:58 EDT

## 2024-10-08 ENCOUNTER — LAB (OUTPATIENT)
Dept: LAB | Facility: HOSPITAL | Age: 74
End: 2024-10-08
Payer: MEDICARE

## 2024-10-08 LAB
25(OH)D3 SERPL-MCNC: 14.8 NG/ML (ref 30–100)
ALBUMIN SERPL-MCNC: 3.5 G/DL (ref 3.5–5.2)
ALBUMIN/GLOB SERPL: 0.9 G/DL
ALP SERPL-CCNC: 163 U/L (ref 39–117)
ALT SERPL W P-5'-P-CCNC: 34 U/L (ref 1–33)
ANION GAP SERPL CALCULATED.3IONS-SCNC: 16.7 MMOL/L (ref 5–15)
AST SERPL-CCNC: 38 U/L (ref 1–32)
BILIRUB SERPL-MCNC: 0.8 MG/DL (ref 0–1.2)
BUN SERPL-MCNC: 44 MG/DL (ref 8–23)
BUN/CREAT SERPL: 28.9 (ref 7–25)
CALCIUM SPEC-SCNC: 8.7 MG/DL (ref 8.6–10.5)
CHLORIDE SERPL-SCNC: 92 MMOL/L (ref 98–107)
CO2 SERPL-SCNC: 23.3 MMOL/L (ref 22–29)
CREAT SERPL-MCNC: 1.52 MG/DL (ref 0.57–1)
DEPRECATED RDW RBC AUTO: 52.6 FL (ref 37–54)
EGFRCR SERPLBLD CKD-EPI 2021: 35.6 ML/MIN/1.73
ERYTHROCYTE [DISTWIDTH] IN BLOOD BY AUTOMATED COUNT: 15 % (ref 12.3–15.4)
GLOBULIN UR ELPH-MCNC: 3.8 GM/DL
GLUCOSE SERPL-MCNC: 181 MG/DL (ref 65–99)
HBA1C MFR BLD: 7.2 % (ref 4.8–5.6)
HCT VFR BLD AUTO: 33.9 % (ref 34–46.6)
HGB BLD-MCNC: 11.3 G/DL (ref 12–15.9)
MCH RBC QN AUTO: 31.8 PG (ref 26.6–33)
MCHC RBC AUTO-ENTMCNC: 33.3 G/DL (ref 31.5–35.7)
MCV RBC AUTO: 95.5 FL (ref 79–97)
PLATELET # BLD AUTO: 229 10*3/MM3 (ref 140–450)
PMV BLD AUTO: 10.5 FL (ref 6–12)
POTASSIUM SERPL-SCNC: 3 MMOL/L (ref 3.5–5.2)
PROT SERPL-MCNC: 7.3 G/DL (ref 6–8.5)
RBC # BLD AUTO: 3.55 10*6/MM3 (ref 3.77–5.28)
SODIUM SERPL-SCNC: 132 MMOL/L (ref 136–145)
TSH SERPL DL<=0.05 MIU/L-ACNC: 0.21 UIU/ML (ref 0.27–4.2)
URATE SERPL-MCNC: 6.5 MG/DL (ref 2.4–5.7)
WBC NRBC COR # BLD AUTO: 8.93 10*3/MM3 (ref 3.4–10.8)

## 2024-10-08 PROCEDURE — 84550 ASSAY OF BLOOD/URIC ACID: CPT | Performed by: NURSE PRACTITIONER

## 2024-10-08 PROCEDURE — 85027 COMPLETE CBC AUTOMATED: CPT | Performed by: NURSE PRACTITIONER

## 2024-10-08 PROCEDURE — 80053 COMPREHEN METABOLIC PANEL: CPT | Performed by: NURSE PRACTITIONER

## 2024-10-08 PROCEDURE — 82306 VITAMIN D 25 HYDROXY: CPT | Performed by: NURSE PRACTITIONER

## 2024-10-08 PROCEDURE — 83036 HEMOGLOBIN GLYCOSYLATED A1C: CPT | Performed by: NURSE PRACTITIONER

## 2024-10-08 PROCEDURE — 36415 COLL VENOUS BLD VENIPUNCTURE: CPT | Performed by: NURSE PRACTITIONER

## 2024-10-08 PROCEDURE — 84443 ASSAY THYROID STIM HORMONE: CPT | Performed by: NURSE PRACTITIONER

## 2024-10-08 NOTE — TELEPHONE ENCOUNTER
Caller: Sean Berg    Relationship: Emergency Contact    Best call back number:     What orders are you requesting (i.e. lab or imaging): URINE ANALYSIS    In what timeframe would the patient need to come in: AS SOON AS POSSIBLE    Where will you receive your lab/imaging services: IN OFFICE    Additional notes: PATIENT'S  THINKS SHE MIGHT HAVE A UTI.

## 2024-10-08 NOTE — TELEPHONE ENCOUNTER
Pt was seen at  after this message was sent over. Urine was noted to be clear there and patient had no complaints of dysuria.  was instructed to bring urine sample back tomorrow morning.

## 2024-10-09 ENCOUNTER — HOSPITAL ENCOUNTER (INPATIENT)
Facility: HOSPITAL | Age: 74
LOS: 4 days | Discharge: NURSING, REHAB OR LONG-TERM CARE | DRG: 641 | End: 2024-10-14
Attending: EMERGENCY MEDICINE | Admitting: INTERNAL MEDICINE
Payer: MEDICARE

## 2024-10-09 ENCOUNTER — APPOINTMENT (OUTPATIENT)
Dept: GENERAL RADIOLOGY | Facility: HOSPITAL | Age: 74
DRG: 641 | End: 2024-10-09
Payer: MEDICARE

## 2024-10-09 DIAGNOSIS — F03.90 DEMENTIA, UNSPECIFIED DEMENTIA SEVERITY, UNSPECIFIED DEMENTIA TYPE, UNSPECIFIED WHETHER BEHAVIORAL, PSYCHOTIC, OR MOOD DISTURBANCE OR ANXIETY: ICD-10-CM

## 2024-10-09 DIAGNOSIS — J06.9 ACUTE URI: ICD-10-CM

## 2024-10-09 DIAGNOSIS — E87.6 HYPOKALEMIA: ICD-10-CM

## 2024-10-09 DIAGNOSIS — R53.1 GENERALIZED WEAKNESS: ICD-10-CM

## 2024-10-09 DIAGNOSIS — N18.9 CHRONIC RENAL IMPAIRMENT, UNSPECIFIED CKD STAGE: ICD-10-CM

## 2024-10-09 DIAGNOSIS — E83.42 HYPOMAGNESEMIA: Primary | ICD-10-CM

## 2024-10-09 PROBLEM — E63.9 POOR DIET: Chronic | Status: ACTIVE | Noted: 2024-10-09

## 2024-10-09 LAB
ALBUMIN SERPL-MCNC: 3.1 G/DL (ref 3.5–5.2)
ALBUMIN/GLOB SERPL: 1.1 G/DL
ALP SERPL-CCNC: 140 U/L (ref 39–117)
ALT SERPL W P-5'-P-CCNC: 29 U/L (ref 1–33)
ANION GAP SERPL CALCULATED.3IONS-SCNC: 14 MMOL/L (ref 5–15)
AST SERPL-CCNC: 33 U/L (ref 1–32)
BACTERIA UR QL AUTO: ABNORMAL /HPF
BASOPHILS # BLD AUTO: 0.03 10*3/MM3 (ref 0–0.2)
BASOPHILS NFR BLD AUTO: 0.3 % (ref 0–1.5)
BILIRUB SERPL-MCNC: 0.8 MG/DL (ref 0–1.2)
BILIRUB UR QL STRIP: NEGATIVE
BUN SERPL-MCNC: 40 MG/DL (ref 8–23)
BUN/CREAT SERPL: 27.4 (ref 7–25)
CALCIUM SPEC-SCNC: 8.7 MG/DL (ref 8.6–10.5)
CHLORIDE SERPL-SCNC: 95 MMOL/L (ref 98–107)
CK SERPL-CCNC: 162 U/L (ref 20–180)
CLARITY UR: CLEAR
CO2 SERPL-SCNC: 24 MMOL/L (ref 22–29)
COLOR UR: YELLOW
CREAT SERPL-MCNC: 1.46 MG/DL (ref 0.57–1)
DEPRECATED RDW RBC AUTO: 53.7 FL (ref 37–54)
EGFRCR SERPLBLD CKD-EPI 2021: 37.4 ML/MIN/1.73
EOSINOPHIL # BLD AUTO: 0.25 10*3/MM3 (ref 0–0.4)
EOSINOPHIL NFR BLD AUTO: 2.8 % (ref 0.3–6.2)
ERYTHROCYTE [DISTWIDTH] IN BLOOD BY AUTOMATED COUNT: 15.2 % (ref 12.3–15.4)
GLOBULIN UR ELPH-MCNC: 2.9 GM/DL
GLUCOSE BLDC GLUCOMTR-MCNC: 147 MG/DL (ref 70–130)
GLUCOSE BLDC GLUCOMTR-MCNC: 180 MG/DL (ref 70–130)
GLUCOSE SERPL-MCNC: 182 MG/DL (ref 65–99)
GLUCOSE UR STRIP-MCNC: NEGATIVE MG/DL
HCT VFR BLD AUTO: 34.2 % (ref 34–46.6)
HGB BLD-MCNC: 11.4 G/DL (ref 12–15.9)
HGB UR QL STRIP.AUTO: ABNORMAL
HOLD SPECIMEN: NORMAL
HOLD SPECIMEN: NORMAL
HYALINE CASTS UR QL AUTO: ABNORMAL /LPF
IMM GRANULOCYTES # BLD AUTO: 0.03 10*3/MM3 (ref 0–0.05)
IMM GRANULOCYTES NFR BLD AUTO: 0.3 % (ref 0–0.5)
KETONES UR QL STRIP: NEGATIVE
LEUKOCYTE ESTERASE UR QL STRIP.AUTO: NEGATIVE
LYMPHOCYTES # BLD AUTO: 0.66 10*3/MM3 (ref 0.7–3.1)
LYMPHOCYTES NFR BLD AUTO: 7.5 % (ref 19.6–45.3)
MAGNESIUM SERPL-MCNC: 1.1 MG/DL (ref 1.6–2.4)
MCH RBC QN AUTO: 32.9 PG (ref 26.6–33)
MCHC RBC AUTO-ENTMCNC: 33.3 G/DL (ref 31.5–35.7)
MCV RBC AUTO: 98.6 FL (ref 79–97)
MONOCYTES # BLD AUTO: 0.88 10*3/MM3 (ref 0.1–0.9)
MONOCYTES NFR BLD AUTO: 10 % (ref 5–12)
NEUTROPHILS NFR BLD AUTO: 6.98 10*3/MM3 (ref 1.7–7)
NEUTROPHILS NFR BLD AUTO: 79.1 % (ref 42.7–76)
NITRITE UR QL STRIP: NEGATIVE
NRBC BLD AUTO-RTO: 0 /100 WBC (ref 0–0.2)
PH UR STRIP.AUTO: 5.5 [PH] (ref 5–8)
PHOSPHATE SERPL-MCNC: 2.9 MG/DL (ref 2.5–4.5)
PLATELET # BLD AUTO: 233 10*3/MM3 (ref 140–450)
PMV BLD AUTO: 11.2 FL (ref 6–12)
POTASSIUM SERPL-SCNC: 2.9 MMOL/L (ref 3.5–5.2)
PROT SERPL-MCNC: 6 G/DL (ref 6–8.5)
PROT UR QL STRIP: NEGATIVE
QT INTERVAL: 466 MS
QTC INTERVAL: 589 MS
RBC # BLD AUTO: 3.47 10*6/MM3 (ref 3.77–5.28)
RBC # UR STRIP: ABNORMAL /HPF
REF LAB TEST METHOD: ABNORMAL
SODIUM SERPL-SCNC: 133 MMOL/L (ref 136–145)
SP GR UR STRIP: 1.01 (ref 1–1.03)
SQUAMOUS #/AREA URNS HPF: ABNORMAL /HPF
TROPONIN T SERPL HS-MCNC: 46 NG/L
UROBILINOGEN UR QL STRIP: ABNORMAL
WBC # UR STRIP: ABNORMAL /HPF
WBC NRBC COR # BLD AUTO: 8.83 10*3/MM3 (ref 3.4–10.8)
WHOLE BLOOD HOLD COAG: NORMAL
WHOLE BLOOD HOLD SPECIMEN: NORMAL

## 2024-10-09 PROCEDURE — 36415 COLL VENOUS BLD VENIPUNCTURE: CPT

## 2024-10-09 PROCEDURE — P9612 CATHETERIZE FOR URINE SPEC: HCPCS

## 2024-10-09 PROCEDURE — 25010000002 MAGNESIUM SULFATE 2 GM/50ML SOLUTION: Performed by: EMERGENCY MEDICINE

## 2024-10-09 PROCEDURE — 85025 COMPLETE CBC W/AUTO DIFF WBC: CPT | Performed by: EMERGENCY MEDICINE

## 2024-10-09 PROCEDURE — 83735 ASSAY OF MAGNESIUM: CPT | Performed by: EMERGENCY MEDICINE

## 2024-10-09 PROCEDURE — 93005 ELECTROCARDIOGRAM TRACING: CPT | Performed by: EMERGENCY MEDICINE

## 2024-10-09 PROCEDURE — 93010 ELECTROCARDIOGRAM REPORT: CPT | Performed by: INTERNAL MEDICINE

## 2024-10-09 PROCEDURE — 82948 REAGENT STRIP/BLOOD GLUCOSE: CPT

## 2024-10-09 PROCEDURE — 84100 ASSAY OF PHOSPHORUS: CPT | Performed by: INTERNAL MEDICINE

## 2024-10-09 PROCEDURE — G0378 HOSPITAL OBSERVATION PER HR: HCPCS

## 2024-10-09 PROCEDURE — 82550 ASSAY OF CK (CPK): CPT | Performed by: INTERNAL MEDICINE

## 2024-10-09 PROCEDURE — 81001 URINALYSIS AUTO W/SCOPE: CPT | Performed by: EMERGENCY MEDICINE

## 2024-10-09 PROCEDURE — 80053 COMPREHEN METABOLIC PANEL: CPT | Performed by: EMERGENCY MEDICINE

## 2024-10-09 PROCEDURE — 25810000003 LACTATED RINGERS SOLUTION: Performed by: EMERGENCY MEDICINE

## 2024-10-09 PROCEDURE — 99285 EMERGENCY DEPT VISIT HI MDM: CPT

## 2024-10-09 PROCEDURE — 71045 X-RAY EXAM CHEST 1 VIEW: CPT

## 2024-10-09 PROCEDURE — 84484 ASSAY OF TROPONIN QUANT: CPT | Performed by: EMERGENCY MEDICINE

## 2024-10-09 RX ORDER — LEVOTHYROXINE SODIUM 112 UG/1
112 TABLET ORAL
Status: DISCONTINUED | OUTPATIENT
Start: 2024-10-10 | End: 2024-10-14 | Stop reason: HOSPADM

## 2024-10-09 RX ORDER — LOSARTAN POTASSIUM 25 MG/1
25 TABLET ORAL
Status: DISCONTINUED | OUTPATIENT
Start: 2024-10-10 | End: 2024-10-14 | Stop reason: HOSPADM

## 2024-10-09 RX ORDER — ASPIRIN 81 MG/1
81 TABLET ORAL DAILY
COMMUNITY

## 2024-10-09 RX ORDER — ASPIRIN 81 MG/1
81 TABLET ORAL DAILY
Status: DISCONTINUED | OUTPATIENT
Start: 2024-10-09 | End: 2024-10-14 | Stop reason: HOSPADM

## 2024-10-09 RX ORDER — MEMANTINE HYDROCHLORIDE 5 MG/1
5 TABLET ORAL 2 TIMES DAILY
Status: DISCONTINUED | OUTPATIENT
Start: 2024-10-09 | End: 2024-10-14 | Stop reason: HOSPADM

## 2024-10-09 RX ORDER — ALLOPURINOL 300 MG/1
300 TABLET ORAL DAILY
Status: DISCONTINUED | OUTPATIENT
Start: 2024-10-09 | End: 2024-10-14 | Stop reason: HOSPADM

## 2024-10-09 RX ORDER — CALCIUM CARBONATE 500 MG/1
2 TABLET, CHEWABLE ORAL 2 TIMES DAILY PRN
Status: DISCONTINUED | OUTPATIENT
Start: 2024-10-09 | End: 2024-10-14 | Stop reason: HOSPADM

## 2024-10-09 RX ORDER — BUMETANIDE 2 MG/1
2 TABLET ORAL DAILY
Status: DISCONTINUED | OUTPATIENT
Start: 2024-10-10 | End: 2024-10-10

## 2024-10-09 RX ORDER — ALBUTEROL SULFATE 0.83 MG/ML
2.5 SOLUTION RESPIRATORY (INHALATION) EVERY 6 HOURS PRN
Status: DISCONTINUED | OUTPATIENT
Start: 2024-10-09 | End: 2024-10-14 | Stop reason: HOSPADM

## 2024-10-09 RX ORDER — METOPROLOL SUCCINATE 25 MG/1
25 TABLET, EXTENDED RELEASE ORAL 2 TIMES DAILY
Status: DISCONTINUED | OUTPATIENT
Start: 2024-10-09 | End: 2024-10-14

## 2024-10-09 RX ORDER — ONDANSETRON 4 MG/1
4 TABLET, ORALLY DISINTEGRATING ORAL EVERY 6 HOURS PRN
Status: DISCONTINUED | OUTPATIENT
Start: 2024-10-09 | End: 2024-10-14 | Stop reason: HOSPADM

## 2024-10-09 RX ORDER — ATORVASTATIN CALCIUM 20 MG/1
40 TABLET, FILM COATED ORAL DAILY
Status: DISCONTINUED | OUTPATIENT
Start: 2024-10-09 | End: 2024-10-14 | Stop reason: HOSPADM

## 2024-10-09 RX ORDER — BISACODYL 10 MG
10 SUPPOSITORY, RECTAL RECTAL DAILY PRN
Status: DISCONTINUED | OUTPATIENT
Start: 2024-10-09 | End: 2024-10-14 | Stop reason: HOSPADM

## 2024-10-09 RX ORDER — SODIUM CHLORIDE 0.9 % (FLUSH) 0.9 %
10 SYRINGE (ML) INJECTION AS NEEDED
Status: DISCONTINUED | OUTPATIENT
Start: 2024-10-09 | End: 2024-10-14 | Stop reason: HOSPADM

## 2024-10-09 RX ORDER — NICOTINE POLACRILEX 4 MG
15 LOZENGE BUCCAL
Status: DISCONTINUED | OUTPATIENT
Start: 2024-10-09 | End: 2024-10-14 | Stop reason: HOSPADM

## 2024-10-09 RX ORDER — POTASSIUM CHLORIDE 750 MG/1
40 TABLET, FILM COATED, EXTENDED RELEASE ORAL EVERY 4 HOURS
Status: COMPLETED | OUTPATIENT
Start: 2024-10-09 | End: 2024-10-09

## 2024-10-09 RX ORDER — CLOPIDOGREL BISULFATE 75 MG/1
75 TABLET ORAL DAILY
Status: DISCONTINUED | OUTPATIENT
Start: 2024-10-09 | End: 2024-10-14 | Stop reason: HOSPADM

## 2024-10-09 RX ORDER — POTASSIUM CHLORIDE 750 MG/1
20 TABLET, FILM COATED, EXTENDED RELEASE ORAL DAILY
Status: DISCONTINUED | OUTPATIENT
Start: 2024-10-10 | End: 2024-10-14 | Stop reason: HOSPADM

## 2024-10-09 RX ORDER — BISACODYL 5 MG/1
5 TABLET, DELAYED RELEASE ORAL DAILY PRN
Status: DISCONTINUED | OUTPATIENT
Start: 2024-10-09 | End: 2024-10-14 | Stop reason: HOSPADM

## 2024-10-09 RX ORDER — DEXTROSE MONOHYDRATE 25 G/50ML
25 INJECTION, SOLUTION INTRAVENOUS
Status: DISCONTINUED | OUTPATIENT
Start: 2024-10-09 | End: 2024-10-14 | Stop reason: HOSPADM

## 2024-10-09 RX ORDER — ONDANSETRON 2 MG/ML
4 INJECTION INTRAMUSCULAR; INTRAVENOUS EVERY 6 HOURS PRN
Status: DISCONTINUED | OUTPATIENT
Start: 2024-10-09 | End: 2024-10-14 | Stop reason: HOSPADM

## 2024-10-09 RX ORDER — IBUPROFEN 600 MG/1
1 TABLET ORAL
Status: DISCONTINUED | OUTPATIENT
Start: 2024-10-09 | End: 2024-10-14 | Stop reason: HOSPADM

## 2024-10-09 RX ORDER — INSULIN LISPRO 100 [IU]/ML
2-9 INJECTION, SOLUTION INTRAVENOUS; SUBCUTANEOUS
Status: DISCONTINUED | OUTPATIENT
Start: 2024-10-09 | End: 2024-10-11

## 2024-10-09 RX ORDER — ACETAMINOPHEN 325 MG/1
650 TABLET ORAL EVERY 4 HOURS PRN
Status: DISCONTINUED | OUTPATIENT
Start: 2024-10-09 | End: 2024-10-14 | Stop reason: HOSPADM

## 2024-10-09 RX ORDER — MAGNESIUM SULFATE HEPTAHYDRATE 40 MG/ML
2 INJECTION, SOLUTION INTRAVENOUS
Status: COMPLETED | OUTPATIENT
Start: 2024-10-09 | End: 2024-10-09

## 2024-10-09 RX ORDER — PSYLLIUM HUSK 0.4 G
1 CAPSULE ORAL 2 TIMES DAILY
COMMUNITY

## 2024-10-09 RX ORDER — AMOXICILLIN 250 MG
2 CAPSULE ORAL 2 TIMES DAILY PRN
Status: DISCONTINUED | OUTPATIENT
Start: 2024-10-09 | End: 2024-10-14 | Stop reason: HOSPADM

## 2024-10-09 RX ORDER — ACETAMINOPHEN 160 MG/5ML
650 SOLUTION ORAL EVERY 4 HOURS PRN
Status: DISCONTINUED | OUTPATIENT
Start: 2024-10-09 | End: 2024-10-14 | Stop reason: HOSPADM

## 2024-10-09 RX ORDER — ACETAMINOPHEN 650 MG/1
650 SUPPOSITORY RECTAL EVERY 4 HOURS PRN
Status: DISCONTINUED | OUTPATIENT
Start: 2024-10-09 | End: 2024-10-14 | Stop reason: HOSPADM

## 2024-10-09 RX ORDER — COLCHICINE 0.6 MG/1
0.6 TABLET ORAL DAILY
Status: DISCONTINUED | OUTPATIENT
Start: 2024-10-09 | End: 2024-10-14 | Stop reason: HOSPADM

## 2024-10-09 RX ORDER — POLYETHYLENE GLYCOL 3350 17 G/17G
17 POWDER, FOR SOLUTION ORAL DAILY PRN
Status: DISCONTINUED | OUTPATIENT
Start: 2024-10-09 | End: 2024-10-14 | Stop reason: HOSPADM

## 2024-10-09 RX ORDER — NITROGLYCERIN 0.4 MG/1
0.4 TABLET SUBLINGUAL
Status: DISCONTINUED | OUTPATIENT
Start: 2024-10-09 | End: 2024-10-14 | Stop reason: HOSPADM

## 2024-10-09 RX ADMIN — MAGNESIUM OXIDE 400 MG (241.3 MG MAGNESIUM) TABLET 400 MG: TABLET at 21:51

## 2024-10-09 RX ADMIN — MAGNESIUM SULFATE HEPTAHYDRATE 2 G: 40 INJECTION, SOLUTION INTRAVENOUS at 17:48

## 2024-10-09 RX ADMIN — MAGNESIUM SULFATE HEPTAHYDRATE 2 G: 40 INJECTION, SOLUTION INTRAVENOUS at 14:43

## 2024-10-09 RX ADMIN — MEMANTINE HYDROCHLORIDE 5 MG: 5 TABLET, FILM COATED ORAL at 21:51

## 2024-10-09 RX ADMIN — COLCHICINE 0.6 MG: 0.6 TABLET ORAL at 21:51

## 2024-10-09 RX ADMIN — POTASSIUM CHLORIDE 40 MEQ: 750 TABLET, EXTENDED RELEASE ORAL at 18:41

## 2024-10-09 RX ADMIN — SODIUM CHLORIDE, POTASSIUM CHLORIDE, SODIUM LACTATE AND CALCIUM CHLORIDE 1000 ML: 600; 310; 30; 20 INJECTION, SOLUTION INTRAVENOUS at 12:34

## 2024-10-09 RX ADMIN — POTASSIUM CHLORIDE 40 MEQ: 750 TABLET, EXTENDED RELEASE ORAL at 14:41

## 2024-10-09 RX ADMIN — MAGNESIUM SULFATE HEPTAHYDRATE 2 G: 40 INJECTION, SOLUTION INTRAVENOUS at 19:04

## 2024-10-09 RX ADMIN — ATORVASTATIN CALCIUM 40 MG: 20 TABLET, FILM COATED ORAL at 21:51

## 2024-10-09 RX ADMIN — METOPROLOL SUCCINATE 25 MG: 25 TABLET, EXTENDED RELEASE ORAL at 21:51

## 2024-10-09 RX ADMIN — CLOPIDOGREL BISULFATE 75 MG: 75 TABLET, FILM COATED ORAL at 21:51

## 2024-10-09 RX ADMIN — POTASSIUM CHLORIDE 40 MEQ: 750 TABLET, EXTENDED RELEASE ORAL at 22:41

## 2024-10-09 NOTE — ED NOTES
"Nursing report ED to floor  Mary Jo Berg  74 y.o.  female    HPI :  HPI (Adult)  Stated Reason for Visit: weakness, syncope  History Obtained From: EMS, patient    Chief Complaint  Chief Complaint   Patient presents with    Weakness - Generalized    Syncope       Admitting doctor:   Melvi Whiting MD    Admitting diagnosis:   There were no encounter diagnoses.    Code status:   Current Code Status       Date Active Code Status Order ID Comments User Context       Prior            Allergies:   Indomethacin, Nutritional supplements, Penicillins, Statins, Hydrochlorothiazide, Lisinopril, Morphine, Letrozole, and Pentazocine    Isolation:   No active isolations    Intake and Output    Intake/Output Summary (Last 24 hours) at 10/9/2024 1551  Last data filed at 10/9/2024 1404  Gross per 24 hour   Intake 1000 ml   Output 300 ml   Net 700 ml       Weight:       10/09/24  1154   Weight: 83.9 kg (185 lb)       Most recent vitals:   Vitals:    10/09/24 1154 10/09/24 1341 10/09/24 1411 10/09/24 1511   BP: 110/55 100/51 116/68 112/53   Patient Position:  Lying Lying Lying   Pulse: 105 88 85 80   Resp: 20      Temp: 98.6 °F (37 °C)      SpO2: 95% 93% 99% 97%   Weight: 83.9 kg (185 lb)      Height: 154.9 cm (61\")          Active LDAs/IV Access:   Lines, Drains & Airways       Active LDAs       Name Placement date Placement time Site Days    Peripheral IV 10/09/24 1151 Anterior;Left Forearm 10/09/24  1151  Forearm  less than 1                    Labs (abnormal labs have a star):   Labs Reviewed   COMPREHENSIVE METABOLIC PANEL - Abnormal; Notable for the following components:       Result Value    Glucose 182 (*)     BUN 40 (*)     Creatinine 1.46 (*)     Sodium 133 (*)     Potassium 2.9 (*)     Chloride 95 (*)     Albumin 3.1 (*)     AST (SGOT) 33 (*)     Alkaline Phosphatase 140 (*)     BUN/Creatinine Ratio 27.4 (*)     eGFR 37.4 (*)     All other components within normal limits    Narrative:     GFR Normal " >60  Chronic Kidney Disease <60  Kidney Failure <15    The GFR formula is only valid for adults with stable renal function between ages 18 and 70.   SINGLE HS TROPONIN T - Abnormal; Notable for the following components:    HS Troponin T 46 (*)     All other components within normal limits    Narrative:     High Sensitive Troponin T Reference Range:  <14.0 ng/L- Negative Female for AMI  <22.0 ng/L- Negative Male for AMI  >=14 - Abnormal Female indicating possible myocardial injury.  >=22 - Abnormal Male indicating possible myocardial injury.   Clinicians would have to utilize clinical acumen, EKG, Troponin, and serial changes to determine if it is an Acute Myocardial Infarction or myocardial injury due to an underlying chronic condition.        MAGNESIUM - Abnormal; Notable for the following components:    Magnesium 1.1 (*)     All other components within normal limits   CBC WITH AUTO DIFFERENTIAL - Abnormal; Notable for the following components:    RBC 3.47 (*)     Hemoglobin 11.4 (*)     MCV 98.6 (*)     Neutrophil % 79.1 (*)     Lymphocyte % 7.5 (*)     Lymphocytes, Absolute 0.66 (*)     All other components within normal limits   URINALYSIS W/ MICROSCOPIC IF INDICATED (NO CULTURE) - Abnormal; Notable for the following components:    Blood, UA Moderate (2+) (*)     All other components within normal limits   URINALYSIS, MICROSCOPIC ONLY - Abnormal; Notable for the following components:    RBC, UA 11-20 (*)     All other components within normal limits   RAINBOW DRAW    Narrative:     The following orders were created for panel order [place] Draw.  Procedure                               Abnormality         Status                     ---------                               -----------         ------                     Green Top (Gel)[[id number]]                                  Final result               Lavender Top[[id number]]                                     Final result               Gold Top - [facility][[id number]]                                    Final result               Light Blue Top[[id number]]                                   Final result                 Please view results for these tests on the individual orders.   POCT GLUCOSE FINGERSTICK   CBC AND DIFFERENTIAL    Narrative:     The following orders were created for panel order CBC & Differential.  Procedure                               Abnormality         Status                     ---------                               -----------         ------                     CBC Auto Differential[[id number]]        Abnormal            Final result                 Please view results for these tests on the individual orders.   GREEN TOP   LAVENDER TOP   GOLD TOP - SST   LIGHT BLUE TOP       EKG:   ECG 12 Lead ED Triage Standing Order; Weak / Dizzy / AMS   Final Result   HEART RATE=96  bpm   RR [place]=379  ms   AL Ewzudfqj=477  ms   P Horizontal Axis=  deg   P Front Axis=157  deg   QRSD Fcxcilbb=140  ms   QT Wderbkdo=947  ms   FHiP=246  ms   QRS Axis=-67  deg   T Wave Axis=92  deg   - ABNORMAL ECG -   Ventricular-paced complexes   No further analysis attempted due to paced rhythm   Qrs morph changed possibly hyperkalemia   Electronically Signed By: Bj BriceTONY) ([facility]) 2024-10-09 14:08:35   Date and Time of Study:2024-10-09 12:35:41          Meds given in ED:   Medications   sodium chloride 0.9 % flush 10 mL (has no administration in time range)   Magnesium Standard Dose Replacement - Follow Nurse / BPA Driven Protocol (has no administration in time range)   Potassium Replacement - Follow Nurse / BPA Driven Protocol (has no administration in time range)   magnesium sulfate 2g/50 mL (PREMIX) infusion (2 g Intravenous New Bag 10/9/24 1443)   potassium chloride (K-DUR,KLOR-CON) ER tablet 40 mEq (40 mEq Oral Given 10/9/24 1441)   lactated ringers bolus 1,000 mL (0 mL Intravenous Stopped 10/9/24 1249)       Imaging results:  XR Chest 1 View    Result Date: 10/9/2024  1.  No acute process   This report was finalized on 10/9/2024 1:31 PM by Dr. Bimal Echevarria M.D on Workstation: [id number]       Ambulatory status:   - has not ambulated in ED    Social issues:   Social History     Socioeconomic History    Marital status:      Spouse name: Sean   Tobacco Use    Smoking status: Never     Passive exposure: Never    Smokeless tobacco: Never   Vaping Use    Vaping status: Never Used   Substance and Sexual Activity    Alcohol use: Yes     Alcohol/week: 1.0 standard drink of alcohol     Types: 1 Standard drinks or equivalent per week     Comment: or less    Drug use: Never    Sexual activity: Yes     Partners: Male     Comment: endometrial [id number]       Peripheral Neurovascular  Peripheral Neurovascular (Adult)  Peripheral Neurovascular WDL: WDL    Neuro Cognitive  Neuro Cognitive (Adult)  Cognitive/Neuro/Behavioral WDL: .WDL except, orientation  Orientation: disoriented to, time, situation    Learning  Learning Assessment (Adult)  Learning Readiness and Ability: cognitive limitation noted  Education Provided  Person Taught: patient    Respiratory  Respiratory WDL  Respiratory WDL: WDL    Abdominal Pain       Pain Assessments  Pain (Adult)  (0-10) Pain Rating: Rest: 0    NIH Stroke Scale       Yesenia Forde RN  10/09/24 15:51 EDT

## 2024-10-09 NOTE — ED PROVIDER NOTES
EMERGENCY DEPARTMENT ENCOUNTER    Room Number:  27/27  PCP: Betty Isaac APRN  Historian: Patient, EMS    I initially evaluated the patient at 12:21 PM    HPI:  Chief Complaint: Generalized weakness  A complete HPI/ROS/PMH/PSH/SH/FH are unobtainable due to: Patient has dementia  Context: Mary Jo Berg is a 74 y.o. female with a medical history of dementia, arthritis, hypertension, hypothyroidism, diabetes, breast cancer, pacemaker, CAD who presents to the ED from home by EMS c/o acute weakness.  Per EMS, patient was getting in the shower when her knees gave out.  Her spouse assisted her to the ground.  She was then unable to get up without assistance.  Patient currently denies headache, neck pain, back pain, chest pain, shortness of breath, abdominal pain, vomiting, diarrhea, or dementia.  Per EMS, glucose was 280.  Patient was seen in urgent care yesterday for dysuria and discolored urine but was unable to provide a urine specimen.            PAST MEDICAL HISTORY  Active Ambulatory Problems     Diagnosis Date Noted    Malignant neoplasm of overlapping sites of right female breast 05/28/2020    Allergic rhinitis 03/21/2012    Complete heart block 01/27/2015    De Quervain's tenosynovitis, left 10/26/2015    CTS (carpal tunnel syndrome) 01/02/2015    Degenerative arthritis of thumb 06/23/2015    Failed total knee, left 10/01/2013    Gout 10/11/2013    Hyperlipidemia 06/18/2020    Postoperative hypothyroidism 02/14/2012    Lumbar radiculopathy 01/16/2019    Lumbar stenosis without neurogenic claudication 01/16/2019    Spondylolisthesis at L5-S1 level 01/16/2019    Diabetes 1.5, 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    Aromatase inhibitor use 08/10/2020    Morbidly obese 10/20/2020    Personal history of colonic polyps 01/08/2021    Pacemaker     Hypertension     Aortic valve stenosis 06/08/2020    Arteritis 03/10/2017    Asthma 03/10/2017    Benign essential hypertension 01/24/2017    Bradycardia 03/17/2017     Cataract 03/10/2017    Encounter for other screening for malignant neoplasm of breast 2020    Female stress incontinence 03/10/2017    Menopause 03/10/2017    Osteopenia 03/10/2017    Persons encountering health services in other specified circumstances 08/10/2020    Secondary cataract of left eye 2017    Type 2 diabetes mellitus without complication 2013    Hypothyroidism 2012    Stage 3a chronic kidney disease 11/15/2021    Degeneration of lumbar intervertebral disc 2023    MCI (mild cognitive impairment) 2023     Resolved Ambulatory Problems     Diagnosis Date Noted    Prediabetes 2020     Past Medical History:   Diagnosis Date    Breast cancer 2020    Coronary artery disease 01/10/2015    Diabetes mellitus     Diverticulosis 2021    Heart murmur     History of asthma     History of colon polyps     History of complete heart block     Myocardial infarction     Obesity     Osteoarthritis     PONV (postoperative nausea and vomiting)     Pulmonary embolism          PAST SURGICAL HISTORY  Past Surgical History:   Procedure Laterality Date    ADENOIDECTOMY      AORTIC VALVE REPAIR/REPLACEMENT  2023    BLADDER REPAIR  2012    BREAST BIOPSY Right 2020    malignant    CARDIAC PACEMAKER PLACEMENT      CARPAL TUNNEL RELEASE Right     CATARACT EXTRACTION Bilateral      SECTION      x2    COLONOSCOPY  2015    COLONOSCOPY N/A 2021    Procedure: COLONOSCOPY;  Surgeon: Yamilet Stewart MD;  Location: [facility] ENDOSCOPY;  Service: Gastroenterology;  Laterality: N/A;  pre- hx polyps  post-- diverticulosis, hemorrhoids    CORONARY ANGIOPLASTY WITH STENT PLACEMENT  2023    ENDOMETRIAL ABLATION      HAND SURGERY      HERNIA REPAIR      Bilateral femoral and inguinal    JOINT REPLACEMENT  2007    Left knee    KNEE ARTHROPLASTY UNICOMPARTMENTAL Right 2022    KNEE ARTHROSCOPY Bilateral     MASTECTOMY  2020    MASTECTOMY WITH  "SENTINEL NODE BIOPSY AND AXILLARY NODE DISSECTION Right 2020    Procedure: RIGHT BREAST MASTECTOMY WITH SENTINEL NODE BIOPSY;  Surgeon: Bj Stephen MD;  Location: [facility] [place], [place];  Service: General;  Laterality: Right;    OOPHORECTOMY      SHOULDER ARTHROSCOPY Right 2016    rotator cuff repair, biceps tenotomy    THYROIDECTOMY, PARTIAL  2005    TONSILLECTOMY AND ADENOIDECTOMY      TOTAL KNEE ARTHROPLASTY Left 2007    TUBAL ABDOMINAL LIGATION      UMBILICAL HERNIA REPAIR           FAMILY HISTORY  Family History   Problem Relation Age of Onset    Cervical cancer Mother     Alzheimer's disease Mother     Arthritis Mother     Other Mother         Uterine cancer    Cancer Father         Bladder    Heart disease Father     Hypertension Father     Arthritis Father         Breadt cancer    Alcohol abuse Father              Hyperlipidemia Father     Depression Father     Diabetes Father         \"borderline\"    Arthritis Maternal Grandmother     Heart disease Other         FH of heart disease in males before age 55    Hyperlipidemia Brother     Hypertension Brother     Breast cancer Paternal Grandmother 75    Heart attack Paternal Grandfather     Breast cancer Paternal Cousin 70    Alcohol abuse Maternal Grandfather              Depression Brother         since     Malig Hyperthermia Neg Hx          SOCIAL HISTORY  Social History     Socioeconomic History    Marital status:      Spouse name: Sean   Tobacco Use    Smoking status: Never     Passive exposure: Never    Smokeless tobacco: Never   Vaping Use    Vaping status: Never Used   Substance and Sexual Activity    Alcohol use: Yes     Alcohol/week: 1.0 standard drink of alcohol     Types: 1 Standard drinks or equivalent per week     Comment: or less    Drug use: Never    Sexual activity: Yes     Partners: Male     Comment: endometrial [id number]         ALLERGIES  Indomethacin, Nutritional supplements, Penicillins, Statins, " Hydrochlorothiazide, Lisinopril, Morphine, Letrozole, and Pentazocine    REVIEW OF SYSTEMS  Review of Systems  Limited secondary to dementia    PHYSICAL EXAM  ED Triage Vitals [10/09/24 1154]   Temp Heart Rate Resp BP SpO2   98.6 °F (37 °C) 105 20 110/55 95 %      Temp src Heart Rate Source Patient Position BP Location FiO2 (%)   -- -- -- -- --       Physical Exam      GENERAL: Awake and alert.  Oriented to self and place but not to day, month, or year.  Well-developed, well-nourished and nontoxic-appearing elderly female.  She is pleasantly confused.    HENT: NCAT, nares patent, mildly dry mucous membranes  EYES: no scleral icterus  CV: regular rhythm, normal rate  RESPIRATORY: normal effort, clear to auscultation bilaterally  ABDOMEN: soft, nondistended, nontender, no CVA tenderness  MUSCULOSKELETAL: Extremities are nontender with full range of motion.  There is a small abrasion on the left lower leg.  C/T/L spine and chest are nontender.  NEURO: Speech is normal.  No facial droop.  Follows commands  PSYCH:  calm, cooperative  SKIN: warm, dry    Vital signs and nursing notes reviewed.          LAB RESULTS  Recent Results (from the past 24 hour(s))   Green Top (Gel)    Collection Time: 10/09/24 12:30 PM   Result Value Ref Range    Extra Tube Hold for add-ons.    Lavender Top    Collection Time: 10/09/24 12:30 PM   Result Value Ref Range    Extra Tube hold for add-on    Gold Top - SST    Collection Time: 10/09/24 12:30 PM   Result Value Ref Range    Extra Tube Hold for add-ons.    Light Blue Top    Collection Time: 10/09/24 12:30 PM   Result Value Ref Range    Extra Tube Hold for add-ons.    CBC Auto Differential    Collection Time: 10/09/24 12:30 PM    Specimen: Blood   Result Value Ref Range    WBC 8.83 3.40 - 10.80 10*3/mm3    RBC 3.47 (L) 3.77 - 5.28 10*6/mm3    Hemoglobin 11.4 (L) 12.0 - 15.9 g/dL    Hematocrit 34.2 34.0 - 46.6 %    MCV 98.6 (H) 79.0 - 97.0 fL    MCH 32.9 26.6 - 33.0 pg    MCHC 33.3 31.5 - 35.7  g/dL    RDW 15.2 12.3 - 15.4 %    RDW-SD 53.7 37.0 - 54.0 fl    MPV 11.2 6.0 - 12.0 fL    Platelets 233 140 - 450 10*3/mm3    Neutrophil % 79.1 (H) 42.7 - 76.0 %    Lymphocyte % 7.5 (L) 19.6 - 45.3 %    Monocyte % 10.0 5.0 - 12.0 %    Eosinophil % 2.8 0.3 - 6.2 %    Basophil % 0.3 0.0 - 1.5 %    Immature Grans % 0.3 0.0 - 0.5 %    Neutrophils, Absolute 6.98 1.70 - 7.00 10*3/mm3    Lymphocytes, Absolute 0.66 (L) 0.70 - 3.10 10*3/mm3    Monocytes, Absolute 0.88 0.10 - 0.90 10*3/mm3    Eosinophils, Absolute 0.25 0.00 - 0.40 10*3/mm3    Basophils, Absolute 0.03 0.00 - 0.20 10*3/mm3    Immature Grans, Absolute 0.03 0.00 - 0.05 10*3/mm3    nRBC 0.0 0.0 - 0.2 /100 WBC   ECG 12 Lead ED Triage Standing Order; Weak / Dizzy / AMS    Collection Time: 10/09/24 12:35 PM   Result Value Ref Range    QT Interval 466 ms    QTC Interval 589 ms   Comprehensive Metabolic Panel    Collection Time: 10/09/24  1:55 PM    Specimen: Arm, Right; Blood   Result Value Ref Range    Glucose 182 (H) 65 - 99 mg/dL    BUN 40 (H) 8 - 23 mg/dL    Creatinine 1.46 (H) 0.57 - 1.00 mg/dL    Sodium 133 (L) 136 - 145 mmol/L    Potassium 2.9 (L) 3.5 - 5.2 mmol/L    Chloride 95 (L) 98 - 107 mmol/L    CO2 24.0 22.0 - 29.0 mmol/L    Calcium 8.7 8.6 - 10.5 mg/dL    Total Protein 6.0 6.0 - 8.5 g/dL    Albumin 3.1 (L) 3.5 - 5.2 g/dL    ALT (SGPT) 29 1 - 33 U/L    AST (SGOT) 33 (H) 1 - 32 U/L    Alkaline Phosphatase 140 (H) 39 - 117 U/L    Total Bilirubin 0.8 0.0 - 1.2 mg/dL    Globulin 2.9 gm/dL    A/G Ratio 1.1 g/dL    BUN/Creatinine Ratio 27.4 (H) 7.0 - 25.0    Anion Gap 14.0 5.0 - 15.0 mmol/L    eGFR 37.4 (L) >60.0 mL/min/1.73   Single High Sensitivity Troponin T    Collection Time: 10/09/24  1:55 PM    Specimen: Arm, Right; Blood   Result Value Ref Range    HS Troponin T 46 (H) <14 ng/L   Magnesium    Collection Time: 10/09/24  1:55 PM    Specimen: Arm, Right; Blood   Result Value Ref Range    Magnesium 1.1 (L) 1.6 - 2.4 mg/dL   Urinalysis With Microscopic If  Indicated (No Culture) - Urine, Catheter    Collection Time: 10/09/24  2:04 PM    Specimen: Urine, Catheter   Result Value Ref Range    Color, UA Yellow Yellow, Straw    Appearance, UA Clear Clear    pH, UA 5.5 5.0 - 8.0    Specific Gravity, UA 1.011 1.005 - 1.030    Glucose, UA Negative Negative    Ketones, UA Negative Negative    Bilirubin, UA Negative Negative    Blood, UA Moderate (2+) (A) Negative    Protein, UA Negative Negative    Leuk Esterase, UA Negative Negative    Nitrite, UA Negative Negative    Urobilinogen, UA 0.2 E.U./dL 0.2 - 1.0 E.U./dL   Urinalysis, Microscopic Only - Urine, Catheter    Collection Time: 10/09/24  2:04 PM    Specimen: Urine, Catheter   Result Value Ref Range    RBC, UA 11-20 (A) None Seen, 0-2 /HPF    WBC, UA 0-2 None Seen, 0-2 /HPF    Bacteria, UA None Seen None Seen /HPF    Squamous Epithelial Cells, UA 0-2 None Seen, 0-2 /HPF    Hyaline Casts, UA 3-6 None Seen /LPF    Methodology Automated Microscopy        Ordered the above labs and reviewed the results.        RADIOLOGY  XR Chest 1 View    Result Date: 10/9/2024  XR CHEST 1 VW-10/9/2024  HISTORY: Weakness, dizziness.  Heart size is within normal limits. Lungs are slightly underinflated but appear clear. Cardiac pacemaker is seen in good position. Bony structures appear normal.      1. No acute process   This report was finalized on 10/9/2024 1:31 PM by Dr. Bimal Echevarria M.D on Workstation: [id number]       Ordered the above noted radiological studies. Reviewed by me in PACS.            PROCEDURES  Procedures        OUTPATIENT MEDICATION MANAGEMENT:  Current Facility-Administered Medications Ordered in Epic   Medication Dose Route Frequency Provider Last Rate Last Admin    Magnesium Standard Dose Replacement - Follow Nurse / BPA Driven Protocol   Does not apply PRN Bj Goode MD        magnesium sulfate 2g/50 mL (PREMIX) infusion  2 g Intravenous Q2H Bj Goode MD   2 g at 10/09/24 1443    potassium  chloride (K-DUR,KLOR-CON) ER tablet 40 mEq  40 mEq Oral Q4H Bj Goode MD   40 mEq at 10/09/24 1441    Potassium Replacement - Follow Nurse / BPA Driven Protocol   Does not apply PRN Bj Goode MD        sodium chloride 0.9 % flush 10 mL  10 mL Intravenous PRN Bj Goode MD         Current Outpatient Medications Ordered in Epic   Medication Sig Dispense Refill    Acetaminophen (TYLENOL PO) Take 500 mg by mouth 2 (Two) Times a Day.      albuterol sulfate  (90 Base) MCG/ACT inhaler Inhale 2 puffs Every 4 (Four) Hours As Needed for Wheezing or Shortness of Air. 18 g 0    allopurinol (Zyloprim) 100 MG tablet Take 2 tablets by mouth Daily. 60 tablet 0    apixaban (ELIQUIS) 2.5 MG tablet tablet Take 1 tablet by mouth 2 (Two) Times a Day. 56 tablet 0    atorvastatin (LIPITOR) 40 MG tablet Take 1 tablet by mouth every night at bedtime.      bumetanide (BUMEX) 2 MG tablet Take 1 tablet by mouth Daily.      clopidogrel (PLAVIX) 75 MG tablet Take 1 tablet by mouth Daily.      clotrimazole-betamethasone (LOTRISONE) 1-0.05 % cream As Needed.      colchicine 0.6 MG tablet TAKE 0.5 TABLET BY MOUTH EVERY MORNING AND 1 TABLET EVERY NIGHT      diphenhydrAMINE (Sominex Nighttime Sleep-Aid) 25 MG tablet 1 tablet.      donepezil (ARICEPT) 10 MG tablet Take 1 tablet by mouth Every Night. Pt states taking 1/2 tablet at night      DOXYLAMINE SUCCINATE TANNATE PO Take  by mouth Every Night.      glucose blood test strip ACCU-CHECK SKYLA. Use to check blood sugar once daily, as directed. DX: E11.9 (Patient not taking: Reported on 9/24/2024)      irbesartan (AVAPRO) 75 MG tablet TAKE 1 TABLET BY MOUTH ONE TIME DAILY      Lancets (ACCU-CHEK MULTICLIX) lancets Use to check blood sugar once daily, as directed. DX: 250.00 (Patient not taking: Reported on 9/24/2024)      levothyroxine (SYNTHROID, LEVOTHROID) 125 MCG tablet Take 1 tab by mouth daily except Sunday take 1/2 tab 90 tablet 0    LORATADINE PO 10 mg.  (Patient not taking: Reported on 9/24/2024)      memantine (NAMENDA) 5 MG tablet Take 1 tablet by mouth 2 (Two) Times a Day.      memantine (NAMENDA) 5 MG tablet Take 1 tablet by mouth Every Night.      metoprolol succinate XL (TOPROL-XL) 25 MG 24 hr tablet Take 1 tablet by mouth 2 (Two) Times a Day.      nitroglycerin (NITROSTAT) 0.4 MG SL tablet Place 1 tablet under the tongue. (Patient not taking: Reported on 9/24/2024)             MEDICATIONS GIVEN IN ER  Medications   sodium chloride 0.9 % flush 10 mL (has no administration in time range)   Magnesium Standard Dose Replacement - Follow Nurse / BPA Driven Protocol (has no administration in time range)   Potassium Replacement - Follow Nurse / BPA Driven Protocol (has no administration in time range)   magnesium sulfate 2g/50 mL (PREMIX) infusion (2 g Intravenous New Bag 10/9/24 1443)   potassium chloride (K-DUR,KLOR-CON) ER tablet 40 mEq (40 mEq Oral Given 10/9/24 1441)   lactated ringers bolus 1,000 mL (0 mL Intravenous Stopped 10/9/24 1249)                   MEDICAL DECISION MAKING, PROGRESS, and CONSULTS    All labs have been independently reviewed by me.  All radiology studies have been reviewed by me and I have also reviewed the radiology report.   EKG's independently viewed and interpreted by me.  Discussion below represents my analysis of pertinent findings related to patient's condition, differential diagnosis, treatment plan and final disposition.      Additional sources:    - Discussed/ obtained information from independent historians: EMS, spouse at bedside    - External (non-ED) record review: Patient was seen in urgent care yesterday for dysuria.  She was unable to provide a urine specimen.  She does not have any prior admissions here.    -Prescription drug monitoring program review:     N/A    - Chronic or social conditions impacting patient care (Social Determinants of Health): None          Orders placed during this visit:  Orders Placed This  Encounter   Procedures    XR Chest 1 View    [place] Draw    Comprehensive Metabolic Panel    Single High Sensitivity Troponin T    Magnesium    CBC Auto Differential    Urinalysis With Microscopic If Indicated (No Culture) - Urine, Catheter    Urinalysis, Microscopic Only - Urine, Clean Catch    Magnesium    Potassium    NPO Diet NPO Type: Strict NPO    Undress & Gown    Continuous Pulse Oximetry    Vital Signs    Orthostatic Blood Pressure    LHA (on-call MD unless specified) Details    Oxygen Therapy- Nasal Cannula; Titrate 1-6 LPM Per SpO2; 90 - 95%    POC Glucose Once    ECG 12 Lead ED Triage Standing Order; Weak / Dizzy / AMS    Insert Peripheral IV    Initiate Observation Status    Fall Precautions    CBC & Differential    Green Top (Gel)    Lavender Top    Gold Top - SST    Light Blue Top         Additional orders considered but not ordered:          Differential diagnosis includes, but is not limited to:    Deconditioning, electrolyte abnormality, dehydration, UTI      Independent interpretation of labs, radiology studies, and discussions with consultants:  ED Course as of 10/09/24 1605   Wed Oct 09, 2024   1220 BP: 110/55 [WH]   1220 Temp: 98.6 °F (37 °C) [WH]   1220 Heart Rate: 105 [WH]   1220 Resp: 20 [WH]   1220 SpO2: 95 % [WH]   1228 Patient presents to the ED from home with generalized weakness.  Her legs gave out earlier today and she was unable to get up.  She did not lose consciousness.  Will obtain chest x-ray, labs, and EKG for further evaluation.  Differential diagnosis includes but is not limited to: Deconditioning, dementia, UTI, dehydration, electrolyte abnormality [WH]   1251 WBC: 8.83 [WH]   1251 Hemoglobin(!): 11.4 [WH]   1332 EKG personally interpreted by me at 1238.  My personal interpretation is:          EKG time: 12:35 PM  Rhythm/Rate: Ventricular paced rhythm, rate 96  QRS, axis: IVCD, LAD  ST and T waves: Inverted T waves in the lateral leads    Interpreted Contemporaneously by me,  independently viewed  EKG is changed compared to prior EKG done on 6/1/2021.  Sinus rhythm with paced complexes was present at that time.  There are no new ischemic changes   [WH]   1427 Leukocytes, UA: Negative [WH]   1427 Nitrite, UA: Negative [WH]   1427 RBC, UA(!): 11-20 [WH]   1427 WBC, UA: 0-2 [WH]   1427 Bacteria, UA: None Seen [WH]   1427 Chest x-ray personally interpreted by me.  My personal trepidation is: Heart size is normal.  Lungs are clear.  No pleural effusion. [WH]   1428 Glucose(!): 182 [WH]   1428 BUN(!): 40 [WH]   1428 Creatinine(!): 1.46  BUN 34 and creatinine 1.39 in May 2024 [WH]   1429 Magnesium(!): 1.1 [WH]   1429 Potassium(!): 2.9 [WH]   1430 Test results and diagnosis discussed with the patient and her .  Shared decision making was discussed and admission was recommended.  They are agreeable with this. [WH]   1445 Case discussed with Dr. Whiting, hospitalist, and she agrees to admit the patient to a monitored bed.  Pertinent history, exam findings, test results, ED course, and diagnoses were discussed with her. [WH]   1459 MDM: Patient presented to the ED with generalized weakness.  She was found to have low magnesium and potassium.  Electrolyte placement protocol was initiated.  She did not have a UTI.  Renal function was elevated but stable.  Chest x-ray was negative acute.  Patient will be admitted. [WH]      ED Course User Index  [WH] Bj Goode MD         COMPLEXITY OF CARE  The patient requires admission.      DIAGNOSIS  Final diagnoses:   Hypomagnesemia   Hypokalemia   Generalized weakness   Dementia, unspecified dementia severity, unspecified dementia type, unspecified whether behavioral, psychotic, or mood disturbance or anxiety   Chronic renal impairment, unspecified CKD stage         DISPOSITION  ADMISSION    Discussed treatment plan and reason for admission with pt/family and admitting physician.  Pt/family voiced understanding of the plan for admission for  further testing/treatment as needed.               Latest Documented Vital Signs:  AS OF 16:05 EDT VITALS:    BP - 112/53  HR - 80  TEMP - 98.6 °F (37 °C)  O2 SATS - 97%            --    Please note that portions of this were completed with a voice recognition program.       Note Disclaimer: At [facility], we believe that sharing information builds trust and better relationships. You are receiving this note because you are receiving care at [facility] or recently visited. It is possible you will see health information before a provider has talked with you about it. This kind of information can be easy to misunderstand. To help you fully understand what it means for your health, we urge you to discuss this note with your provider.             Bj Goode MD  10/09/24 2295

## 2024-10-09 NOTE — ED NOTES
Pt to ED from home via [facility]. EMS called for fall. Pt was attempting to get in the shower and her knees gave out, pt spouse assisted pt to the ground. No known injuries. Pt c/o weakness. Seen yesterday for weakness at [facility]. EMS reports EKG has varied between v-paced and av-paced. Hx dementia.

## 2024-10-09 NOTE — PLAN OF CARE
Goal Outcome Evaluation:                                       Patient admitted to room 3116 via stretcher, no family at bedside, for hypomagnesemia. Empty bag of magnesium hanging on IV pump, IV flushed and saline locked. Patient is clearly confused with time and situation. Assessment completed- redness noted to buttock- blanchable- cream applied. Redness, slightly scabbed area noted to right thigh possibly due to moisture. Patient on room air and paced on monitor. Patient oriented to unit. No acute distress noted, will continue to monitor.

## 2024-10-09 NOTE — H&P
PCP: Betty Isaac APRN    Chief complaint   Chief Complaint   Patient presents with    Weakness - Generalized    Syncope       HPI  Patient is a 74 y.o. female presents with history of diabetes, pacemaker, obesity, pulmonary embolism, on chronic anticoagulation, history of breast cancer on tamoxifen who presents to the ER due to generalized weakness.  Patient denies any dysuria but says that has been dark, she has not been eaten as much, decreased appetite for weeks, has been says that her knees gave out and he helped her to the floor.  No change in medications.  She was found to have a blood pressure of 75/35 with hypokalemia and hypomagnesemia.    Has had some diarrhea and recently started on Metamucil and that has improved.    PAST MEDICAL HISTORY  Past Medical History:   Diagnosis Date    Asthma     Breast cancer 2020    right IDC    Cataract     on both  retinas    Coronary artery disease 01/10/2015    Pacemaker 2015    Diabetes mellitus     Type 2    Diverticulosis 2021    Encounter for other screening for malignant neoplasm of breast 2020    Heart murmur 2015    History of asthma     History of colon polyps     History of complete heart block     paced in     Hyperlipidemia     Hypertension     Hypothyroidism     Myocardial infarction     Obesity     Osteoarthritis     Osteopenia     Pacemaker     PONV (postoperative nausea and vomiting)     Pulmonary embolism        PAST SURGICAL HISTORY  Past Surgical History:   Procedure Laterality Date    ADENOIDECTOMY      AORTIC VALVE REPAIR/REPLACEMENT  2023    BLADDER REPAIR  2012    BREAST BIOPSY Right 2020    malignant    CARDIAC PACEMAKER PLACEMENT      CARPAL TUNNEL RELEASE Right     CATARACT EXTRACTION Bilateral      SECTION      x2    COLONOSCOPY  2015    COLONOSCOPY N/A 2021    Procedure: COLONOSCOPY;  Surgeon: Yamilet Stewart MD;  Location: [facility] ENDOSCOPY;  Service: Gastroenterology;   "Laterality: N/A;  pre- hx polyps  post-- diverticulosis, hemorrhoids    CORONARY ANGIOPLASTY WITH STENT PLACEMENT  2023    ENDOMETRIAL ABLATION      HAND SURGERY      HERNIA REPAIR      Bilateral femoral and inguinal    JOINT REPLACEMENT  2007    Left knee    KNEE ARTHROPLASTY UNICOMPARTMENTAL Right 2022    KNEE ARTHROSCOPY Bilateral     MASTECTOMY  2020    MASTECTOMY WITH SENTINEL NODE BIOPSY AND AXILLARY NODE DISSECTION Right 2020    Procedure: RIGHT BREAST MASTECTOMY WITH SENTINEL NODE BIOPSY;  Surgeon: Bj Stephen MD;  Location: [facility] OR [facility] [place];  Service: General;  Laterality: Right;    OOPHORECTOMY      SHOULDER ARTHROSCOPY Right 2016    rotator cuff repair, biceps tenotomy    THYROIDECTOMY, PARTIAL  2005    TONSILLECTOMY AND ADENOIDECTOMY      TOTAL KNEE ARTHROPLASTY Left     TUBAL ABDOMINAL LIGATION      UMBILICAL HERNIA REPAIR         FAMILY HISTORY  Family History   Problem Relation Age of Onset    Cervical cancer Mother     Alzheimer's disease Mother     Arthritis Mother     Other Mother         Uterine cancer    Cancer Father         Bladder    Heart disease Father     Hypertension Father     Arthritis Father         Breadt cancer    Alcohol abuse Father              Hyperlipidemia Father     Depression Father     Diabetes Father         \"borderline\"    Arthritis Maternal Grandmother     Heart disease Other         FH of heart disease in males before age 55    Hyperlipidemia Brother     Hypertension Brother     Breast cancer Paternal Grandmother 75    Heart attack Paternal Grandfather     Breast cancer Paternal Cousin 70    Alcohol abuse Maternal Grandfather              Depression Brother         since     Malig Hyperthermia Neg Hx        SOCIAL HISTORY  Social History     Tobacco Use    Smoking status: Never     Passive exposure: Never    Smokeless tobacco: Never   Vaping Use    Vaping status: Never Used   Substance Use Topics    Alcohol " use: Yes     Alcohol/week: 1.0 standard drink of alcohol     Types: 1 Standard drinks or equivalent per week     Comment: or less    Drug use: Never       MEDICATIONS:  Medications Prior to Admission   Medication Sig Dispense Refill Last Dose    allopurinol (Zyloprim) 100 MG tablet Take 2 tablets by mouth Daily. (Patient taking differently: Take 3 tablets by mouth Daily.) 60 tablet 0     aspirin 81 MG EC tablet Take 1 tablet by mouth Daily.       atorvastatin (LIPITOR) 40 MG tablet Take 1 tablet by mouth every night at bedtime.       bumetanide (BUMEX) 2 MG tablet Take 1 tablet by mouth Daily.       clopidogrel (PLAVIX) 75 MG tablet Take 1 tablet by mouth Daily.       colchicine 0.6 MG tablet TAKE 0.5 TABLET BY MOUTH EVERY MORNING AND 1 TABLET EVERY NIGHT       DOXYLAMINE SUCCINATE TANNATE PO Take 25 mg by mouth Every Night.       irbesartan (AVAPRO) 75 MG tablet TAKE 1 TABLET BY MOUTH ONE TIME DAILY       levothyroxine (SYNTHROID, LEVOTHROID) 125 MCG tablet Take 1 tab by mouth daily except Sunday take 1/2 tab 90 tablet 0     memantine (NAMENDA) 5 MG tablet Take 1 tablet by mouth 2 (Two) Times a Day.       metoprolol succinate XL (TOPROL-XL) 25 MG 24 hr tablet Take 1 tablet by mouth 2 (Two) Times a Day.       Acetaminophen (TYLENOL PO) Take 500 mg by mouth 2 (Two) Times a Day.       albuterol sulfate  (90 Base) MCG/ACT inhaler Inhale 2 puffs Every 4 (Four) Hours As Needed for Wheezing or Shortness of Air. 18 g 0     apixaban (ELIQUIS) 2.5 MG tablet tablet Take 1 tablet by mouth 2 (Two) Times a Day. 56 tablet 0     clotrimazole-betamethasone (LOTRISONE) 1-0.05 % cream As Needed.       diphenhydrAMINE (Sominex Nighttime Sleep-Aid) 25 MG tablet 1 tablet.       donepezil (ARICEPT) 10 MG tablet Take 1 tablet by mouth Every Night. Pt states taking 1/2 tablet at night       glucose blood test strip ACCU-CHECK SKYLA. Use to check blood sugar once daily, as directed. DX: E11.9 (Patient not taking: Reported on  "9/24/2024)       Lancets (ACCU-CHEK MULTICLIX) lancets Use to check blood sugar once daily, as directed. DX: 250.00 (Patient not taking: Reported on 9/24/2024)       LORATADINE PO 10 mg. (Patient not taking: Reported on 9/24/2024)       memantine (NAMENDA) 5 MG tablet Take 1 tablet by mouth Every Night.       nitroglycerin (NITROSTAT) 0.4 MG SL tablet Place 1 tablet under the tongue. (Patient not taking: Reported on 9/24/2024)          Allergies:  Indomethacin, Nutritional supplements, Penicillins, Statins, Hydrochlorothiazide, Lisinopril, Morphine, Letrozole, and Pentazocine    Review of Systems:  Unable to obtain secondary to cognitive impairment        Vital Signs  Temp:  [98.6 °F (37 °C)-98.7 °F (37.1 °C)] 98.7 °F (37.1 °C)  Heart Rate:  [] 79  Resp:  [18-20] 18  BP: (100-125)/(51-68) 125/60  Flowsheet Rows      Flowsheet Row First Filed Value   Admission Height 154.9 cm (61\") Documented at 10/09/2024 1154   Admission Weight 83.9 kg (185 lb) Documented at 10/09/2024 1154           Body mass index is 35.55 kg/m².      Physical Exam:  General Appearance:    Alert, cooperative, in no acute distress   Head:    Normocephalic, without obvious abnormality, atraumatic   Eyes:         conjunctivae and sclerae normal, no icterus, PERRLA   ENT:    Ears grossly intact, oral mucosa moist,   Neck:   No adenopathy, supple, trachea midline,        Lungs:     Clear to auscultation,respirations regular, even and                   unlabored    Heart:    Regular rhythm and normal rate,  no murmur, normal S1 and S2,   Abdomen:     Normal bowel sounds, no masses,  soft non-tender, non-distended, morbidly obese   Extremities:   Moves all extremities , no cyanosis, 1+ edema,             Pulses:   Pulses palpable and equal bilaterally   Skin:   No bleeding, rash,  +bruising bilateral knee replacement scars,   Neurologic:    Psych:   Cranial nerves 2 - 12 grossly intact, sensation grossly intact,     Moves all extremities , equal " bilateral strength 3/5    Alert and Oriented x 2, flat  Affect    I washed/sanitized my hands before entering the room and immediately upon leaving the room.    LABS:  Admission on 10/09/2024   Component Date Value Ref Range Status    QT Interval 10/09/2024 466  ms Final    QTC Interval 10/09/2024 589  ms Final    Glucose 10/09/2024 182 (H)  65 - 99 mg/dL Final    BUN 10/09/2024 40 (H)  8 - 23 mg/dL Final    Creatinine 10/09/2024 1.46 (H)  0.57 - 1.00 mg/dL Final    Sodium 10/09/2024 133 (L)  136 - 145 mmol/L Final    Potassium 10/09/2024 2.9 (L)  3.5 - 5.2 mmol/L Final    Chloride 10/09/2024 95 (L)  98 - 107 mmol/L Final    CO2 10/09/2024 24.0  22.0 - 29.0 mmol/L Final    Calcium 10/09/2024 8.7  8.6 - 10.5 mg/dL Final    Total Protein 10/09/2024 6.0  6.0 - 8.5 g/dL Final    Albumin 10/09/2024 3.1 (L)  3.5 - 5.2 g/dL Final    ALT (SGPT) 10/09/2024 29  1 - 33 U/L Final    AST (SGOT) 10/09/2024 33 (H)  1 - 32 U/L Final    Alkaline Phosphatase 10/09/2024 140 (H)  39 - 117 U/L Final    Total Bilirubin 10/09/2024 0.8  0.0 - 1.2 mg/dL Final    Globulin 10/09/2024 2.9  gm/dL Final    A/G Ratio 10/09/2024 1.1  g/dL Final    BUN/Creatinine Ratio 10/09/2024 27.4 (H)  7.0 - 25.0 Final    Anion Gap 10/09/2024 14.0  5.0 - 15.0 mmol/L Final    eGFR 10/09/2024 37.4 (L)  >60.0 mL/min/1.73 Final    HS Troponin T 10/09/2024 46 (H)  <14 ng/L Final    Magnesium 10/09/2024 1.1 (L)  1.6 - 2.4 mg/dL Final    Extra Tube 10/09/2024 Hold for add-ons.   Final    Auto resulted.    Extra Tube 10/09/2024 hold for add-on   Final    Auto resulted    Extra Tube 10/09/2024 Hold for add-ons.   Final    Auto resulted.    Extra Tube 10/09/2024 Hold for add-ons.   Final    Auto resulted    WBC 10/09/2024 8.83  3.40 - 10.80 10*3/mm3 Final    RBC 10/09/2024 3.47 (L)  3.77 - 5.28 10*6/mm3 Final    Hemoglobin 10/09/2024 11.4 (L)  12.0 - 15.9 g/dL Final    Hematocrit 10/09/2024 34.2  34.0 - 46.6 % Final    MCV 10/09/2024 98.6 (H)  79.0 - 97.0 fL Final    MCH  10/09/2024 32.9  26.6 - 33.0 pg Final    MCHC 10/09/2024 33.3  31.5 - 35.7 g/dL Final    RDW 10/09/2024 15.2  12.3 - 15.4 % Final    RDW-SD 10/09/2024 53.7  37.0 - 54.0 fl Final    MPV 10/09/2024 11.2  6.0 - 12.0 fL Final    Platelets 10/09/2024 233  140 - 450 10*3/mm3 Final    Neutrophil % 10/09/2024 79.1 (H)  42.7 - 76.0 % Final    Lymphocyte % 10/09/2024 7.5 (L)  19.6 - 45.3 % Final    Monocyte % 10/09/2024 10.0  5.0 - 12.0 % Final    Eosinophil % 10/09/2024 2.8  0.3 - 6.2 % Final    Basophil % 10/09/2024 0.3  0.0 - 1.5 % Final    Immature Grans % 10/09/2024 0.3  0.0 - 0.5 % Final    Neutrophils, Absolute 10/09/2024 6.98  1.70 - 7.00 10*3/mm3 Final    Lymphocytes, Absolute 10/09/2024 0.66 (L)  0.70 - 3.10 10*3/mm3 Final    Monocytes, Absolute 10/09/2024 0.88  0.10 - 0.90 10*3/mm3 Final    Eosinophils, Absolute 10/09/2024 0.25  0.00 - 0.40 10*3/mm3 Final    Basophils, Absolute 10/09/2024 0.03  0.00 - 0.20 10*3/mm3 Final    Immature Grans, Absolute 10/09/2024 0.03  0.00 - 0.05 10*3/mm3 Final    nRBC 10/09/2024 0.0  0.0 - 0.2 /100 WBC Final    Color, UA 10/09/2024 Yellow  Yellow, Straw Final    Appearance, UA 10/09/2024 Clear  Clear Final    pH, UA 10/09/2024 5.5  5.0 - 8.0 Final    Specific Gravity, UA 10/09/2024 1.011  1.005 - 1.030 Final    Glucose, UA 10/09/2024 Negative  Negative Final    Ketones, UA 10/09/2024 Negative  Negative Final    Bilirubin, UA 10/09/2024 Negative  Negative Final    Blood, UA 10/09/2024 Moderate (2+) (A)  Negative Final    Protein, UA 10/09/2024 Negative  Negative Final    Leuk Esterase, UA 10/09/2024 Negative  Negative Final    Nitrite, UA 10/09/2024 Negative  Negative Final    Urobilinogen, UA 10/09/2024 0.2 E.U./dL  0.2 - 1.0 E.U./dL Final    RBC, UA 10/09/2024 11-20 (A)  None Seen, 0-2 /HPF Final    WBC, UA 10/09/2024 0-2  None Seen, 0-2 /HPF Final    Bacteria, UA 10/09/2024 None Seen  None Seen /HPF Final    Squamous Epithelial Cells, UA 10/09/2024 0-2  None Seen, 0-2 /HPF Final     Hyaline Casts, UA 10/09/2024 3-6  None Seen /LPF Final    Methodology 10/09/2024 Automated Microscopy   Final         DIAGNOSTICS:  XR Chest 1 View    Result Date: 10/9/2024  1. No acute process   This report was finalized on 10/9/2024 1:31 PM by Dr. Bimal Echevarria M.D on Workstation: [id number]            Results Review:   I reviewed the patient's new clinical results.  Discussed with ER physician  Old records reviewed / Medical Decision Making High Complexity  I personally viewed and interpreted the patient's EKG/Telemetry data- sinus rhythm      ASSESSMENT AND PLAN    Hypomagnesemia    Hyperlipidemia    Postoperative hypothyroidism    Lumbar radiculopathy    Morbidly obese    Pacemaker    Hypertension    Type 2 diabetes mellitus without complication    Stage 3a chronic kidney disease    MCI (mild cognitive impairment)    Poor diet    Chronic anticoagulation    Generalized weakness likely secondary to hypotension and electrolyte abnormalities  -Treat all of the below  -PT and OT to evaluate and treat    Hypomagnesemia and hypokalemia  -Please secondary to poor p.o. intake as well as the patient is on Bumex without any potassium supplements  -Nutrition to evaluate  -Put on electrolyte replacement    Hypotension and a history of hypertension with CAD patient has history of TAVR in 2023  -Cannot get the echo report follow-up from [facility] cardiology  -Holding Bumex and Coreg    Poor p.o. intake chronically with hypoalbuminemia    Hypothyroidism  -Patient has been on Synthroid replacement and calculated is approximately 116 mcg a day and TSH was low therefore we will decrease to 112 mcg and have patient recheck in 4 to 6 weeks as an outpatient      HOME MEDS HELD:   -Restart when clinically appropriate      Chronic medical conditions being monitored- stable, continue medical management  -Obesity, history of asthma, pacemaker,    +DVT PROPH:    eliquis  + CODE STATUS: Full code although when I asked the patient and has  been in the ER they both a DNR and that she does have a living will      I discussed the patient's findings and my recommendations with the patient and/or family.  Please reference all orders placed.    Melvi Whiting MD  10/09/24  18:03 EDT      This document is intended for medical expert use only. Reading of this document by patients and/or patient's family without participating in medical staff guidance may result in misinterpretation and unintended morbidity. Any interpretation of such data is the responsibility of the patient and/or family member responsible for the patient in concert with their primary or specialist providers, and NOT to be left for sources of online searches such as [employer], [employer] or similar queries. Relying on these approaches to knowledge may result in misinterpretation, misguided goals of care and even death should patients or family members try recommendations outside of the realm of professional medical care in a supervised way.    Dictated utilizing Voice dictation:  Parts of this note may be an electronic transcription/translation of spoke language to printed text using Dragon dictation system.

## 2024-10-10 PROBLEM — Z79.01 CHRONIC ANTICOAGULATION: Chronic | Status: ACTIVE | Noted: 2024-10-10

## 2024-10-10 LAB
ALBUMIN SERPL-MCNC: 2.9 G/DL (ref 3.5–5.2)
ALBUMIN/GLOB SERPL: 0.9 G/DL
ALP SERPL-CCNC: 158 U/L (ref 39–117)
ALT SERPL W P-5'-P-CCNC: 31 U/L (ref 1–33)
ANION GAP SERPL CALCULATED.3IONS-SCNC: 11.6 MMOL/L (ref 5–15)
AST SERPL-CCNC: 45 U/L (ref 1–32)
BASOPHILS # BLD AUTO: 0.04 10*3/MM3 (ref 0–0.2)
BASOPHILS NFR BLD AUTO: 0.5 % (ref 0–1.5)
BILIRUB SERPL-MCNC: 0.7 MG/DL (ref 0–1.2)
BUN SERPL-MCNC: 32 MG/DL (ref 8–23)
BUN/CREAT SERPL: 24.6 (ref 7–25)
CALCIUM SPEC-SCNC: 8.7 MG/DL (ref 8.6–10.5)
CHLORIDE SERPL-SCNC: 97 MMOL/L (ref 98–107)
CO2 SERPL-SCNC: 24.4 MMOL/L (ref 22–29)
CREAT SERPL-MCNC: 1.3 MG/DL (ref 0.57–1)
DEPRECATED RDW RBC AUTO: 56.4 FL (ref 37–54)
EGFRCR SERPLBLD CKD-EPI 2021: 43 ML/MIN/1.73
EOSINOPHIL # BLD AUTO: 0.31 10*3/MM3 (ref 0–0.4)
EOSINOPHIL NFR BLD AUTO: 3.5 % (ref 0.3–6.2)
ERYTHROCYTE [DISTWIDTH] IN BLOOD BY AUTOMATED COUNT: 15.4 % (ref 12.3–15.4)
GLOBULIN UR ELPH-MCNC: 3.3 GM/DL
GLUCOSE BLDC GLUCOMTR-MCNC: 153 MG/DL (ref 70–130)
GLUCOSE BLDC GLUCOMTR-MCNC: 191 MG/DL (ref 70–130)
GLUCOSE BLDC GLUCOMTR-MCNC: 269 MG/DL (ref 70–130)
GLUCOSE BLDC GLUCOMTR-MCNC: 287 MG/DL (ref 70–130)
GLUCOSE SERPL-MCNC: 160 MG/DL (ref 65–99)
HCT VFR BLD AUTO: 34.3 % (ref 34–46.6)
HGB BLD-MCNC: 11 G/DL (ref 12–15.9)
IMM GRANULOCYTES # BLD AUTO: 0.03 10*3/MM3 (ref 0–0.05)
IMM GRANULOCYTES NFR BLD AUTO: 0.3 % (ref 0–0.5)
LYMPHOCYTES # BLD AUTO: 1.23 10*3/MM3 (ref 0.7–3.1)
LYMPHOCYTES NFR BLD AUTO: 14 % (ref 19.6–45.3)
MAGNESIUM SERPL-MCNC: 2.4 MG/DL (ref 1.6–2.4)
MCH RBC QN AUTO: 31.9 PG (ref 26.6–33)
MCHC RBC AUTO-ENTMCNC: 32.1 G/DL (ref 31.5–35.7)
MCV RBC AUTO: 99.4 FL (ref 79–97)
MONOCYTES # BLD AUTO: 1.03 10*3/MM3 (ref 0.1–0.9)
MONOCYTES NFR BLD AUTO: 11.7 % (ref 5–12)
NEUTROPHILS NFR BLD AUTO: 6.17 10*3/MM3 (ref 1.7–7)
NEUTROPHILS NFR BLD AUTO: 70 % (ref 42.7–76)
NRBC BLD AUTO-RTO: 0 /100 WBC (ref 0–0.2)
PLATELET # BLD AUTO: 218 10*3/MM3 (ref 140–450)
PMV BLD AUTO: 11 FL (ref 6–12)
POTASSIUM SERPL-SCNC: 4.3 MMOL/L (ref 3.5–5.2)
PROT SERPL-MCNC: 6.2 G/DL (ref 6–8.5)
RBC # BLD AUTO: 3.45 10*6/MM3 (ref 3.77–5.28)
SODIUM SERPL-SCNC: 133 MMOL/L (ref 136–145)
WBC NRBC COR # BLD AUTO: 8.81 10*3/MM3 (ref 3.4–10.8)

## 2024-10-10 PROCEDURE — 97530 THERAPEUTIC ACTIVITIES: CPT

## 2024-10-10 PROCEDURE — 63710000001 INSULIN LISPRO (HUMAN) PER 5 UNITS: Performed by: INTERNAL MEDICINE

## 2024-10-10 PROCEDURE — 97535 SELF CARE MNGMENT TRAINING: CPT

## 2024-10-10 PROCEDURE — 82948 REAGENT STRIP/BLOOD GLUCOSE: CPT

## 2024-10-10 PROCEDURE — 80053 COMPREHEN METABOLIC PANEL: CPT | Performed by: INTERNAL MEDICINE

## 2024-10-10 PROCEDURE — 83735 ASSAY OF MAGNESIUM: CPT | Performed by: INTERNAL MEDICINE

## 2024-10-10 PROCEDURE — 36415 COLL VENOUS BLD VENIPUNCTURE: CPT | Performed by: INTERNAL MEDICINE

## 2024-10-10 PROCEDURE — 97166 OT EVAL MOD COMPLEX 45 MIN: CPT

## 2024-10-10 PROCEDURE — 85025 COMPLETE CBC W/AUTO DIFF WBC: CPT | Performed by: INTERNAL MEDICINE

## 2024-10-10 PROCEDURE — 97162 PT EVAL MOD COMPLEX 30 MIN: CPT

## 2024-10-10 RX ORDER — BUMETANIDE 2 MG/1
2 TABLET ORAL DAILY
Status: DISCONTINUED | OUTPATIENT
Start: 2024-10-10 | End: 2024-10-10

## 2024-10-10 RX ORDER — BUMETANIDE 2 MG/1
2 TABLET ORAL DAILY
Status: DISCONTINUED | OUTPATIENT
Start: 2024-10-11 | End: 2024-10-14 | Stop reason: HOSPADM

## 2024-10-10 RX ADMIN — INSULIN LISPRO 2 UNITS: 100 INJECTION, SOLUTION INTRAVENOUS; SUBCUTANEOUS at 18:46

## 2024-10-10 RX ADMIN — POTASSIUM CHLORIDE 20 MEQ: 750 TABLET, EXTENDED RELEASE ORAL at 09:37

## 2024-10-10 RX ADMIN — ASPIRIN 81 MG: 81 TABLET, COATED ORAL at 09:37

## 2024-10-10 RX ADMIN — DOXYLAMINE SUCCINATE 25 MG: 25 TABLET ORAL at 23:41

## 2024-10-10 RX ADMIN — METOPROLOL SUCCINATE 25 MG: 25 TABLET, EXTENDED RELEASE ORAL at 23:40

## 2024-10-10 RX ADMIN — ATORVASTATIN CALCIUM 40 MG: 20 TABLET, FILM COATED ORAL at 09:38

## 2024-10-10 RX ADMIN — INSULIN LISPRO 6 UNITS: 100 INJECTION, SOLUTION INTRAVENOUS; SUBCUTANEOUS at 21:55

## 2024-10-10 RX ADMIN — CLOPIDOGREL BISULFATE 75 MG: 75 TABLET, FILM COATED ORAL at 09:37

## 2024-10-10 RX ADMIN — METOPROLOL SUCCINATE 25 MG: 25 TABLET, EXTENDED RELEASE ORAL at 09:37

## 2024-10-10 RX ADMIN — INSULIN LISPRO 6 UNITS: 100 INJECTION, SOLUTION INTRAVENOUS; SUBCUTANEOUS at 12:18

## 2024-10-10 RX ADMIN — LEVOTHYROXINE SODIUM 112 MCG: 112 TABLET ORAL at 05:36

## 2024-10-10 RX ADMIN — DOXYLAMINE SUCCINATE 25 MG: 25 TABLET ORAL at 00:16

## 2024-10-10 RX ADMIN — LOSARTAN POTASSIUM 25 MG: 25 TABLET ORAL at 09:37

## 2024-10-10 RX ADMIN — MEMANTINE HYDROCHLORIDE 5 MG: 5 TABLET, FILM COATED ORAL at 09:37

## 2024-10-10 RX ADMIN — INSULIN LISPRO 2 UNITS: 100 INJECTION, SOLUTION INTRAVENOUS; SUBCUTANEOUS at 06:56

## 2024-10-10 RX ADMIN — MEMANTINE HYDROCHLORIDE 5 MG: 5 TABLET, FILM COATED ORAL at 23:41

## 2024-10-10 NOTE — PLAN OF CARE
Goal Outcome Evaluation:  Plan of Care Reviewed With: patient        Progress: no change  Outcome Evaluation: No complaints voiced, denies pain, NSR on monitor, awake most of shift, dozing at short intervals, little sleep noted

## 2024-10-10 NOTE — TELEPHONE ENCOUNTER
Caller: Sean Berg    Relationship: Emergency Contact    Best call back number: [phone]     What was the call regarding: PATIENTS  CALLING STATING THAT THE PATIENT FELL YESTERDAY AND CURRENTLY IN [facility]

## 2024-10-10 NOTE — PLAN OF CARE
Goal Outcome Evaluation:  Plan of Care Reviewed With: patient, spouse           Outcome Evaluation: Pt is a 74 year old female admitted after a fall at home, workup for hypomagnesemia, hypokalemia. Pt lives with her  and has no steps to enter her house. Per chart she has a cane, walker, and wheelchair for home use. Upon examination, pt demonstrated generalized weakness, decreased balance, and limited activity tolerance. She performed STS from the chair wth min A x 1 and rolling walker and AMB 25 ft CGA-min A x 1. She required increased assistance with walker management as she fatigued and was assisted back to the chair. Pt would benefit from acute PT services to address stated deficits and prepare for d/c. Recommend SNF at d/c.      Anticipated Discharge Disposition (PT): skilled nursing facility

## 2024-10-10 NOTE — THERAPY EVALUATION
Patient Name: Mary Jo Berg  : 1950    MRN: [id number]                              Today's Date: 10/10/2024       Admit Date: 10/9/2024    Visit Dx:     ICD-10-CM ICD-9-CM   1. Hypomagnesemia  E83.42 275.2   2. Hypokalemia  E87.6 276.8   3. Generalized weakness  R53.1 780.79   4. Dementia, unspecified dementia severity, unspecified dementia type, unspecified whether behavioral, psychotic, or mood disturbance or anxiety  F03.90 294.20   5. Chronic renal impairment, unspecified CKD stage  N18.9 585.9     Patient Active Problem List   Diagnosis    Malignant neoplasm of overlapping sites of right female breast    Allergic rhinitis    Complete heart block    De Quervain's tenosynovitis, left    CTS (carpal tunnel syndrome)    Degenerative arthritis of thumb    Failed total knee, left    Gout    Hyperlipidemia    Postoperative hypothyroidism    Lumbar radiculopathy    Lumbar stenosis without neurogenic claudication    Spondylolisthesis at L5-S1 level    Diabetes 1.5, managed as type 2    Aromatase inhibitor use    Morbidly obese    Personal history of colonic polyps    Pacemaker    Hypertension    Aortic valve stenosis    Arteritis    Asthma    Benign essential hypertension    Bradycardia    Cataract    Encounter for other screening for malignant neoplasm of breast    Female stress incontinence    Menopause    Osteopenia    Persons encountering health services in other specified circumstances    Secondary cataract of left eye    Type 2 diabetes mellitus without complication    Hypothyroidism    Stage 3a chronic kidney disease    Degeneration of lumbar intervertebral disc    MCI (mild cognitive impairment)    Hypomagnesemia    Poor diet    Chronic anticoagulation     Past Medical History:   Diagnosis Date    Asthma     Breast cancer 2020    right IDC    Cataract 2010    on both  retinas    Coronary artery disease 01/10/2015    Pacemaker 2015    Diabetes mellitus     Type 2    Diverticulosis  2021    Encounter for other screening for malignant neoplasm of breast 2020    Heart murmur 2015    History of asthma     History of colon polyps     History of complete heart block     paced in     Hyperlipidemia     Hypertension     Hypothyroidism     MCI (mild cognitive impairment) 2023    Myocardial infarction     Obesity     Osteoarthritis     Osteopenia     Pacemaker     PONV (postoperative nausea and vomiting)     Pulmonary embolism      Past Surgical History:   Procedure Laterality Date    ADENOIDECTOMY      AORTIC VALVE REPAIR/REPLACEMENT  2023    BLADDER REPAIR  2012    BREAST BIOPSY Right 2020    malignant    CARDIAC PACEMAKER PLACEMENT      CARPAL TUNNEL RELEASE Right 2015    CATARACT EXTRACTION Bilateral      SECTION      x2    COLONOSCOPY  2015    COLONOSCOPY N/A 2021    Procedure: COLONOSCOPY;  Surgeon: Yamilet Stewart MD;  Location: [facility] ENDOSCOPY;  Service: Gastroenterology;  Laterality: N/A;  pre- hx polyps  post-- diverticulosis, hemorrhoids    CORONARY ANGIOPLASTY WITH STENT PLACEMENT  2023    ENDOMETRIAL ABLATION      HAND SURGERY      HERNIA REPAIR      Bilateral femoral and inguinal    JOINT REPLACEMENT  2007    Left knee    KNEE ARTHROPLASTY UNICOMPARTMENTAL Right 2022    KNEE ARTHROSCOPY Bilateral     MASTECTOMY  2020    MASTECTOMY WITH SENTINEL NODE BIOPSY AND AXILLARY NODE DISSECTION Right 2020    Procedure: RIGHT BREAST MASTECTOMY WITH SENTINEL NODE BIOPSY;  Surgeon: Bj Stephen MD;  Location: [facility] OR [facility] [place];  Service: General;  Laterality: Right;    OOPHORECTOMY      SHOULDER ARTHROSCOPY Right     rotator cuff repair, biceps tenotomy    THYROIDECTOMY, PARTIAL  2005    TONSILLECTOMY AND ADENOIDECTOMY      TOTAL KNEE ARTHROPLASTY Left     TUBAL ABDOMINAL LIGATION      UMBILICAL HERNIA REPAIR        General Information       Row Name 10/10/24 7553          Physical Therapy Time and Intention     Document Type evaluation  -CW (r) SF (t) CW (c)     Mode of Treatment physical therapy;individual therapy  -CW       Row Name 10/10/24 1157          General Information    Patient Profile Reviewed yes  -CW (r) SF (t) CW (c)     Prior Level of Function independent:;all household mobility;transfer;bed mobility  -CW     Existing Precautions/Restrictions fall  -CW (r) SF (t) CW (c)       Row Name 10/10/24 1157          Living Environment    People in Home spouse  -CW (r) SF (t) CW (c)       Row Name 10/10/24 1157          Cognition    Orientation Status (Cognition) oriented to;person;place  -CW       Row Name 10/10/24 1157          Safety Issues, Functional Mobility    Safety Issues Affecting Function (Mobility) awareness of need for assistance;insight into deficits/self-awareness;judgment;problem-solving  -CW     Impairments Affecting Function (Mobility) endurance/activity tolerance;strength;cognition;balance  -CW (r) SF (t) CW (c)               User Key  (r) = Recorded By, (t) = Taken By, (c) = Cosigned By      Initials Name Provider Type    CW Nicky Harris, PT Physical Therapist    SF Emmy Verdugo, PT Student PT Student                   Mobility       Row Name 10/10/24 1159          Bed Mobility    Comment, (Bed Mobility) pt in chair, bed mobility not assessed  -CW (r) SF (t) CW (c)       Row Name 10/10/24 1159          Sit-Stand Transfer    Sit-Stand [place] (Transfers) minimum assist (75% patient effort);1 person assist  -CW (r) SF (t) CW (c)     Assistive Device (Sit-Stand Transfers) walker, front-wheeled  -CW (r) SF (t) CW (c)       Row Name 10/10/24 1159          Gait/Stairs (Locomotion)    [place] Level (Gait) contact guard;minimum assist (75% patient effort);1 person assist  -CW (r) SF (t) CW (c)     Assistive Device (Gait) walker, front-wheeled  -CW (r) SF (t) CW (c)     Distance in Feet (Gait) 25  -CW (r) SF (t) CW (c)     Deviations/Abnormal Patterns (Gait) annie decreased;gait speed  decreased  -CW (r) SF (t) CW (c)     Bilateral Gait Deviations forward flexed posture;heel strike decreased  -CW (r) SF (t) CW (c)     Comment, (Gait/Stairs) forward flexed posture, slow shuffled steps, labored breathing noted as pt fatiged  -CW (r) SF (t) CW (c)               User Key  (r) = Recorded By, (t) = Taken By, (c) = Cosigned By      Initials Name Provider Type    CW Nicky Harris, PT Physical Therapist    Emmy Kee, PT Student PT Student                   Obj/Interventions       Row Name 10/10/24 1202          Range of Motion Comprehensive    General Range of Motion bilateral lower extremity ROM WFL  -CW (r) SF (t) CW (c)       Row Name 10/10/24 1202          Strength Comprehensive (MMT)    Comment, General Manual Muscle Testing (MMT) Assessment generalized weakness  -CW (r) SF (t) CW (c)       Row Name 10/10/24 1202          Balance    Balance Assessment sitting static balance;standing static balance;standing dynamic balance  -CW (r) SF (t) CW (c)     Static Sitting Balance standby assist  -CW (r) SF (t) CW (c)     Position, Sitting Balance sitting in chair  -CW (r) SF (t) CW (c)     Static Standing Balance contact guard  -CW (r) SF (t) CW (c)     Dynamic Standing Balance contact guard  -CW (r) SF (t) CW (c)     Position/Device Used, Standing Balance walker, front-wheeled  -CW (r) SF (t) CW (c)               User Key  (r) = Recorded By, (t) = Taken By, (c) = Cosigned By      Initials Name Provider Type    CW Nicky Harris, PT Physical Therapist    Emmy Kee, PT Student PT Student                   Goals/Plan       Row Name 10/10/24 1254          Bed Mobility Goal 1 (PT)    Activity/Assistive Device (Bed Mobility Goal 1, PT) bed mobility activities, all  -CW (r) SF (t) CW (c)     [place] Level/Cues Needed (Bed Mobility Goal 1, PT) supervision required  -CW (r) SF (t) CW (c)     Time Frame (Bed Mobility Goal 1, PT) 2 weeks  -CW (r) SF (t) CW (c)       Row Name 10/10/24 1250           Transfer Goal 1 (PT)    Activity/Assistive Device (Transfer Goal 1, PT) transfers, all  -CW (r) SF (t) CW (c)     [place] Level/Cues Needed (Transfer Goal 1, PT) supervision required  -CW (r) SF (t) CW (c)     Time Frame (Transfer Goal 1, PT) 2 weeks  -CW (r) SF (t) CW (c)       Row Name 10/10/24 125          Gait Training Goal 1 (PT)    Activity/Assistive Device (Gait Training Goal 1, PT) gait (walking locomotion);walker, rolling  -CW (r) SF (t) CW (c)     Distance (Gait Training Goal 1, PT) 75  -CW (r) SF (t) CW (c)     Time Frame (Gait Training Goal 1, PT) 2 weeks  -CW (r) SF (t) CW (c)       Row Name 10/10/24 9852          Therapy Assessment/Plan (PT)    Planned Therapy Interventions (PT) balance training;bed mobility training;gait training;patient/family education;ROM (range of motion);stair training;strengthening;stretching;transfer training  -CW (r) SF (t) CW (c)               User Key  (r) = Recorded By, (t) = Taken By, (c) = Cosigned By      Initials Name Provider Type    Nicky Dewitt, PT Physical Therapist    Emmy Kee, PT Student PT Student                   Clinical Impression       Row Name 10/10/24 3748          Pain    Pretreatment Pain Rating 0/10 - no pain  -CW (r) SF (t) CW (c)     Posttreatment Pain Rating 0/10 - no pain  -CW (r) SF (t) CW (c)       Row Name 10/10/24 1201          Plan of Care Review    Plan of Care Reviewed With patient;spouse  -CW (r) SF (t) CW (c)     Outcome Evaluation Pt is a 74 year old female admitted after a fall at home, workup for hypomagnesemia, hypokalemia. Pt lives with her  and has no steps to enter her house. Per chart she has a cane, walker, and wheelchair for home use. Upon examination, pt demonstrated generalized weakness, decreased balance, and limited activity tolerance. She performed STS from the chair wth min A x 1 and rolling walker and AMB 25 ft CGA-min A x 1. She required increased assistance with walker management as  she fatigued and was assisted back to the chair. Pt would benefit from acute PT services to address stated deficits and prepare for d/c. Recommend SNF at d/c.  -CW (r) SF (t) CW (c)       Row Name 10/10/24 1204          Therapy Assessment/Plan (PT)    Rehab Potential (PT) good, to achieve stated therapy goals  -CW (r) SF (t) CW (c)     Criteria for Skilled Interventions Met (PT) yes  -CW (r) SF (t) CW (c)     Therapy Frequency (PT) 5 times/wk  -CW (r) SF (t) CW (c)       Row Name 10/10/24 1204          Vital Signs    O2 Delivery Pre Treatment room air  -CW (r) SF (t) CW (c)     O2 Delivery Intra Treatment room air  -CW (r) SF (t) CW (c)     O2 Delivery Post Treatment room air  -CW (r) SF (t) CW (c)       Row Name 10/10/24 1204          Positioning and Restraints    Pre-Treatment Position sitting in chair/recliner  -CW (r) SF (t) CW (c)     Post Treatment Position chair  -CW (r) SF (t) CW (c)     In Chair reclined;call light within reach;encouraged to call for assist;exit alarm on;with family/caregiver  -CW (r) SF (t) CW (c)               User Key  (r) = Recorded By, (t) = Taken By, (c) = Cosigned By      Initials Name Provider Type    Nicky Dewitt, PT Physical Therapist    SF Emmy Verdugo, PT Student PT Student                   Outcome Measures       Row Name 10/10/24 1256          How much help from another person do you currently need...    Turning from your back to your side while in flat bed without using bedrails? 3  -CW (r) SF (t) CW (c)     Moving from lying on back to sitting on the side of a flat bed without bedrails? 3  -CW (r) SF (t) CW (c)     Moving to and from a bed to a chair (including a wheelchair)? 3  -CW (r) SF (t) CW (c)     Standing up from a chair using your arms (e.g., wheelchair, bedside chair)? 3  -CW (r) SF (t) CW (c)     Climbing 3-5 steps with a railing? 2  -CW (r) SF (t) CW (c)     To walk in hospital room? 3  -CW (r) SF (t) CW (c)     AM-PAC 6 Clicks Score (PT) 17  -CW (r)  SF (t)     Highest Level of Mobility Goal 5 --> Static standing  -CW (r) SF (t)       Row Name 10/10/24 0928          Functional Assessment    Outcome Measure Options AM-PAC 6 Clicks Daily Activity (OT)  -BC               User Key  (r) = Recorded By, (t) = Taken By, (c) = Cosigned By      Initials Name Provider Type    CW Nicky Harris, PT Physical Therapist    BC Mingo Cook, OT Occupational Therapist    Emmy Kee, PT Student PT Student                                 Physical Therapy Education       Title: PT OT SLP Therapies (In Progress)       Topic: Physical Therapy (In Progress)       Point: Mobility training (Done)       Learning Progress Summary             Patient Acceptance, E, VU by  at 10/10/2024 1257                         Point: Home exercise program (Not Started)       Learner Progress:  Not documented in this visit.              Point: Body mechanics (Not Started)       Learner Progress:  Not documented in this visit.              Point: Precautions (Not Started)       Learner Progress:  Not documented in this visit.                              User Key       Initials Effective Dates Name Provider Type Discipline     08/08/24 -  Emmy Verdugo, PT Student PT Student PT                  PT Recommendation and Plan  Planned Therapy Interventions (PT): balance training, bed mobility training, gait training, patient/family education, ROM (range of motion), stair training, strengthening, stretching, transfer training  Plan of Care Reviewed With: patient, spouse  Outcome Evaluation: Pt is a 74 year old female admitted after a fall at home, workup for hypomagnesemia, hypokalemia. Pt lives with her  and has no steps to enter her house. Per chart she has a cane, walker, and wheelchair for home use. Upon examination, pt demonstrated generalized weakness, decreased balance, and limited activity tolerance. She performed STS from the chair wth min A x 1 and rolling walker and AMB 25  ft CGA-min A x 1. She required increased assistance with walker management as she fatigued and was assisted back to the chair. Pt would benefit from acute PT services to address stated deficits and prepare for d/c. Recommend SNF at d/c.     Time Calculation:         PT Charges       Row Name 10/10/24 1257             Time Calculation    Start Time 0930  -CW (r) SF (t) CW (c)      Stop Time 0944  -CW (r) SF (t) CW (c)      Time Calculation (min) 14 min  -CW (r) SF (t)      PT Received On 10/10/24  -CW (r) SF (t) CW (c)      PT - Next Appointment 10/11/24  -CW (r) SF (t) CW (c)      PT Goal Re-Cert Due Date 10/24/24  -CW (r) SF (t) CW (c)         Time Calculation- PT    Total Timed Code Minutes- PT 10 minute(s)  -CW (r) SF (t) CW (c)                User Key  (r) = Recorded By, (t) = Taken By, (c) = Cosigned By      Initials Name Provider Type    CW Nicky Harris, PT Physical Therapist    SF Emmy Verdugo, PT Student PT Student                  Therapy Charges for Today       Code Description Service Date Service Provider Modifiers Qty    10645212439 HC PT EVAL MOD COMPLEXITY 3 10/10/2024 Emmy Verdugo, PT Student GP 1    12405716391 HC PT THERAPEUTIC ACT EA 15 MIN 10/10/2024 Emmy Verdugo, PT Student GP 1            PT G-Codes  Outcome Measure Options: AM-PAC 6 Clicks Daily Activity (OT)  AM-PAC 6 Clicks Score (PT): 17  AM-PAC 6 Clicks Score (OT): 11  PT Discharge Summary  Anticipated Discharge Disposition (PT): skilled nursing facility    Emmy Verdugo PT Student  10/10/2024

## 2024-10-10 NOTE — PLAN OF CARE
Goal Outcome Evaluation:  Plan of Care Reviewed With: patient, spouse        Progress: no change  Outcome Evaluation: Pt is a 73 y/o F admitted to [facility] 10/9 for fall, attempting to get in shower when knees gave out. Hx of dementia, arthritis, hypertension, hypothyroidism, diabetes, breast cancer, pacemaker, CAD. Pt lives with spouse who was present during OT evaluation and verb'd he is working on hiring full time caregivers to assist Pt at home and that with her dementia he would prefer her to return home to her own environment. With Pts dementia anticipate when in her own environment she may function better and have more awareness for ADl task completion but demo'd with significant difficulty w/ ADL routine and functiona mobility with walker today. Pt is unsteady, has decreased ADL IND/function, and requiring physical assistance to stand/transfer today with spouse observing mvmts. If he can obtain care for Pt who can provide physical assist to her or use w/c at home until her function improves, then may be okay with home w/ HHS otherwise concern for increased falls/readmission risk and may benefit from [facility]. Spouse verb understanding.      Anticipated Discharge Disposition (OT): home with assist, home with 24/7 care, home with home health, skilled nursing facility

## 2024-10-10 NOTE — CONSULTS
"Nutrition Services    Patient Name:  Mary Jo Berg  YOB: 1950  MRN: [id number]  Admit Date:  10/9/2024Assessment Date:  10/10/24    NUTRITION SCREENING      Reason for Encounter Physician consult chronic poor intake  Per MD note pt has poor PO intakes    Diagnosis/Problem Hypomagnesemia    PMH CKD3, DM2, dementia   PO Diet Diet: Cardiac; Healthy Heart (2-3 Na+); Fluid Consistency: Thin (IDDSI 0)   Supplements na   PO Intake % %       Medications MAR reviewed by RD   Labs  Listed below, reviewed   Physical Findings Alert, disoriented, room air    GI Function Fecal incontinence, BM 10/9   Skin Status Intact        Height  Weight  BMI  Weight Trend     Height: 154.9 cm (60.98\")  Weight: 82.8 kg (182 lb 8.7 oz) (10/10/24 0700)  Body mass index is 34.51 kg/m².  Stable flux 180-190# in the last 9 months        Nutrition Problem (PES) Nutrition appropriate for condition at this time as evidence by PO >%.       Intervention/Plan Pt agreeable to try boost GC shake to supplement oral diet   Liberalize diet to promote intakes   Encourage oral intakes     RD to follow up per protocol.     Results from last 7 days   Lab Units 10/10/24  0430 10/09/24  1355 10/08/24  1526   SODIUM mmol/L 133* 133* 132*   POTASSIUM mmol/L 4.3 2.9* 3.0*   CHLORIDE mmol/L 97* 95* 92*   CO2 mmol/L 24.4 24.0 23.3   BUN mg/dL 32* 40* 44*   CREATININE mg/dL 1.30* 1.46* 1.52*   CALCIUM mg/dL 8.7 8.7 8.7   BILIRUBIN mg/dL 0.7 0.8 0.8   ALK PHOS U/L 158* 140* 163*   ALT (SGPT) U/L 31 29 34*   AST (SGOT) U/L 45* 33* 38*   GLUCOSE mg/dL 160* 182* 181*     Results from last 7 days   Lab Units 10/10/24  0430 10/09/24  1355   MAGNESIUM mg/dL 2.4 1.1*   PHOSPHORUS mg/dL  --  2.9   HEMOGLOBIN g/dL 11.0*  --    HEMATOCRIT % 34.3  --      Lab Results   Component Value Date    HGBA1C 7.20 (H) 10/08/2024         Electronically signed by:  Pita Diallo RD  10/10/24 13:42 EDT  "

## 2024-10-10 NOTE — CASE MANAGEMENT/SOCIAL WORK
Discharge Planning Assessment  [facility]     Patient Name: Mary Jo Berg  MRN: [id number]  Today's Date: 10/10/2024    Admit Date: 10/9/2024    Plan: Plan home with spouse.  AARON Herrera RN   Discharge Needs Assessment       Row Name 10/10/24 0818       Living Environment    People in Home spouse    Name(s) of People in Home Spouse  ( Sean Berg [phone])    Current Living Arrangements home    Potentially Unsafe Housing Conditions none    In the past 12 months has the electric, gas, oil, or water company threatened to shut off services in your home? No    Primary Care Provided by self;spouse/significant other    Provides Primary Care For no one, unable/limited ability to care for self    Family Caregiver if Needed spouse    Family Caregiver Names Spouse ( Sean Berg [phone])    Quality of Family Relationships helpful;involved;supportive    Able to Return to Prior Arrangements yes    Living Arrangement Comments Pt lives with her spouse ( Sean Berg [phone]) in a single story house.       Resource/Environmental Concerns    Resource/Environmental Concerns none    Transportation Concerns none       Transportation Needs    In the past 12 months, has lack of transportation kept you from medical appointments or from getting medications? no    In the past 12 months, has lack of transportation kept you from meetings, work, or from getting things needed for daily living? No       Food Insecurity    Within the past 12 months, you worried that your food would run out before you got the money to buy more. Never true    Within the past 12 months, the food you bought just didn't last and you didn't have money to get more. Never true       Transition Planning    Patient/Family Anticipates Transition to home with family    Transportation Anticipated family or friend will provide       Discharge Needs Assessment    Readmission Within the Last 30 Days no previous admission in last 30 days     Equipment Currently Used at Home cane, straight;glucometer;shower chair;walker, standard;wheelchair    Concerns to be Addressed basic needs    Anticipated Changes Related to Illness none    Equipment Needed After Discharge cane, straight;glucometer;shower chair;walker, standard;wheelchair, manual                   Discharge Plan       Row Name 10/10/24 0818       Plan    Plan Plan home with spouse.  AARON Herrera RN    Patient/Family in Agreement with Plan yes    Plan Comments FACE SHEET VERIFIED/ MILNER SIGNED.  Spoke with pt at bedside. Pt having difficulty answering questions.  Called pt's spouse ( Sean).  Pt's PCP is ELIN Bond.  Pt lives with her spouse ( Sean Berg [phone]) in a single story house.  Pt requires assistance with ADLs.  Spouse assists pt with dressing.  Pt has a cane, glucometer, shower chair, standard walker and wheelchair for home use.  Pt gets her prescriptions at [facility]  ([facility]  Pt denies any issues affording medications. Pt is not current with HH.  Pt has not been in SNF.  Pt's spouse requested an In Home Personal Care Agencies list and one was provided for him.  CCP to follow for needs.  Pt's spouse assists pt at  home and will transport pt home.  Plan home with spouse.  AARON Herrera RN                  Continued Care and Services - Admitted Since 10/9/2024    No active coordination exists for this encounter.          Demographic Summary       Row Name 10/10/24 4933       General Information    Admission Type observation    Arrived From emergency department    Required Notices Provided Observation Status Notice    Referral Source admission list    Reason for Consult discharge planning    Preferred Language English                   Functional Status       Row Name 10/10/24 5515       Functional Status    Usual Activity Tolerance moderate    Current Activity Tolerance fair       Functional Status, IADL    Medications assistive person    Meal  Preparation assistive person    Housekeeping assistive person    Laundry assistive person    Shopping independent       Mental Status    General Appearance WDL WDL                   Psychosocial    No documentation.                  Abuse/Neglect    No documentation.                  Legal    No documentation.                  Substance Abuse    No documentation.                  Patient Forms    No documentation.                     Rosalinda Herrera RN

## 2024-10-10 NOTE — NURSING NOTE
arrived shortly after patient and was questioning staff about wedding rings. This nurse never saw any rings on patient. This nurse called ZAHIDA Patterson from the ED and she cannot recall if patient had rings on while she was providing care.  went to ED and security and questioned staff about the rings. The rings were not found. This nurse asked  to call daughter to see if she recalls the rings- the daughter told him she thinks the rings at the house.  said he will go home and look and If he cannot find the rings at home he will call the night nurse and let her know.

## 2024-10-10 NOTE — PROGRESS NOTES
Name: Mary Jo Berg ADMIT: 10/9/2024   : 1950  PCP: Betty Isaac APRN    MRN: [id number] LOS: 0 days   AGE/SEX: 74 y.o. female  ROOM: [facility]     Subjective   Subjective   Laying in bed, no acute events overnight.  Denies complaints when asked.  Oriented to name, not to time or place.  Disoriented station.  Spouse present in the room.  Ports worsening memory issues over the last month.  Does have history of dementia, on Namenda, donepezil discontinued due to side effect, however spouse unsure why.    Review of Systems   As above  Objective   Objective   Vital Signs  Temp:  [98.7 °F (37.1 °C)-99.6 °F (37.6 °C)] 98.9 °F (37.2 °C)  Heart Rate:  [79-98] 88  Resp:  [16-18] 16  BP: (100-140)/() 115/66  SpO2:  [93 %-100 %] 97 %  on   ;   Device (Oxygen Therapy): room air  Body mass index is 34.51 kg/m².  Physical Exam    General: Confused, laying in bed, not in distress, cooperative  HEENT: Normocephalic, atraumatic  CV: Regular rate and rhythm, no murmurs rubs or gallops  Lungs: Clear to auscultation bilaterally, no crackles or wheezes  Abdomen: Soft, nontender, nondistended  Extremities: 1+ bilateral ankle edema, no cyanosis    Results Review     I reviewed the patient's new clinical results.  Results from last 7 days   Lab Units 10/10/24  0430 10/09/24  1230 10/08/24  1526   WBC 10*3/mm3 8.81 8.83 8.93   HEMOGLOBIN g/dL 11.0* 11.4* 11.3*   PLATELETS 10*3/mm3 218 233 229     Results from last 7 days   Lab Units 10/10/24  0430 10/09/24  1355 10/08/24  1526   SODIUM mmol/L 133* 133* 132*   POTASSIUM mmol/L 4.3 2.9* 3.0*   CHLORIDE mmol/L 97* 95* 92*   CO2 mmol/L 24.4 24.0 23.3   BUN mg/dL 32* 40* 44*   CREATININE mg/dL 1.30* 1.46* 1.52*   GLUCOSE mg/dL 160* 182* 181*   Estimated Creatinine Clearance: 36.5 mL/min (A) (by C-G formula based on SCr of 1.3 mg/dL (H)).  Results from last 7 days   Lab Units 10/10/24  0430 10/09/24  1355 10/08/24  1526   ALBUMIN g/dL 2.9* 3.1* 3.5   BILIRUBIN mg/dL 0.7  0.8 0.8   ALK PHOS U/L 158* 140* 163*   AST (SGOT) U/L 45* 33* 38*   ALT (SGPT) U/L 31 29 34*     Results from last 7 days   Lab Units 10/10/24  0430 10/09/24  1355 10/08/24  1526   CALCIUM mg/dL 8.7 8.7 8.7   ALBUMIN g/dL 2.9* 3.1* 3.5   MAGNESIUM mg/dL 2.4 1.1*  --    PHOSPHORUS mg/dL  --  2.9  --        COVID19   Date Value Ref Range Status   02/06/2021 Not Detected Not Detected - Ref. Range Final     SARS-CoV-2, ANITA   Date Value Ref Range Status   03/07/2022 Not Detected Not Detected Final     Comment:     This nucleic acid amplification test was developed and its performance  characteristics determined by [employer]. Nucleic acid  amplification tests include RT-PCR and TMA. This test has not been  FDA cleared or approved. This test has been authorized by FDA under  an Emergency Use Authorization (EUA). This test is only authorized  for the duration of time the declaration that circumstances exist  justifying the authorization of the emergency use of in vitro  diagnostic tests for detection of SARS-CoV-2 virus and/or diagnosis  of COVID-19 infection under section 564(b)(1) of the Act, 21 U.S.C.  360bbb-3(b) (1), unless the authorization is terminated or revoked  sooner.  When diagnostic testing is negative, the possibility of a false  negative result should be considered in the context of a patient's  recent exposures and the presence of clinical signs and symptoms  consistent with COVID-19. An individual without symptoms of COVID-19  and who is not shedding SARS-CoV-2 virus would expect to have a  negative (not detected) result in this assay.       Hemoglobin A1C   Date/Time Value Ref Range Status   10/08/2024 1526 7.20 (H) 4.80 - 5.60 % Final     Glucose   Date/Time Value Ref Range Status   10/10/2024 1050 269 (H) 70 - 130 mg/dL Final   10/10/2024 0631 153 (H) 70 - 130 mg/dL Final   10/09/2024 2010 180 (H) 70 - 130 mg/dL Final   10/09/2024 1838 147 (H) 70 - 130 mg/dL Final           XR Chest 1  View  Narrative: XR CHEST 1 VW-10/9/2024     HISTORY: Weakness, dizziness.     Heart size is within normal limits. Lungs are slightly underinflated but  appear clear. Cardiac pacemaker is seen in good position. Bony  structures appear normal.     Impression: 1. No acute process        This report was finalized on 10/9/2024 1:31 PM by Dr. Bimal Echevarria M.D on Workstation: [id number]       Scheduled Medications  allopurinol, 300 mg, Oral, Daily  apixaban, 2.5 mg, Oral, BID  aspirin, 81 mg, Oral, Daily  atorvastatin, 40 mg, Oral, Daily  bumetanide, 2 mg, Oral, Daily  clopidogrel, 75 mg, Oral, Daily  colchicine, 0.6 mg, Oral, Daily  doxylamine, 25 mg, Oral, Nightly  insulin lispro, 2-9 Units, Subcutaneous, 4x Daily AC & at Bedtime  levothyroxine, 112 mcg, Oral, Q AM  [Held by provider] losartan, 25 mg, Oral, Q24H  magnesium oxide, 400 mg, Oral, Daily  memantine, 5 mg, Oral, BID  metoprolol succinate XL, 25 mg, Oral, BID  potassium chloride, 20 mEq, Oral, Daily    Infusions   Diet  Diet: Cardiac; Healthy Heart (2-3 Na+); Fluid Consistency: Thin (IDDSI 0)    I have personally reviewed     [x]  Laboratory   [x]  Microbiology   [x]  Radiology   [x]  EKG/Telemetry  []  Cardiology/Vascular   []  Pathology    [x]  Records       Assessment/Plan     Active Hospital Problems    Diagnosis  POA    **Hypomagnesemia [E83.42]  Yes    Chronic anticoagulation [Z79.01]  Not Applicable    Poor diet [E63.9]  Yes    MCI (mild cognitive impairment) [G31.84]  Yes    Stage 3a chronic kidney disease [N18.31]  Yes    Pacemaker [Z95.0]  Yes    Hypertension [I10]  Yes    Morbidly obese [E66.01]  Yes    Hyperlipidemia [E78.5]  Yes    Lumbar radiculopathy [M54.16]  Yes    Type 2 diabetes mellitus without complication [E11.9]  Yes    Postoperative hypothyroidism [E89.0]  Yes      Resolved Hospital Problems   No resolved problems to display.     Patient is a 73 yo F with a H/O SSS status post pacemaker,,, PE on apixaban, anticoagulation, history  of breast cancer on tamoxifen DM type II, CKD stage III, Dementia, likely due to Alzheimer's disease per outpatient neurology note  on Namenda CAD with PCI to LAD in June 2023., status postTAVR , SSS status post pacemaker, ER due to generalized weakness.     Generalized weakness likely secondary to hypotension and electrolyte abnormalities  -Treat all of the below  -PT and OT to evaluate and treat       Hypomagnesemia / hypokalemia  -Please secondary to poor p.o. intake as well as the patient is on Bumex without any potassium supplements  -Nutrition to evaluate  -Status post electrolyte replacement, potassium, magnesium normal       CAD with PCI to LAD in June 2023  SSS status post pacemaker  Aortic stenosis status post TAVR  Cardiomyopathy  -Cannot get the echo report follow-up from [facility] cardiology  -Hold Coreg/losartan due to soft BP, resume Bumex as electrolytes stabilized  - will Need to follow-up with cardiology outpatient     Poor p.o. intake chronically with hypoalbuminemia  Nutrition consult     Hypothyroidism  -TSH was low at 0.209 on 10/8/2024 t  -Levothyroxine decreased to 112 mcg a, will need to recheck thyroid function labs in 4 to 6 weeks as an outpatient        Dementia, likely due to Alzheimer's disease   -Spouse reports progression  -Moderate stage per last neurology note,Memory testing has declined 3 points in the last year - MoCA 16/30   -Continue memantine 5 mg twice daily, donepezil low-dose discontinued  -Follow-up with neurology outpatient as scheduled    Anemia  -Stable compared to prior.  Monitor daily  -Check iron, B12, folate    DVT prophylaxis.  On Eliquis  DNR/DNI  Discussed with patient.  Spouse  Discussed with RN  Home with home health versus SNF, likely stable to be discharged tomorrow from medical standpoint  Expected Discharge Date: 10/14/2024; Expected Discharge Time:        Copied text in this note has been reviewed and is accurate as of 10/10/24.         Dictated utilizing  Lakshmi dictation        Leila Owens MD  [facility]  10/10/24  12:40 EDT

## 2024-10-10 NOTE — CASE MANAGEMENT/SOCIAL WORK
Continued Stay Note  [facility]     Patient Name: Mary Jo Berg  MRN: [id number]  Today's Date: 10/10/2024    Admit Date: 10/9/2024    Plan: Plan home with spouse.  AARON Herrera RN   Discharge Plan       Row Name 10/10/24 0820       Plan    Plan Plan home with spouse.  AARON Herrera RN    Patient/Family in Agreement with Plan yes    Plan Comments FACE SHEET VERIFIED/ MILNER SIGNED.  Spoke with pt at bedside. Pt having difficulty answering questions.  Called pt's spouse ( Sean).  Pt's PCP is ELIN Bond.  Pt lives with her spouse ( Sean Berg [phone]) in a single story house.  Pt requires assistance with ADLs.  Spouse assists pt with dressing.  Pt has a cane, glucometer, shower chair, standard walker and wheelchair for home use.  Pt gets her prescriptions [facility]  ([facility]  Pt denies any issues affording medications. Pt is not current with HH.  Pt has not been in SNF.  Pt's spouse requested an In Home Personal Care Agencies list and one was provided for him.  CCP to follow for needs.  Pt's spouse assists pt at  home and will transport pt home.  Plan home with spouse.  AARON Herrera RN                   Discharge Codes    No documentation.                       Rosalinda Herrera RN

## 2024-10-10 NOTE — THERAPY EVALUATION
Patient Name: Mary Jo Berg  : 1950    MRN: [id number]                              Today's Date: 10/10/2024       Admit Date: 10/9/2024    Visit Dx:     ICD-10-CM ICD-9-CM   1. Hypomagnesemia  E83.42 275.2   2. Hypokalemia  E87.6 276.8   3. Generalized weakness  R53.1 780.79   4. Dementia, unspecified dementia severity, unspecified dementia type, unspecified whether behavioral, psychotic, or mood disturbance or anxiety  F03.90 294.20   5. Chronic renal impairment, unspecified CKD stage  N18.9 585.9     Patient Active Problem List   Diagnosis    Malignant neoplasm of overlapping sites of right female breast    Allergic rhinitis    Complete heart block    De Quervain's tenosynovitis, left    CTS (carpal tunnel syndrome)    Degenerative arthritis of thumb    Failed total knee, left    Gout    Hyperlipidemia    Postoperative hypothyroidism    Lumbar radiculopathy    Lumbar stenosis without neurogenic claudication    Spondylolisthesis at L5-S1 level    Diabetes 1.5, managed as type 2    Aromatase inhibitor use    Morbidly obese    Personal history of colonic polyps    Pacemaker    Hypertension    Aortic valve stenosis    Arteritis    Asthma    Benign essential hypertension    Bradycardia    Cataract    Encounter for other screening for malignant neoplasm of breast    Female stress incontinence    Menopause    Osteopenia    Persons encountering health services in other specified circumstances    Secondary cataract of left eye    Type 2 diabetes mellitus without complication    Hypothyroidism    Stage 3a chronic kidney disease    Degeneration of lumbar intervertebral disc    MCI (mild cognitive impairment)    Hypomagnesemia    Poor diet    Chronic anticoagulation     Past Medical History:   Diagnosis Date    Asthma     Breast cancer 2020    right IDC    Cataract 2010    on both  retinas    Coronary artery disease 01/10/2015    Pacemaker 2015    Diabetes mellitus     Type 2    Diverticulosis  2021    Encounter for other screening for malignant neoplasm of breast 2020    Heart murmur 2015    History of asthma     History of colon polyps     History of complete heart block     paced in     Hyperlipidemia     Hypertension     Hypothyroidism     MCI (mild cognitive impairment) 2023    Myocardial infarction     Obesity     Osteoarthritis     Osteopenia     Pacemaker     PONV (postoperative nausea and vomiting)     Pulmonary embolism      Past Surgical History:   Procedure Laterality Date    ADENOIDECTOMY      AORTIC VALVE REPAIR/REPLACEMENT  2023    BLADDER REPAIR  2012    BREAST BIOPSY Right 2020    malignant    CARDIAC PACEMAKER PLACEMENT      CARPAL TUNNEL RELEASE Right 2015    CATARACT EXTRACTION Bilateral      SECTION      x2    COLONOSCOPY  2015    COLONOSCOPY N/A 2021    Procedure: COLONOSCOPY;  Surgeon: Yamilet Stewart MD;  Location: [facility] ENDOSCOPY;  Service: Gastroenterology;  Laterality: N/A;  pre- hx polyps  post-- diverticulosis, hemorrhoids    CORONARY ANGIOPLASTY WITH STENT PLACEMENT  2023    ENDOMETRIAL ABLATION      HAND SURGERY      HERNIA REPAIR      Bilateral femoral and inguinal    JOINT REPLACEMENT  2007    Left knee    KNEE ARTHROPLASTY UNICOMPARTMENTAL Right 2022    KNEE ARTHROSCOPY Bilateral     MASTECTOMY  2020    MASTECTOMY WITH SENTINEL NODE BIOPSY AND AXILLARY NODE DISSECTION Right 2020    Procedure: RIGHT BREAST MASTECTOMY WITH SENTINEL NODE BIOPSY;  Surgeon: Bj Stephen MD;  Location: [facility] OR [facility] [place];  Service: General;  Laterality: Right;    OOPHORECTOMY      SHOULDER ARTHROSCOPY Right     rotator cuff repair, biceps tenotomy    THYROIDECTOMY, PARTIAL  2005    TONSILLECTOMY AND ADENOIDECTOMY      TOTAL KNEE ARTHROPLASTY Left     TUBAL ABDOMINAL LIGATION      UMBILICAL HERNIA REPAIR        General Information       Row Name 10/10/24 0823          OT Time and Intention    Document Type  evaluation  -BC     Mode of Treatment individual therapy;occupational therapy  -BC       Row Name 10/10/24 0823          General Information    Patient Profile Reviewed yes  -BC     Prior Level of Function independent:;ADL's;all household mobility  -BC     Existing Precautions/Restrictions fall  -BC     Barriers to Rehab cognitive status  -BC       Row Name 10/10/24 0823          Occupational Profile    Occupational History/Life Experiences (Occupational Profile) Spouse confirmed Pts reports, IND PLF, was using  walker for about 2 weeks before admission, prior to that IND w/o. Quick decline in function.  -BC       Row Name 10/10/24 0823          Living Environment    People in Home spouse  -BC       Row Name 10/10/24 0823          Home Main Entrance    Number of Stairs, Main Entrance none  -BC       Row Name 10/10/24 0823          Stairs Within Home, Primary    Number of Stairs, Within Home, Primary none  -BC       Row Name 10/10/24 0823          Cognition    Orientation Status (Cognition) oriented to;person;place  -BC       Row Name 10/10/24 0823          Safety Issues, Functional Mobility    Safety Issues Affecting Function (Mobility) ability to follow commands;problem-solving;judgment;sequencing abilities;safety precaution awareness;positioning of assistive device;safety precautions follow-through/compliance  -BC     Impairments Affecting Function (Mobility) balance;cognition;coordination;endurance/activity tolerance;strength;shortness of breath  -BC     Cognitive Impairments, Mobility Safety/Performance attention;judgment;problem-solving/reasoning;safety precaution awareness;sequencing abilities  -BC               User Key  (r) = Recorded By, (t) = Taken By, (c) = Cosigned By      Initials Name Provider Type    BC Mingo Cook OT Occupational Therapist                     Mobility/ADL's       Row Name 10/10/24 0918          Bed Mobility    Bed Mobility supine-sit  -BC     Supine-Sit [place] (Bed  Mobility) moderate assist (50% patient effort);1 person assist  -BC     Bed Mobility, Safety Issues decreased use of arms for pushing/pulling;cognitive deficits limit understanding;decreased use of legs for bridging/pushing  -BC     Assistive Device (Bed Mobility) draw sheet;bed rails;head of bed elevated  -BC     Comment, (Bed Mobility) increseedson stiffnes w/ mvmts and mod cues for planing w/ sequence bed mobility supine >sit w/ mod A. Increased time and scooting practice to bring hips out to EOB in prep for mvmts.  -BC       Row Name 10/10/24 0918          Transfers    Transfers sit-stand transfer;toilet transfer;bed-chair transfer;stand-sit transfer  -BC     Comment, (Transfers) Increased time, cues, and tactile assist for mvmts and initiation sit>stand from EOB and cont'd w/ walker to bathroom for ADls. Pt fatigues quickly, cont'd from BR back to chair and cont'd w ADls seated in chair. Assist w/ safety w/ walker mgmt, cues for push up from surface with Pt continuing to pull up on walker.  -BC       Row Name 10/10/24 0918          Bed-Chair Transfer    Bed-Chair [place] (Transfers) minimum assist (75% patient effort);1 person assist;moderate assist (50% patient effort)  -BC       Row Name 10/10/24 0918          Sit-Stand Transfer    Sit-Stand [place] (Transfers) minimum assist (75% patient effort);1 person assist;verbal cues;nonverbal cues (demo/gesture)  -BC     Assistive Device (Sit-Stand Transfers) walker, front-wheeled  -BC       Row Name 10/10/24 0918          Stand-Sit Transfer    Stand-Sit [place] (Transfers) 1 person assist;minimum assist (75% patient effort);moderate assist (50% patient effort)  -BC     Assistive Device (Stand-Sit Transfers) walker, front-wheeled  -BC       Row Name 10/10/24 0918          Toilet Transfer    Type (Toilet Transfer) sit-stand;stand-sit  -BC     [place] Level (Toilet Transfer) moderate assist (50% patient effort);1 person assist  -BC     Assistive Device  (Toilet Transfer) commode  -BC     Comment, (Toilet Transfer) max cues, assist w/ foot placement/positioning, cues for hand placement and reach back with use of grab bar on R to ease down. mod A for ease down/boost up from standard commode, has ETS at home.  -BC       Row Name 10/10/24 0918          Functional Mobility    Functional Mobility- Ind. Level verbal cues required;minimum assist (75% patient effort);moderate assist (50% patient effort)  -BC     Functional Mobility- Device walker, front-wheeled  -BC     Functional Mobility- Safety Issues balance decreased during turns;sequencing ability decreased;step length decreased;weight-shifting ability decreased  -BC     Functional Mobility- Comment cues and assist w walker mgmt and safety w/ turns, in room mobility and from bed to BR >chair.  -BC     Patient was able to Ambulate yes  -BC       Row Name 10/10/24 0918          Activities of Daily Living    BADL Assessment/Intervention lower body dressing;feeding;toileting  -BC       Row Name 10/10/24 0918          Lower Body Dressing Assessment/Training    [place] Level (Lower Body Dressing) doff;don;pants/bottoms;dependent (less than 25% patient effort)  -BC     Position (Lower Body Dressing) unsupported sitting;supported standing  -BC       Row Name 10/10/24 0918          Self-Feeding Assessment/Training    [place] Level (Feeding) scoop food and bring to mouth;supervision  -BC     Position (Self-Feeding) sitting up in bed  -BC       Row Name 10/10/24 0918          Toileting Assessment/Training    [place] Level (Toileting) toileting skills;adjust/manage clothing;change pad/brief;perform perineal hygiene;dependent (less than 25% patient effort)  -BC     Assistive Devices (Toileting) commode  -BC     Position (Toileting) supported sitting;supported standing  -BC     Comment, (Toileting) upon standing from EOB pt had incontinence of bladder. Full clean up and assistance required, with continence of bowel  while on commode, unable to reach or assist w/ portions for c/u.  -BC               User Key  (r) = Recorded By, (t) = Taken By, (c) = Cosigned By      Initials Name Provider Type    Mingo Cevallos OT Occupational Therapist                   Obj/Interventions       Row Name 10/10/24 0923          Sensory Assessment (Somatosensory)    Sensory Assessment (Somatosensory) sensation intact  -BC       Row Name 10/10/24 0923          Vision Assessment/Intervention    Visual Impairment/Limitations WFL  -BC       Row Name 10/10/24 0923          Range of Motion Comprehensive    General Range of Motion no range of motion deficits identified  -BC       Row Name 10/10/24 0923          Strength Comprehensive (MMT)    General Manual Muscle Testing (MMT) Assessment upper extremity strength deficits identified  -BC     Comment, General Manual Muscle Testing (MMT) Assessment gen'd weakness w/ BUEs 3+/5  -BC       Row Name 10/10/24 0923          Balance    Balance Assessment standing static balance;standing dynamic balance;sitting dynamic balance;sitting static balance  -BC     Static Sitting Balance standby assist  -BC     Dynamic Sitting Balance contact guard;standby assist  -BC     Position, Sitting Balance unsupported;sitting edge of bed  -BC     Static Standing Balance contact guard  -BC     Dynamic Standing Balance minimal assist;moderate assist  -BC     Position/Device Used, Standing Balance supported;walker, front-wheeled  -BC     Balance Interventions standing  -BC     Comment, Balance impaired standing balance, narrow XOCHILT with cues for widening steps for mvmts/safety. Min to mod a for balance  -BC               User Key  (r) = Recorded By, (t) = Taken By, (c) = Cosigned By      Initials Name Provider Type    Mingo Cevallos OT Occupational Therapist                   Goals/Plan       Row Name 10/10/24 0927          Bed Mobility Goal 1 (OT)    Activity/Assistive Device (Bed Mobility Goal 1, OT) bed mobility  activities, all  -BC     [place] Level/Cues Needed (Bed Mobility Goal 1, OT) supervision required  -BC     Time Frame (Bed Mobility Goal 1, OT) short term goal (STG);2 weeks  -BC     Progress/Outcomes (Bed Mobility Goal 1, OT) new goal  -BC       Row Name 10/10/24 0927          Transfer Goal 1 (OT)    Activity/Assistive Device (Transfer Goal 1, OT) sit-to-stand/stand-to-sit;bed-to-chair/chair-to-bed;toilet  -BC     [place] Level/Cues Needed (Transfer Goal 1, OT) supervision required  -BC     Time Frame (Transfer Goal 1, OT) short term goal (STG);2 weeks  -BC     Progress/Outcome (Transfer Goal 1, OT) new goal  -BC       Row Name 10/10/24 0927          Dressing Goal 1 (OT)    Activity/Device (Dressing Goal 1, OT) upper body dressing;lower body dressing  -BC     [place]/Cues Needed (Dressing Goal 1, OT) nonverbal cues (demo/gesture) required;verbal cues required;minimum assist (75% or more patient effort)  -BC     Time Frame (Dressing Goal 1, OT) short term goal (STG);2 weeks  -BC     Progress/Outcome (Dressing Goal 1, OT) new goal  -BC       Row Name 10/10/24 0927          Toileting Goal 1 (OT)    Activity/Device (Toileting Goal 1, OT) toileting skills, all;adjust/manage clothing;perform perineal hygiene  -BC     [place] Level/Cues Needed (Toileting Goal 1, OT) minimum assist (75% or more patient effort)  -BC     Time Frame (Toileting Goal 1, OT) short term goal (STG);2 weeks  -BC     Progress/Outcome (Toileting Goal 1, OT) new goal  -BC       Row Name 10/10/24 0927          Grooming Goal 1 (OT)    Activity/Device (Grooming Goal 1, OT) grooming skills, all  -BC     [place] (Grooming Goal 1, OT) supervision required  -BC     Time Frame (Grooming Goal 1, OT) 2 weeks;short term goal (STG)  -BC     Strategies/Barriers (Grooming Goal 1, OT) standing sinkside for ADL IND  -BC     Progress/Outcome (Grooming Goal 1, OT) new goal  -BC       Row Name 10/10/24 0927          Problem Specific Goal 1 (OT)     Problem Specific Goal 1 (OT) Pt and caregiver will demonstrate/verbalize understanding of safe transfers to assist Pt w/ Sup for in room transfer w/ walker and provide appropriate cues for Pt safety.  -BC     Time Frame (Problem Specific Goal 1, OT) 2 weeks;short term goal (STG)  -BC     Progress/Outcome (Problem Specific Goal 1, OT) new goal  -BC       Row Name 10/10/24 0927          Therapy Assessment/Plan (OT)    Planned Therapy Interventions (OT) activity tolerance training;cognitive/visual perception retraining;functional balance retraining;neuromuscular control/coordination retraining;occupation/activity based interventions;passive ROM/stretching;patient/caregiver education/training;ROM/therapeutic exercise;strengthening exercise;transfer/mobility retraining;BADL retraining  -BC               User Key  (r) = Recorded By, (t) = Taken By, (c) = Cosigned By      Initials Name Provider Type    BC Mingo Cook, RUMA Occupational Therapist                   Clinical Impression       Row Name 10/10/24 0924          Pain Assessment    Pretreatment Pain Rating 0/10 - no pain  -BC       Row Name 10/10/24 0948          Plan of Care Review    Plan of Care Reviewed With patient;spouse  -BC     Progress no change  -BC     Outcome Evaluation Pt is a 75 y/o F admitted [facility] for fall, attempting to get in shower when knees gave out. Hx of dementia, arthritis, hypertension, hypothyroidism, diabetes, breast cancer, pacemaker, CAD. Pt lives with spouse who was present during OT evaluation and verb'd he is working on hiring full time caregivers to assist Pt at home and that with her dementia he would prefer her to return home to her own environment. With Pts dementia anticipate when in her own environment she may function better and have more awareness for ADl task completion but demo'd with significant difficulty w/ ADL routine and functiona mobility with walker today. Pt is unsteady, has decreased ADL IND/function, and  requiring physical assistance to stand/transfer today with spouse observing mvmts. If he can obtain care for Pt who can provide physical assist to her or use w/c at home until her function improves, then may be okay with home w/ HHS otherwise concern for increased falls/readmission risk and may benefit from GERRY. Spouse verb understanding.  -BC       Row Name 10/10/24 0924          Therapy Assessment/Plan (OT)    Rehab Potential (OT) fair, will monitor progress closely  -BC     Criteria for Skilled Therapeutic Interventions Met (OT) yes;meets criteria;skilled treatment is necessary  -BC     Therapy Frequency (OT) 5 times/wk  -BC     Predicted Duration of Therapy Intervention (OT) 2 weeks  -BC       Row Name 10/10/24 0924          Therapy Plan Review/Discharge Plan (OT)    Anticipated Discharge Disposition (OT) home with assist;home with 24/7 care;home with home health;skilled nursing facility  -BC       Row Name 10/10/24 0924          Vital Signs    O2 Delivery Pre Treatment room air  -BC     O2 Delivery Post Treatment room air  -BC     Pre Patient Position Supine  -BC     Intra Patient Position Standing  -BC     Post Patient Position Sitting  -BC       Row Name 10/10/24 0924          Positioning and Restraints    Pre-Treatment Position in bed  -BC     Post Treatment Position chair  -BC     In Chair reclined;sitting;call light within reach;encouraged to call for assist;exit alarm on;with family/caregiver  -BC               User Key  (r) = Recorded By, (t) = Taken By, (c) = Cosigned By      Initials Name Provider Type    BC Mingo Cook, OT Occupational Therapist                   Outcome Measures       Row Name 10/10/24 0928          How much help from another is currently needed...    Putting on and taking off regular lower body clothing? 1  -BC     Bathing (including washing, rinsing, and drying) 1  -BC     Toileting (which includes using toilet bed pan or urinal) 1  -BC     Putting on and taking off regular  upper body clothing 2  -BC     Taking care of personal grooming (such as brushing teeth) 3  -BC     Eating meals 3  -BC     AM-PAC 6 Clicks Score (OT) 11  -BC       Row Name 10/10/24 0928          Functional Assessment    Outcome Measure Options AM-PAC 6 Clicks Daily Activity (OT)  -BC               User Key  (r) = Recorded By, (t) = Taken By, (c) = Cosigned By      Initials Name Provider Type    BC Mingo Cook OT Occupational Therapist                    Occupational Therapy Education       Title: PT OT SLP Therapies (In Progress)       Topic: Occupational Therapy (In Progress)       Point: ADL training (Done)       Description:   Instruct learner(s) on proper safety adaptation and remediation techniques during self care or transfers.   Instruct in proper use of assistive devices.                  Learning Progress Summary             Patient TONY Arias VU by BC at 10/10/2024 0929    Comment: ognitive impairments impact Pt's carryout of education, educ on role of OT at IP level, home safety, ADL retraining, balance retraining, POC, DC recs, cont teaching   Family TONY Arias VU by BC at 10/10/2024 0929    Comment: ognitive impairments impact Pt's carryout of education, educ on role of OT at IP level, home safety, ADL retraining, balance retraining, POC, DC recs, cont teaching                         Point: Home exercise program (Not Started)       Description:   Instruct learner(s) on appropriate technique for monitoring, assisting and/or progressing therapeutic exercises/activities.                  Learner Progress:  Not documented in this visit.              Point: Precautions (Not Started)       Description:   Instruct learner(s) on prescribed precautions during self-care and functional transfers.                  Learner Progress:  Not documented in this visit.              Point: Body mechanics (Done)       Description:   Instruct learner(s) on proper positioning and spine alignment during self-care,  functional mobility activities and/or exercises.                  Learning Progress Summary             Patient TONY Arias VU by BC at 10/10/2024 0929    Comment: ognitive impairments impact Pt's carryout of education, educ on role of OT at IP level, home safety, ADL retraining, balance retraining, POC, DC recs, cont teaching   Family TONY Arias VU by BC at 10/10/2024 0929    Comment: ognitive impairments impact Pt's carryout of education, educ on role of OT at IP level, home safety, ADL retraining, balance retraining, POC, DC recs, cont teaching                                         User Key       Initials Effective Dates Name Provider Type Discipline    BC 07/29/24 -  Mingo Cook OT Occupational Therapist OT                  OT Recommendation and Plan  Planned Therapy Interventions (OT): activity tolerance training, cognitive/visual perception retraining, functional balance retraining, neuromuscular control/coordination retraining, occupation/activity based interventions, passive ROM/stretching, patient/caregiver education/training, ROM/therapeutic exercise, strengthening exercise, transfer/mobility retraining, BADL retraining  Therapy Frequency (OT): 5 times/wk  Plan of Care Review  Plan of Care Reviewed With: patient, spouse  Progress: no change  Outcome Evaluation: Pt is a 73 y/o F admitted to [facility] 10/[facility] for fall, attempting to get in shower when knees gave out. Hx of dementia, arthritis, hypertension, hypothyroidism, diabetes, breast cancer, pacemaker, CAD. Pt lives with spouse who was present during OT evaluation and verb'd he is working on hiring full time caregivers to assist Pt at home and that with her dementia he would prefer her to return home to her own environment. With Pts dementia anticipate when in her own environment she may function better and have more awareness for ADl task completion but demo'd with significant difficulty w/ ADL routine and functiona mobility with walker today. Pt is  unsteady, has decreased ADL IND/function, and requiring physical assistance to stand/transfer today with spouse observing mvmts. If he can obtain care for Pt who can provide physical assist to her or use w/c at home until her function improves, then may be okay with home w/ HHS otherwise concern for increased falls/readmission risk and may benefit from GERRY. Spouse verb understanding.     Time Calculation:   Evaluation Complexity (OT)  Review Occupational Profile/Medical/Therapy History Complexity: expanded/moderate complexity  Assessment, Occupational Performance/Identification of Deficit Complexity: 3-5 performance deficits  Clinical Decision Making Complexity (OT): detailed assessment/moderate complexity  Overall Complexity of Evaluation (OT): moderate complexity     Time Calculation- OT       Row Name 10/10/24 0930             Time Calculation- OT    OT Start Time 0822  -BC      OT Stop Time 0851  -BC      OT Time Calculation (min) 29 min  -BC      Total Timed Code Minutes- OT 15 minute(s)  -BC      OT Received On 10/10/24  -BC      OT - Next Appointment 10/11/24  -BC      OT Goal Re-Cert Due Date 10/24/24  -BC         Timed Charges    79162 - OT Self Care/Mgmt Minutes 15  -BC         Total Minutes    Timed Charges Total Minutes 15  -BC       Total Minutes 15  -BC                User Key  (r) = Recorded By, (t) = Taken By, (c) = Cosigned By      Initials Name Provider Type    BC Mingo Cook OT Occupational Therapist                  Therapy Charges for Today       Code Description Service Date Service Provider Modifiers Qty    [id number]  OT SELF CARE/MGMT/TRAIN EA 15 MIN 10/10/2024 Mingo Cook OT GO 1    [id number]  OT EVAL MOD COMPLEXITY 3 10/10/2024 Mingo Cook OT GO 1                 Mingo Cook OT  10/10/2024

## 2024-10-11 LAB
ALBUMIN SERPL-MCNC: 2.9 G/DL (ref 3.5–5.2)
ALBUMIN/GLOB SERPL: 0.9 G/DL
ALP SERPL-CCNC: 155 U/L (ref 39–117)
ALT SERPL W P-5'-P-CCNC: 36 U/L (ref 1–33)
ANION GAP SERPL CALCULATED.3IONS-SCNC: 10 MMOL/L (ref 5–15)
AST SERPL-CCNC: 52 U/L (ref 1–32)
BASOPHILS # BLD AUTO: 0.02 10*3/MM3 (ref 0–0.2)
BASOPHILS NFR BLD AUTO: 0.2 % (ref 0–1.5)
BILIRUB SERPL-MCNC: 0.6 MG/DL (ref 0–1.2)
BUN SERPL-MCNC: 39 MG/DL (ref 8–23)
BUN/CREAT SERPL: 31.5 (ref 7–25)
CALCIUM SPEC-SCNC: 9 MG/DL (ref 8.6–10.5)
CHLORIDE SERPL-SCNC: 98 MMOL/L (ref 98–107)
CO2 SERPL-SCNC: 23 MMOL/L (ref 22–29)
CREAT SERPL-MCNC: 1.24 MG/DL (ref 0.57–1)
DEPRECATED RDW RBC AUTO: 54 FL (ref 37–54)
EGFRCR SERPLBLD CKD-EPI 2021: 45.5 ML/MIN/1.73
EOSINOPHIL # BLD AUTO: 0.39 10*3/MM3 (ref 0–0.4)
EOSINOPHIL NFR BLD AUTO: 4.4 % (ref 0.3–6.2)
ERYTHROCYTE [DISTWIDTH] IN BLOOD BY AUTOMATED COUNT: 15 % (ref 12.3–15.4)
FERRITIN SERPL-MCNC: 291 NG/ML (ref 13–150)
FOLATE SERPL-MCNC: 5.62 NG/ML (ref 4.78–24.2)
GLOBULIN UR ELPH-MCNC: 3.3 GM/DL
GLUCOSE BLDC GLUCOMTR-MCNC: 153 MG/DL (ref 70–130)
GLUCOSE BLDC GLUCOMTR-MCNC: 169 MG/DL (ref 70–130)
GLUCOSE BLDC GLUCOMTR-MCNC: 260 MG/DL (ref 70–130)
GLUCOSE SERPL-MCNC: 141 MG/DL (ref 65–99)
HCT VFR BLD AUTO: 29.9 % (ref 34–46.6)
HCT VFR BLD AUTO: 30.5 % (ref 34–46.6)
HGB BLD-MCNC: 9.9 G/DL (ref 12–15.9)
HGB BLD-MCNC: 9.9 G/DL (ref 12–15.9)
IMM GRANULOCYTES # BLD AUTO: 0.06 10*3/MM3 (ref 0–0.05)
IMM GRANULOCYTES NFR BLD AUTO: 0.7 % (ref 0–0.5)
IRON 24H UR-MRATE: 16 MCG/DL (ref 37–145)
IRON SATN MFR SERPL: 7 % (ref 20–50)
LYMPHOCYTES # BLD AUTO: 1.21 10*3/MM3 (ref 0.7–3.1)
LYMPHOCYTES NFR BLD AUTO: 13.7 % (ref 19.6–45.3)
MAGNESIUM SERPL-MCNC: 2 MG/DL (ref 1.6–2.4)
MCH RBC QN AUTO: 32.4 PG (ref 26.6–33)
MCHC RBC AUTO-ENTMCNC: 32.5 G/DL (ref 31.5–35.7)
MCV RBC AUTO: 99.7 FL (ref 79–97)
MONOCYTES # BLD AUTO: 1.07 10*3/MM3 (ref 0.1–0.9)
MONOCYTES NFR BLD AUTO: 12.1 % (ref 5–12)
NEUTROPHILS NFR BLD AUTO: 6.08 10*3/MM3 (ref 1.7–7)
NEUTROPHILS NFR BLD AUTO: 68.9 % (ref 42.7–76)
NRBC BLD AUTO-RTO: 0 /100 WBC (ref 0–0.2)
PLATELET # BLD AUTO: 250 10*3/MM3 (ref 140–450)
PMV BLD AUTO: 11 FL (ref 6–12)
POTASSIUM SERPL-SCNC: 4 MMOL/L (ref 3.5–5.2)
PROT SERPL-MCNC: 6.2 G/DL (ref 6–8.5)
RBC # BLD AUTO: 3.06 10*6/MM3 (ref 3.77–5.28)
SODIUM SERPL-SCNC: 131 MMOL/L (ref 136–145)
T4 FREE SERPL-MCNC: 1.52 NG/DL (ref 0.92–1.68)
TIBC SERPL-MCNC: 225 MCG/DL (ref 298–536)
TRANSFERRIN SERPL-MCNC: 151 MG/DL (ref 200–360)
VIT B12 BLD-MCNC: 381 PG/ML (ref 211–946)
WBC NRBC COR # BLD AUTO: 8.83 10*3/MM3 (ref 3.4–10.8)

## 2024-10-11 PROCEDURE — 85018 HEMOGLOBIN: CPT | Performed by: STUDENT IN AN ORGANIZED HEALTH CARE EDUCATION/TRAINING PROGRAM

## 2024-10-11 PROCEDURE — 82728 ASSAY OF FERRITIN: CPT | Performed by: STUDENT IN AN ORGANIZED HEALTH CARE EDUCATION/TRAINING PROGRAM

## 2024-10-11 PROCEDURE — 82746 ASSAY OF FOLIC ACID SERUM: CPT | Performed by: STUDENT IN AN ORGANIZED HEALTH CARE EDUCATION/TRAINING PROGRAM

## 2024-10-11 PROCEDURE — 82607 VITAMIN B-12: CPT | Performed by: STUDENT IN AN ORGANIZED HEALTH CARE EDUCATION/TRAINING PROGRAM

## 2024-10-11 PROCEDURE — 83540 ASSAY OF IRON: CPT | Performed by: STUDENT IN AN ORGANIZED HEALTH CARE EDUCATION/TRAINING PROGRAM

## 2024-10-11 PROCEDURE — 83735 ASSAY OF MAGNESIUM: CPT | Performed by: STUDENT IN AN ORGANIZED HEALTH CARE EDUCATION/TRAINING PROGRAM

## 2024-10-11 PROCEDURE — 84439 ASSAY OF FREE THYROXINE: CPT | Performed by: STUDENT IN AN ORGANIZED HEALTH CARE EDUCATION/TRAINING PROGRAM

## 2024-10-11 PROCEDURE — 97530 THERAPEUTIC ACTIVITIES: CPT

## 2024-10-11 PROCEDURE — 84466 ASSAY OF TRANSFERRIN: CPT | Performed by: STUDENT IN AN ORGANIZED HEALTH CARE EDUCATION/TRAINING PROGRAM

## 2024-10-11 PROCEDURE — 82948 REAGENT STRIP/BLOOD GLUCOSE: CPT

## 2024-10-11 PROCEDURE — 63710000001 INSULIN GLARGINE PER 5 UNITS: Performed by: STUDENT IN AN ORGANIZED HEALTH CARE EDUCATION/TRAINING PROGRAM

## 2024-10-11 PROCEDURE — 85025 COMPLETE CBC W/AUTO DIFF WBC: CPT | Performed by: INTERNAL MEDICINE

## 2024-10-11 PROCEDURE — 25010000002 ONDANSETRON PER 1 MG: Performed by: INTERNAL MEDICINE

## 2024-10-11 PROCEDURE — 80053 COMPREHEN METABOLIC PANEL: CPT | Performed by: INTERNAL MEDICINE

## 2024-10-11 PROCEDURE — 85014 HEMATOCRIT: CPT | Performed by: STUDENT IN AN ORGANIZED HEALTH CARE EDUCATION/TRAINING PROGRAM

## 2024-10-11 PROCEDURE — 97535 SELF CARE MNGMENT TRAINING: CPT

## 2024-10-11 PROCEDURE — 63710000001 INSULIN LISPRO (HUMAN) PER 5 UNITS: Performed by: INTERNAL MEDICINE

## 2024-10-11 RX ORDER — FOLIC ACID 1 MG/1
1 TABLET ORAL DAILY
Status: DISCONTINUED | OUTPATIENT
Start: 2024-10-11 | End: 2024-10-14 | Stop reason: HOSPADM

## 2024-10-11 RX ORDER — UREA 10 %
1000 LOTION (ML) TOPICAL DAILY
Status: DISCONTINUED | OUTPATIENT
Start: 2024-10-11 | End: 2024-10-14 | Stop reason: HOSPADM

## 2024-10-11 RX ADMIN — Medication 1000 MCG: at 12:02

## 2024-10-11 RX ADMIN — MEMANTINE HYDROCHLORIDE 5 MG: 5 TABLET, FILM COATED ORAL at 08:08

## 2024-10-11 RX ADMIN — INSULIN LISPRO 6 UNITS: 100 INJECTION, SOLUTION INTRAVENOUS; SUBCUTANEOUS at 12:02

## 2024-10-11 RX ADMIN — LEVOTHYROXINE SODIUM 112 MCG: 112 TABLET ORAL at 06:19

## 2024-10-11 RX ADMIN — COLCHICINE 0.6 MG: 0.6 TABLET ORAL at 08:07

## 2024-10-11 RX ADMIN — INSULIN GLARGINE 8 UNITS: 100 INJECTION, SOLUTION SUBCUTANEOUS at 12:02

## 2024-10-11 RX ADMIN — ONDANSETRON 4 MG: 2 INJECTION, SOLUTION INTRAMUSCULAR; INTRAVENOUS at 14:43

## 2024-10-11 RX ADMIN — FOLIC ACID 1 MG: 1 TABLET ORAL at 12:02

## 2024-10-11 RX ADMIN — BUMETANIDE 2 MG: 2 TABLET ORAL at 08:07

## 2024-10-11 RX ADMIN — INSULIN LISPRO 2 UNITS: 100 INJECTION, SOLUTION INTRAVENOUS; SUBCUTANEOUS at 08:10

## 2024-10-11 RX ADMIN — METOPROLOL SUCCINATE 25 MG: 25 TABLET, EXTENDED RELEASE ORAL at 22:02

## 2024-10-11 RX ADMIN — MEMANTINE HYDROCHLORIDE 5 MG: 5 TABLET, FILM COATED ORAL at 22:02

## 2024-10-11 RX ADMIN — POTASSIUM CHLORIDE 20 MEQ: 750 TABLET, EXTENDED RELEASE ORAL at 08:07

## 2024-10-11 RX ADMIN — ATORVASTATIN CALCIUM 40 MG: 20 TABLET, FILM COATED ORAL at 08:10

## 2024-10-11 RX ADMIN — DOXYLAMINE SUCCINATE 25 MG: 25 TABLET ORAL at 22:02

## 2024-10-11 RX ADMIN — MAGNESIUM OXIDE 400 MG (241.3 MG MAGNESIUM) TABLET 400 MG: TABLET at 08:09

## 2024-10-11 RX ADMIN — CLOPIDOGREL BISULFATE 75 MG: 75 TABLET, FILM COATED ORAL at 08:09

## 2024-10-11 RX ADMIN — ASPIRIN 81 MG: 81 TABLET, COATED ORAL at 08:09

## 2024-10-11 RX ADMIN — ALLOPURINOL 300 MG: 300 TABLET ORAL at 08:09

## 2024-10-11 NOTE — PLAN OF CARE
Goal Outcome Evaluation:         Pt. Alert, confused, oriented x2, no voiced c/o pain, v/s stable with noticing BP lower at times, 02 sats 97% on room air, no s/s acute distress noted at this time

## 2024-10-11 NOTE — PLAN OF CARE
Goal Outcome Evaluation:  Plan of Care Reviewed With: patient, spouse           Outcome Evaluation: Pt seems more tired today than the previous day.  She has to be motivated to take her medication or eat.  Her  said that she has days like this.   is refusing for his wife to be stuck for BG checks or any insulin to be given.  Dr. Owens is aware and said that they will check with AM labs then.  VSS, RA, Paced rythym.  DNR/DNI.  Home at NH with spouse.

## 2024-10-11 NOTE — PLAN OF CARE
Goal Outcome Evaluation:  Plan of Care Reviewed With: patient, spouse        Progress: no change  Outcome Evaluation: Pt participated in OT session fair but significantly weak from baseline only transferring short distance in room from chair around bed to bathroom and back and completed full ADL routine today. Upon stand from commode Pt fatigued requiring rest break seated on BSC before continuing w/ transfer around bed up to chair. pt performed multiple transfers and incontinence episode from return to chair, assist up to BSC 2nd time. Cont OT services with spouse present and observed /assisting during session. He still verb'd LTG is to return home, but concerns for high falls risk and decreased function/performance for ADL tasks at home. DC Rec SNF, unless Pt will have 24/7 caregiver assistance and spouse wants to return home w/ her, then home w 24/7 support and HHS. If spouse chooses to take Pt home, will likely be w/c level until more progress is made.      Anticipated Discharge Disposition (OT): home with assist, home with 24/7 care, home with home health, skilled nursing facility

## 2024-10-11 NOTE — PLAN OF CARE
Goal Outcome Evaluation:  Plan of Care Reviewed With: patient, spouse           Outcome Evaluation: Pt seen for PT this morning. She was in bed and eager to mobilize. She performed supine to sit with mod A x 1 and STS with min A x 2 and rolling walker. She maintained standing for a a few minutes with min A x 1 to get cleaned up before AMB 3 ft to chair with min A x 2 and rolling walker. She fatigued quickly and was left in recliner. PT will continue to follow and progress as able. Recommend home with 24/7 care vs SNF.      Anticipated Discharge Disposition (PT): home with 24/7 care, skilled nursing facility

## 2024-10-11 NOTE — THERAPY TREATMENT NOTE
Patient Name: Mary Jo Berg  : 1950    MRN: [id number]                              Today's Date: 10/11/2024       Admit Date: 10/9/2024    Visit Dx:     ICD-10-CM ICD-9-CM   1. Hypomagnesemia  E83.42 275.2   2. Hypokalemia  E87.6 276.8   3. Generalized weakness  R53.1 780.79   4. Dementia, unspecified dementia severity, unspecified dementia type, unspecified whether behavioral, psychotic, or mood disturbance or anxiety  F03.90 294.20   5. Chronic renal impairment, unspecified CKD stage  N18.9 585.9     Patient Active Problem List   Diagnosis    Malignant neoplasm of overlapping sites of right female breast    Allergic rhinitis    Complete heart block    De Quervain's tenosynovitis, left    CTS (carpal tunnel syndrome)    Degenerative arthritis of thumb    Failed total knee, left    Gout    Hyperlipidemia    Postoperative hypothyroidism    Lumbar radiculopathy    Lumbar stenosis without neurogenic claudication    Spondylolisthesis at L5-S1 level    Diabetes 1.5, managed as type 2    Aromatase inhibitor use    Morbidly obese    Personal history of colonic polyps    Pacemaker    Hypertension    Aortic valve stenosis    Arteritis    Asthma    Benign essential hypertension    Bradycardia    Cataract    Encounter for other screening for malignant neoplasm of breast    Female stress incontinence    Menopause    Osteopenia    Persons encountering health services in other specified circumstances    Secondary cataract of left eye    Type 2 diabetes mellitus without complication    Hypothyroidism    Stage 3a chronic kidney disease    Degeneration of lumbar intervertebral disc    MCI (mild cognitive impairment)    Hypomagnesemia    Poor diet    Chronic anticoagulation     Past Medical History:   Diagnosis Date    Asthma     Breast cancer 2020    right IDC    Cataract 2010    on both  retinas    Coronary artery disease 01/10/2015    Pacemaker 2015    Diabetes mellitus     Type 2    Diverticulosis  2021    Encounter for other screening for malignant neoplasm of breast 2020    Heart murmur 2015    History of asthma     History of colon polyps     History of complete heart block     paced in     Hyperlipidemia     Hypertension     Hypothyroidism     MCI (mild cognitive impairment) 2023    Myocardial infarction     Obesity     Osteoarthritis     Osteopenia     Pacemaker     PONV (postoperative nausea and vomiting)     Pulmonary embolism      Past Surgical History:   Procedure Laterality Date    ADENOIDECTOMY      AORTIC VALVE REPAIR/REPLACEMENT  2023    BLADDER REPAIR  2012    BREAST BIOPSY Right 2020    malignant    CARDIAC PACEMAKER PLACEMENT      CARPAL TUNNEL RELEASE Right 2015    CATARACT EXTRACTION Bilateral      SECTION      x2    COLONOSCOPY  2015    COLONOSCOPY N/A 2021    Procedure: COLONOSCOPY;  Surgeon: Yamilet Stewart MD;  Location: [facility] ENDOSCOPY;  Service: Gastroenterology;  Laterality: N/A;  pre- hx polyps  post-- diverticulosis, hemorrhoids    CORONARY ANGIOPLASTY WITH STENT PLACEMENT  2023    ENDOMETRIAL ABLATION      HAND SURGERY      HERNIA REPAIR      Bilateral femoral and inguinal    JOINT REPLACEMENT  2007    Left knee    KNEE ARTHROPLASTY UNICOMPARTMENTAL Right 2022    KNEE ARTHROSCOPY Bilateral     MASTECTOMY  2020    MASTECTOMY WITH SENTINEL NODE BIOPSY AND AXILLARY NODE DISSECTION Right 2020    Procedure: RIGHT BREAST MASTECTOMY WITH SENTINEL NODE BIOPSY;  Surgeon: Bj Stephen MD;  Location: [facility] OR [facility];  Service: General;  Laterality: Right;    OOPHORECTOMY      SHOULDER ARTHROSCOPY Right     rotator cuff repair, biceps tenotomy    THYROIDECTOMY, PARTIAL  2005    TONSILLECTOMY AND ADENOIDECTOMY      TOTAL KNEE ARTHROPLASTY Left     TUBAL ABDOMINAL LIGATION      UMBILICAL HERNIA REPAIR        General Information       Row Name 10/11/24 1148          OT Time and Intention    Document Type  "therapy note (daily note)  -BC     Mode of Treatment individual therapy;occupational therapy  -BC       Row Name 10/11/24 1148          General Information    Patient Profile Reviewed yes  -BC     Existing Precautions/Restrictions fall  -BC       Row Name 10/11/24 1148          Cognition    Orientation Status (Cognition) oriented to;person;place  -BC       Row Name 10/11/24 1148          Safety Issues, Functional Mobility    Safety Issues Affecting Function (Mobility) problem-solving;positioning of assistive device;judgment;insight into deficits/self-awareness;safety precaution awareness;safety precautions follow-through/compliance;sequencing abilities  -BC     Impairments Affecting Function (Mobility) endurance/activity tolerance;strength;cognition;balance  -BC     Cognitive Impairments, Mobility Safety/Performance attention;insight into deficits/self-awareness;judgment;problem-solving/reasoning;safety precaution awareness;safety precaution follow-through;sequencing abilities  -BC               User Key  (r) = Recorded By, (t) = Taken By, (c) = Cosigned By      Initials Name Provider Type    BC Mingo Cook OT Occupational Therapist                     Mobility/ADL's       Row Name 10/11/24 1148          Bed Mobility    Comment, (Bed Mobility) up in chair pre/post  -[facility] Name 10/11/24 1148          Transfers    Transfers sit-stand transfer;toilet transfer;bed-chair transfer;stand-sit transfer  -BC     Comment, (Transfers) Increased time, cues and assist w/ walker safety/mgmt. Cont'd w/ walker to/from bathroom commode for toileting, ~25 feet each direction. Upon returning to chair \"I have to pee\" and became incontinent in her brief, Stand pivot sit up to [facility] for cont'd toileting tasks. sit,>Stands mulitple times from commode and max cues for hand placement to push up from arms of commode/chair.  -BC       Row Name 10/11/24 1148          Sit-Stand Transfer    Sit-Stand [place] (Transfers) minimum " assist (75% patient effort);1 person assist  -BC     Assistive Device (Sit-Stand Transfers) walker, front-wheeled  -BC     Comment, (Sit-Stand Transfer) multple sit<>stands from chair, BSC, and commode level w/ max cues for push up from arm rests. On last two stands from commode Pt was able to complete sup A w/ cues.  -BC       Row Name 10/11/24 1148          Stand-Sit Transfer    Stand-Sit [place] (Transfers) 1 person assist;minimum assist (75% patient effort);moderate assist (50% patient effort)  -BC     Assistive Device (Stand-Sit Transfers) walker, front-wheeled  -BC     Comment, (Stand-Sit Transfer) transfer assist level varied based on fatigue level, min to mod A  -BC       Row Name 10/11/24 1148          Toilet Transfer    Type (Toilet Transfer) sit-stand;stand-sit;stand pivot/stand step  -BC     [place] Level (Toilet Transfer) moderate assist (50% patient effort);1 person assist  -BC     Assistive Device (Toilet Transfer) commode  -BC     Comment, (Toilet Transfer) Mod cues and assist onto/off BR commode. Once back in chair incontinence episode needing commode again, SPS transfer to/from BS.  -BC       Row Name 10/11/24 1148          Functional Mobility    Functional Mobility- Ind. Level verbal cues required;minimum assist (75% patient effort);moderate assist (50% patient effort)  -BC     Functional Mobility- Device walker, front-wheeled  -BC     Functional Mobility- Comment max cues w/ walker mgmt, min to mod A for safety w/ mvmts and cues for widening XOCHILT, step count and cues for longer step length as shuffling when fatigued.  -BC     Patient was able to Ambulate yes  -BC       Row Name 10/11/24 1148          Activities of Daily Living    BADL Assessment/Intervention lower body dressing;grooming;toileting;bathing  -BC       Row Name 10/11/24 1148          Lower Body Dressing Assessment/Training    [place] Level (Lower Body Dressing) doff;don;pants/bottoms;dependent (less than 25% patient  effort)  -BC     Position (Lower Body Dressing) unsupported sitting;supported standing  -BC     Comment, (Lower Body Dressing) pull up doff/don twice, socks mgmt seated on commode and BSC.  -BC       Row Name 10/11/24 1148          Toileting Assessment/Training    [place] Level (Toileting) toileting skills;adjust/manage clothing;change pad/brief;perform perineal hygiene;dependent (less than 25% patient effort)  -BC     Assistive Devices (Toileting) commode  -BC     Position (Toileting) supported sitting;supported standing  -BC     Comment, (Toileting) txfr to bathroom commode for toileting but unable to void. upon  transition back to room once near chair incontinent of bladder, up to BSC for clean up dep A.  -BC       Row Name 10/11/24 1148          Grooming Assessment/Training    [place] Level (Grooming) supervision;wash face, hands  -BC     Comment, (Grooming) seated hand washing w cues  -BC       Row Name 10/11/24 1148          Bathing Assessment/Intervention    [place] Level (Bathing) lower body;proximal lower extremities;perineal area;maximum assist (25% patient effort);bathing skills;distal lower extremities/feet  -BC     Position (Bathing) supported sitting  -BC               User Key  (r) = Recorded By, (t) = Taken By, (c) = Cosigned By      Initials Name Provider Type    Mingo Cevallos OT Occupational Therapist                   Obj/Interventions       Row Name 10/11/24 1254          Balance    Balance Assessment sitting static balance  -BC     Static Sitting Balance standby assist  -BC     Dynamic Sitting Balance standby assist  -BC     Position, Sitting Balance unsupported  -BC     Static Standing Balance minimal assist  -BC     Dynamic Standing Balance moderate assist  -BC     Position/Device Used, Standing Balance supported;walker, front-wheeled  -BC     Balance Interventions standing  -BC     Comment, Balance Cont'd to require cues/assist for balance in standing for ADL completion  and mvmts. narrow XOCHILT and shuffling gait.  -BC               User Key  (r) = Recorded By, (t) = Taken By, (c) = Cosigned By      Initials Name Provider Type    BC Mingo Cook OT Occupational Therapist                   Goals/Plan    No documentation.                  Clinical Impression       Row Name 10/11/24 9289          Pain Assessment    Pre/Posttreatment Pain Comment never rated pain but discomfort in [place] area from incontinence this AM with 'burning' did not rate on scale. OT performed full c/u in BR and assisted w/ generic breezy/beverly cream with nrsg approval  -BC     Pain Intervention(s) Ambulation/increased activity;Repositioned  -BC       Row Name 10/11/24 3223          Plan of Care Review    Plan of Care Reviewed With patient;spouse  -BC     Progress no change  -BC     Outcome Evaluation Pt participated in OT session fair but significantly weak from baseline only transferring short distance in room from chair around bed to bathroom and back and completed full ADL routine today. Upon stand from commode Pt fatigued requiring rest break seated on BSC before continuing w/ transfer around bed up to chair. pt performed multiple transfers and incontinence episode from return to chair, assist up to BSC 2nd time. Cont OT services with spouse present and observed /assisting during session. He still verb'd LTG is to return home, but concerns for high falls risk and decreased function/performance for ADL tasks at home. DC Rec SNF, unless Pt will have 24/7 caregiver assistance and spouse wants to return home w/ her, then home w 24/7 support and HHS. If spouse chooses to take Pt home, will likely be w/c level until more progress is made.  -BC       Row Name 10/11/24 3334          Therapy Assessment/Plan (OT)    Rehab Potential (OT) fair, will monitor progress closely  -BC     Criteria for Skilled Therapeutic Interventions Met (OT) yes;meets criteria;skilled treatment is necessary  -BC       Row Name 10/11/24 5932           Therapy Plan Review/Discharge Plan (OT)    Anticipated Discharge Disposition (OT) home with assist;home with 24/7 care;home with home health;skilled nursing facility  -BC       Row Name 10/11/24 1255          Vital Signs    O2 Delivery Pre Treatment room air  -BC     Pre Patient Position Sitting  -BC     Intra Patient Position Standing  -BC     Post Patient Position Sitting  -BC       Row Name 10/11/24 1255          Positioning and Restraints    Pre-Treatment Position sitting in chair/recliner  -BC     Post Treatment Position chair  -BC     In Chair notified nsg;with nsg;reclined;call light within reach;encouraged to call for assist;exit alarm on;with family/caregiver  -BC               User Key  (r) = Recorded By, (t) = Taken By, (c) = Cosigned By      Initials Name Provider Type    Mingo Cevallos, RUMA Occupational Therapist                   Outcome Measures       Row Name 10/11/24 1300          How much help from another is currently needed...    Putting on and taking off regular lower body clothing? 1  -BC     Bathing (including washing, rinsing, and drying) 2  -BC     Toileting (which includes using toilet bed pan or urinal) 1  -BC     Putting on and taking off regular upper body clothing 2  -BC     Taking care of personal grooming (such as brushing teeth) 3  -BC     Eating meals 3  -BC     AM-PAC 6 Clicks Score (OT) 12  -BC       Row Name 10/11/24 1129 10/11/24 0821       How much help from another person do you currently need...    Turning from your back to your side while in flat bed without using bedrails? 3 (P)   -SF 3  -RW    Moving from lying on back to sitting on the side of a flat bed without bedrails? 3 (P)   -SF 3  -RW    Moving to and from a bed to a chair (including a wheelchair)? 2 (P)   -SF 3  -RW    Standing up from a chair using your arms (e.g., wheelchair, bedside chair)? 2 (P)   -SF 3  -RW    Climbing 3-5 steps with a railing? 1 (P)   -SF 2  -RW    To walk in hospital room? 2 (P)    -SF 3  -RW    AM-PAC 6 Clicks Score (PT) 13 (P)   -SF 17  -RW    Highest Level of Mobility Goal 4 --> Transfer to chair/commode (P)   -SF 5 --> Static standing  -RW      Row Name 10/11/24 0300          How much help from another person do you currently need...    Turning from your back to your side while in flat bed without using bedrails? 3  -JL     Moving from lying on back to sitting on the side of a flat bed without bedrails? 3  -JL     Moving to and from a bed to a chair (including a wheelchair)? 3  -JL     Standing up from a chair using your arms (e.g., wheelchair, bedside chair)? 3  -JL     Climbing 3-5 steps with a railing? 2  -JL     To walk in hospital room? 3  -JL     AM-PAC 6 Clicks Score (PT) 17  -JL     Highest Level of Mobility Goal 5 --> Static standing  -JL       Row Name 10/11/24 1300          Functional Assessment    Outcome Measure Options AM-PAC 6 Clicks Daily Activity (OT)  -BC               User Key  (r) = Recorded By, (t) = Taken By, (c) = Cosigned By      Initials Name Provider Type    RW Alfredo Read, RN Registered Nurse    Diego Esteves, ZAHIDA Registered Nurse    Mingo Cevlalos OT Occupational Therapist    Emmy Kee, PT Student PT Student                    Occupational Therapy Education       Title: PT OT SLP Therapies (In Progress)       Topic: Occupational Therapy (In Progress)       Point: ADL training (Done)       Description:   Instruct learner(s) on proper safety adaptation and remediation techniques during self care or transfers.   Instruct in proper use of assistive devices.                  Learning Progress Summary             Patient TONY Arias VU by BC at 10/10/2024 0929    Comment: ognitive impairments impact Pt's carryout of education, educ on role of OT at IP level, home safety, ADL retraining, balance retraining, POC, DC recs, cont teaching   Family TONY Arias VU by BC at 10/10/2024 0929    Comment: ognitive impairments impact Pt's carryout of education, educ on  role of OT at IP level, home safety, ADL retraining, balance retraining, POC, DC recs, cont teaching                         Point: Home exercise program (Not Started)       Description:   Instruct learner(s) on appropriate technique for monitoring, assisting and/or progressing therapeutic exercises/activities.                  Learner Progress:  Not documented in this visit.              Point: Precautions (Not Started)       Description:   Instruct learner(s) on prescribed precautions during self-care and functional transfers.                  Learner Progress:  Not documented in this visit.              Point: Body mechanics (Done)       Description:   Instruct learner(s) on proper positioning and spine alignment during self-care, functional mobility activities and/or exercises.                  Learning Progress Summary             Patient TONY Arias VU by BC at 10/10/2024 0929    Comment: ognitive impairments impact Pt's carryout of education, educ on role of OT at IP level, home safety, ADL retraining, balance retraining, POC, DC recs, cont teaching   Family TONY Arias VU by BC at 10/10/2024 0929    Comment: ognitive impairments impact Pt's carryout of education, educ on role of OT at IP level, home safety, ADL retraining, balance retraining, POC, DC recs, cont teaching                                         User Key       Initials Effective Dates Name Provider Type Discipline    BC 07/29/24 -  Mingo Cook OT Occupational Therapist OT                  OT Recommendation and Plan  Planned Therapy Interventions (OT): activity tolerance training, cognitive/visual perception retraining, functional balance retraining, neuromuscular control/coordination retraining, occupation/activity based interventions, passive ROM/stretching, patient/caregiver education/training, ROM/therapeutic exercise, strengthening exercise, transfer/mobility retraining, BADL retraining  Therapy Frequency (OT): 5 times/wk  Plan of Care  Review  Plan of Care Reviewed With: patient, spouse  Progress: no change  Outcome Evaluation: Pt participated in OT session fair but significantly weak from baseline only transferring short distance in room from chair around bed to bathroom and back and completed full ADL routine today. Upon stand from commode Pt fatigued requiring rest break seated on BSC before continuing w/ transfer around bed up to chair. pt performed multiple transfers and incontinence episode from return to chair, assist up to BSC 2nd time. Cont OT services with spouse present and observed /assisting during session. He still verb'd LTG is to return home, but concerns for high falls risk and decreased function/performance for ADL tasks at home. DC Rec SNF, unless Pt will have 24/7 caregiver assistance and spouse wants to return home w/ her, then home w 24/7 support and HHS. If spouse chooses to take Pt home, will likely be w/c level until more progress is made.     Time Calculation:   Evaluation Complexity (OT)  Review Occupational Profile/Medical/Therapy History Complexity: expanded/moderate complexity  Assessment, Occupational Performance/Identification of Deficit Complexity: 3-5 performance deficits  Clinical Decision Making Complexity (OT): detailed assessment/moderate complexity  Overall Complexity of Evaluation (OT): moderate complexity     Time Calculation- OT       Row Name 10/11/24 1301             Time Calculation- OT    OT Start Time 1115  -BC      OT Stop Time 1202  -BC      OT Time Calculation (min) 47 min  -BC      Total Timed Code Minutes- OT 47 minute(s)  -BC      OT Received On 10/11/24  -BC      OT - Next Appointment 10/14/24  -BC         Timed Charges    59500 - OT Therapeutic Activity Minutes 17  -BC      28010 - OT Self Care/Mgmt Minutes 30  -BC         Total Minutes    Timed Charges Total Minutes 47  -BC       Total Minutes 47  -BC                User Key  (r) = Recorded By, (t) = Taken By, (c) = Cosigned By      Initials  Name Provider Type    BC Mingo Cook OT Occupational Therapist                  Therapy Charges for Today       Code Description Service Date Service Provider Modifiers Qty    10637408522 HC OT SELF CARE/MGMT/TRAIN EA 15 MIN 10/10/2024 Mingo Cook OT GO 1    43052554262 HC OT EVAL MOD COMPLEXITY 3 10/10/2024 Mingo Cook OT GO 1    64152789394 HC OT THERAPEUTIC ACT EA 15 MIN 10/11/2024 Mingo Cook OT GO 1    19765437019 HC OT SELF CARE/MGMT/TRAIN EA 15 MIN 10/11/2024 Mingo Cook OT GO 2                 Mingo Cook OT  10/11/2024

## 2024-10-11 NOTE — PROGRESS NOTES
Name: Mary Jo Berg ADMIT: 10/9/2024   : 1950  PCP: Betty Isaac APRN    MRN: [id number] LOS: 1 days   AGE/SEX: 74 y.o. female  ROOM: [facility]     Subjective   Subjective   Patient seen this morning.  No acute events overnight.  Oriented to name, place, not to year.  Denies complaints when asked.    Review of Systems   As above  Objective   Objective   Vital Signs  Temp:  [97.9 °F (36.6 °C)-98.5 °F (36.9 °C)] 98.5 °F (36.9 °C)  Heart Rate:  [79-92] 79  Resp:  [16-18] 16  BP: ()/(49-74) 102/49  SpO2:  [97 %-100 %] 97 %  on   ;   Device (Oxygen Therapy): room air  Body mass index is 34.8 kg/m².  Physical Exam    General: Lying in bed, not in distress, ill-appearing  HEENT: Normocephalic, atraumatic  CV: Regular rate and rhythm, no murmurs rubs or gallops  Lungs: Clear to auscultation bilaterally, no crackles or wheezes  Abdomen: Soft, nontender, nondistended  Extremities: 1+ bilateral ankle edema, no cyanosis    Results Review     I reviewed the patient's new clinical results.  Results from last 7 days   Lab Units 10/11/24  0522 10/10/24  0430 10/09/24  1230 10/08/24  1526   WBC 10*3/mm3 8.83 8.81 8.83 8.93   HEMOGLOBIN g/dL 9.9* 11.0* 11.4* 11.3*   PLATELETS 10*3/mm3 250 218 233 229     Results from last 7 days   Lab Units 10/11/24  0522 10/10/24  0430 10/09/24  1355 10/08/24  1526   SODIUM mmol/L 131* 133* 133* 132*   POTASSIUM mmol/L 4.0 4.3 2.9* 3.0*   CHLORIDE mmol/L 98 97* 95* 92*   CO2 mmol/L 23.0 24.4 24.0 23.3   BUN mg/dL 39* 32* 40* 44*   CREATININE mg/dL 1.24* 1.30* 1.46* 1.52*   GLUCOSE mg/dL 141* 160* 182* 181*   Estimated Creatinine Clearance: 38.4 mL/min (A) (by C-G formula based on SCr of 1.24 mg/dL (H)).  Results from last 7 days   Lab Units 10/11/24  0522 10/10/24  0430 10/09/24  1355 10/08/24  1526   ALBUMIN g/dL 2.9* 2.9* 3.1* 3.5   BILIRUBIN mg/dL 0.6 0.7 0.8 0.8   ALK PHOS U/L 155* 158* 140* 163*   AST (SGOT) U/L 52* 45* 33* 38*   ALT (SGPT) U/L 36* 31 29 34*      Results from last 7 days   Lab Units 10/11/24  0522 10/10/24  0430 10/09/24  1355 10/08/24  1526   CALCIUM mg/dL 9.0 8.7 8.7 8.7   ALBUMIN g/dL 2.9* 2.9* 3.1* 3.5   MAGNESIUM mg/dL  --  2.4 1.1*  --    PHOSPHORUS mg/dL  --   --  2.9  --        COVID19   Date Value Ref Range Status   02/06/2021 Not Detected Not Detected - Ref. Range Final     SARS-CoV-2, ANITA   Date Value Ref Range Status   03/07/2022 Not Detected Not Detected Final     Comment:     This nucleic acid amplification test was developed and its performance  characteristics determined by [facility]. Nucleic acid  amplification tests include RT-PCR and TMA. This test has not been  FDA cleared or approved. This test has been authorized by FDA under  an Emergency Use Authorization (EUA). This test is only authorized  for the duration of time the declaration that circumstances exist  justifying the authorization of the emergency use of in vitro  diagnostic tests for detection of SARS-CoV-2 virus and/or diagnosis  of COVID-19 infection under section 564(b)(1) of the Act, 21 U.S.C.  360bbb-3(b) (1), unless the authorization is terminated or revoked  sooner.  When diagnostic testing is negative, the possibility of a false  negative result should be considered in the context of a patient's  recent exposures and the presence of clinical signs and symptoms  consistent with COVID-19. An individual without symptoms of COVID-19  and who is not shedding SARS-CoV-2 virus would expect to have a  negative (not detected) result in this assay.       Hemoglobin A1C   Date/Time Value Ref Range Status   10/08/2024 1526 7.20 (H) 4.80 - 5.60 % Final     Glucose   Date/Time Value Ref Range Status   10/11/2024 0621 153 (H) 70 - 130 mg/dL Final   10/10/2024 2052 287 (H) 70 - 130 mg/dL Final   10/10/2024 1540 191 (H) 70 - 130 mg/dL Final   10/10/2024 1050 269 (H) 70 - 130 mg/dL Final   10/10/2024 0631 153 (H) 70 - 130 mg/dL Final   10/09/2024 2010 180 (H) 70 - 130 mg/dL  Final   10/09/2024 1838 147 (H) 70 - 130 mg/dL Final           XR Chest 1 View  Narrative: XR CHEST 1 VW-10/9/2024     HISTORY: Weakness, dizziness.     Heart size is within normal limits. Lungs are slightly underinflated but  appear clear. Cardiac pacemaker is seen in good position. Bony  structures appear normal.     Impression: 1. No acute process        This report was finalized on 10/9/2024 1:31 PM by Dr. Bimal Echevarria M.D on Workstation: [id number]       Scheduled Medications  allopurinol, 300 mg, Oral, Daily  aspirin, 81 mg, Oral, Daily  atorvastatin, 40 mg, Oral, Daily  bumetanide, 2 mg, Oral, Daily  clopidogrel, 75 mg, Oral, Daily  colchicine, 0.6 mg, Oral, Daily  doxylamine, 25 mg, Oral, Nightly  insulin lispro, 2-9 Units, Subcutaneous, 4x Daily AC & at Bedtime  levothyroxine, 112 mcg, Oral, Q AM  [Held by provider] losartan, 25 mg, Oral, Q24H  magnesium oxide, 400 mg, Oral, Daily  memantine, 5 mg, Oral, BID  metoprolol succinate XL, 25 mg, Oral, BID  potassium chloride, 20 mEq, Oral, Daily    Infusions   Diet  Diet: Regular/House; Fluid Consistency: Thin (IDDSI 0)    I have personally reviewed     [x]  Laboratory   [x]  Microbiology   [x]  Radiology   [x]  EKG/Telemetry  []  Cardiology/Vascular   []  Pathology    [x]  Records       Assessment/Plan     Active Hospital Problems    Diagnosis  POA    **Hypomagnesemia [E83.42]  Yes    Chronic anticoagulation [Z79.01]  Not Applicable    Poor diet [E63.9]  Yes    MCI (mild cognitive impairment) [G31.84]  Yes    Stage 3a chronic kidney disease [N18.31]  Yes    Pacemaker [Z95.0]  Yes    Hypertension [I10]  Yes    Morbidly obese [E66.01]  Yes    Hyperlipidemia [E78.5]  Yes    Lumbar radiculopathy [M54.16]  Yes    Type 2 diabetes mellitus without complication [E11.9]  Yes    Postoperative hypothyroidism [E89.0]  Yes      Resolved Hospital Problems   No resolved problems to display.     Patient is a 73 yo F with a H/O SSS status post pacemaker,,, PE on apixaban,  anticoagulation, history of breast cancer on tamoxifen DM type II, CKD stage III, Dementia, likely due to Alzheimer's disease per outpatient neurology note  on Namenda CAD with PCI to LAD in June 2023., status postTAVR , SSS status post pacemaker, ER due to generalized weakness.     Generalized weakness likely secondary to hypotension and electrolyte abnormalities  -Treat all of the below  -PT and OT to evaluate and treat  -SNF recommended however plan home with home health per spouse.       Hypomagnesemia / hypokalemia   secondary to poor p.o. intake as well as the patient is on Bumex without any potassium supplements  -Nutrition to evaluate  -Status post electrolyte replacement, potassium, magnesium normal  -       CAD with PCI to LAD in June 2023  SSS status post pacemaker  Aortic stenosis status post TAVR  Cardiomyopathy  -Cannot get the echo report follow-up from [facility] cardiology  -Hold Coreg/losartan due to soft BP, resumed Bumex as electrolytes stabilized  - will Need to follow-up with cardiology outpatient  -Blood pressure remains soft.  Continue to hold Coreg/losartan  -Continue aspirin, Plavix     Poor p.o. intake chronically with hypoalbuminemia  Nutrition consult     Hypothyroidism  -TSH was low at 0.209 on 10/8/2024   -Levothyroxine decreased to 112 mcg a, will need to recheck thyroid function labs in 4 to 6 weeks as an outpatient    CKD stage III  -Stable, monitor    Type II DM with hyperglycemia  -Continue SSI, add Lantus 8 units daily    Dementia, likely due to Alzheimer's disease   -Spouse reports progression  -Moderate stage per last neurology note,Memory testing has declined 3 points in the last year - MoCA 16/30   -Continue memantine 5 mg twice daily, donepezil low-dose discontinued  -Follow-up with neurology outpatient as scheduled    Anemia  -9.9, down from 11.0 yesterday.  -Folic acid and B12 low end of normal, initiated on B12 and folic acid supplement  -Iron panel pending  -repeat H&H this  afternoon    Transaminitis   -Mild, monitor.  -hepatitis panel in a.m.    History of PE  -No longer on Eliquis per spouse.      DVT prophylaxis.  SCDs  CODE STATUS: DNR/DNI  Discussed with patient.    Disposition: SNF recommended but plan home with home health per discussion with spouse, likely stable to be discharged 1-2 days  Expected Discharge Date: 10/14/2024; Expected Discharge Time:        Copied text in this note has been reviewed and is accurate as of 10/11/24.         Dictated utilizing Dragon dictation        Leila Owens MD  [facility]  10/11/24  09:36 EDT

## 2024-10-11 NOTE — NURSING NOTE
The  is refusing for his wife to be checked for BG levels or given any insulin any longer while she is here.  Dr. Graciela rodriguez.

## 2024-10-11 NOTE — THERAPY TREATMENT NOTE
Patient Name: Mary Jo Berg  : 1950    MRN: [id number]                              Today's Date: 10/11/2024       Admit Date: 10/9/2024    Visit Dx:     ICD-10-CM ICD-9-CM   1. Hypomagnesemia  E83.42 275.2   2. Hypokalemia  E87.6 276.8   3. Generalized weakness  R53.1 780.79   4. Dementia, unspecified dementia severity, unspecified dementia type, unspecified whether behavioral, psychotic, or mood disturbance or anxiety  F03.90 294.20   5. Chronic renal impairment, unspecified CKD stage  N18.9 585.9     Patient Active Problem List   Diagnosis    Malignant neoplasm of overlapping sites of right female breast    Allergic rhinitis    Complete heart block    De Quervain's tenosynovitis, left    CTS (carpal tunnel syndrome)    Degenerative arthritis of thumb    Failed total knee, left    Gout    Hyperlipidemia    Postoperative hypothyroidism    Lumbar radiculopathy    Lumbar stenosis without neurogenic claudication    Spondylolisthesis at L5-S1 level    Diabetes 1.5, managed as type 2    Aromatase inhibitor use    Morbidly obese    Personal history of colonic polyps    Pacemaker    Hypertension    Aortic valve stenosis    Arteritis    Asthma    Benign essential hypertension    Bradycardia    Cataract    Encounter for other screening for malignant neoplasm of breast    Female stress incontinence    Menopause    Osteopenia    Persons encountering health services in other specified circumstances    Secondary cataract of left eye    Type 2 diabetes mellitus without complication    Hypothyroidism    Stage 3a chronic kidney disease    Degeneration of lumbar intervertebral disc    MCI (mild cognitive impairment)    Hypomagnesemia    Poor diet    Chronic anticoagulation     Past Medical History:   Diagnosis Date    Asthma     Breast cancer 2020    right IDC    Cataract 2010    on both  retinas    Coronary artery disease 01/10/2015    Pacemaker 2015    Diabetes mellitus     Type 2    Diverticulosis  2021    Encounter for other screening for malignant neoplasm of breast 2020    Heart murmur 2015    History of asthma     History of colon polyps     History of complete heart block     paced in     Hyperlipidemia     Hypertension     Hypothyroidism     MCI (mild cognitive impairment) 2023    Myocardial infarction     Obesity     Osteoarthritis     Osteopenia     Pacemaker     PONV (postoperative nausea and vomiting)     Pulmonary embolism      Past Surgical History:   Procedure Laterality Date    ADENOIDECTOMY      AORTIC VALVE REPAIR/REPLACEMENT  2023    BLADDER REPAIR  2012    BREAST BIOPSY Right 2020    malignant    CARDIAC PACEMAKER PLACEMENT      CARPAL TUNNEL RELEASE Right 2015    CATARACT EXTRACTION Bilateral      SECTION      x2    COLONOSCOPY  2015    COLONOSCOPY N/A 2021    Procedure: COLONOSCOPY;  Surgeon: Yamilet Stewart MD;  Location: [facility] ENDOSCOPY;  Service: Gastroenterology;  Laterality: N/A;  pre- hx polyps  post-- diverticulosis, hemorrhoids    CORONARY ANGIOPLASTY WITH STENT PLACEMENT  2023    ENDOMETRIAL ABLATION      HAND SURGERY      HERNIA REPAIR      Bilateral femoral and inguinal    JOINT REPLACEMENT  2007    Left knee    KNEE ARTHROPLASTY UNICOMPARTMENTAL Right 2022    KNEE ARTHROSCOPY Bilateral     MASTECTOMY  2020    MASTECTOMY WITH SENTINEL NODE BIOPSY AND AXILLARY NODE DISSECTION Right 2020    Procedure: RIGHT BREAST MASTECTOMY WITH SENTINEL NODE BIOPSY;  Surgeon: Bj Stephen MD;  [place] [facility] [place] [facility] [place]  Service: General;  Laterality: Right;    OOPHORECTOMY      SHOULDER ARTHROSCOPY Right     rotator cuff repair, biceps tenotomy    THYROIDECTOMY, PARTIAL  2005    TONSILLECTOMY AND ADENOIDECTOMY      TOTAL KNEE ARTHROPLASTY Left     TUBAL ABDOMINAL LIGATION      UMBILICAL HERNIA REPAIR        General Information       Row Name 10/11/24 1122          Physical Therapy Time and Intention     Document Type therapy note (daily note)  -CW (r) SF (t) CW (c)     Mode of Treatment physical therapy;individual therapy  -CW       Row Name 10/11/24 1122          General Information    Patient Profile Reviewed yes  -CW (r) SF (t) CW (c)     Existing Precautions/Restrictions fall  -CW (r) SF (t) CW (c)       Row Name 10/11/24 1122          Cognition    Orientation Status (Cognition) oriented to;person  -CW       Row Name 10/11/24 1122          Safety Issues, Functional Mobility    Safety Issues Affecting Function (Mobility) insight into deficits/self-awareness;awareness of need for assistance;safety precautions follow-through/compliance  -CW     Impairments Affecting Function (Mobility) endurance/activity tolerance;strength;cognition;balance  -CW (r) SF (t) CW (c)               User Key  (r) = Recorded By, (t) = Taken By, (c) = Cosigned By      Initials Name Provider Type    CW Nicky Harris, PT Physical Therapist    SF Emmy Verdugo, PT Student PT Student                   Mobility       Row Name 10/11/24 1324 10/11/24 1123       Bed Mobility    Bed Mobility --  -CW supine-sit  -CW (r) SF (t) CW (c)    Supine-Sit [place] (Bed Mobility) --  -CW moderate assist (50% patient effort);1 person assist;verbal cues  -CW (r) SF (t) CW (c)    Assistive Device (Bed Mobility) --  -CW head of bed elevated;bed rails  -CW (r) SF (t) CW (c)      Row Name 10/11/24 1123          Sit-Stand Transfer    Sit-Stand [place] (Transfers) minimum assist (75% patient effort);2 person assist;verbal cues  -CW (r) SF (t) CW (c)     Assistive Device (Sit-Stand Transfers) walker, front-wheeled  -CW (r) SF (t) CW (c)       Row Name 10/11/24 1123          Gait/Stairs (Locomotion)    [place] Level (Gait) minimum assist (75% patient effort);2 person assist;verbal cues  -CW     Assistive Device (Gait) walker, front-wheeled  -CW (r) SF (t) CW (c)     Distance in Feet (Gait) 3  -CW (r) SF (t) CW (c)     Deviations/Abnormal Patterns  (Gait) annie decreased;gait speed decreased  -CW (r) SF (t) CW (c)     Bilateral Gait Deviations forward flexed posture;heel strike decreased  -CW (r) SF (t) CW (c)     Comment, (Gait/Stairs) shuffled steps, difficulty picking up feet  -CW (r) SF (t) CW (c)               User Key  (r) = Recorded By, (t) = Taken By, (c) = Cosigned By      Initials Name Provider Type    CW Nicky Harris, PT Physical Therapist    Emmy Kee, PT Student PT Student                   Obj/Interventions       Row Name 10/11/24 1125          Balance    Balance Assessment sitting static balance;standing static balance;standing dynamic balance  -CW (r) SF (t) CW (c)     Static Sitting Balance standby assist  -CW (r) SF (t) CW (c)     Position, Sitting Balance sitting edge of bed  -CW (r) SF (t) CW (c)     Static Standing Balance minimal assist;1-person assist  -CW (r) SF (t) CW (c)     Dynamic Standing Balance minimal assist;2-person assist  -CW (r) SF (t) CW (c)     Position/Device Used, Standing Balance walker, front-wheeled  -CW (r) SF (t) CW (c)     Comment, Balance forward flexed posture in static standing  -CW (r) SF (t) CW (c)               User Key  (r) = Recorded By, (t) = Taken By, (c) = Cosigned By      Initials Name Provider Type    CW Nicky Harris, PT Physical Therapist    Emmy Kee, PT Student PT Student                   Goals/Plan    No documentation.                  Clinical Impression       Row Name 10/11/24 1125          Pain    Pretreatment Pain Rating 0/10 - no pain  -CW (r) SF (t) CW (c)     Posttreatment Pain Rating 0/10 - no pain  -CW (r) SF (t) CW (c)       Row Name 10/11/24 1125          Plan of Care Review    Plan of Care Reviewed With patient;spouse  -CW (r) SF (t) CW (c)     Outcome Evaluation Pt seen for PT this morning. She was in bed and eager to mobilize. She performed supine to sit with mod A x 1 and STS with min A x 2 and rolling walker. She maintained standing for a a few minutes  with min A x 1 to get cleaned up before AMB 3 ft to chair with min A x 2 and rolling walker. She fatigued quickly and was left in recliner. PT will continue to follow and progress as able. Recommend home with 24/7 care vs SNF.  -CW (r) SF (t) CW (c)       Row Name 10/11/24 1125          Vital Signs    O2 Delivery Pre Treatment room air  -CW (r) SF (t) CW (c)     O2 Delivery Intra Treatment room air  -CW (r) SF (t) CW (c)     O2 Delivery Post Treatment room air  -CW (r) SF (t) CW (c)       Row Name 10/11/24 1125          Positioning and Restraints    Pre-Treatment Position in bed  -CW (r) SF (t) CW (c)     Post Treatment Position chair  -CW (r) SF (t) CW (c)     In Chair reclined;call light within reach;encouraged to call for assist;exit alarm on;with family/caregiver  -CW (r) SF (t) CW (c)               User Key  (r) = Recorded By, (t) = Taken By, (c) = Cosigned By      Initials Name Provider Type    CW Nicky Harris, PT Physical Therapist    Emmy Kee, PT Student PT Student                   Outcome Measures       Row Name 10/11/24 1435 10/11/24 1129       How much help from another person do you currently need...    Turning from your back to your side while in flat bed without using bedrails? 3  -RW (r) RS (t) RW (c) 3  -CW (r) SF (t) CW (c)    Moving from lying on back to sitting on the side of a flat bed without bedrails? 3  -RW (r) RS (t) RW (c) 3  -CW (r) SF (t) CW (c)    Moving to and from a bed to a chair (including a wheelchair)? 2  -RW (r) RS (t) RW (c) 2  -CW (r) SF (t) CW (c)    Standing up from a chair using your arms (e.g., wheelchair, bedside chair)? 2  -RW (r) RS (t) RW (c) 2  -CW (r) SF (t) CW (c)    Climbing 3-5 steps with a railing? 1  -RW (r) RS (t) RW (c) 1  -CW (r) SF (t) CW (c)    To walk in hospital room? 2  -RW (r) RS (t) RW (c) 2  -CW (r) SF (t) CW (c)    AM-PAC 6 Clicks Score (PT) 13  -RW (r) RS (t) 13  -CW (r) SF (t)    Highest Level of Mobility Goal 4 --> Transfer to  chair/commode  -RW (r) RS (t) 4 --> Transfer to chair/commode  -CW (r) SF (t)      Row Name 10/11/24 0821          How much help from another person do you currently need...    Turning from your back to your side while in flat bed without using bedrails? 3  -RW     Moving from lying on back to sitting on the side of a flat bed without bedrails? 3  -RW     Moving to and from a bed to a chair (including a wheelchair)? 3  -RW     Standing up from a chair using your arms (e.g., wheelchair, bedside chair)? 3  -RW     Climbing 3-5 steps with a railing? 2  -RW     To walk in hospital room? 3  -RW     AM-PAC 6 Clicks Score (PT) 17  -RW     Highest Level of Mobility Goal 5 --> Static standing  -RW       Row Name 10/11/24 1300          Functional Assessment    Outcome Measure Options AM-PAC 6 Clicks Daily Activity (OT)  -BC               User Key  (r) = Recorded By, (t) = Taken By, (c) = Cosigned By      Initials Name Provider Type    RW Alfredo Raed, RN Registered Nurse    Nicky Dewitt, PT Physical Therapist    Radha Ross RN Extern Registered Nurse    Mingo Cevallos, OT Occupational Therapist    Emmy Kee, PT Student PT Student                                 Physical Therapy Education       Title: PT OT SLP Therapies (In Progress)       Topic: Physical Therapy (In Progress)       Point: Mobility training (Done)       Learning Progress Summary             Patient Acceptance, E, VU by  at 10/11/2024 1129    Acceptance, E, VU by  at 10/10/2024 1257                         Point: Home exercise program (Not Started)       Learner Progress:  Not documented in this visit.              Point: Body mechanics (Not Started)       Learner Progress:  Not documented in this visit.              Point: Precautions (Not Started)       Learner Progress:  Not documented in this visit.                              User Key       Initials Effective Dates Name Provider Type Discipline     08/08/24 -  Kartik  Emmy, PT Student PT Student PT                  PT Recommendation and Plan  Planned Therapy Interventions (PT): balance training, bed mobility training, gait training, patient/family education, ROM (range of motion), stair training, strengthening, stretching, transfer training  Plan of Care Reviewed With: patient, spouse  Outcome Evaluation: Pt seen for PT this morning. She was in bed and eager to mobilize. She performed supine to sit with mod A x 1 and STS with min A x 2 and rolling walker. She maintained standing for a a few minutes with min A x 1 to get cleaned up before AMB 3 ft to chair with min A x 2 and rolling walker. She fatigued quickly and was left in recliner. PT will continue to follow and progress as able. Recommend home with 24/7 care vs SNF.     Time Calculation:         PT Charges       Row Name 10/11/24 1129             Time Calculation    Start Time 1006  -CW (r) SF (t) CW (c)      Stop Time 1029  -CW (r) SF (t) CW (c)      Time Calculation (min) 23 min  -CW (r) SF (t)      PT Received On 10/11/24  -CW (r) SF (t) CW (c)      PT - Next Appointment 10/14/24  -CW (r) SF (t) CW (c)         Time Calculation- PT    Total Timed Code Minutes- PT 23 minute(s)  -CW (r) SF (t) CW (c)                User Key  (r) = Recorded By, (t) = Taken By, (c) = Cosigned By      Initials Name Provider Type    Nicky Dewitt, PT Physical Therapist    SF Emmy Verdugo, PT Student PT Student                  Therapy Charges for Today       Code Description Service Date Service Provider Modifiers Qty    71186518823 HC PT EVAL MOD COMPLEXITY 3 10/10/2024 Emmy Verdugo, PT Student GP 1    48612069020 HC PT THERAPEUTIC ACT EA 15 MIN 10/10/2024 Emmy Verdugo, PT Student GP 1    55741315375 HC PT THERAPEUTIC ACT EA 15 MIN 10/11/2024 Emmy Verdugo, PT Student GP 2            PT G-Codes  Outcome Measure Options: AM-PAC 6 Clicks Daily Activity (OT)  AM-PAC 6 Clicks Score (PT): 13  AM-PAC 6 Clicks Score (OT): 12  PT  Discharge Summary  Anticipated Discharge Disposition (PT): home with 24/7 care, skilled nursing facility    Emmy Verdugo, PT Student  10/11/2024

## 2024-10-12 LAB
ALBUMIN SERPL-MCNC: 2.7 G/DL (ref 3.5–5.2)
ALBUMIN/GLOB SERPL: 0.8 G/DL
ALP SERPL-CCNC: 179 U/L (ref 39–117)
ALT SERPL W P-5'-P-CCNC: 45 U/L (ref 1–33)
ANION GAP SERPL CALCULATED.3IONS-SCNC: 11 MMOL/L (ref 5–15)
AST SERPL-CCNC: 73 U/L (ref 1–32)
BASOPHILS # BLD AUTO: 0.03 10*3/MM3 (ref 0–0.2)
BASOPHILS NFR BLD AUTO: 0.4 % (ref 0–1.5)
BILIRUB SERPL-MCNC: 0.6 MG/DL (ref 0–1.2)
BUN SERPL-MCNC: 32 MG/DL (ref 8–23)
BUN/CREAT SERPL: 24.6 (ref 7–25)
CALCIUM SPEC-SCNC: 9 MG/DL (ref 8.6–10.5)
CHLORIDE SERPL-SCNC: 97 MMOL/L (ref 98–107)
CO2 SERPL-SCNC: 24 MMOL/L (ref 22–29)
CREAT SERPL-MCNC: 1.3 MG/DL (ref 0.57–1)
DEPRECATED RDW RBC AUTO: 53.4 FL (ref 37–54)
EGFRCR SERPLBLD CKD-EPI 2021: 43 ML/MIN/1.73
EOSINOPHIL # BLD AUTO: 0.33 10*3/MM3 (ref 0–0.4)
EOSINOPHIL NFR BLD AUTO: 4.3 % (ref 0.3–6.2)
ERYTHROCYTE [DISTWIDTH] IN BLOOD BY AUTOMATED COUNT: 14.9 % (ref 12.3–15.4)
GLOBULIN UR ELPH-MCNC: 3.2 GM/DL
GLUCOSE BLDC GLUCOMTR-MCNC: 126 MG/DL (ref 70–130)
GLUCOSE SERPL-MCNC: 142 MG/DL (ref 65–99)
HAV IGM SERPL QL IA: NORMAL
HBV CORE IGM SERPL QL IA: NORMAL
HBV SURFACE AG SERPL QL IA: NORMAL
HCT VFR BLD AUTO: 28.2 % (ref 34–46.6)
HCV AB SER QL: NORMAL
HGB BLD-MCNC: 9.8 G/DL (ref 12–15.9)
IMM GRANULOCYTES # BLD AUTO: 0.03 10*3/MM3 (ref 0–0.05)
IMM GRANULOCYTES NFR BLD AUTO: 0.4 % (ref 0–0.5)
LYMPHOCYTES # BLD AUTO: 1.57 10*3/MM3 (ref 0.7–3.1)
LYMPHOCYTES NFR BLD AUTO: 20.7 % (ref 19.6–45.3)
MCH RBC QN AUTO: 34.1 PG (ref 26.6–33)
MCHC RBC AUTO-ENTMCNC: 34.8 G/DL (ref 31.5–35.7)
MCV RBC AUTO: 98.3 FL (ref 79–97)
MONOCYTES # BLD AUTO: 0.92 10*3/MM3 (ref 0.1–0.9)
MONOCYTES NFR BLD AUTO: 12.1 % (ref 5–12)
NEUTROPHILS NFR BLD AUTO: 4.71 10*3/MM3 (ref 1.7–7)
NEUTROPHILS NFR BLD AUTO: 62.1 % (ref 42.7–76)
NRBC BLD AUTO-RTO: 0 /100 WBC (ref 0–0.2)
PHOSPHATE SERPL-MCNC: 3.9 MG/DL (ref 2.5–4.5)
PLATELET # BLD AUTO: 246 10*3/MM3 (ref 140–450)
PMV BLD AUTO: 10.8 FL (ref 6–12)
POTASSIUM SERPL-SCNC: 3.9 MMOL/L (ref 3.5–5.2)
PROT SERPL-MCNC: 5.9 G/DL (ref 6–8.5)
RBC # BLD AUTO: 2.87 10*6/MM3 (ref 3.77–5.28)
SODIUM SERPL-SCNC: 132 MMOL/L (ref 136–145)
WBC NRBC COR # BLD AUTO: 7.59 10*3/MM3 (ref 3.4–10.8)

## 2024-10-12 PROCEDURE — 80074 ACUTE HEPATITIS PANEL: CPT | Performed by: STUDENT IN AN ORGANIZED HEALTH CARE EDUCATION/TRAINING PROGRAM

## 2024-10-12 PROCEDURE — 82948 REAGENT STRIP/BLOOD GLUCOSE: CPT

## 2024-10-12 PROCEDURE — 84100 ASSAY OF PHOSPHORUS: CPT | Performed by: STUDENT IN AN ORGANIZED HEALTH CARE EDUCATION/TRAINING PROGRAM

## 2024-10-12 PROCEDURE — 80053 COMPREHEN METABOLIC PANEL: CPT | Performed by: INTERNAL MEDICINE

## 2024-10-12 PROCEDURE — 85025 COMPLETE CBC W/AUTO DIFF WBC: CPT | Performed by: INTERNAL MEDICINE

## 2024-10-12 RX ORDER — POTASSIUM CHLORIDE 750 MG/1
20 TABLET, FILM COATED, EXTENDED RELEASE ORAL DAILY
Status: DISCONTINUED | OUTPATIENT
Start: 2024-10-12 | End: 2024-10-12

## 2024-10-12 RX ADMIN — LEVOTHYROXINE SODIUM 112 MCG: 112 TABLET ORAL at 06:59

## 2024-10-12 RX ADMIN — ALLOPURINOL 300 MG: 300 TABLET ORAL at 09:48

## 2024-10-12 RX ADMIN — Medication 1000 MCG: at 09:48

## 2024-10-12 RX ADMIN — MAGNESIUM OXIDE 400 MG (241.3 MG MAGNESIUM) TABLET 400 MG: TABLET at 09:48

## 2024-10-12 RX ADMIN — ASPIRIN 81 MG: 81 TABLET, COATED ORAL at 09:48

## 2024-10-12 RX ADMIN — MEMANTINE HYDROCHLORIDE 5 MG: 5 TABLET, FILM COATED ORAL at 21:14

## 2024-10-12 RX ADMIN — CLOPIDOGREL BISULFATE 75 MG: 75 TABLET, FILM COATED ORAL at 09:48

## 2024-10-12 RX ADMIN — COLCHICINE 0.6 MG: 0.6 TABLET ORAL at 09:48

## 2024-10-12 RX ADMIN — FOLIC ACID 1 MG: 1 TABLET ORAL at 09:48

## 2024-10-12 RX ADMIN — POTASSIUM CHLORIDE 20 MEQ: 750 TABLET, EXTENDED RELEASE ORAL at 09:47

## 2024-10-12 RX ADMIN — DOXYLAMINE SUCCINATE 25 MG: 25 TABLET ORAL at 21:14

## 2024-10-12 RX ADMIN — BUMETANIDE 2 MG: 2 TABLET ORAL at 09:48

## 2024-10-12 RX ADMIN — METOPROLOL SUCCINATE 25 MG: 25 TABLET, EXTENDED RELEASE ORAL at 21:14

## 2024-10-12 RX ADMIN — ACETAMINOPHEN 325MG 650 MG: 325 TABLET ORAL at 00:12

## 2024-10-12 RX ADMIN — ATORVASTATIN CALCIUM 40 MG: 20 TABLET, FILM COATED ORAL at 09:48

## 2024-10-12 RX ADMIN — MEMANTINE HYDROCHLORIDE 5 MG: 5 TABLET, FILM COATED ORAL at 09:48

## 2024-10-12 RX ADMIN — METOPROLOL SUCCINATE 25 MG: 25 TABLET, EXTENDED RELEASE ORAL at 09:48

## 2024-10-12 NOTE — PROGRESS NOTES
Continued Stay Note  [facility]     Patient Name: Mary Jo Berg  MRN: [id number]  Today's Date: 10/12/2024    Admit Date: 10/9/2024    Plan: Plan subacute rehab, referrals pending.......Zaid TEAGUE   Discharge Plan       Row Name 10/12/24 1340       Plan    Plan Plan subacute rehab, referrals pending.......Zaid RN    Patient/Family in Agreement with Plan yes    Plan Comments S/W Patient's spouse, introduced self and role. Pt. interested in subacute rehab at [facility] or [facility]. Spouse states as of yesterday [place] did not have beds but [facility] did and they are agreeable to this facility. EPIC referrals placed. VM to [facility]. [facility] intake line, will e-mail nurse for facility and request review of clinicals. CCP following........Zaid RN                   Discharge Codes    No documentation.                 Expected Discharge Date and Time       Expected Discharge Date Expected Discharge Time    Oct 14, 2024               Yana Zendejas, RN

## 2024-10-12 NOTE — PLAN OF CARE
Pt oriented x1, NAD, bs mgmt, CCP notified for possible placement SNF      Problem: Adult Inpatient Plan of Care  Goal: Plan of Care Review  Outcome: Progressing  Goal: Patient-Specific Goal (Individualized)  Outcome: Progressing  Goal: Absence of Hospital-Acquired Illness or Injury  Outcome: Progressing  Intervention: Identify and Manage Fall Risk  Recent Flowsheet Documentation  Taken 10/12/2024 1400 by Renee Diallo RN  Safety Promotion/Fall Prevention:   activity supervised   assistive device/personal items within reach   clutter free environment maintained   fall prevention program maintained   nonskid shoes/slippers when out of bed   room organization consistent   safety round/check completed  Taken 10/12/2024 0945 by Renee Diallo RN  Safety Promotion/Fall Prevention:   activity supervised   assistive device/personal items within reach   clutter free environment maintained   fall prevention program maintained   nonskid shoes/slippers when out of bed   room organization consistent   safety round/check completed  Intervention: Prevent Skin Injury  Recent Flowsheet Documentation  Taken 10/12/2024 1800 by Renee Diallo RN  Body Position: tilted  Taken 10/12/2024 1600 by Renee Diallo RN  Body Position:   tilted   right  Taken 10/12/2024 1400 by Renee Diallo RN  Body Position: tilted  Taken 10/12/2024 1200 by Renee Diallo RN  Body Position: tilted  Taken 10/12/2024 1000 by Renee Diallo RN  Body Position: supine  Taken 10/12/2024 0945 by Renee Diallo RN  Body Position: tilted  Intervention: Prevent and Manage VTE (Venous Thromboembolism) Risk  Recent Flowsheet Documentation  Taken 10/12/2024 0945 by Renee Diallo RN  VTE Prevention/Management: (Plavix) other (see comments)  Intervention: Prevent Infection  Recent Flowsheet Documentation  Taken 10/12/2024 1400 by Renee Diallo RN  Infection Prevention:   rest/sleep promoted   single patient room provided  Taken 10/12/2024 0945 by  Renee Diallo RN  Infection Prevention:   rest/sleep promoted   single patient room provided  Goal: Optimal Comfort and Wellbeing  Outcome: Progressing  Intervention: Provide Person-Centered Care  Recent Flowsheet Documentation  Taken 10/12/2024 1400 by Renee Diallo RN  Trust Relationship/Rapport: care explained  Taken 10/12/2024 0945 by Renee Diallo RN  Trust Relationship/Rapport:   care explained   thoughts/feelings acknowledged   reassurance provided   questions encouraged   questions answered   emotional support provided  Goal: Readiness for Transition of Care  Outcome: Progressing     Problem: Fall Injury Risk  Goal: Absence of Fall and Fall-Related Injury  Outcome: Progressing  Intervention: Identify and Manage Contributors  Recent Flowsheet Documentation  Taken 10/12/2024 1400 by Renee Diallo RN  Medication Review/Management: medications reviewed  Self-Care Promotion:   BADL personal objects within reach   independence encouraged   BADL personal routines maintained  Taken 10/12/2024 0945 by Renee Diallo RN  Medication Review/Management: medications reviewed  Self-Care Promotion:   independence encouraged   BADL personal objects within reach   BADL personal routines maintained  Intervention: Promote Injury-Free Environment  Recent Flowsheet Documentation  Taken 10/12/2024 1400 by Renee Diallo RN  Safety Promotion/Fall Prevention:   activity supervised   assistive device/personal items within reach   clutter free environment maintained   fall prevention program maintained   nonskid shoes/slippers when out of bed   room organization consistent   safety round/check completed  Taken 10/12/2024 0945 by Renee Diallo RN  Safety Promotion/Fall Prevention:   activity supervised   assistive device/personal items within reach   clutter free environment maintained   fall prevention program maintained   nonskid shoes/slippers when out of bed   room organization consistent   safety round/check  completed     Problem: Skin Injury Risk Increased  Goal: Skin Health and Integrity  Outcome: Progressing  Intervention: Optimize Skin Protection  Recent Flowsheet Documentation  Taken 10/12/2024 1800 by Renee Diallo RN  Head of Bed (HOB) Positioning: HOB elevated  Taken 10/12/2024 1600 by Renee Diallo RN  Head of Bed (HOB) Positioning: HOB elevated  Taken 10/12/2024 1400 by Renee Diallo, RN  Activity Management: activity encouraged  Head of Bed (HOB) Positioning: HOB elevated  Taken 10/12/2024 1200 by Renee Diallo RN  Head of Bed (HOB) Positioning: HOB elevated  Taken 10/12/2024 1000 by Renee Diallo RN  Head of Bed (HOB) Positioning: HOB elevated  Taken 10/12/2024 0945 by Renee Diallo, RN  Activity Management: activity encouraged  Pressure Reduction Techniques: frequent weight shift encouraged  Head of Bed (HOB) Positioning: HOB elevated  Pressure Reduction Devices: pressure-redistributing mattress utilized     Problem: Electrolyte Imbalance  Goal: Electrolyte Balance  Outcome: Progressing   Goal Outcome Evaluation:

## 2024-10-12 NOTE — PROGRESS NOTES
Name: Mary Jo Berg ADMIT: 10/9/2024   : 1950  PCP: Betty Isaac APRN    MRN: [id number] LOS: 2 days   AGE/SEX: 74 y.o. female  ROOM: [facility]     Subjective   Subjective   Patient seen this morning.  Spouse present in the room.  No acute events overnight.  Patient sitting up in the bed, oriented to name only.  Denies shortness of breath when asked.  Spouse wants patient to go to rehab and reports he chose the facility.    Review of Systems   As above  Objective   Objective   Vital Signs  Temp:  [97.5 °F (36.4 °C)-100.4 °F (38 °C)] 97.5 °F (36.4 °C)  Heart Rate:  [] 75  Resp:  [16-18] 18  BP: (110-122)/(58-70) 116/58  SpO2:  [93 %-100 %] 100 %  on   ;   Device (Oxygen Therapy): room air  Body mass index is 34.88 kg/m².  Physical Exam    General: Sitting up in the bed, not in distress,  HEENT: Normocephalic, atraumatic  CV: Regular rate and rhythm, no murmurs rubs or gallops  Lungs: CTA anteriorly, no wheezing  Abdomen: Soft, nontender, nondistended  Extremities: 1+ bilateral ankle edema, no cyanosis    Results Review     I reviewed the patient's new clinical results.  Results from last 7 days   Lab Units 10/12/24  0417 10/11/24  1558 10/11/24  0522 10/10/24  0430 10/09/24  1230   WBC 10*3/mm3 7.59  --  8.83 8.81 8.83   HEMOGLOBIN g/dL 9.8* 9.9* 9.9* 11.0* 11.4*   PLATELETS 10*3/mm3 246  --  250 218 233     Results from last 7 days   Lab Units 10/12/24  0417 10/11/24  0522 10/10/24  0430 10/09/24  1355   SODIUM mmol/L 132* 131* 133* 133*   POTASSIUM mmol/L 3.9 4.0 4.3 2.9*   CHLORIDE mmol/L 97* 98 97* 95*   CO2 mmol/L 24.0 23.0 24.4 24.0   BUN mg/dL 32* 39* 32* 40*   CREATININE mg/dL 1.30* 1.24* 1.30* 1.46*   GLUCOSE mg/dL 142* 141* 160* 182*   Estimated Creatinine Clearance: 36.7 mL/min (A) (by C-G formula based on SCr of 1.3 mg/dL (H)).  Results from last 7 days   Lab Units 10/12/24  0417 10/11/24  0522 10/10/24  0430 10/09/24  1355   ALBUMIN g/dL 2.7* 2.9* 2.9* 3.1*   BILIRUBIN mg/dL  0.6 0.6 0.7 0.8   ALK PHOS U/L 179* 155* 158* 140*   AST (SGOT) U/L 73* 52* 45* 33*   ALT (SGPT) U/L 45* 36* 31 29     Results from last 7 days   Lab Units 10/12/24  0417 10/11/24  0522 10/10/24  0430 10/09/24  1355   CALCIUM mg/dL 9.0 9.0 8.7 8.7   ALBUMIN g/dL 2.7* 2.9* 2.9* 3.1*   MAGNESIUM mg/dL  --  2.0 2.4 1.1*   PHOSPHORUS mg/dL 3.9  --   --  2.9       COVID19   Date Value Ref Range Status   02/06/2021 Not Detected Not Detected - Ref. Range Final     SARS-CoV-2, ANITA   Date Value Ref Range Status   03/07/2022 Not Detected Not Detected Final     Comment:     This nucleic acid amplification test was developed and its performance  characteristics determined by [employer]. Nucleic acid  amplification tests include RT-PCR and TMA. This test has not been  FDA cleared or approved. This test has been authorized by FDA under  an Emergency Use Authorization (EUA). This test is only authorized  for the duration of time the declaration that circumstances exist  justifying the authorization of the emergency use of in vitro  diagnostic tests for detection of SARS-CoV-2 virus and/or diagnosis  of COVID-19 infection under section 564(b)(1) of the Act, 21 U.S.C.  360bbb-3(b) (1), unless the authorization is terminated or revoked  sooner.  When diagnostic testing is negative, the possibility of a false  negative result should be considered in the context of a patient's  recent exposures and the presence of clinical signs and symptoms  consistent with COVID-19. An individual without symptoms of COVID-19  and who is not shedding SARS-CoV-2 virus would expect to have a  negative (not detected) result in this assay.       Glucose   Date/Time Value Ref Range Status   10/12/2024 0620 126 70 - 130 mg/dL Final   10/11/2024 2118 169 (H) 70 - 130 mg/dL Final   10/11/2024 1040 260 (H) 70 - 130 mg/dL Final   10/11/2024 0621 153 (H) 70 - 130 mg/dL Final   10/10/2024 2052 287 (H) 70 - 130 mg/dL Final   10/10/2024 1540 191 (H) 70 -  130 mg/dL Final   10/10/2024 1050 269 (H) 70 - 130 mg/dL Final           XR Chest 1 View  Narrative: XR CHEST 1 VW-10/9/2024     HISTORY: Weakness, dizziness.     Heart size is within normal limits. Lungs are slightly underinflated but  appear clear. Cardiac pacemaker is seen in good position. Bony  structures appear normal.     Impression: 1. No acute process        This report was finalized on 10/9/2024 1:31 PM by Dr. Bimal Echevarria M.D on Workstation: [id number]       Scheduled Medications  allopurinol, 300 mg, Oral, Daily  aspirin, 81 mg, Oral, Daily  atorvastatin, 40 mg, Oral, Daily  bumetanide, 2 mg, Oral, Daily  clopidogrel, 75 mg, Oral, Daily  colchicine, 0.6 mg, Oral, Daily  doxylamine, 25 mg, Oral, Nightly  folic acid, 1 mg, Oral, Daily  levothyroxine, 112 mcg, Oral, Q AM  [Held by provider] losartan, 25 mg, Oral, Q24H  magnesium oxide, 400 mg, Oral, Daily  memantine, 5 mg, Oral, BID  metoprolol succinate XL, 25 mg, Oral, BID  potassium chloride, 20 mEq, Oral, Daily  vitamin B-12, 1,000 mcg, Oral, Daily    Infusions   Diet  Diet: Regular/House; Fluid Consistency: Thin (IDDSI 0)    I have personally reviewed     [x]  Laboratory   []  Microbiology   []  Radiology   []  EKG/Telemetry  []  Cardiology/Vascular   []  Pathology    []  Records       Assessment/Plan     Active Hospital Problems    Diagnosis  POA    **Hypomagnesemia [E83.42]  Yes    Chronic anticoagulation [Z79.01]  Not Applicable    Poor diet [E63.9]  Yes    MCI (mild cognitive impairment) [G31.84]  Yes    Stage 3a chronic kidney disease [N18.31]  Yes    Pacemaker [Z95.0]  Yes    Hypertension [I10]  Yes    Morbidly obese [E66.01]  Yes    Hyperlipidemia [E78.5]  Yes    Lumbar radiculopathy [M54.16]  Yes    Type 2 diabetes mellitus without complication [E11.9]  Yes    Postoperative hypothyroidism [E89.0]  Yes      Resolved Hospital Problems   No resolved problems to display.     Patient is a 73 yo F with a H/O SSS status post pacemaker,,, PE on  apixaban, anticoagulation, history of breast cancer on tamoxifen DM type II, CKD stage III, Dementia, likely due to Alzheimer's disease per outpatient neurology note  on Namenda CAD with PCI to LAD in June 2023., status postTAVR , SSS status post pacemaker, ER due to generalized weakness.     Generalized weakness likely secondary to hypotension and electrolyte abnormalities  -Treat all of the below  -PT and OT to evaluate and treat  -Spouse wants patient to go to rehab.  CCP to be notified     Hypomagnesemia / hypokalemia   secondary to poor p.o. intake as well as the patient is on Bumex without any potassium supplements  -Nutrition evaluated  -Status post electrolyte replacement, potassium, magnesium normal  -Continue scheduled potassium supplement.       CAD with PCI to LAD in June 2023  SSS status post pacemaker  Aortic stenosis status post TAVR  Cardiomyopathy  -Cannot get the echo report follow-up from [facility] cardiology  -Hold Coreg/losartan due to soft BP, resumed Bumex as electrolytes stabilized  - will Need to follow-up with cardiology outpatient  -Blood pressure remains soft.  Continue to hold Coreg/losartan  -Continue aspirin, Plavix     Poor p.o. intake chronically with hypoalbuminemia  Nutrition consulted     Hypothyroidism  -TSH was low at 0.209 on 10/8/2024   -Levothyroxine decreased to 112 mcg a, will need to recheck thyroid function labs in 4 to 6 weeks as an outpatient    CKD stage III  -Stable, monitor    Type II DM with hyperglycemia  -Continue SSI, add Lantus 8 units daily  -Spouse refused blood glucose checks.    Dementia, likely due to Alzheimer's disease   -Spouse reports progression  -Moderate stage per last neurology note per chart review,Memory testing has declined 3 points in the last year - MoCA 16/30   -Continue memantine 5 mg twice daily, donepezil low-dose discontinued  -Follow-up with neurology outpatient as scheduled    Anemia  -9.9, down from 11.0 yesterday.  -Folic acid and B12 low  end of normal, initiated on B12 and folic acid supplement  -Iron panel consistent with anemia of chronic disease  -Hemoglobin stable    Transaminitis   Mild, monitor.  -hepatitis panel negative  -Hold statin as LFTs gradually trending up    History of PE  -No longer on Eliquis per spouse.      DVT prophylaxis.  SCDs  CODE STATUS: DNR/DNI  Discussed with patient.    Discussed with spouse  Discussed with RN  Disposition: SNF once arranged, spouse did now wants rehab. Weekend  CCP to be notified.  Expected Discharge Date: 10/14/2024; Expected Discharge Time:        Copied text in this note has been reviewed and is accurate as of 10/12/24.         Dictated utilizing Dragon dictation        Leila Owens MD  [facility]  10/12/24  10:15 EDT

## 2024-10-13 LAB
ALBUMIN SERPL-MCNC: 2.7 G/DL (ref 3.5–5.2)
ALBUMIN/GLOB SERPL: 0.9 G/DL
ALP SERPL-CCNC: 176 U/L (ref 39–117)
ALT SERPL W P-5'-P-CCNC: 40 U/L (ref 1–33)
ANION GAP SERPL CALCULATED.3IONS-SCNC: 12.6 MMOL/L (ref 5–15)
AST SERPL-CCNC: 58 U/L (ref 1–32)
BILIRUB SERPL-MCNC: 0.6 MG/DL (ref 0–1.2)
BUN SERPL-MCNC: 28 MG/DL (ref 8–23)
BUN/CREAT SERPL: 23.5 (ref 7–25)
CALCIUM SPEC-SCNC: 8.9 MG/DL (ref 8.6–10.5)
CHLORIDE SERPL-SCNC: 95 MMOL/L (ref 98–107)
CO2 SERPL-SCNC: 24.4 MMOL/L (ref 22–29)
CREAT SERPL-MCNC: 1.19 MG/DL (ref 0.57–1)
DEPRECATED RDW RBC AUTO: 52.9 FL (ref 37–54)
EGFRCR SERPLBLD CKD-EPI 2021: 47.8 ML/MIN/1.73
ERYTHROCYTE [DISTWIDTH] IN BLOOD BY AUTOMATED COUNT: 14.7 % (ref 12.3–15.4)
GLOBULIN UR ELPH-MCNC: 3 GM/DL
GLUCOSE BLDC GLUCOMTR-MCNC: 187 MG/DL (ref 70–130)
GLUCOSE SERPL-MCNC: 151 MG/DL (ref 65–99)
HCT VFR BLD AUTO: 30.3 % (ref 34–46.6)
HGB BLD-MCNC: 9.9 G/DL (ref 12–15.9)
MAGNESIUM SERPL-MCNC: 1.5 MG/DL (ref 1.6–2.4)
MCH RBC QN AUTO: 31.8 PG (ref 26.6–33)
MCHC RBC AUTO-ENTMCNC: 32.7 G/DL (ref 31.5–35.7)
MCV RBC AUTO: 97.4 FL (ref 79–97)
PLATELET # BLD AUTO: 269 10*3/MM3 (ref 140–450)
PMV BLD AUTO: 9.9 FL (ref 6–12)
POTASSIUM SERPL-SCNC: 4 MMOL/L (ref 3.5–5.2)
PROT SERPL-MCNC: 5.7 G/DL (ref 6–8.5)
RBC # BLD AUTO: 3.11 10*6/MM3 (ref 3.77–5.28)
SODIUM SERPL-SCNC: 132 MMOL/L (ref 136–145)
WBC NRBC COR # BLD AUTO: 9.34 10*3/MM3 (ref 3.4–10.8)

## 2024-10-13 PROCEDURE — 80053 COMPREHEN METABOLIC PANEL: CPT | Performed by: INTERNAL MEDICINE

## 2024-10-13 PROCEDURE — 85027 COMPLETE CBC AUTOMATED: CPT | Performed by: STUDENT IN AN ORGANIZED HEALTH CARE EDUCATION/TRAINING PROGRAM

## 2024-10-13 PROCEDURE — 82948 REAGENT STRIP/BLOOD GLUCOSE: CPT

## 2024-10-13 PROCEDURE — 83735 ASSAY OF MAGNESIUM: CPT | Performed by: STUDENT IN AN ORGANIZED HEALTH CARE EDUCATION/TRAINING PROGRAM

## 2024-10-13 PROCEDURE — 25010000002 MAGNESIUM SULFATE IN D5W 1G/100ML (PREMIX) 1-5 GM/100ML-% SOLUTION: Performed by: STUDENT IN AN ORGANIZED HEALTH CARE EDUCATION/TRAINING PROGRAM

## 2024-10-13 RX ORDER — MAGNESIUM SULFATE 1 G/100ML
1 INJECTION INTRAVENOUS
Status: COMPLETED | OUTPATIENT
Start: 2024-10-13 | End: 2024-10-13

## 2024-10-13 RX ADMIN — COLCHICINE 0.6 MG: 0.6 TABLET ORAL at 08:23

## 2024-10-13 RX ADMIN — Medication 1000 MCG: at 08:23

## 2024-10-13 RX ADMIN — FOLIC ACID 1 MG: 1 TABLET ORAL at 08:23

## 2024-10-13 RX ADMIN — LEVOTHYROXINE SODIUM 112 MCG: 112 TABLET ORAL at 06:35

## 2024-10-13 RX ADMIN — MAGNESIUM SULFATE IN DEXTROSE 1 G: 10 INJECTION, SOLUTION INTRAVENOUS at 08:20

## 2024-10-13 RX ADMIN — ASPIRIN 81 MG: 81 TABLET, COATED ORAL at 08:23

## 2024-10-13 RX ADMIN — MAGNESIUM OXIDE 400 MG (241.3 MG MAGNESIUM) TABLET 400 MG: TABLET at 08:23

## 2024-10-13 RX ADMIN — ALLOPURINOL 300 MG: 300 TABLET ORAL at 08:23

## 2024-10-13 RX ADMIN — CLOPIDOGREL BISULFATE 75 MG: 75 TABLET, FILM COATED ORAL at 08:23

## 2024-10-13 RX ADMIN — MAGNESIUM SULFATE IN DEXTROSE 1 G: 10 INJECTION, SOLUTION INTRAVENOUS at 11:01

## 2024-10-13 RX ADMIN — POTASSIUM CHLORIDE 20 MEQ: 750 TABLET, EXTENDED RELEASE ORAL at 08:23

## 2024-10-13 RX ADMIN — MAGNESIUM SULFATE IN DEXTROSE 1 G: 10 INJECTION, SOLUTION INTRAVENOUS at 10:13

## 2024-10-13 RX ADMIN — DOXYLAMINE SUCCINATE 25 MG: 25 TABLET ORAL at 21:57

## 2024-10-13 RX ADMIN — BUMETANIDE 2 MG: 2 TABLET ORAL at 08:23

## 2024-10-13 RX ADMIN — METOPROLOL SUCCINATE 25 MG: 25 TABLET, EXTENDED RELEASE ORAL at 08:23

## 2024-10-13 RX ADMIN — MEMANTINE HYDROCHLORIDE 5 MG: 5 TABLET, FILM COATED ORAL at 21:57

## 2024-10-13 RX ADMIN — MEMANTINE HYDROCHLORIDE 5 MG: 5 TABLET, FILM COATED ORAL at 08:23

## 2024-10-13 RX ADMIN — Medication 10 ML: at 08:24

## 2024-10-13 NOTE — PROGRESS NOTES
Name: Mary Jo Berg ADMIT: 10/9/2024   : 1950  PCP: Betty Isaac APRN    MRN: [id number] LOS: 3 days   AGE/SEX: 74 y.o. female  ROOM: [facility]     Subjective   Subjective   Sitting up in the bed, pleasantly confused.  Oriented to name, place not to year.  No complaints when asked.  Brother was present in the room.    Review of Systems   As above  Objective   Objective   Vital Signs  Temp:  [97.5 °F (36.4 °C)-100.2 °F (37.9 °C)] 98.9 °F (37.2 °C)  Heart Rate:  [85-93] 86  Resp:  [18] 18  BP: (102-125)/(51-68) 102/61  SpO2:  [95 %-100 %] 97 %  on   ;   Device (Oxygen Therapy): room air  Body mass index is 34.59 kg/m².  Physical Exam    General: Alert, sitting up in the bed,  HEENT: Normocephalic, atraumatic  CV: Regular rate and rhythm, no murmurs rubs or gallops  Lungs: CTA anteriorly, no wheezing  Abdomen: Soft, nontender, nondistended  Extremities: 1+ bilateral ankle edema, no cyanosis    Results Review     I reviewed the patient's new clinical results.  Results from last 7 days   Lab Units 10/13/24  0404 10/12/24  0417 10/11/24  1558 10/11/24  0522 10/10/24  0430   WBC 10*3/mm3 9.34 7.59  --  8.83 8.81   HEMOGLOBIN g/dL 9.9* 9.8* 9.9* 9.9* 11.0*   PLATELETS 10*3/mm3 269 246  --  250 218     Results from last 7 days   Lab Units 10/13/24  0404 10/12/24  0417 10/11/24  0522 10/10/24  0430   SODIUM mmol/L 132* 132* 131* 133*   POTASSIUM mmol/L 4.0 3.9 4.0 4.3   CHLORIDE mmol/L 95* 97* 98 97*   CO2 mmol/L 24.4 24.0 23.0 24.4   BUN mg/dL 28* 32* 39* 32*   CREATININE mg/dL 1.19* 1.30* 1.24* 1.30*   GLUCOSE mg/dL 151* 142* 141* 160*   Estimated Creatinine Clearance: 39.9 mL/min (A) (by C-G formula based on SCr of 1.19 mg/dL (H)).  Results from last 7 days   Lab Units 10/13/24  0404 10/12/24  0417 10/11/24  0522 10/10/24  0430   ALBUMIN g/dL 2.7* 2.7* 2.9* 2.9*   BILIRUBIN mg/dL 0.6 0.6 0.6 0.7   ALK PHOS U/L 176* 179* 155* 158*   AST (SGOT) U/L 58* 73* 52* 45*   ALT (SGPT) U/L 40* 45* 36* 31      Results from last 7 days   Lab Units 10/13/24  0404 10/12/24  0417 10/11/24  0522 10/10/24  0430 10/09/24  1355   CALCIUM mg/dL 8.9 9.0 9.0 8.7 8.7   ALBUMIN g/dL 2.7* 2.7* 2.9* 2.9* 3.1*   MAGNESIUM mg/dL 1.5*  --  2.0 2.4 1.1*   PHOSPHORUS mg/dL  --  3.9  --   --  2.9       COVID19   Date Value Ref Range Status   02/06/2021 Not Detected Not Detected - Ref. Range Final     SARS-CoV-2, ANITA   Date Value Ref Range Status   03/07/2022 Not Detected Not Detected Final     Comment:     This nucleic acid amplification test was developed and its performance  characteristics determined by "[employer]. Nucleic acid  amplification tests include RT-PCR and TMA. This test has not been  FDA cleared or approved. This test has been authorized by FDA under  an Emergency Use Authorization (EUA). This test is only authorized  for the duration of time the declaration that circumstances exist  justifying the authorization of the emergency use of in vitro  diagnostic tests for detection of SARS-CoV-2 virus and/or diagnosis  of COVID-19 infection under section 564(b)(1) of the Act, 21 U.S.C.  360bbb-3(b) (1), unless the authorization is terminated or revoked  sooner.  When diagnostic testing is negative, the possibility of a false  negative result should be considered in the context of a patient's  recent exposures and the presence of clinical signs and symptoms  consistent with COVID-19. An individual without symptoms of COVID-19  and who is not shedding SARS-CoV-2 virus would expect to have a  negative (not detected) result in this assay.       Glucose   Date/Time Value Ref Range Status   10/12/2024 0620 126 70 - 130 mg/dL Final   10/11/2024 2118 169 (H) 70 - 130 mg/dL Final   10/11/2024 1040 260 (H) 70 - 130 mg/dL Final   10/11/2024 0621 153 (H) 70 - 130 mg/dL Final   10/10/2024 2052 287 (H) 70 - 130 mg/dL Final   10/10/2024 1540 191 (H) 70 - 130 mg/dL Final   10/10/2024 1050 269 (H) 70 - 130 mg/dL Final           XR Chest  1 View  Narrative: XR CHEST 1 VW-10/9/2024     HISTORY: Weakness, dizziness.     Heart size is within normal limits. Lungs are slightly underinflated but  appear clear. Cardiac pacemaker is seen in good position. Bony  structures appear normal.     Impression: 1. No acute process        This report was finalized on 10/9/2024 1:31 PM by Dr. Bimal Echevarria M.D on Workstation: [id number]       Scheduled Medications  allopurinol, 300 mg, Oral, Daily  aspirin, 81 mg, Oral, Daily  [Held by provider] atorvastatin, 40 mg, Oral, Daily  bumetanide, 2 mg, Oral, Daily  clopidogrel, 75 mg, Oral, Daily  colchicine, 0.6 mg, Oral, Daily  doxylamine, 25 mg, Oral, Nightly  folic acid, 1 mg, Oral, Daily  levothyroxine, 112 mcg, Oral, Q AM  [Held by provider] losartan, 25 mg, Oral, Q24H  magnesium oxide, 400 mg, Oral, Daily  magnesium sulfate, 1 g, Intravenous, Q1H  memantine, 5 mg, Oral, BID  metoprolol succinate XL, 25 mg, Oral, BID  potassium chloride, 20 mEq, Oral, Daily  vitamin B-12, 1,000 mcg, Oral, Daily    Infusions   Diet  Diet: Regular/House; Fluid Consistency: Thin (IDDSI 0)    I have personally reviewed     [x]  Laboratory   []  Microbiology   []  Radiology   []  EKG/Telemetry  []  Cardiology/Vascular   []  Pathology    []  Records       Assessment/Plan     Active Hospital Problems    Diagnosis  POA    **Hypomagnesemia [E83.42]  Yes    Chronic anticoagulation [Z79.01]  Not Applicable    Poor diet [E63.9]  Yes    MCI (mild cognitive impairment) [G31.84]  Yes    Stage 3a chronic kidney disease [N18.31]  Yes    Pacemaker [Z95.0]  Yes    Hypertension [I10]  Yes    Morbidly obese [E66.01]  Yes    Hyperlipidemia [E78.5]  Yes    Lumbar radiculopathy [M54.16]  Yes    Type 2 diabetes mellitus without complication [E11.9]  Yes    Postoperative hypothyroidism [E89.0]  Yes      Resolved Hospital Problems   No resolved problems to display.     Patient is a 75 yo F with a H/O SSS status post pacemaker,,, PE on apixaban,  anticoagulation, history of breast cancer on tamoxifen DM type II, CKD stage III, Dementia, likely due to Alzheimer's disease per outpatient neurology note  on Namenda CAD with PCI to LAD in June 2023., status postTAVR , SSS status post pacemaker, ER due to generalized weakness.     Generalized weakness likely secondary to hypotension and electrolyte abnormalities  -Treat all of the below  -PT and OT to evaluate and treat  -Spouse wants patient to go to rehab.  CCP to be notified     Hypomagnesemia / hypokalemia   secondary to poor p.o. intake as well as the patient is on Bumex without any potassium supplements  -Nutrition evaluated  -Continue scheduled potassium supplement.  -Potassium WNL, magnesium 1.5, IV replacement ordered.  Will schedule scheduled magnesium starting tomorrow.    CAD with PCI to LAD in June 2023  SSS status post pacemaker  Aortic stenosis status post TAVR  Cardiomyopathy  -Cannot get the echo report follow-up from [facility] cardiology  -Hold Coreg/losartan due to soft BP, resumed Bumex as electrolytes stabilized  - will Need to follow-up with cardiology outpatient  -Blood pressure remains soft.  Continue to hold Coreg/losartan  -Continue aspirin, Plavix     Poor p.o. intake chronically with hypoalbuminemia  Nutrition consulted     Hypothyroidism  -TSH was low at 0.209 on 10/8/2024   -Levothyroxine decreased to 112 mcg daily, will need to recheck thyroid function labs in 4 to 6 weeks as an outpatient    CKD stage III  -Stable, monitor    Type II DM with hyperglycemia  -Continue SSI,  Lantus 8 units daily  - spouse refused blood glucose checks.  -Blood glucose stable on BMP this morning    Dementia, likely due to Alzheimer's disease   -Spouse reports progression  -Moderate stage per last neurology note per chart review,Memory testing has declined 3 points in the last year - MoCA 16/30   -Continue memantine 5 mg twice daily, donepezil low-dose discontinued  -Follow-up with neurology outpatient as  scheduled    Anemia  -9.9, down from 11.0 yesterday.  -Folic acid and B12 low end of normal, initiated on B12 and folic acid supplement  -Iron panel consistent with anemia of chronic disease  -Hemoglobin stable    Transaminitis   Mild, monitor.  -hepatitis panel negative  -Hold statin as LFTs gradually trending up    History of PE  -No longer on Eliquis per spouse.  -On Plavix/aspirin      DVT prophylaxis.  SCDs  CODE STATUS: DNR/DNI  Discussed with patient.    Discussed with brother  Discussed with RN  Disposition: SNF once arranged, medically stable to be discharged.  Expected Discharge Date: 10/14/2024; Expected Discharge Time:        Copied text in this note has been reviewed and is accurate as of 10/13/24.         Dictated utilizing Dragon dictation        Leila Owens MD  [facility]  10/13/24  09:49 EDT

## 2024-10-13 NOTE — PLAN OF CARE
Goal Outcome Evaluation:      Patient is resting well during the night without any c/o pain or discomfort. She continues with a pure wick external catheter in place and is voiding well, straw colored urine. VSS, lungs are clear to diminished bilaterally without coughing or congestion noted. Abdomen is large and soft with good bowel sounds and she did have a medium loose bowel movement last night. Patient will take snacks and water to drink readily, and answers simple questions appropriately.

## 2024-10-13 NOTE — PLAN OF CARE
Goal Outcome Evaluation:                         Patient alert with confusion. Patient resting with eyes closed on and off this shift.  at bedside assisting patient with meals. No acute distress noted, will continue to monitor.

## 2024-10-14 VITALS
HEART RATE: 84 BPM | OXYGEN SATURATION: 92 % | RESPIRATION RATE: 18 BRPM | SYSTOLIC BLOOD PRESSURE: 110 MMHG | TEMPERATURE: 98.1 F | DIASTOLIC BLOOD PRESSURE: 61 MMHG | WEIGHT: 176.81 LBS | BODY MASS INDEX: 33.38 KG/M2 | HEIGHT: 61 IN

## 2024-10-14 LAB
ALBUMIN SERPL-MCNC: 2.6 G/DL (ref 3.5–5.2)
ALBUMIN/GLOB SERPL: 0.8 G/DL
ALP SERPL-CCNC: 192 U/L (ref 39–117)
ALT SERPL W P-5'-P-CCNC: 38 U/L (ref 1–33)
ANION GAP SERPL CALCULATED.3IONS-SCNC: 13 MMOL/L (ref 5–15)
AST SERPL-CCNC: 50 U/L (ref 1–32)
BILIRUB SERPL-MCNC: 0.7 MG/DL (ref 0–1.2)
BUN SERPL-MCNC: 29 MG/DL (ref 8–23)
BUN/CREAT SERPL: 22.3 (ref 7–25)
CALCIUM SPEC-SCNC: 9 MG/DL (ref 8.6–10.5)
CHLORIDE SERPL-SCNC: 96 MMOL/L (ref 98–107)
CO2 SERPL-SCNC: 26 MMOL/L (ref 22–29)
CREAT SERPL-MCNC: 1.3 MG/DL (ref 0.57–1)
DEPRECATED RDW RBC AUTO: 49.5 FL (ref 37–54)
EGFRCR SERPLBLD CKD-EPI 2021: 43 ML/MIN/1.73
ERYTHROCYTE [DISTWIDTH] IN BLOOD BY AUTOMATED COUNT: 14.4 % (ref 12.3–15.4)
GLOBULIN UR ELPH-MCNC: 3.3 GM/DL
GLUCOSE BLDC GLUCOMTR-MCNC: 188 MG/DL (ref 70–130)
GLUCOSE SERPL-MCNC: 169 MG/DL (ref 65–99)
HCT VFR BLD AUTO: 30.9 % (ref 34–46.6)
HGB BLD-MCNC: 10.4 G/DL (ref 12–15.9)
MAGNESIUM SERPL-MCNC: 2.1 MG/DL (ref 1.6–2.4)
MCH RBC QN AUTO: 32.2 PG (ref 26.6–33)
MCHC RBC AUTO-ENTMCNC: 33.7 G/DL (ref 31.5–35.7)
MCV RBC AUTO: 95.7 FL (ref 79–97)
PLATELET # BLD AUTO: 316 10*3/MM3 (ref 140–450)
PMV BLD AUTO: 10.5 FL (ref 6–12)
POTASSIUM SERPL-SCNC: 4 MMOL/L (ref 3.5–5.2)
PROT SERPL-MCNC: 5.9 G/DL (ref 6–8.5)
RBC # BLD AUTO: 3.23 10*6/MM3 (ref 3.77–5.28)
SODIUM SERPL-SCNC: 135 MMOL/L (ref 136–145)
WBC NRBC COR # BLD AUTO: 9.87 10*3/MM3 (ref 3.4–10.8)

## 2024-10-14 PROCEDURE — 82948 REAGENT STRIP/BLOOD GLUCOSE: CPT

## 2024-10-14 PROCEDURE — 80053 COMPREHEN METABOLIC PANEL: CPT | Performed by: STUDENT IN AN ORGANIZED HEALTH CARE EDUCATION/TRAINING PROGRAM

## 2024-10-14 PROCEDURE — 85027 COMPLETE CBC AUTOMATED: CPT | Performed by: STUDENT IN AN ORGANIZED HEALTH CARE EDUCATION/TRAINING PROGRAM

## 2024-10-14 PROCEDURE — 83735 ASSAY OF MAGNESIUM: CPT | Performed by: STUDENT IN AN ORGANIZED HEALTH CARE EDUCATION/TRAINING PROGRAM

## 2024-10-14 RX ORDER — METOPROLOL SUCCINATE 25 MG/1
12.5 TABLET, EXTENDED RELEASE ORAL 2 TIMES DAILY
Status: DISCONTINUED | OUTPATIENT
Start: 2024-10-14 | End: 2024-10-14 | Stop reason: HOSPADM

## 2024-10-14 RX ORDER — LEVOTHYROXINE SODIUM 112 UG/1
112 TABLET ORAL
Start: 2024-10-15

## 2024-10-14 RX ORDER — AMOXICILLIN 250 MG
2 CAPSULE ORAL 2 TIMES DAILY PRN
Start: 2024-10-14

## 2024-10-14 RX ORDER — METOPROLOL SUCCINATE 25 MG/1
12.5 TABLET, EXTENDED RELEASE ORAL DAILY
Start: 2024-10-14

## 2024-10-14 RX ORDER — FOLIC ACID 1 MG/1
1 TABLET ORAL DAILY
Start: 2024-10-15

## 2024-10-14 RX ORDER — POTASSIUM CHLORIDE 1500 MG/1
20 TABLET, EXTENDED RELEASE ORAL DAILY
Start: 2024-10-15

## 2024-10-14 RX ORDER — ALBUTEROL SULFATE 90 UG/1
2 INHALANT RESPIRATORY (INHALATION) EVERY 4 HOURS PRN
Qty: 6.7 G | Refills: 0 | Status: SHIPPED | OUTPATIENT
Start: 2024-10-14

## 2024-10-14 RX ORDER — ALLOPURINOL 300 MG/1
300 TABLET ORAL DAILY
Start: 2024-10-15

## 2024-10-14 RX ADMIN — Medication 1000 MCG: at 09:53

## 2024-10-14 RX ADMIN — MEMANTINE HYDROCHLORIDE 5 MG: 5 TABLET, FILM COATED ORAL at 09:53

## 2024-10-14 RX ADMIN — ALLOPURINOL 300 MG: 300 TABLET ORAL at 09:52

## 2024-10-14 RX ADMIN — LEVOTHYROXINE SODIUM 112 MCG: 112 TABLET ORAL at 06:40

## 2024-10-14 RX ADMIN — POTASSIUM CHLORIDE 20 MEQ: 750 TABLET, EXTENDED RELEASE ORAL at 09:54

## 2024-10-14 RX ADMIN — FOLIC ACID 1 MG: 1 TABLET ORAL at 09:53

## 2024-10-14 RX ADMIN — CLOPIDOGREL BISULFATE 75 MG: 75 TABLET, FILM COATED ORAL at 09:54

## 2024-10-14 RX ADMIN — METOPROLOL SUCCINATE 25 MG: 25 TABLET, EXTENDED RELEASE ORAL at 09:56

## 2024-10-14 RX ADMIN — COLCHICINE 0.6 MG: 0.6 TABLET ORAL at 09:57

## 2024-10-14 RX ADMIN — MAGNESIUM OXIDE 400 MG (241.3 MG MAGNESIUM) TABLET 400 MG: TABLET at 09:54

## 2024-10-14 RX ADMIN — ASPIRIN 81 MG: 81 TABLET, COATED ORAL at 09:54

## 2024-10-14 RX ADMIN — BUMETANIDE 2 MG: 2 TABLET ORAL at 09:53

## 2024-10-14 NOTE — CASE MANAGEMENT/SOCIAL WORK
Case Management Discharge Note      Final Note: Pt discharged to [facility] Acute [facility]hab.   AARON Herrera RN         Selected Continued Care - Admitted Since 10/9/2024       Destination Coordination complete.      Service Provider Services Address Phone Fax Patient Preferred    [facility] [address] [place], [place] [address] [phone] --              Durable Medical Equipment    No services have been selected for the patient.                Dialysis/Infusion    No services have been selected for the patient.                Home Medical Care    No services have been selected for the patient.                Therapy    No services have been selected for the patient.                Community Resources    No services have been selected for the patient.                Community & DME    No services have been selected for the patient.                    Transportation Services  Private: Car    Final Discharge Disposition Code: 62 - inpatient rehab facility

## 2024-10-14 NOTE — CASE MANAGEMENT/SOCIAL WORK
Continued Stay Note  [facility]     Patient Name: Mary Jo Berg  MRN: [id number]  Today's Date: 10/14/2024    Admit Date: 10/9/2024    Plan: Plan Acute Rehab at [facility] or [facility] for skilled care.  AARON Herrera RN   Discharge Plan       Row Name 10/14/24 1026       Plan    Plan Plan Acute Rehab at [facility] or [facility] for skilled care.  AARON Herrera RN    Plan Comments Spoke with pt and pt's spouse ( Sean Berg [phone]) at bedside.  Pt's spouse states pt's first choice is [facility] for Acute Rehab .  Maribell ( [phone]) called for bed availability.  Pt's first choice for skilled care is [facility].  Debby ( [phone]) states [facility] can accept pt.  Plan Acute Rehab at [facility] or [facility] for skilled care. AARON Herrera RN                   Discharge Codes    No documentation.                 Expected Discharge Date and Time       Expected Discharge Date Expected Discharge Time    Oct 14, 2024               Rosalinda Herrera RN

## 2024-10-14 NOTE — PLAN OF CARE
Goal Outcome Evaluation:               Pt. Alert, not oriented , oriented x2, v/s stable with lower BP , metoprolol withheld upon BP 91/42 at last evening time, no voiced c/o pain, no s/s acute distress noted

## 2024-10-14 NOTE — DISCHARGE PLACEMENT REQUEST
"Parth Gauthier (74 y.o. Female)       Date of Birth   1950    Social Security Number       Address   [address] [place] [address]    Home Phone   [phone]    MRN   [id number]       Jehovah's witness   M[employer]    Marital Status                               Admission Date   10/9/24    Admission Type   Emergency    Admitting Provider   Melvi Whiting MD    Attending Provider   Leila Owens MD    [facility]       Discharge Date       Discharge Disposition       Discharge Destination                                 Attending Provider: Leila Owens MD    Allergies: Indomethacin, Nutritional Supplements, Penicillins, Statins, Hydrochlorothiazide, Lisinopril, Morphine, Letrozole, Pentazocine    Isolation: None   Infection: None   Code Status: No CPR    Ht: 154.9 cm (60.98\")   Wt: 80.2 kg (176 lb 12.9 oz)    Admission Cmt: None   Principal Problem: Hypomagnesemia [E83.42]                   Active Insurance as of 10/9/2024       Primary Coverage       Payor Plan Insurance Group Employer/Plan Group    MEDICARE MEDICARE A & B        Payor Plan Address Payor Plan Phone Number Payor Plan Fax Number Effective Dates    [address] [phone]  1/1/2014 - None Entered    [facility] [id number]         Subscriber Name Subscriber Birth Date Member ID       PARTH GAUTHIER 1950 [id number]               Secondary Coverage       Payor Plan Insurance Group Employer/Plan Group    [facility] HEALTH CARE OPTIONS        Payor Plan Address Payor Plan Phone Number Payor Plan Fax Number Effective Dates    [facility] [phone]  1/1/2020 - None Entered    [address]       [facility] [id number]         Subscriber Name Subscriber Birth Date Member ID       PARTH GAUTHIER 1950 [id number]                     Emergency Contacts        (Rel.) Home Phone Work Phone Mobile Phone    Sean Gauthier " (Spouse) [phone] -- [phone]    Brooklyn Berg (Daughter) -- -- [phone]    Johanny Lam (Daughter) -- -- [phone]

## 2024-10-14 NOTE — PROGRESS NOTES
Name: Mary Jo Berg ADMIT: 10/9/2024   : 1950  PCP: Betty Isaac APRN    MRN: [id number] LOS: 4 days   AGE/SEX: 74 y.o. female  ROOM: [facility]     Subjective   Subjective   Patient seen this morning.  Spouse present at bedside.  No new events overnight, patient more alert this morning.  Reported feeling better.    Review of Systems   As above  Objective   Objective   Vital Signs  Temp:  [98 °F (36.7 °C)-99.3 °F (37.4 °C)] 98.2 °F (36.8 °C)  Heart Rate:  [87-92] 88  Resp:  [18] 18  BP: ()/(42-66) 118/65  SpO2:  [93 %-96 %] 94 %  on   ;   Device (Oxygen Therapy): room air  Body mass index is 33.43 kg/m².  Physical Exam    General: Alert, laying in bed, not in distress,  HEENT: Normocephalic, atraumatic  CV: Regular rate and rhythm, no murmurs rubs or gallops  Lungs: CTA anteriorly, no wheezing  Abdomen: Soft, nontender, nondistended  Extremities: No significant peripheral edema,    Results Review     I reviewed the patient's new clinical results.  Results from last 7 days   Lab Units 10/14/24  0458 10/13/24  0404 10/12/24  0417 10/11/24  1558 10/11/24  0522   WBC 10*3/mm3 9.87 9.34 7.59  --  8.83   HEMOGLOBIN g/dL 10.4* 9.9* 9.8* 9.9* 9.9*   PLATELETS 10*3/mm3 316 269 246  --  250     Results from last 7 days   Lab Units 10/14/24  0458 10/13/24  0404 10/12/24  0417 10/11/24  0522   SODIUM mmol/L 135* 132* 132* 131*   POTASSIUM mmol/L 4.0 4.0 3.9 4.0   CHLORIDE mmol/L 96* 95* 97* 98   CO2 mmol/L 26.0 24.4 24.0 23.0   BUN mg/dL 29* 28* 32* 39*   CREATININE mg/dL 1.30* 1.19* 1.30* 1.24*   GLUCOSE mg/dL 169* 151* 142* 141*   Estimated Creatinine Clearance: 35.9 mL/min (A) (by C-G formula based on SCr of 1.3 mg/dL (H)).  Results from last 7 days   Lab Units 10/14/24  0458 10/13/24  0404 10/12/24  0417 10/11/24  0522   ALBUMIN g/dL 2.6* 2.7* 2.7* 2.9*   BILIRUBIN mg/dL 0.7 0.6 0.6 0.6   ALK PHOS U/L 192* 176* 179* 155*   AST (SGOT) U/L 50* 58* 73* 52*   ALT (SGPT) U/L 38* 40* 45* 36*     Results  from last 7 days   Lab Units 10/14/24  0458 10/13/24  0404 10/12/24  0417 10/11/24  0522 10/10/24  0430 10/09/24  1355   CALCIUM mg/dL 9.0 8.9 9.0 9.0 8.7 8.7   ALBUMIN g/dL 2.6* 2.7* 2.7* 2.9* 2.9* 3.1*   MAGNESIUM mg/dL 2.1 1.5*  --  2.0 2.4 1.1*   PHOSPHORUS mg/dL  --   --  3.9  --   --  2.9       COVID19   Date Value Ref Range Status   02/06/2021 Not Detected Not Detected - Ref. Range Final     SARS-CoV-2, ANITA   Date Value Ref Range Status   03/07/2022 Not Detected Not Detected Final     Comment:     This nucleic acid amplification test was developed and its performance  characteristics determined by [facility]. Nucleic acid  amplification tests include RT-PCR and TMA. This test has not been  FDA cleared or approved. This test has been authorized by FDA under  an Emergency Use Authorization (EUA). This test is only authorized  for the duration of time the declaration that circumstances exist  justifying the authorization of the emergency use of in vitro  diagnostic tests for detection of SARS-CoV-2 virus and/or diagnosis  of COVID-19 infection under section 564(b)(1) of the Act, 21 U.S.C.  360bbb-3(b) (1), unless the authorization is terminated or revoked  sooner.  When diagnostic testing is negative, the possibility of a false  negative result should be considered in the context of a patient's  recent exposures and the presence of clinical signs and symptoms  consistent with COVID-19. An individual without symptoms of COVID-19  and who is not shedding SARS-CoV-2 virus would expect to have a  negative (not detected) result in this assay.       Glucose   Date/Time Value Ref Range Status   10/14/2024 0608 188 (H) 70 - 130 mg/dL Final   10/13/2024 1953 187 (H) 70 - 130 mg/dL Final   10/12/2024 0620 126 70 - 130 mg/dL Final   10/11/2024 2118 169 (H) 70 - 130 mg/dL Final   10/11/2024 1040 260 (H) 70 - 130 mg/dL Final           XR Chest 1 View  Narrative: XR CHEST 1 VW-10/9/2024     HISTORY: Weakness,  dizziness.     Heart size is within normal limits. Lungs are slightly underinflated but  appear clear. Cardiac pacemaker is seen in good position. Bony  structures appear normal.     Impression: 1. No acute process        This report was finalized on 10/9/2024 1:31 PM by Dr. Bimal Echevarria M.D on Workstation: [id number]       Scheduled Medications  allopurinol, 300 mg, Oral, Daily  aspirin, 81 mg, Oral, Daily  [Held by provider] atorvastatin, 40 mg, Oral, Daily  bumetanide, 2 mg, Oral, Daily  clopidogrel, 75 mg, Oral, Daily  colchicine, 0.6 mg, Oral, Daily  doxylamine, 25 mg, Oral, Nightly  folic acid, 1 mg, Oral, Daily  levothyroxine, 112 mcg, Oral, Q AM  [Held by provider] losartan, 25 mg, Oral, Q24H  magnesium oxide, 400 mg, Oral, BID  memantine, 5 mg, Oral, BID  metoprolol succinate XL, 12.5 mg, Oral, BID  potassium chloride, 20 mEq, Oral, Daily  vitamin B-12, 1,000 mcg, Oral, Daily    Infusions   Diet  Diet: Regular/House; Fluid Consistency: Thin (IDDSI 0)    I have personally reviewed     [x]  Laboratory   []  Microbiology   []  Radiology   []  EKG/Telemetry  []  Cardiology/Vascular   []  Pathology    []  Records       Assessment/Plan     Active Hospital Problems    Diagnosis  POA    **Hypomagnesemia [E83.42]  Yes    Chronic anticoagulation [Z79.01]  Not Applicable    Poor diet [E63.9]  Yes    MCI (mild cognitive impairment) [G31.84]  Yes    Stage 3a chronic kidney disease [N18.31]  Yes    Pacemaker [Z95.0]  Yes    Hypertension [I10]  Yes    Morbidly obese [E66.01]  Yes    Hyperlipidemia [E78.5]  Yes    Lumbar radiculopathy [M54.16]  Yes    Type 2 diabetes mellitus without complication [E11.9]  Yes    Postoperative hypothyroidism [E89.0]  Yes      Resolved Hospital Problems   No resolved problems to display.     Patient is a 73 yo F with a H/O SSS status post pacemaker,,, PE on apixaban, anticoagulation, history of breast cancer on tamoxifen DM type II, CKD stage III, Dementia, likely due to Alzheimer's  disease per outpatient neurology note  on Namenda CAD with PCI to LAD in June 2023., status postTAVR , SSS status post pacemaker, ER due to generalized weakness.     Generalized weakness likely secondary to hypotension and electrolyte abnormalities  -Treat all of the below  -PT and OT to evaluate and treat  -Pending placement to acute versus short-term rehab.     Hypomagnesemia / hypokalemia   secondary to poor p.o. intake as well as the patient is on Bumex without any potassium supplements  -Nutrition evaluated  -Continue scheduled potassium/magnesium supplement.  -Potassium/magnesium within normal limits    CAD with PCI to LAD in June 2023  SSS status post pacemaker  Aortic stenosis status post TAVR  Cardiomyopathy  -Cannot get the echo report follow-up from [facility] cardiology  -Hold losartan due to soft BP,   - will need to follow-up with cardiology outpatient  -Blood pressure remains soft.    -Continue aspirin, Plavix, low-dose metoprolol with holding parameters  -Continue Bumex     Poor p.o. intake chronically with hypoalbuminemia  Nutrition evaluated  Continue Glucerna shake supplement      Hypothyroidism  -TSH was low at 0.209 on 10/8/2024   -Levothyroxine decreased to 112 mcg daily, will need to recheck thyroid function labs in 4 to 6 weeks as an outpatient    CKD stage III  -Stable, monitor    Type II DM with hyperglycemia  -Continue SSI,  Lantus 8 units daily  - spouse refused blood glucose checks.  -Blood glucose stable    Dementia, likely due to Alzheimer's disease   -Spouse reports progression  -Moderate stage per last neurology note per chart review,Memory testing has declined 3 points in the last year - [facility] 16/30   -Continue memantine 5 mg twice daily, donepezil have been discontinued outpatient per neurology  -Follow-up with neurology outpatient as scheduled    Anemia  -Hemoglobin stable around 10.  -Folic acid and B12 low end of normal, initiated on B12 and folic acid supplement  -Iron panel  consistent with anemia of chronic disease  -Monitor.    Transaminitis   Mild, monitor.  -hepatitis panel negative  -LFTs improved.   atorvastatin on hold.    History of PE  -No longer on Eliquis per spouse.  -On Plavix/aspirin      DVT prophylaxis.  SCDs  CODE STATUS: DNR/DNI  Discussed with patient.    Discussed with spouse  Discussed with RN  Disposition: Acute rehab versus SNF once arranged, medically stable to be discharged.  Expected Discharge Date: 10/14/2024; Expected Discharge Time:        Copied text in this note has been reviewed and is accurate as of 10/14/24.         Dictated utilizing Dragon dictation        Leila Owens MD  [facility]  10/14/24  09:01 EDT

## 2024-10-14 NOTE — DISCHARGE SUMMARY
Patient Name: Mary Jo Berg  : 1950  MRN: [id number]    Date of Admission: 10/9/2024  Date of Discharge:  10/14/2024  Primary Care Physician: Betty Isaac APRN      Chief Complaint:   Weakness - Generalized and Syncope      Discharge Diagnoses     Active Hospital Problems    Diagnosis  POA    **Hypomagnesemia [E83.42]  Yes    Chronic anticoagulation [Z79.01]  Not Applicable    Poor diet [E63.9]  Yes    MCI (mild cognitive impairment) [G31.84]  Yes    Stage 3a chronic kidney disease [N18.31]  Yes    Pacemaker [Z95.0]  Yes    Hypertension [I10]  Yes    Morbidly obese [E66.01]  Yes    Hyperlipidemia [E78.5]  Yes    Lumbar radiculopathy [M54.16]  Yes    Type 2 diabetes mellitus without complication [E11.9]  Yes    Postoperative hypothyroidism [E89.0]  Yes      Resolved Hospital Problems   No resolved problems to display.        Patient is a 75 yo F with a H/O SSS status post pacemaker,,, PE on apixaban, anticoagulation, history of breast cancer on tamoxifen DM type II, CKD stage III, Dementia, likely due to Alzheimer's disease per outpatient neurology note  on Namenda CAD with PCI to LAD in 2023., status postTAVR , SSS status post pacemaker, ER due to generalized weakness.     Generalized weakness likely secondary to hypotension and electrolyte abnormalities in the setting of dementia.  -PT and OT to evaluated and rehab was recommended.       Hypomagnesemia / hypokalemia   secondary to poor p.o. intake as well as the patient is on Bumex without any potassium supplements  -Nutrition evaluated  -Continue scheduled potassium and magnesium supplement.  -Recommend checking BMP and magnesium in 3 days 5 days to monitor renal function electrolytes.      CAD with PCI to LAD in 2023  SSS status post pacemaker  Aortic stenosis status post TAVR  Cardiomyopathy  -Cannot get the echo report follow-up from [facility] cardiology  -Outpatient irbesartan was discontinued secondary to soft blood  pressure  -Decrease metoprolol XL to lower dose 12.5 mg daily,  --Continue aspirin, Plavix, statin      Poor p.o. intake chronically with hypoalbuminemia  Nutrition consulted  Continue Glucerna supplements     Hypothyroidism  -TSH was low at 0.209 on 10/8/2024   -Levothyroxine decreased to 112 mcg daily, will need to recheck thyroid function labs in 4 to 6 weeks as an outpatient     CKD stage III  -Stable, monitor    Type II DM w  -Hemoglobin A1c was 7.2 on 10/8/2024, improved compared to 8.5 point 2/8/2024  -Not on diabetes medication outpatient    Dementia, likely due to Alzheimer's disease   -Spouse reports progression  -Moderate stage per last neurology note per chart review,Memory testing has declined 3 points in the last year - [facility] 16/30   -Continue memantine 5 mg twice daily,  -Follow-up with neurology outpatient as scheduled     Anemia  -Folic acid and B12 low end of normal, initiated on B12 and folic acid supplement, continues on discharge  -Iron panel consistent with anemia of chronic disease  -Hemoglobin has been stable around 10.     Transaminitis  -hepatitis panel negative  -AST remains mildly elevated around 50s, ALT 40s  -Patient is on statin outpatient for history of CAD as above.  Recommend repeat CMP in 1 week and if LFTs trending, atorvastatin will need to be held.     History of PE  -No longer on Eliquis per spouse.  -On Plavix/aspirin for history of CAD    History of gout  -Continue allopurinol, colchicine    CODE STATUS: DNR/DNI    At the time of discharge patient was told to take all medications as prescribed, keep all follow-up appointments, and call their doctor or return to the hospital with any worsening or concerning symptoms.                   Day of Discharge     Subjective:  Patient seen this morning. Spouse present at bedside. No new events overnight, patient more alert this morning. Reported feeling better.     Physical Exam:  Temp:  [98 °F (36.7 °C)-99.2 °F (37.3 °C)] 98.1 °F (36.7  °C)  Heart Rate:  [84-92] 84  Resp:  [18] 18  BP: ()/(42-66) 110/61  Body mass index is 33.43 kg/m².  Physical Exam  General: Alert, laying in bed, not in distress,  HEENT: Normocephalic, atraumatic  CV: Regular rate and rhythm, no murmurs rubs or gallops  Lungs: CTA anteriorly, no wheezing  Abdomen: Soft, nontender, nondistended  Extremities: No significant peripheral edema,     Consultants     Consult Orders (all) (From admission, onward)       Start     Ordered    10/09/24 1759  Inpatient Nutrition Consult  Once        Provider:  (Not yet assigned)    10/09/24 1801    10/09/24 1758  Inpatient Consult to Case Management   Once        Provider:  (Not yet assigned)    10/09/24 1801    10/09/24 1712  Inpatient Case Management  Consult  Once        Provider:  (Not yet assigned)    10/09/24 1719    10/09/24 1712  Inpatient Diabetes Educator Consult  Once,   Status:  Canceled        Provider:  (Not yet assigned)    10/09/24 1719    10/09/24 1431  [facility] (on-call MD unless specified) Details  Once        Specialty:  Hospitalist  Provider:  Juan José Beckett MD    10/09/24 1430                  Procedures     * Surgery not found *      Imaging Results (All)       Procedure Component Value Units Date/Time    XR Chest 1 View [[id number]] Collected: 10/09/24 1330     Updated: 10/09/24 1334    Narrative:      XR CHEST 1 VW-10/9/2024     HISTORY: Weakness, dizziness.     Heart size is within normal limits. Lungs are slightly underinflated but  appear clear. Cardiac pacemaker is seen in good position. Bony  structures appear normal.       Impression:      1. No acute process        This report was finalized on 10/9/2024 1:31 PM by Dr. Bimal Echevarria M.D on Workstation: [id number]             Results for orders placed during the hospital encounter of 02/08/24    Duplex Venous Lower Extremity - Right CAR    Interpretation Summary    Normal right lower extremity venous duplex scan.    Large right  "groin lymph node noted.      Pertinent Labs     Results from last 7 days   Lab Units 10/14/24  0458 10/13/24  0404 10/12/24  0417 10/11/24  1558 10/11/24  0522   WBC 10*3/mm3 9.87 9.34 7.59  --  8.83   HEMOGLOBIN g/dL 10.4* 9.9* 9.8* 9.9* 9.9*   PLATELETS 10*3/mm3 316 269 246  --  250     Results from last 7 days   Lab Units 10/14/24  0458 10/13/24  0404 10/12/24  0417 10/11/24  0522   SODIUM mmol/L 135* 132* 132* 131*   POTASSIUM mmol/L 4.0 4.0 3.9 4.0   CHLORIDE mmol/L 96* 95* 97* 98   CO2 mmol/L 26.0 24.4 24.0 23.0   BUN mg/dL 29* 28* 32* 39*   CREATININE mg/dL 1.30* 1.19* 1.30* 1.24*   GLUCOSE mg/dL 169* 151* 142* 141*   Estimated Creatinine Clearance: 35.9 mL/min (A) (by C-G formula based on SCr of 1.3 mg/dL (H)).  Results from last 7 days   Lab Units 10/14/24  0458 10/13/24  0404 10/12/24  0417 10/11/24  0522   ALBUMIN g/dL 2.6* 2.7* 2.7* 2.9*   BILIRUBIN mg/dL 0.7 0.6 0.6 0.6   ALK PHOS U/L 192* 176* 179* 155*   AST (SGOT) U/L 50* 58* 73* 52*   ALT (SGPT) U/L 38* 40* 45* 36*     Results from last 7 days   Lab Units 10/14/24  0458 10/13/24  0404 10/12/24  0417 10/11/24  0522 10/10/24  0430 10/09/24  1355   CALCIUM mg/dL 9.0 8.9 9.0 9.0 8.7 8.7   ALBUMIN g/dL 2.6* 2.7* 2.7* 2.9* 2.9* 3.1*   MAGNESIUM mg/dL 2.1 1.5*  --  2.0 2.4 1.1*   PHOSPHORUS mg/dL  --   --  3.9  --   --  2.9       Results from last 7 days   Lab Units 10/09/24  1355   CK TOTAL U/L 162   HSTROP T ng/L 46*     Results from last 7 days   Lab Units 10/08/24  1526   URIC ACID mg/dL 6.5*         Invalid input(s): \"LDLCALC\"          Test Results Pending at Discharge       Discharge Details        Discharge Medications        New Medications        Instructions Start Date   cyanocobalamin 1000 MCG tablet  Commonly known as: VITAMIN B-12   1,000 mcg, Oral, Daily   Start Date: October 15, 2024     folic acid 1 MG tablet  Commonly known as: FOLVITE   1 mg, Oral, Daily   Start Date: October 15, 2024     magnesium oxide 400 tablet tablet  Commonly known " as: MAG-OX   400 mg, Oral, 2 Times Daily      potassium chloride 20 MEQ CR tablet  Commonly known as: KLOR-CON M20   20 mEq, Oral, Daily   Start Date: October 15, 2024     sennosides-docusate 8.6-50 MG per tablet  Commonly known as: PERICOLACE   2 tablets, Oral, 2 Times Daily PRN             Changes to Medications        Instructions Start Date   allopurinol 300 MG tablet  Commonly known as: ZYLOPRIM  What changed:   medication strength  how much to take   300 mg, Oral, Daily   Start Date: October 15, 2024     levothyroxine 112 MCG tablet  Commonly known as: SYNTHROID, LEVOTHROID  What changed:   medication strength  how much to take  how to take this  when to take this  additional instructions   112 mcg, Oral, Every Early Morning   Start Date: October 15, 2024     memantine 5 MG tablet  Commonly known as: NAMENDA  What changed: Another medication with the same name was removed. Continue taking this medication, and follow the directions you see here.   5 mg, Oral, 2 Times Daily      metoprolol succinate XL 25 MG 24 hr tablet  Commonly known as: TOPROL-XL  What changed:   how much to take  when to take this   12.5 mg, Oral, Daily             Continue These Medications        Instructions Start Date   albuterol sulfate  (90 Base) MCG/ACT inhaler  Commonly known as: PROVENTIL HFA;VENTOLIN HFA;PROAIR HFA   2 puffs, Inhalation, Every 4 Hours PRN      aspirin 81 MG EC tablet   81 mg, Oral, Daily      atorvastatin 40 MG tablet  Commonly known as: LIPITOR   40 mg, Oral, Every Night at Bedtime      bumetanide 2 MG tablet  Commonly known as: BUMEX   2 mg, Oral, Daily      clopidogrel 75 MG tablet  Commonly known as: PLAVIX   75 mg, Oral, Daily      clotrimazole-betamethasone 1-0.05 % cream  Commonly known as: LOTRISONE   As Needed      colchicine 0.6 MG tablet   TAKE 0.5 TABLET BY MOUTH EVERY MORNING AND 1 TABLET EVERY NIGHT      donepezil 10 MG tablet  Commonly known as: ARICEPT   10 mg, Oral, Nightly, Pt states  taking 1/2 tablet at night       DOXYLAMINE SUCCINATE TANNATE PO   25 mg, Oral, Nightly      Metamucil 0.36 g capsule  Generic drug: Psyllium   1 capsule, Oral, 2 Times Daily      TYLENOL PO   500 mg, Oral, 2 Times Daily             Stop These Medications      irbesartan 75 MG tablet  Commonly known as: AVAPRO     LORATADINE PO     nitroglycerin 0.4 MG SL tablet  Commonly known as: NITROSTAT     Sominex Nighttime Sleep-Aid 25 MG tablet  Generic drug: diphenhydrAMINE              Allergies   Allergen Reactions    Indomethacin Nausea Only and Dizziness     EAR RINGING      Nutritional Supplements Swelling     [place] nuts - no other nuts that she is aware of    Penicillins Anaphylaxis, Hives, Shortness Of Breath and Itching      tachycardia      Statins Unknown - High Severity    Hydrochlorothiazide Unknown - Low Severity     Caused gout symptoms      Lisinopril Cough    Morphine Nausea Only             Letrozole Unknown - Low Severity    Pentazocine Nausea And Vomiting              Discharge Disposition:  Rehab Facility or Unit (DC - External)      Discharge Diet:  Diet Order   Procedures    Diet: Regular/House; Fluid Consistency: Thin (IDDSI 0)       Discharge Activity:       CODE STATUS:    Code Status and Medical Interventions: No CPR (Do Not Attempt to Resuscitate); Limited Support; No intubation (DNI)   Ordered at: 10/10/24 1216     Medical Intervention Limits:    No intubation (DNI)     Code Status (Patient has no pulse and is not breathing):    No CPR (Do Not Attempt to Resuscitate)     Medical Interventions (Patient has pulse or is breathing):    Limited Support       Future Appointments   Date Time Provider Department Center   10/31/2024  9:30 AM LAB CHAIR 2 [facility]  [facility]   10/31/2024 10:00 AM Syed Jasso MD [facility]      Follow-up Information       Betty Isaac, APRN. Schedule an appointment as soon as possible for a visit in 1 week(s).    Specialty: Nurse  Practitioner  Contact information:  [address]  [address]  [place] [id number] [phone]                             Time Spent on Discharge:  Greater than 30 minutes      Leila Owens MD  [facility]  10/14/24  12:16 EDT

## 2024-10-14 NOTE — CASE MANAGEMENT/SOCIAL WORK
Continued Stay Note  [facility]     Patient Name: Mary Jo Berg  MRN: [id number]  Today's Date: 10/14/2024    Admit Date: 10/9/2024    Plan: Plan [facility] for Acute Rehab.  AARON Herrera RN   Discharge Plan       Row Name 10/14/24 1242       Plan    Plan Plan [facility] for Acute Rehab.  AARON Herrera RN    Patient/Family in Agreement with Plan yes    Plan Comments Per Maribell ( [phone]) pt has a bed and can be accepted at [facility] for Acute Rehab today.  Pt and spouse aware [facility] has a financial interest it [facility].  Pt's spouse states he will transport pt.  Discharge Summary in packet.  Facility to print Discharge Summary.  No prescriptions sent. Packet to RN.  Plan [facility] for Acute Rehab.  AARON Herrera RN      Row Name 10/14/24 1026       Plan    Plan Plan Acute Rehab at [facility] or [facility] for skilled care.  AARON Herrera RN    Plan Comments Spoke with pt and pt's spouse ( Sean Berg [phone]) at bedside.  Pt's spouse states pt's first choice is [facility] for Acute Rehab .  Maribell ( [phone]) called for bed availability.  Pt's first choice for skilled care is [facility].  Debby ( [phone]) states [facility] can accept pt.  Plan Acute Rehab at [facility] or [facility] for skilled care. AARON Herrera RN                   Discharge Codes    No documentation.                 Expected Discharge Date and Time       Expected Discharge Date Expected Discharge Time    Oct 14, 2024               Rosalinda Herrera RN

## 2024-10-14 NOTE — DISCHARGE PLACEMENT REQUEST
"Parth Gauthier (74 y.o. Female)       Date of Birth   1950    Social Security Number       Address   [address] [place] [address]    Home Phone   [phone]    MRN   [id number]       Alevism   Lutheran    Marital Status                               Admission Date   10/9/24    Admission Type   Emergency    Admitting Provider   Melvi Whiting MD    Attending Provider   Leila Owens MD    [facility]       Discharge Date       Discharge Disposition       Discharge Destination                                 Attending Provider: Leila Owens MD    Allergies: Indomethacin, Nutritional Supplements, Penicillins, Statins, Hydrochlorothiazide, Lisinopril, Morphine, Letrozole, Pentazocine    Isolation: None   Infection: None   Code Status: No CPR    Ht: 154.9 cm (60.98\")   Wt: 80.2 kg (176 lb 12.9 oz)    Admission Cmt: None   Principal Problem: Hypomagnesemia [E83.42]                   Active Insurance as of 10/9/2024       Primary Coverage       Payor Plan Insurance Group Employer/Plan Group    MEDICARE MEDICARE A & B        Payor Plan Address Payor Plan Phone Number Payor Plan Fax Number Effective Dates    [address] [phone]  1/1/2014 - None Entered    [facility] [id number]         Subscriber Name Subscriber Birth Date Member ID       PARTH GAUTHIER 1950 [id number]               Secondary Coverage       Payor Plan Insurance Group Employer/Plan Group    [facility] SUP [facility] [employer] OPTIONS        Payor Plan Address Payor Plan Phone Number Payor Plan Fax Number Effective Dates    [facility] [phone]  1/1/2020 - None Entered    [address]       [facility] [id number]         Subscriber Name Subscriber Birth Date Member ID       PARTH GAUTHIER 1950 [id number]                     Emergency Contacts        (Rel.) Home Phone Work Phone Mobile Phone    Sean Gauthier " (Spouse) [phone] -- [phone]    Brooklyn Berg (Daughter) -- -- [phone]    Johanny Lam (Daughter) -- -- [phone]

## 2024-11-06 ENCOUNTER — TELEPHONE (OUTPATIENT)
Dept: ONCOLOGY | Facility: CLINIC | Age: 74
End: 2024-11-06

## 2024-11-06 NOTE — TELEPHONE ENCOUNTER
“Please be informed that patient has passed. Patient has been marked  in the system. The date of death is: 11/3/24    Caller: Sean Berg -     Relationship: Emergency Contact    Best call back number: [phone]    Did the patient have surgery within 30 days of their passing (Y/N): N

## (undated) DEVICE — THE TORRENT IRRIGATION SCOPE CONNECTOR IS USED WITH THE TORRENT IRRIGATION TUBING TO PROVIDE IRRIGATION FLUIDS SUCH AS STERILE WATER DURING GASTROINTESTINAL ENDOSCOPIC PROCEDURES WHEN USED IN CONJUNCTION WITH AN IRRIGATION PUMP (OR ELECTROSURGICAL UNIT).: Brand: TORRENT

## (undated) DEVICE — ADAPT CLN BIOGUARD AIR/H2O DISP

## (undated) DEVICE — IRRIGATOR BULB ASEPTO 60CC STRL

## (undated) DEVICE — 3M™ STERI-STRIP™ COMPOUND BENZOIN TINCTURE 40 BAGS/CARTON 4 CARTONS/CASE C1544: Brand: 3M™ STERI-STRIP™

## (undated) DEVICE — CANN O2 ETCO2 FITS ALL CONN CO2 SMPL A/ 7IN DISP LF

## (undated) DEVICE — TUBING, SUCTION, 1/4" X 10', STRAIGHT: Brand: MEDLINE

## (undated) DEVICE — SUT SILK 2/0 FS BLK 18IN 685G

## (undated) DEVICE — Device

## (undated) DEVICE — PENCL E/S HNDSWTCH SMOKEEVAC HOLSTR 10FT

## (undated) DEVICE — SUT VIC 3/0 TIES 18IN J110T

## (undated) DEVICE — CVR TRANSD CIV FLX TPR 11.9 TO 3.8X61CM

## (undated) DEVICE — SKIN PREP TRAY W/CHG: Brand: MEDLINE INDUSTRIES, INC.

## (undated) DEVICE — STPLR SKIN VISISTAT WD 35CT

## (undated) DEVICE — SPNG LAP 18X18IN LF STRL PK/5

## (undated) DEVICE — KT ORCA ORCAPOD DISP STRL

## (undated) DEVICE — TUBING, SUCTION, 1/4" X 20', STRAIGHT: Brand: MEDLINE INDUSTRIES, INC.

## (undated) DEVICE — PROXIMATE PLUS MD MULTI-DIRECTIONAL RELEASE SKIN STAPLERS CONTAINS 35 STAINLESS STEEL STAPLES APPROXIMATE CLOSED DIMENSIONS: 6.9MM X 3.9MM WIDE: Brand: PROXIMATE

## (undated) DEVICE — ANTIBACTERIAL UNDYED BRAIDED (POLYGLACTIN 910), SYNTHETIC ABSORBABLE SUTURE: Brand: COATED VICRYL

## (undated) DEVICE — SYR LUERLOK 5CC

## (undated) DEVICE — ELECTRD BLD EDGE/INSUL1P 2.4X5.1MM STRL

## (undated) DEVICE — GLV SURG PREMIERPRO ORTHO LTX PF SZ8 BRN

## (undated) DEVICE — SYS PERFUS SEP PLATLT W TIPS CUST

## (undated) DEVICE — 3M™ STERI-STRIP™ REINFORCED ADHESIVE SKIN CLOSURES, R1547, 1/2 IN X 4 IN (12 MM X 100 MM), 6 STRIPS/ENVELOPE: Brand: 3M™ STERI-STRIP™

## (undated) DEVICE — LN SMPL CO2 SHTRM SD STREAM W/M LUER

## (undated) DEVICE — BNDG ELAS ELITE V/CLOSE 6IN 5YD LF STRL

## (undated) DEVICE — DRAPE,CHEST,FENES,15X10,STERIL: Brand: MEDLINE

## (undated) DEVICE — PK PROC MINOR TOWER 40

## (undated) DEVICE — 1010 S-DRAPE TOWEL DRAPE 10/BX: Brand: STERI-DRAPE™

## (undated) DEVICE — SENSR O2 OXIMAX FNGR A/ 18IN NONSTR

## (undated) DEVICE — JACKSON-PRATT 100CC BULB RESERVOIR: Brand: CARDINAL HEALTH